# Patient Record
Sex: FEMALE | Race: BLACK OR AFRICAN AMERICAN | NOT HISPANIC OR LATINO | Employment: FULL TIME | URBAN - METROPOLITAN AREA
[De-identification: names, ages, dates, MRNs, and addresses within clinical notes are randomized per-mention and may not be internally consistent; named-entity substitution may affect disease eponyms.]

---

## 2017-01-24 ENCOUNTER — HOSPITAL ENCOUNTER (OUTPATIENT)
Dept: GENERAL RADIOLOGY | Facility: HOSPITAL | Age: 52
Discharge: HOME | End: 2017-01-24
Admitting: ORTHOPAEDIC SURGERY

## 2017-01-24 ENCOUNTER — APPOINTMENT (OUTPATIENT)
Dept: PREADMISSION TESTING | Facility: HOSPITAL | Age: 52
End: 2017-01-24

## 2017-01-24 VITALS
TEMPERATURE: 97.1 F | SYSTOLIC BLOOD PRESSURE: 137 MMHG | OXYGEN SATURATION: 98 % | HEART RATE: 82 BPM | HEIGHT: 62 IN | BODY MASS INDEX: 43.98 KG/M2 | RESPIRATION RATE: 16 BRPM | DIASTOLIC BLOOD PRESSURE: 97 MMHG | WEIGHT: 239 LBS

## 2017-01-24 LAB
APTT PPP: 25.6 SECONDS (ref 22.7–35.4)
BASOPHILS # BLD AUTO: 0.04 10*3/MM3 (ref 0–0.2)
BASOPHILS NFR BLD AUTO: 0.6 % (ref 0–1.5)
BILIRUB UR QL STRIP: NEGATIVE
CLARITY UR: CLEAR
COLOR UR: YELLOW
DEPRECATED RDW RBC AUTO: 49 FL (ref 37–54)
EOSINOPHIL # BLD AUTO: 0.1 10*3/MM3 (ref 0–0.7)
EOSINOPHIL NFR BLD AUTO: 1.6 % (ref 0.3–6.2)
ERYTHROCYTE [DISTWIDTH] IN BLOOD BY AUTOMATED COUNT: 14.5 % (ref 11.7–13)
GLUCOSE UR STRIP-MCNC: NEGATIVE MG/DL
HCT VFR BLD AUTO: 41.4 % (ref 35.6–45.5)
HGB BLD-MCNC: 13.5 G/DL (ref 11.9–15.5)
HGB UR QL STRIP.AUTO: NEGATIVE
IMM GRANULOCYTES # BLD: 0 10*3/MM3 (ref 0–0.03)
IMM GRANULOCYTES NFR BLD: 0 % (ref 0–0.5)
INR PPP: 0.94 (ref 0.9–1.1)
KETONES UR QL STRIP: NEGATIVE
LEUKOCYTE ESTERASE UR QL STRIP.AUTO: NEGATIVE
LYMPHOCYTES # BLD AUTO: 2.66 10*3/MM3 (ref 0.9–4.8)
LYMPHOCYTES NFR BLD AUTO: 42.8 % (ref 19.6–45.3)
MCH RBC QN AUTO: 29.9 PG (ref 26.9–32)
MCHC RBC AUTO-ENTMCNC: 32.6 G/DL (ref 32.4–36.3)
MCV RBC AUTO: 91.8 FL (ref 80.5–98.2)
MONOCYTES # BLD AUTO: 0.33 10*3/MM3 (ref 0.2–1.2)
MONOCYTES NFR BLD AUTO: 5.3 % (ref 5–12)
NEUTROPHILS # BLD AUTO: 3.08 10*3/MM3 (ref 1.9–8.1)
NEUTROPHILS NFR BLD AUTO: 49.7 % (ref 42.7–76)
NITRITE UR QL STRIP: NEGATIVE
PH UR STRIP.AUTO: 7 [PH] (ref 5–8)
PLATELET # BLD AUTO: 303 10*3/MM3 (ref 140–500)
PMV BLD AUTO: 10.5 FL (ref 6–12)
PROT UR QL STRIP: NEGATIVE
PROTHROMBIN TIME: 12.2 SECONDS (ref 11.7–14.2)
RBC # BLD AUTO: 4.51 10*6/MM3 (ref 3.9–5.2)
SP GR UR STRIP: 1.01 (ref 1–1.03)
UROBILINOGEN UR QL STRIP: NORMAL
WBC NRBC COR # BLD: 6.21 10*3/MM3 (ref 4.5–10.7)

## 2017-01-24 PROCEDURE — 85025 COMPLETE CBC W/AUTO DIFF WBC: CPT | Performed by: ORTHOPAEDIC SURGERY

## 2017-01-24 PROCEDURE — 36415 COLL VENOUS BLD VENIPUNCTURE: CPT

## 2017-01-24 PROCEDURE — 85610 PROTHROMBIN TIME: CPT | Performed by: ORTHOPAEDIC SURGERY

## 2017-01-24 PROCEDURE — 85730 THROMBOPLASTIN TIME PARTIAL: CPT | Performed by: ORTHOPAEDIC SURGERY

## 2017-01-24 PROCEDURE — 81003 URINALYSIS AUTO W/O SCOPE: CPT | Performed by: ORTHOPAEDIC SURGERY

## 2017-01-24 PROCEDURE — 71020 HC CHEST PA AND LATERAL: CPT

## 2017-01-24 RX ORDER — AMLODIPINE BESYLATE 10 MG/1
10 TABLET ORAL EVERY MORNING
COMMUNITY
End: 2017-09-18 | Stop reason: SDUPTHER

## 2017-01-24 RX ORDER — CHLORHEXIDINE GLUCONATE 500 MG/1
CLOTH TOPICAL
Status: ON HOLD | COMMUNITY
End: 2017-01-31

## 2017-01-24 RX ORDER — ESTRADIOL 0.07 MG/D
1 FILM, EXTENDED RELEASE TRANSDERMAL 2 TIMES WEEKLY
COMMUNITY
End: 2017-10-10 | Stop reason: SDUPTHER

## 2017-01-24 RX ORDER — FUROSEMIDE 40 MG/1
40 TABLET ORAL EVERY MORNING
COMMUNITY
End: 2017-08-22 | Stop reason: SDUPTHER

## 2017-01-24 NOTE — DISCHARGE INSTRUCTIONS
Take the following medications the morning of surgery with a small sip of water.  AMLODIPINE    ARRIVE AT 5:30    General Instructions:  • Do not eat or drink after midnight: includes water, mints, or gum. You may brush your teeth.  • Do not smoke, chew tobacco, or drink alcohol.  • Bring medications in original bottles, any inhalers and if applicable your C-PAP/ BI-PAP machine.  • Bring any papers given to you in the doctor’s office.  • Wear clean comfortable clothes and socks.  • Do not wear contact lenses or make-up.  Bring a case for your glasses if applicable.   • Bring crutches or walker if applicable.  • Leave all other valuables and jewelry at home.    If you were given a blood bank ID arm band remember to bring it with you the day of surgery.    Preventing a Surgical Site Infection:  Shower on the morning of surgery using a fresh bar of anti-bacterial soap (such as Dial) and clean washcloth.  Dry with a clean towel and dress in clean clothing.  For 2 to 3 days before surgery, avoid shaving with a razor near where you will have surgery because the razor can irritate skin and make it easier to develop an infection  Ask your surgeon if you will be receiving antibiotics prior to surgery  Make sure you, your family, and all healthcare providers clean their hands with soap and water or an alcohol based hand  before caring for you or your wound  If at all possible, quit smoking as many days before surgery as you can.    Day of surgery:  Upon arrival, a Pre-op nurse and Anesthesiologist will review your health history, obtain vital signs, and answer questions you may have.  The only belongings needed at this time will be your home medications and if applicable your C-PAP/BI-PAP machine.  If you are staying overnight your family can leave the rest of your belongings in the car and bring them to your room later.  A Pre-op nurse will start an IV and you may receive medication in preparation for surgery,  including something to help you relax.  Your family will be able to see you in the Pre-op area.  While you are in surgery your family should notify the waiting room  if they leave the waiting room area and provide a contact phone number.    Please be aware that surgery does come with discomfort.  We want to make every effort to control your discomfort so please discuss any uncontrolled symptoms with your nurse.   Your doctor will most likely have prescribed pain medications.      If you are going home after surgery you will receive individualized written care instructions before being discharged.  A responsible adult must drive you to and from the hospital on the day of your surgery and stay with you for 24 hours.    If you are staying overnight following surgery, you will be transported to your hospital room following the recovery period.  [facility] has all private rooms.    If you have any questions please call Pre-Admission Testing at [phone].  Deductibles and co-payments are collected on the day of service. Please be prepared to pay the required co-pay, deductible or deposit on the day of service as defined by your plan.    2% CHLORAHEXIDINE GLUCONATE* CLOTH  Preparing or “prepping” skin before surgery can reduce the risk of infection at the surgical site. To make the process easier, [facility] has chosen disposable cloths moistened with a rinse-free, 2% Chlorhexidine Gluconate (CHG) antiseptic solution. The steps below outline the prepping process and should be carefully followed.        Use the prep cloth on the area that is circled in the diagram             Directions Night before Surgery  1) Shower using a fresh bar of anti-bacterial soap (such as Dial) and clean washcloth.  Use a clean towel to completely dry your skin.  2) Do not use any lotions, oils or creams on your skin.  3) Open the package and remove 1 cloth, wipe your skin for 30 seconds in a circular  motion.  Allow to dry for 3 minutes.  4) Repeat #3 with second cloth.  5) Do not touch your eyes, ears, or mouth with the prep cloth.  6) Allow the wet prep solution to air dry.  7) Discard the prep cloth and wash your hands with soap and water.   8) Dress in clean bed clothes and sleep on fresh clean bed sheets.   9) You may experience some temporary itching after the prep.    Directions Day of Surgery  1) Repeat steps 1,2,3,4,5,6,7, and 9.   2) Dress in clean clothes before coming to the hospital.    BACTROBAN NASAL OINTMENT  There are many germs normally in your nose. Bactroban is an ointment that will help reduce these germs. Please follow these instructions for Bactroban use:    ____Two days before surgery in the evening Date________    ____The day before surgery in the morning  Date________    ____The day before surgery in the evening              Date________    ____The day of surgery in the morning    Date________    **Squirt ½ package of Bactroban Ointment onto a cotton applicator and apply to inside of 1st nostril.  Squirt the remaining Bactroban and apply to the inside of the other nostril.    PERIDEX- ORAL:  Use only if your surgeon has ordered  Use the night before and morning of surgery - Swish, gargle, and spit - do not swallow.

## 2017-01-24 NOTE — MR AVS SNAPSHOT
Joselynromaine Grant   2017 12:00 PM   Appointment    Provider:  FRANKI De Leon   Department:  [facility] PREADMISSION T   Dept Phone:  [phone]                Your Full Care Plan              Your Updated Medication List          This list is accurate as of: 17 12:46 PM.  Always use your most recent med list.                amLODIPine 10 MG tablet   Commonly known as:  NORVASC       Chlorhexidine Gluconate Cloth 2 % pads       diclofenac 50 MG tablet   Commonly known as:  CATAFLAM       estradiol 0.075 MG/24HR patch   Commonly known as:  BRIDGER JOHN-DOT       * furosemide 40 MG tablet   Commonly known as:  LASIX   Take 1 tablet by mouth Daily.       * furosemide 40 MG tablet   Commonly known as:  LASIX       MULTI VITAMIN PO       mupirocin 2 % nasal ointment   Commonly known as:  BACTROBAN       POTASSIUM PO       traMADol 50 MG tablet   Commonly known as:  ULTRAM       ULTIMATE FAT BURNER tablet       * Notice:  This list has 2 medication(s) that are the same as other medications prescribed for you. Read the directions carefully, and ask your doctor or other care provider to review them with you.            [employer] Signup     [facility] [employer] allows you to send messages to your doctor, view your test results, renew your prescriptions, schedule appointments, and more. To sign up, go to [employer] and click on the Sign Up Now link in the New User? box. Enter your [employer] Activation Code exactly as it appears below along with the last four digits of your Social Security Number and your Date of Birth () to complete the sign-up process. If you do not sign up before the expiration date, you must request a new code.    [employer] Activation Code: [email or web address]  Expires: 2017  5:38 AM    If you have questions, you can email "[email or web address] or call [phone] to talk to our [employer] staff. Remember, [employer] is NOT to be used  "for urgent needs. For medical emergencies, dial 911.               Other Info from Your Visit           Allergies     No Known Allergies      Vital Signs     Blood Pressure Pulse Temperature Respirations Height Weight    137/97 (BP Location: Left arm, Patient Position: Sitting) 82 97.1 °F (36.2 °C) (Oral) 16 62\" (157.5 cm) 239 lb (108 kg)    Oxygen Saturation Body Mass Index Smoking Status             98% 43.71 kg/m2 Never Smoker           Discharge Instructions       Take the following medications the morning of surgery with a small sip of water.      ARRIVE AT 5:30    General Instructions:  • Do not eat or drink after midnight: includes water, mints, or gum. You may brush your teeth.  • Do not smoke, chew tobacco, or drink alcohol.  • Bring medications in original bottles, any inhalers and if applicable your C-PAP/ BI-PAP machine.  • Bring any papers given to you in the doctor’s office.  • Wear clean comfortable clothes and socks.  • Do not wear contact lenses or make-up.  Bring a case for your glasses if applicable.   • Bring crutches or walker if applicable.  • Leave all other valuables and jewelry at home.    If you were given a blood bank ID arm band remember to bring it with you the day of surgery.    Preventing a Surgical Site Infection:  Shower on the morning of surgery using a fresh bar of anti-bacterial soap (such as Dial) and clean washcloth.  Dry with a clean towel and dress in clean clothing.  For 2 to 3 days before surgery, avoid shaving with a razor near where you will have surgery because the razor can irritate skin and make it easier to develop an infection  Ask your surgeon if you will be receiving antibiotics prior to surgery  Make sure you, your family, and all healthcare providers clean their hands with soap and water or an alcohol based hand  before caring for you or your wound  If at all possible, quit smoking as many days before surgery as you can.    Day of surgery:  Upon arrival, " a Pre-op nurse and Anesthesiologist will review your health history, obtain vital signs, and answer questions you may have.  The only belongings needed at this time will be your home medications and if applicable your C-PAP/BI-PAP machine.  If you are staying overnight your family can leave the rest of your belongings in the car and bring them to your room later.  A Pre-op nurse will start an IV and you may receive medication in preparation for surgery, including something to help you relax.  Your family will be able to see you in the Pre-op area.  While you are in surgery your family should notify the waiting room  if they leave the waiting room area and provide a contact phone number.    Please be aware that surgery does come with discomfort.  We want to make every effort to control your discomfort so please discuss any uncontrolled symptoms with your nurse.   Your doctor will most likely have prescribed pain medications.      If you are going home after surgery you will receive individualized written care instructions before being discharged.  A responsible adult must drive you to and from the hospital on the day of your surgery and stay with you for 24 hours.    If you are staying overnight following surgery, you will be transported to your hospital room following the recovery period.  [facility] has all private rooms.    If you have any questions please call Pre-Admission Testing at [phone].  Deductibles and co-payments are collected on the day of service. Please be prepared to pay the required co-pay, deductible or deposit on the day of service as defined by your plan.    2% CHLORAHEXIDINE GLUCONATE* CLOTH  Preparing or “prepping” skin before surgery can reduce the risk of infection at the surgical site. To make the process easier, [facility] has chosen disposable cloths moistened with a rinse-free, 2% Chlorhexidine Gluconate (CHG) antiseptic solution. The steps below  outline the prepping process and should be carefully followed.        Use the prep cloth on the area that is circled in the diagram             Directions Night before Surgery  1) Shower using a fresh bar of anti-bacterial soap (such as Dial) and clean washcloth.  Use a clean towel to completely dry your skin.  2) Do not use any lotions, oils or creams on your skin.  3) Open the package and remove 1 cloth, wipe your skin for 30 seconds in a circular motion.  Allow to dry for 3 minutes.  4) Repeat #3 with second cloth.  5) Do not touch your eyes, ears, or mouth with the prep cloth.  6) Allow the wet prep solution to air dry.  7) Discard the prep cloth and wash your hands with soap and water.   8) Dress in clean bed clothes and sleep on fresh clean bed sheets.   9) You may experience some temporary itching after the prep.    Directions Day of Surgery  1) Repeat steps 1,2,3,4,5,6,7, and 9.   2) Dress in clean clothes before coming to the hospital.    BACTROBAN NASAL OINTMENT  There are many germs normally in your nose. Bactroban is an ointment that will help reduce these germs. Please follow these instructions for Bactroban use:    ____Two days before surgery in the evening Date________    ____The day before surgery in the morning  Date________    ____The day before surgery in the evening              Date________    ____The day of surgery in the morning    Date________    **Squirt ½ package of Bactroban Ointment onto a cotton applicator and apply to inside of 1st nostril.  Squirt the remaining Bactroban and apply to the inside of the other nostril.    PERIDEX- ORAL:  Use only if your surgeon has ordered  Use the night before and morning of surgery - Swish, gargle, and spit - do not swallow.       SYMPTOMS OF A STROKE    Call 911 or have someone take you to the Emergency Department if you have any of the following:    · Sudden numbness or weakness of your face, arm or leg especially on one side of the body  · Sudden  confusion, diffiiculty speaking or trouble understanding   · Changes in your vision or loss of sight in one eye  · Sudden severe headache with no known cause  · sudden dizziness, trouble walking, loss of balance or coordination    It is important to seek emergency care right away if you have further stroke symptoms. If you get emergency help quickly, the powerful clot-dissolving medicines can reduce the disabilities caused by a stroke.     For more information:    American Stroke Association  3-709-7-STROKE  www.strokeassociation.org           IF YOU SMOKE OR USE TOBACCO PLEASE READ THE FOLLOWING:    Why is smoking bad for me?  Smoking increases the risk of heart disease, lung disease, vascular disease, stroke, and cancer.     If you smoke, STOP!    If you would like more information on quitting smoking, please visit the [employer] website: [email or web address]   This link will provide additional resources including the QUIT line and the Beat the Pack support groups.     For more information:    American Cancer Society  ([phone] 912-9362    American Heart Association  1-252.453.7044

## 2017-01-30 PROBLEM — T84.023A INSTABILITY OF INTERNAL LEFT KNEE PROSTHESIS (HCC): Chronic | Status: ACTIVE | Noted: 2017-01-30

## 2017-01-31 ENCOUNTER — ANESTHESIA EVENT (OUTPATIENT)
Dept: PERIOP | Facility: HOSPITAL | Age: 52
End: 2017-01-31

## 2017-01-31 ENCOUNTER — APPOINTMENT (OUTPATIENT)
Dept: GENERAL RADIOLOGY | Facility: HOSPITAL | Age: 52
End: 2017-01-31

## 2017-01-31 ENCOUNTER — ANESTHESIA (OUTPATIENT)
Dept: PERIOP | Facility: HOSPITAL | Age: 52
End: 2017-01-31

## 2017-01-31 PROBLEM — E66.01 MORBID OBESITY WITH BMI OF 40.0-44.9, ADULT (HCC): Status: ACTIVE | Noted: 2017-01-31

## 2017-01-31 PROBLEM — M17.9 OSTEOARTHRITIS, KNEE: Status: ACTIVE | Noted: 2017-01-31

## 2017-01-31 PROCEDURE — 25010000002 PROPOFOL 10 MG/ML EMULSION: Performed by: NURSE ANESTHETIST, CERTIFIED REGISTERED

## 2017-01-31 PROCEDURE — 25010000002 KETOROLAC TROMETHAMINE PER 15 MG: Performed by: NURSE ANESTHETIST, CERTIFIED REGISTERED

## 2017-01-31 PROCEDURE — 25010000002 ROPIVACAINE PER 1 MG: Performed by: ANESTHESIOLOGY

## 2017-01-31 PROCEDURE — 73560 X-RAY EXAM OF KNEE 1 OR 2: CPT

## 2017-01-31 PROCEDURE — 25010000002 DEXAMETHASONE PER 1 MG: Performed by: NURSE ANESTHETIST, CERTIFIED REGISTERED

## 2017-01-31 PROCEDURE — 25010000002 NEOSTIGMINE 10 MG/10ML SOLUTION: Performed by: NURSE ANESTHETIST, CERTIFIED REGISTERED

## 2017-01-31 PROCEDURE — 25010000002 FENTANYL CITRATE (PF) 100 MCG/2ML SOLUTION: Performed by: NURSE ANESTHETIST, CERTIFIED REGISTERED

## 2017-01-31 PROCEDURE — 25010000003 CEFAZOLIN IN DEXTROSE 2-4 GM/100ML-% SOLUTION: Performed by: ORTHOPAEDIC SURGERY

## 2017-01-31 PROCEDURE — 25010000002 ONDANSETRON PER 1 MG: Performed by: NURSE ANESTHETIST, CERTIFIED REGISTERED

## 2017-01-31 RX ORDER — ONDANSETRON 2 MG/ML
INJECTION INTRAMUSCULAR; INTRAVENOUS AS NEEDED
Status: DISCONTINUED | OUTPATIENT
Start: 2017-01-31 | End: 2017-01-31 | Stop reason: SURG

## 2017-01-31 RX ORDER — LIDOCAINE HYDROCHLORIDE 20 MG/ML
INJECTION, SOLUTION INFILTRATION; PERINEURAL AS NEEDED
Status: DISCONTINUED | OUTPATIENT
Start: 2017-01-31 | End: 2017-01-31 | Stop reason: SURG

## 2017-01-31 RX ORDER — KETOROLAC TROMETHAMINE 30 MG/ML
INJECTION, SOLUTION INTRAMUSCULAR; INTRAVENOUS AS NEEDED
Status: DISCONTINUED | OUTPATIENT
Start: 2017-01-31 | End: 2017-01-31 | Stop reason: SURG

## 2017-01-31 RX ORDER — ROPIVACAINE HYDROCHLORIDE 5 MG/ML
INJECTION, SOLUTION EPIDURAL; INFILTRATION; PERINEURAL AS NEEDED
Status: DISCONTINUED | OUTPATIENT
Start: 2017-01-31 | End: 2017-01-31 | Stop reason: SURG

## 2017-01-31 RX ORDER — GLYCOPYRROLATE 0.2 MG/ML
INJECTION INTRAMUSCULAR; INTRAVENOUS AS NEEDED
Status: DISCONTINUED | OUTPATIENT
Start: 2017-01-31 | End: 2017-01-31 | Stop reason: SURG

## 2017-01-31 RX ORDER — LIDOCAINE HYDROCHLORIDE AND EPINEPHRINE BITARTRATE 20; .01 MG/ML; MG/ML
INJECTION, SOLUTION SUBCUTANEOUS AS NEEDED
Status: DISCONTINUED | OUTPATIENT
Start: 2017-01-31 | End: 2017-01-31 | Stop reason: SURG

## 2017-01-31 RX ORDER — ROCURONIUM BROMIDE 10 MG/ML
INJECTION, SOLUTION INTRAVENOUS AS NEEDED
Status: DISCONTINUED | OUTPATIENT
Start: 2017-01-31 | End: 2017-01-31 | Stop reason: SURG

## 2017-01-31 RX ORDER — TRANEXAMIC ACID 100 MG/ML
INJECTION, SOLUTION INTRAVENOUS AS NEEDED
Status: DISCONTINUED | OUTPATIENT
Start: 2017-01-31 | End: 2017-01-31 | Stop reason: SURG

## 2017-01-31 RX ORDER — DEXAMETHASONE SODIUM PHOSPHATE 10 MG/ML
INJECTION INTRAMUSCULAR; INTRAVENOUS AS NEEDED
Status: DISCONTINUED | OUTPATIENT
Start: 2017-01-31 | End: 2017-01-31 | Stop reason: SURG

## 2017-01-31 RX ORDER — NEOSTIGMINE METHYLSULFATE 1 MG/ML
INJECTION, SOLUTION INTRAVENOUS AS NEEDED
Status: DISCONTINUED | OUTPATIENT
Start: 2017-01-31 | End: 2017-01-31 | Stop reason: SURG

## 2017-01-31 RX ORDER — FENTANYL CITRATE 50 UG/ML
INJECTION, SOLUTION INTRAMUSCULAR; INTRAVENOUS AS NEEDED
Status: DISCONTINUED | OUTPATIENT
Start: 2017-01-31 | End: 2017-01-31 | Stop reason: SURG

## 2017-01-31 RX ORDER — PROPOFOL 10 MG/ML
VIAL (ML) INTRAVENOUS AS NEEDED
Status: DISCONTINUED | OUTPATIENT
Start: 2017-01-31 | End: 2017-01-31 | Stop reason: SURG

## 2017-01-31 RX ADMIN — LIDOCAINE HYDROCHLORIDE 60 MG: 20 INJECTION, SOLUTION INFILTRATION; PERINEURAL at 10:02

## 2017-01-31 RX ADMIN — CEFAZOLIN SODIUM 2 G: 2 INJECTION, SOLUTION INTRAVENOUS at 09:56

## 2017-01-31 RX ADMIN — TRANEXAMIC ACID 1000 MG: 100 INJECTION, SOLUTION INTRAVENOUS at 11:44

## 2017-01-31 RX ADMIN — GLYCOPYRROLATE 0.6 MG: 0.2 INJECTION INTRAMUSCULAR; INTRAVENOUS at 12:10

## 2017-01-31 RX ADMIN — FENTANYL CITRATE 100 MCG: 50 INJECTION, SOLUTION INTRAMUSCULAR; INTRAVENOUS at 10:02

## 2017-01-31 RX ADMIN — KETOROLAC TROMETHAMINE 30 MG: 30 INJECTION, SOLUTION INTRAMUSCULAR; INTRAVENOUS at 12:11

## 2017-01-31 RX ADMIN — ROPIVACAINE HYDROCHLORIDE 30 ML: 5 INJECTION, SOLUTION EPIDURAL; INFILTRATION; PERINEURAL at 08:40

## 2017-01-31 RX ADMIN — FENTANYL CITRATE 50 MCG: 50 INJECTION, SOLUTION INTRAMUSCULAR; INTRAVENOUS at 12:05

## 2017-01-31 RX ADMIN — NEOSTIGMINE METHYLSULFATE 4 MG: 1 INJECTION INTRAVENOUS at 12:10

## 2017-01-31 RX ADMIN — ROCURONIUM BROMIDE 50 MG: 10 INJECTION INTRAVENOUS at 10:02

## 2017-01-31 RX ADMIN — ONDANSETRON 4 MG: 2 INJECTION INTRAMUSCULAR; INTRAVENOUS at 11:50

## 2017-01-31 RX ADMIN — SODIUM CHLORIDE, POTASSIUM CHLORIDE, SODIUM LACTATE AND CALCIUM CHLORIDE: 600; 310; 30; 20 INJECTION, SOLUTION INTRAVENOUS at 07:45

## 2017-01-31 RX ADMIN — DEXAMETHASONE SODIUM PHOSPHATE 8 MG: 10 INJECTION INTRAMUSCULAR; INTRAVENOUS at 10:05

## 2017-01-31 RX ADMIN — FENTANYL CITRATE 50 MCG: 50 INJECTION, SOLUTION INTRAMUSCULAR; INTRAVENOUS at 10:10

## 2017-01-31 RX ADMIN — PROPOFOL 200 MG: 10 INJECTION, EMULSION INTRAVENOUS at 10:02

## 2017-01-31 RX ADMIN — LIDOCAINE HYDROCHLORIDE AND EPINEPHRINE 30 ML: 20; 10 INJECTION, SOLUTION INFILTRATION; PERINEURAL at 08:40

## 2017-01-31 RX ADMIN — EPHEDRINE SULFATE 5 MG: 50 INJECTION INTRAMUSCULAR; INTRAVENOUS; SUBCUTANEOUS at 11:11

## 2017-01-31 RX ADMIN — SODIUM CHLORIDE, POTASSIUM CHLORIDE, SODIUM LACTATE AND CALCIUM CHLORIDE: 600; 310; 30; 20 INJECTION, SOLUTION INTRAVENOUS at 12:06

## 2017-01-31 RX ADMIN — FENTANYL CITRATE 50 MCG: 50 INJECTION, SOLUTION INTRAMUSCULAR; INTRAVENOUS at 10:40

## 2017-01-31 RX ADMIN — ROCURONIUM BROMIDE 10 MG: 10 INJECTION INTRAVENOUS at 11:18

## 2017-01-31 NOTE — ANESTHESIA PROCEDURE NOTES
Peripheral Block    Patient location during procedure: holding area  Start time: 1/31/2017 8:30 AM  Stop time: 1/31/2017 8:50 AM  Reason for block: at surgeon's request and post-op pain management  Preanesthetic Checklist  Completed: patient identified, site marked, surgical consent, pre-op evaluation, timeout performed, IV checked, risks and benefits discussed and monitors and equipment checked  Peripheral Block Prep:  Sterile barriers:cap and gloves  Prep: ChloraPrep  Patient monitoring: blood pressure monitoring, continuous pulse oximetry and EKG  Peripheral Procedure  Sedation:yes  Guidance:nerve stimulator  Images:still images not obtained  Laterality:leftBlock Type:femoral and sciatic  Injection Technique:single-shotNeedle Type:short-bevel  Needle Gauge:22 G    Medications  Local Injected:ropivacaine 0.5% without epinephrine and lidocaine 2% with epinephrine Local Amount Injected:60mL  Post Assessment  Patient Tolerance:comfortable throughout block  Complications:no

## 2017-01-31 NOTE — ANESTHESIA PROCEDURE NOTES
Airway  Urgency: elective    Airway not difficult    General Information and Staff    Patient location during procedure: OR  Anesthesiologist: MARY CABRAL  CRNA: KRISTI WALTON    Indications and Patient Condition  Indications for airway management: airway protection    Preoxygenated: yes  MILS not maintained throughout  Mask difficulty assessment: 1 - vent by mask    Final Airway Details  Final airway type: endotracheal airway      Successful airway: ETT  Cuffed: yes   Successful intubation technique: direct laryngoscopy  Facilitating devices/methods: intubating stylet  Endotracheal tube insertion site: oral  Blade: Jalen  Blade size: #3  ETT size: 7.0 mm  Cormack-Lehane Classification: grade I - full view of glottis  Placement verified by: chest auscultation   Cuff volume (mL): 8  Measured from: lips  ETT to lips (cm): 21  Number of attempts at approach: 1    Additional Comments  PreO2 100% face mask, IV induction, easy mask, DVL x1, cords noted, tube through, cuff up, EBBSH, +etCO2, = chest movement, tube secured in place, atraumatic, teeth and lips intact as preop.

## 2017-01-31 NOTE — ANESTHESIA POSTPROCEDURE EVALUATION
Patient: Joselyn Grant    Procedure Summary     Date Anesthesia Start Anesthesia Stop Room / Location    01/31/17 0956 1236  [facility] OR 22 /  [facility]       Procedure Diagnosis Surgeon Provider    LT TOTAL KNEE ARTHROPLASTY REVISION (Left Knee) Instability of internal left knee prosthesis, initial encounter MD Belle Sy MD          Anesthesia Type: general  Last vitals  /84 (01/31/17 1315)    Temp 36.4 °C (97.6 °F) (01/31/17 1315)    Pulse 89 (01/31/17 1315)   Resp 16 (01/31/17 1315)    SpO2 95 % (01/31/17 1315)      Post Anesthesia Care and Evaluation    Patient location during evaluation: PACU  Patient participation: complete - patient participated  Level of consciousness: awake and alert  Pain management: adequate  Airway patency: patent  Anesthetic complications: No anesthetic complications    Cardiovascular status: acceptable  Respiratory status: acceptable  Hydration status: acceptable

## 2017-01-31 NOTE — ANESTHESIA PREPROCEDURE EVALUATION
Anesthesia Evaluation     Patient summary reviewed and Nursing notes reviewed    Airway   Mallampati: II  Neck ROM: full  no difficulty expected  Dental - normal exam     Pulmonary     breath sounds clear to auscultation  Cardiovascular   (+) hypertension,     Rhythm: regular    Neuro/Psych  GI/Hepatic/Renal/Endo    (+) obesity, morbid obesity,     Musculoskeletal     Abdominal   (+) obese,    Substance History      OB/GYN          Other                             Anesthesia Plan    ASA 3     general   (FEM/Sciatic)  intravenous induction   Anesthetic plan and risks discussed with patient.

## 2017-02-02 PROBLEM — T84.023A INSTABILITY OF INTERNAL LEFT KNEE PROSTHESIS: Chronic | Status: RESOLVED | Noted: 2017-01-30 | Resolved: 2017-02-02

## 2017-02-02 PROBLEM — M17.9 OSTEOARTHRITIS, KNEE: Status: RESOLVED | Noted: 2017-01-31 | Resolved: 2017-02-02

## 2017-02-21 ENCOUNTER — TRANSCRIBE ORDERS (OUTPATIENT)
Dept: PHYSICAL THERAPY | Facility: CLINIC | Age: 52
End: 2017-02-21

## 2017-02-21 ENCOUNTER — TREATMENT (OUTPATIENT)
Dept: PHYSICAL THERAPY | Facility: CLINIC | Age: 52
End: 2017-02-21

## 2017-02-21 DIAGNOSIS — M25.562 ACUTE PAIN OF LEFT KNEE: Primary | ICD-10-CM

## 2017-02-21 DIAGNOSIS — Z96.652 STATUS POST REVISION OF TOTAL KNEE, LEFT: ICD-10-CM

## 2017-02-21 DIAGNOSIS — M25.662 KNEE STIFFNESS, LEFT: ICD-10-CM

## 2017-02-21 DIAGNOSIS — M25.562 LEFT KNEE PAIN, UNSPECIFIED CHRONICITY: Primary | ICD-10-CM

## 2017-02-21 PROCEDURE — G0283 ELEC STIM OTHER THAN WOUND: HCPCS | Performed by: PHYSICAL THERAPIST

## 2017-02-21 PROCEDURE — 97110 THERAPEUTIC EXERCISES: CPT | Performed by: PHYSICAL THERAPIST

## 2017-02-21 PROCEDURE — 97161 PT EVAL LOW COMPLEX 20 MIN: CPT | Performed by: PHYSICAL THERAPIST

## 2017-02-21 NOTE — PROGRESS NOTES
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  Previous hx of left knee ACL reconstruction, TKA, and TKA revision.  Currently ambulating with straight cane.  Reports left sciatic nerve pain of several months duration as well, is on medication for this and states it is helping.    Quality of life: good    Pain  Location: left knee  Quality: dull ache, sharp, burning and throbbing  Relieving factors: ice and medications  Aggravating factors: movement  Progression: improved    Diagnostic Tests  X-ray: normal (post op)    Patient Goals  Patient goals for therapy: decreased pain, increased motion, independence with ADLs/IADLs, increased strength, return to sport/leisure activities and return to work  Patient goal:              Objective     Observations   Left Knee   Positive for incision.     Additional Observation Details  Incision healing well, no signs of infection    Palpation   Left   Tenderness of the distal semimembranosus, distal semitendinosus and rectus femoris.     Tenderness   Left Knee   Tenderness in the lateral joint line, medial joint line, patellar tendon and quadriceps tendon.     Neurological Testing   Sensation     Knee   Left Knee   Diminished: light touch     Comments   Left light touch: left lateral knee.     Active Range of Motion   Left Knee   Flexion: 97 degrees   Extension: 6 degrees     Right Knee   Normal active range of motion    Passive Range of Motion   Left Knee   Flexion: 100 degrees   Extension: 2 degrees     Right Knee   Normal passive range of motion    Strength/Myotome Testing      Left Hip   Planes of Motion   Flexion: 4-  Extension: 3-  Abduction: 3-  External rotation: 3-  Internal rotation: 3-    Right Hip   Planes of Motion   Flexion: 5  Extension: 5  Abduction: 5  External rotation: 5  Internal rotation: 5    Left Knee   Flexion: 3-  Extension: 3-  Quadriceps contraction: fair    Right Knee   Flexion: 5  Extension: 5  Quadriceps contraction: good    Ambulation   Weight-Bearing Status   Weight-Bearing Status (Left): weight-bearing as tolerated   Assistive device used: single point cane    Observational Gait   Gait: antalgic   Decreased walking speed, stride length and left stance time.   Left foot contact pattern: foot flat         Assessment & Plan     Assessment  Impairments: abnormal gait, abnormal or restricted ROM, activity intolerance, impaired balance, impaired physical strength, lacks appropriate home exercise program, pain with function and weight-bearing intolerance  Assessment details: Pt will benefit from skilled PT services in order to address listed impairments and increase tolerance to normal daily activities including ADL's, work and recreational activities.    Prognosis: good  Prognosis details: Short Term Goals: 4-6 weeks. Patient will:  1.  Patient to be adherent with stretching and HEP.  3.  (L) MMT 4/5 for ability to ascend/descend 5 steps and transfer sit to stand x 5 with knee pain <3/10  4.  Increased hip joint, patellar mobility to allow for decreased stress at tibiofemoral, patellofemoral joint. (knee ROM 0°-110°)  5.  Pt. to exhibit increased LE soft tissue extensibility to allow for increased ease with walking 10 minutes.     Long Term Goals: 6-12 weeks. Patient will:  1.  Pt to score 80% perceived normal ability on LEFS  2.  (L) LE strength to at least 4+/5 to ascend/descend 5 steps without pain >1/10.  3.  Pt to exhibit improved Knee AROM 0 degrees-125 degrees to allow for improved gait, kneeling, bending squatting as is necessary for home management and  work tasks.    4.  Pt to exhibit LE endurance/strength to 4+/5 to allow for returning to unrestricted walking > 20 min.    5.  Negative findings for special testing for dysfunction.   6.  Pt to demonstrate increased stability of the knee to balance on left for 20 seconds as needed to traverse uneven terrain .    Goals        Plan  Therapy options: will be seen for skilled physical therapy services  Planned modality interventions: cryotherapy, electrical stimulation/Russian stimulation and thermotherapy (hydrocollator packs)  Planned therapy interventions: balance/weight-bearing training, body mechanics training, flexibility, functional ROM exercises, gait training, home exercise program, joint mobilization, manual therapy, neuromuscular re-education, postural training, soft tissue mobilization, strengthening, stretching and therapeutic activities  Frequency: 3x week  Duration in weeks: 12  Treatment plan discussed with: patient        Manual Therapy:    0     mins  14467;  Therapeutic Exercise:    23     mins  48446;     Neuromuscular Driss:    0    mins  11571;    Therapeutic Activity:     0     mins  16800;     Gait Trainin     mins  22496;     Ultrasound:     0     mins  63599;    Electrical Stimulation:    15     mins  86629 ( );    Timed Treatment:   23   mins   Total Treatment:     65   mins    PT SIGNATURE: Zena Longo PT, DPT          Physical Therapist                                [place] License #[id number]    DATE TREATMENT INITIATED: 2017    Initial Certification Certification Period: 2017  I certify that the therapy services are furnished while this patient is under my care.  The services outlined above are required by this patient, and will be reviewed every 90 days.     PHYSICIAN: Ha Oh MD      DATE:     Please sign and return via fax to [phone].. Thank you, [facility].

## 2017-02-22 ENCOUNTER — TREATMENT (OUTPATIENT)
Dept: PHYSICAL THERAPY | Facility: CLINIC | Age: 52
End: 2017-02-22

## 2017-02-22 DIAGNOSIS — Z96.652 STATUS POST REVISION OF TOTAL KNEE, LEFT: ICD-10-CM

## 2017-02-22 DIAGNOSIS — M25.662 KNEE STIFFNESS, LEFT: ICD-10-CM

## 2017-02-22 DIAGNOSIS — M25.562 LEFT KNEE PAIN, UNSPECIFIED CHRONICITY: Primary | ICD-10-CM

## 2017-02-22 PROCEDURE — 97110 THERAPEUTIC EXERCISES: CPT | Performed by: PHYSICAL THERAPIST

## 2017-02-22 PROCEDURE — G0283 ELEC STIM OTHER THAN WOUND: HCPCS | Performed by: PHYSICAL THERAPIST

## 2017-02-22 NOTE — PROGRESS NOTES
Physical Therapy Daily Progress Note    Subjective     Joselyn Grant reports: sore after initial evaluation, is having difficulty sleeping      Objective   See Exercise, Manual, and Modality Logs for complete treatment.       Assessment/Plan  Difficulty with exercises today because of knee and sciatic pain.  ROM is progressing well, however.  Progress per Plan of Care           Manual Therapy:    0     mins  44920;  Therapeutic Exercise:    30     mins  97657;     Neuromuscular Driss:    0    mins  86377;    Therapeutic Activity:     0     mins  91170;     Gait Trainin     mins  18971;     Ultrasound:     0     mins  78834;    Electrical Stimulation:    15     mins  95429 ( );    Timed Treatment:   30   mins   Total Treatment:     45   mins    Zena Longo PT, DPT  Physical Therapist  [place] License #[id number]

## 2017-02-24 ENCOUNTER — TREATMENT (OUTPATIENT)
Dept: PHYSICAL THERAPY | Facility: CLINIC | Age: 52
End: 2017-02-24

## 2017-02-24 DIAGNOSIS — M25.662 KNEE STIFFNESS, LEFT: ICD-10-CM

## 2017-02-24 DIAGNOSIS — Z96.652 STATUS POST REVISION OF TOTAL KNEE, LEFT: ICD-10-CM

## 2017-02-24 DIAGNOSIS — M25.562 LEFT KNEE PAIN, UNSPECIFIED CHRONICITY: Primary | ICD-10-CM

## 2017-02-24 PROCEDURE — 97110 THERAPEUTIC EXERCISES: CPT | Performed by: PHYSICAL THERAPIST

## 2017-02-24 PROCEDURE — G0283 ELEC STIM OTHER THAN WOUND: HCPCS | Performed by: PHYSICAL THERAPIST

## 2017-02-24 NOTE — PROGRESS NOTES
Physical Therapy Daily Progress Note    Subjective     Joselyn Grant reports: knee is more painful today, unsure of cause.      Objective   See Exercise, Manual, and Modality Logs for complete treatment.       Assessment/Plan  Unable to progress exercises further today because of pain/nausea.  Progress per Plan of Care           Manual Therapy:    0     mins  89574;  Therapeutic Exercise:    40     mins  21970;     Neuromuscular Driss:    0    mins  89320;    Therapeutic Activity:     0     mins  54441;     Gait Trainin     mins  85470;     Ultrasound:     0     mins  18942;    Electrical Stimulation:    15     mins  21100 ( );    Timed Treatment:   40   mins   Total Treatment:     55   mins    Zena Longo PT, DPT  Physical Therapist  [place] License #[id number]

## 2017-03-02 ENCOUNTER — TREATMENT (OUTPATIENT)
Dept: PHYSICAL THERAPY | Facility: CLINIC | Age: 52
End: 2017-03-02

## 2017-03-02 DIAGNOSIS — M25.562 LEFT KNEE PAIN, UNSPECIFIED CHRONICITY: Primary | ICD-10-CM

## 2017-03-02 DIAGNOSIS — Z96.652 STATUS POST REVISION OF TOTAL KNEE, LEFT: ICD-10-CM

## 2017-03-02 DIAGNOSIS — M25.662 KNEE STIFFNESS, LEFT: ICD-10-CM

## 2017-03-02 PROCEDURE — G0283 ELEC STIM OTHER THAN WOUND: HCPCS | Performed by: PHYSICAL THERAPIST

## 2017-03-02 PROCEDURE — 97110 THERAPEUTIC EXERCISES: CPT | Performed by: PHYSICAL THERAPIST

## 2017-03-02 NOTE — PROGRESS NOTES
Physical Therapy Daily Progress Note    Subjective     Joselyn Grant reports: her movement, quad is improved.  Pain is also a little better.      Objective   See Exercise, Manual, and Modality Logs for complete treatment.       Assessment/Plan  Able to perform 8 reps of SLR independently without extensor lag.  ROM progressing normally.  Will benefit from continued PT addressing ROM and strength.  Progress per Plan of Care           Manual Therapy:    0     mins  44359;  Therapeutic Exercise:    30     mins  95400;     Neuromuscular Driss:    0    mins  67562;    Therapeutic Activity:     0     mins  40904;     Gait Trainin     mins  71807;     Ultrasound:     0     mins  59407;    Electrical Stimulation:    15     mins  98534 ( );    Timed Treatment:   30   mins   Total Treatment:     45   mins    Zena Longo, PT, DPT  Physical Therapist  [place] License #[id number]

## 2017-03-03 ENCOUNTER — TREATMENT (OUTPATIENT)
Dept: PHYSICAL THERAPY | Facility: CLINIC | Age: 52
End: 2017-03-03

## 2017-03-03 DIAGNOSIS — M25.662 KNEE STIFFNESS, LEFT: ICD-10-CM

## 2017-03-03 DIAGNOSIS — M25.562 LEFT KNEE PAIN, UNSPECIFIED CHRONICITY: Primary | ICD-10-CM

## 2017-03-03 DIAGNOSIS — Z96.652 STATUS POST REVISION OF TOTAL KNEE, LEFT: ICD-10-CM

## 2017-03-03 PROCEDURE — 97110 THERAPEUTIC EXERCISES: CPT | Performed by: PHYSICAL THERAPIST

## 2017-03-03 PROCEDURE — G0283 ELEC STIM OTHER THAN WOUND: HCPCS | Performed by: PHYSICAL THERAPIST

## 2017-03-03 NOTE — PROGRESS NOTES
" Physical Therapy Daily Progress Note    Subjective     Joselyn Grant reports: she feels stronger, \"more bendy\", is walking better      Objective   See Exercise, Manual, and Modality Logs for complete treatment.       Assessment/Plan  Ease of movement improved, ROM progressing as expected.  Step length and speed with gait improved.  Progress per Plan of Care           Manual Therapy:    0     mins  41842;  Therapeutic Exercise:    40     mins  10423;     Neuromuscular Driss:    0    mins  93764;    Therapeutic Activity:     0     mins  57695;     Gait Trainin     mins  40091;     Ultrasound:     0     mins  22104;    Electrical Stimulation:    15     mins  77631 ( );    Timed Treatment:   40   mins   Total Treatment:     55   mins    Zena Longo PT, DPT  Physical Therapist  [place] License #[id number]                    "

## 2017-03-06 ENCOUNTER — TREATMENT (OUTPATIENT)
Dept: PHYSICAL THERAPY | Facility: CLINIC | Age: 52
End: 2017-03-06

## 2017-03-06 DIAGNOSIS — M25.662 KNEE STIFFNESS, LEFT: ICD-10-CM

## 2017-03-06 DIAGNOSIS — M25.562 LEFT KNEE PAIN, UNSPECIFIED CHRONICITY: Primary | ICD-10-CM

## 2017-03-06 DIAGNOSIS — Z96.652 STATUS POST REVISION OF TOTAL KNEE, LEFT: ICD-10-CM

## 2017-03-06 PROCEDURE — G0283 ELEC STIM OTHER THAN WOUND: HCPCS | Performed by: PHYSICAL THERAPIST

## 2017-03-06 PROCEDURE — 97110 THERAPEUTIC EXERCISES: CPT | Performed by: PHYSICAL THERAPIST

## 2017-03-06 NOTE — PROGRESS NOTES
Physical Therapy Daily Progress Note    Subjective     Joselyn Grant reports: she was able to increase her activity some over the weekend.  Also worked on extension, feels that she is able to get her knee completely straight.       Objective   See Exercise, Manual, and Modality Logs for complete treatment.       Assessment/Plan  Knee extension near zero.  Did well with neal walks, walking tolerance improving.  Progress per Plan of Care           Manual Therapy:    0     mins  65354;  Therapeutic Exercise:    40     mins  57292;     Neuromuscular Driss:    0    mins  80459;    Therapeutic Activity:     0     mins  68962;     Gait Trainin     mins  95533;     Ultrasound:     0     mins  48463;    Electrical Stimulation:    15     mins  12501 ( );    Timed Treatment:   40   mins   Total Treatment:     55   mins    Zena Longo PT, DPT  Physical Therapist  [place] License #[id number]

## 2017-03-07 ENCOUNTER — TREATMENT (OUTPATIENT)
Dept: PHYSICAL THERAPY | Facility: CLINIC | Age: 52
End: 2017-03-07

## 2017-03-07 DIAGNOSIS — M25.662 KNEE STIFFNESS, LEFT: ICD-10-CM

## 2017-03-07 DIAGNOSIS — M25.562 LEFT KNEE PAIN, UNSPECIFIED CHRONICITY: Primary | ICD-10-CM

## 2017-03-07 DIAGNOSIS — Z96.652 STATUS POST REVISION OF TOTAL KNEE, LEFT: ICD-10-CM

## 2017-03-07 PROCEDURE — 97110 THERAPEUTIC EXERCISES: CPT | Performed by: PHYSICAL THERAPIST

## 2017-03-07 PROCEDURE — G0283 ELEC STIM OTHER THAN WOUND: HCPCS | Performed by: PHYSICAL THERAPIST

## 2017-03-07 NOTE — PROGRESS NOTES
Physical Therapy Daily Progress Note    Subjective     Joselyn Grant reports: walking is getting easier, feels that her quad is getting stronger      Objective   See Exercise, Manual, and Modality Logs for complete treatment.       Assessment/Plan  Tolerated well, making good progress with standing exercises.  Progress per Plan of Care           Manual Therapy:    0     mins  53244;  Therapeutic Exercise:    40     mins  04295;     Neuromuscular Driss:    0    mins  58631;    Therapeutic Activity:     0     mins  28573;     Gait Trainin     mins  94730;     Ultrasound:     0     mins  81134;    Electrical Stimulation:    15     mins  58480 ( );    Timed Treatment:   40   mins   Total Treatment:     55   mins    Zena Longo PT, DPT  Physical Therapist  [place] License #[id number]

## 2017-03-10 ENCOUNTER — TREATMENT (OUTPATIENT)
Dept: PHYSICAL THERAPY | Facility: CLINIC | Age: 52
End: 2017-03-10

## 2017-03-10 DIAGNOSIS — Z96.652 STATUS POST REVISION OF TOTAL KNEE, LEFT: ICD-10-CM

## 2017-03-10 DIAGNOSIS — M25.662 KNEE STIFFNESS, LEFT: ICD-10-CM

## 2017-03-10 DIAGNOSIS — M25.562 LEFT KNEE PAIN, UNSPECIFIED CHRONICITY: Primary | ICD-10-CM

## 2017-03-10 PROCEDURE — 97110 THERAPEUTIC EXERCISES: CPT | Performed by: PHYSICAL THERAPIST

## 2017-03-10 PROCEDURE — G0283 ELEC STIM OTHER THAN WOUND: HCPCS | Performed by: PHYSICAL THERAPIST

## 2017-03-10 NOTE — PROGRESS NOTES
Physical Therapy Daily Progress Note    Subjective     Joselyn Grant reports: continued adherence with HEP.  Pain steadily improving.        Objective   See Exercise, Manual, and Modality Logs for complete treatment.       Assessment/Plan  ROM and gait continue to make steady progress.    Progress per Plan of Care           Manual Therapy:    0     mins  50254;  Therapeutic Exercise:    40     mins  57560;     Neuromuscular Driss:    0    mins  58548;    Therapeutic Activity:     0     mins  93480;     Gait Trainin     mins  37768;     Ultrasound:     0     mins  51323;    Electrical Stimulation:    15     mins  13778 ( );    Timed Treatment:   40   mins   Total Treatment:     55   mins    Zena Longo PT, DPT  Physical Therapist  [place] License #[id number]

## 2017-03-15 ENCOUNTER — TREATMENT (OUTPATIENT)
Dept: PHYSICAL THERAPY | Facility: CLINIC | Age: 52
End: 2017-03-15

## 2017-03-15 DIAGNOSIS — M25.562 LEFT KNEE PAIN, UNSPECIFIED CHRONICITY: Primary | ICD-10-CM

## 2017-03-15 DIAGNOSIS — M25.662 KNEE STIFFNESS, LEFT: ICD-10-CM

## 2017-03-15 DIAGNOSIS — Z96.652 STATUS POST TOTAL LEFT KNEE REPLACEMENT: ICD-10-CM

## 2017-03-15 DIAGNOSIS — Z96.652 STATUS POST REVISION OF TOTAL KNEE, LEFT: ICD-10-CM

## 2017-03-15 PROCEDURE — 97110 THERAPEUTIC EXERCISES: CPT | Performed by: PHYSICAL THERAPIST

## 2017-03-15 PROCEDURE — G0283 ELEC STIM OTHER THAN WOUND: HCPCS | Performed by: PHYSICAL THERAPIST

## 2017-03-15 NOTE — PROGRESS NOTES
Physical Therapy Daily Progress Note    Subjective     Joselyn Grant reports: she felt ill on Monday, rested and now feels much better.      Objective   See Exercise, Manual, and Modality Logs for complete treatment.       Assessment/Plan  Gait speed, pattern improved today.  Active flexion ROM also improved.  Will benefit from continued PT to address ROM, strength and gait.  Progress per Plan of Care           Manual Therapy:    0     mins  52399;  Therapeutic Exercise:    40     mins  04005;     Neuromuscular Driss:    0    mins  25337;    Therapeutic Activity:     0     mins  84504;     Gait Trainin     mins  13031;     Ultrasound:     0     mins  58095;    Electrical Stimulation:    15     mins  10851 ( );    Timed Treatment:   40   mins   Total Treatment:     55   mins    Zena Longo, PT, DPT  Physical Therapist  [place] License #[id number]

## 2017-03-17 ENCOUNTER — TREATMENT (OUTPATIENT)
Dept: PHYSICAL THERAPY | Facility: CLINIC | Age: 52
End: 2017-03-17

## 2017-03-17 DIAGNOSIS — Z96.652 STATUS POST TOTAL LEFT KNEE REPLACEMENT: ICD-10-CM

## 2017-03-17 DIAGNOSIS — M25.662 KNEE STIFFNESS, LEFT: ICD-10-CM

## 2017-03-17 DIAGNOSIS — M25.562 LEFT KNEE PAIN, UNSPECIFIED CHRONICITY: Primary | ICD-10-CM

## 2017-03-17 DIAGNOSIS — Z96.652 STATUS POST REVISION OF TOTAL KNEE, LEFT: ICD-10-CM

## 2017-03-17 PROCEDURE — 97110 THERAPEUTIC EXERCISES: CPT | Performed by: PHYSICAL THERAPIST

## 2017-03-17 PROCEDURE — G0283 ELEC STIM OTHER THAN WOUND: HCPCS | Performed by: PHYSICAL THERAPIST

## 2017-03-17 NOTE — PROGRESS NOTES
Re-Assessment / Re-Certification    Patient: Joselyn Grant   : 1965  Diagnosis/ICD-10 Code:  Left knee pain, unspecified chronicity [M25.562]  Referring practitioner: Ha Oh,*  Date of Initial Visit: 3/17/2017  Today's Date: 3/17/2017  Patient seen for 10 sessions      Subjective:   Joselyn Grant reports: increased ease with ADL's, able to walk 15 minutes during daily activities  Subjective Questionnaire: LEFS: 32/80 (improved from 26/80 at initial eval)  Clinical Progress: improved  Home Program Compliance: Yes  Treatment has included: therapeutic exercise, neuromuscular re-education, electrical stimulation and cryotherapy    Subjective   Objective     Active Range of Motion   Left Knee   Flexion: 106 degrees   Extension: 0 degrees     Strength/Myotome Testing     Left Knee   Flexion: 5  Extension: 4-  Quadriceps contraction: good    Right Knee   Flexion: 5  Extension: 5      Assessment/Plan  Progress toward previous goals: Partially Met    Short Term Goals: 4-6 weeks. Patient will:  1. Patient to be adherent with stretching and HEP. (MET)  3. (L) MMT 4/5 for ability to ascend/descend 5 steps and transfer sit to stand x 5 with knee pain <3/10 (NOT MET)  4. Increased hip joint, patellar mobility to allow for decreased stress at tibiofemoral, patellofemoral joint. (knee ROM 0°-110°)  5. Pt. to exhibit increased LE soft tissue extensibility to allow for increased ease with walking 10 minutes.  (MET)    Long Term Goals: 6-12 weeks. Patient will:  1. Pt to score 80% perceived normal ability on LEFS (NOT MET)  2. (L) LE strength to at least 4+/5 to ascend/descend 5 steps without pain >1/10. (NOT MET)  3. Pt to exhibit improved Knee AROM 0 degrees-125 degrees to allow for improved gait, kneeling, bending squatting as is necessary for home management and work tasks.  (NOT MET)  4. Pt to exhibit LE endurance/strength to 4+/5 to allow for returning to unrestricted walking > 20 min.  (NOT MET)  5.  Negative findings for special testing for dysfunction. (MET)  6. Pt to demonstrate increased stability of the knee to balance on left for 20 seconds as needed to traverse uneven terrain . (NOT MET)      Recommendations: Continue as planned  Timeframe: 3 months  Prognosis to achieve goals: good    PT Signature: Zena Longo, PT, DPT                         Physical Therapist                         [place] License #[id number]    Based upon review of the patient's progress and continued therapy plan, it is my medical opinion that Joselyn Grant should continue physical therapy treatment at [facility]  [address] [place] [id number].    Signature: __________________________________  Ha Oh MD    Manual Therapy:    0     mins  95392;  Therapeutic Exercise:    40     mins  74879;     Neuromuscular Driss:    0    mins  03101;    Therapeutic Activity:     0     mins  88015;     Gait Trainin     mins  08330;     Ultrasound:     0     mins  82740;    Electrical Stimulation:    15     mins  56360 ( );    Timed Treatment:   40   mins   Total Treatment:     55   mins

## 2017-03-20 ENCOUNTER — TREATMENT (OUTPATIENT)
Dept: PHYSICAL THERAPY | Facility: CLINIC | Age: 52
End: 2017-03-20

## 2017-03-20 DIAGNOSIS — M25.662 KNEE STIFFNESS, LEFT: ICD-10-CM

## 2017-03-20 DIAGNOSIS — M25.562 LEFT KNEE PAIN, UNSPECIFIED CHRONICITY: Primary | ICD-10-CM

## 2017-03-20 DIAGNOSIS — Z96.652 STATUS POST REVISION OF TOTAL KNEE, LEFT: ICD-10-CM

## 2017-03-20 PROCEDURE — G0283 ELEC STIM OTHER THAN WOUND: HCPCS | Performed by: PHYSICAL THERAPIST

## 2017-03-20 PROCEDURE — 97110 THERAPEUTIC EXERCISES: CPT | Performed by: PHYSICAL THERAPIST

## 2017-03-20 NOTE — PROGRESS NOTES
" Physical Therapy Daily Progress Note    Subjective     Joselyn Grant reports: \"feeling good today\", ADL's are getting easier.      Objective   See Exercise, Manual, and Modality Logs for complete treatment.       Assessment/Plan  Pt requires cueing to encourage working toward symmetrical weightbearing.  Overall, tolerated well without pain.  Progress per Plan of Care           Manual Therapy:    0     mins  92187;  Therapeutic Exercise:    40     mins  27477;     Neuromuscular Driss:    0    mins  46301;    Therapeutic Activity:     0     mins  06139;     Gait Trainin     mins  54093;     Ultrasound:     0     mins  35351;    Electrical Stimulation:    15     mins  57753 ( );    Timed Treatment:   40   mins   Total Treatment:     55   mins    Zena Longo, PT, DPT  Physical Therapist  [place] License #[id number]                    "

## 2017-03-22 ENCOUNTER — TREATMENT (OUTPATIENT)
Dept: PHYSICAL THERAPY | Facility: CLINIC | Age: 52
End: 2017-03-22

## 2017-03-22 DIAGNOSIS — M25.562 LEFT KNEE PAIN, UNSPECIFIED CHRONICITY: Primary | ICD-10-CM

## 2017-03-22 DIAGNOSIS — Z96.652 STATUS POST REVISION OF TOTAL KNEE, LEFT: ICD-10-CM

## 2017-03-22 DIAGNOSIS — M25.662 KNEE STIFFNESS, LEFT: ICD-10-CM

## 2017-03-22 DIAGNOSIS — Z96.652 STATUS POST TOTAL LEFT KNEE REPLACEMENT: ICD-10-CM

## 2017-03-22 PROCEDURE — 97110 THERAPEUTIC EXERCISES: CPT | Performed by: PHYSICAL THERAPIST

## 2017-03-22 PROCEDURE — G0283 ELEC STIM OTHER THAN WOUND: HCPCS | Performed by: PHYSICAL THERAPIST

## 2017-03-24 ENCOUNTER — TREATMENT (OUTPATIENT)
Dept: PHYSICAL THERAPY | Facility: CLINIC | Age: 52
End: 2017-03-24

## 2017-03-24 DIAGNOSIS — Z96.652 STATUS POST REVISION OF TOTAL KNEE, LEFT: ICD-10-CM

## 2017-03-24 DIAGNOSIS — M25.562 LEFT KNEE PAIN, UNSPECIFIED CHRONICITY: Primary | ICD-10-CM

## 2017-03-24 DIAGNOSIS — M25.662 KNEE STIFFNESS, LEFT: ICD-10-CM

## 2017-03-24 PROCEDURE — G0283 ELEC STIM OTHER THAN WOUND: HCPCS | Performed by: PHYSICAL THERAPIST

## 2017-03-24 PROCEDURE — 97110 THERAPEUTIC EXERCISES: CPT | Performed by: PHYSICAL THERAPIST

## 2017-03-24 NOTE — PROGRESS NOTES
Physical Therapy Daily Progress Note    Subjective     Joselyn Grant reports: increased swelling in thigh, knee and ankle since Wednesday.  States she did some housework and cooked a meal right before the swelling increased.      Objective   See Exercise, Manual, and Modality Logs for complete treatment.   PT noted increased edema LLE, no erythema, point tenderness.  Negative Lisa's.  PT contacted MD office to report change in symptoms and left detailed message with staff member, did not receive call back as of 1:48 in the afternoon.      Assessment/Plan  Held standing exercises today secondary to edema, focused on maintaining ROM and elevating LE.  Advised pt to rest/elevate/ice her knee, and to monitor for signs of DVT, she verbalizes understanding.    Progress per Plan of Care, resume full Rx as able.           Manual Therapy:    0     mins  68708;  Therapeutic Exercise:    25     mins  13108;     Neuromuscular Driss:    0    mins  32764;    Therapeutic Activity:     0     mins  96266;     Gait Trainin     mins  38484;     Ultrasound:     0     mins  88959;    Electrical Stimulation:    15     mins  12438 ( );    Timed Treatment:   25   mins   Total Treatment:     40   mins    Zena Longo PT, DPT  Physical Therapist  [place] License #[id number]

## 2017-03-27 ENCOUNTER — TREATMENT (OUTPATIENT)
Dept: PHYSICAL THERAPY | Facility: CLINIC | Age: 52
End: 2017-03-27

## 2017-03-27 DIAGNOSIS — Z96.652 STATUS POST REVISION OF TOTAL KNEE, LEFT: ICD-10-CM

## 2017-03-27 DIAGNOSIS — M25.662 KNEE STIFFNESS, LEFT: ICD-10-CM

## 2017-03-27 DIAGNOSIS — M25.562 LEFT KNEE PAIN, UNSPECIFIED CHRONICITY: Primary | ICD-10-CM

## 2017-03-27 PROCEDURE — G0283 ELEC STIM OTHER THAN WOUND: HCPCS | Performed by: PHYSICAL THERAPIST

## 2017-03-27 PROCEDURE — 97110 THERAPEUTIC EXERCISES: CPT | Performed by: PHYSICAL THERAPIST

## 2017-03-27 NOTE — PROGRESS NOTES
Physical Therapy Daily Progress Note    Subjective     Joselyn Grant reports: MD contacted her on Friday, she described her swelling, she was told if it worsened to call over the weekend.  Pt reports significant increase in swelling on , spent most of the day resting with LE elevated.  She is scheduled to see MD on Wednesday for follow up.      Objective   See Exercise, Manual, and Modality Logs for complete treatment.   Swelling is minimal at time of treatment today, no erythema, calf is soft and nontender.      Assessment/Plan  Reviewed signs of DVT with patient, advised her to go to ER if these symptoms develop. ROM is still WNL, tolerated exercises in clinic well without adverse reaction.  Progress strengthening /stabilization /functional activity           Manual Therapy:    0     mins  64469;  Therapeutic Exercise:    40     mins  23372;     Neuromuscular Driss:    0    mins  51280;    Therapeutic Activity:     0     mins  64678;     Gait Trainin     mins  46121;     Ultrasound:     0     mins  89887;    Electrical Stimulation:    15     mins  45488 ( );    Timed Treatment:   40   mins   Total Treatment:     55   mins    Zena Longo PT, DPT  Physical Therapist  [place] License #[id number]

## 2017-03-31 ENCOUNTER — TREATMENT (OUTPATIENT)
Dept: PHYSICAL THERAPY | Facility: CLINIC | Age: 52
End: 2017-03-31

## 2017-03-31 DIAGNOSIS — M25.562 LEFT KNEE PAIN, UNSPECIFIED CHRONICITY: Primary | ICD-10-CM

## 2017-03-31 DIAGNOSIS — Z96.652 STATUS POST REVISION OF TOTAL KNEE, LEFT: ICD-10-CM

## 2017-03-31 DIAGNOSIS — M25.662 KNEE STIFFNESS, LEFT: ICD-10-CM

## 2017-03-31 PROCEDURE — G0283 ELEC STIM OTHER THAN WOUND: HCPCS | Performed by: PHYSICAL THERAPIST

## 2017-03-31 PROCEDURE — 97110 THERAPEUTIC EXERCISES: CPT | Performed by: PHYSICAL THERAPIST

## 2017-04-03 ENCOUNTER — TREATMENT (OUTPATIENT)
Dept: PHYSICAL THERAPY | Facility: CLINIC | Age: 52
End: 2017-04-03

## 2017-04-03 DIAGNOSIS — M25.662 KNEE STIFFNESS, LEFT: ICD-10-CM

## 2017-04-03 DIAGNOSIS — Z96.652 STATUS POST REVISION OF TOTAL KNEE, LEFT: ICD-10-CM

## 2017-04-03 DIAGNOSIS — M25.562 LEFT KNEE PAIN, UNSPECIFIED CHRONICITY: Primary | ICD-10-CM

## 2017-04-03 PROCEDURE — 97116 GAIT TRAINING THERAPY: CPT | Performed by: PHYSICAL THERAPIST

## 2017-04-03 PROCEDURE — G0283 ELEC STIM OTHER THAN WOUND: HCPCS | Performed by: PHYSICAL THERAPIST

## 2017-04-03 PROCEDURE — 97110 THERAPEUTIC EXERCISES: CPT | Performed by: PHYSICAL THERAPIST

## 2017-04-03 NOTE — PROGRESS NOTES
Physical Therapy Daily Progress Note    Subjective     Joselyn Grant reports: she was able to walk around the block at home.  Is working hard on exercises.       Objective   See Exercise, Manual, and Modality Logs for complete treatment.       Assessment/Plan  Gait pattern improved after work in parallel bars, less lateral sway.  Able to increase weight on single leg press.  Progressing well toward goals.  Progress per Plan of Care           Manual Therapy:    0     mins  99608;  Therapeutic Exercise:    35     mins  28149;     Neuromuscular Driss:    0    mins  33128;    Therapeutic Activity:     0     mins  24164;     Gait Trainin    mins  04774;     Ultrasound:     0     mins  82930;    Electrical Stimulation:    15     mins  34422 ( );    Timed Treatment:   43   mins   Total Treatment:     57   mins    Zena Longo PT, DPT  Physical Therapist  [place] License #[id number]

## 2017-04-04 ENCOUNTER — TREATMENT (OUTPATIENT)
Dept: PHYSICAL THERAPY | Facility: CLINIC | Age: 52
End: 2017-04-04

## 2017-04-04 DIAGNOSIS — M25.662 KNEE STIFFNESS, LEFT: ICD-10-CM

## 2017-04-04 DIAGNOSIS — Z96.652 STATUS POST REVISION OF TOTAL KNEE, LEFT: ICD-10-CM

## 2017-04-04 DIAGNOSIS — M25.562 LEFT KNEE PAIN, UNSPECIFIED CHRONICITY: Primary | ICD-10-CM

## 2017-04-04 PROCEDURE — G0283 ELEC STIM OTHER THAN WOUND: HCPCS | Performed by: PHYSICAL THERAPIST

## 2017-04-04 PROCEDURE — 97110 THERAPEUTIC EXERCISES: CPT | Performed by: PHYSICAL THERAPIST

## 2017-04-04 NOTE — PROGRESS NOTES
Physical Therapy Daily Progress Note    Subjective     Joselyn Grant reports: she is steadily increasing her activity at home, feels like she is getting stronger.      Objective   See Exercise, Manual, and Modality Logs for complete treatment.       Assessment/Plan  Gait without AD is mor symmetrical, increased tolerance to weightbearing.  Will benefit from continued PT focused on strength, gait and endurance.  Progress per Plan of Care           Manual Therapy:    0     mins  13521;  Therapeutic Exercise:    45     mins  67522;     Neuromuscular Driss:    0    mins  26395;    Therapeutic Activity:     0     mins  99372;     Gait Trainin     mins  14895;     Ultrasound:     0     mins  05737;    Electrical Stimulation:    15     mins  63180 ( );    Timed Treatment:   45   mins   Total Treatment:     60   mins    Zena Longo, PT, DPT  Physical Therapist  [place] License #[id number]

## 2017-04-10 ENCOUNTER — TREATMENT (OUTPATIENT)
Dept: PHYSICAL THERAPY | Facility: CLINIC | Age: 52
End: 2017-04-10

## 2017-04-10 DIAGNOSIS — Z96.652 STATUS POST REVISION OF TOTAL KNEE, LEFT: ICD-10-CM

## 2017-04-10 DIAGNOSIS — M25.562 LEFT KNEE PAIN, UNSPECIFIED CHRONICITY: Primary | ICD-10-CM

## 2017-04-10 DIAGNOSIS — M25.662 KNEE STIFFNESS, LEFT: ICD-10-CM

## 2017-04-10 PROCEDURE — 97110 THERAPEUTIC EXERCISES: CPT | Performed by: PHYSICAL THERAPIST

## 2017-04-10 PROCEDURE — G0283 ELEC STIM OTHER THAN WOUND: HCPCS | Performed by: PHYSICAL THERAPIST

## 2017-04-10 PROCEDURE — 97140 MANUAL THERAPY 1/> REGIONS: CPT | Performed by: PHYSICAL THERAPIST

## 2017-04-12 ENCOUNTER — TREATMENT (OUTPATIENT)
Dept: PHYSICAL THERAPY | Facility: CLINIC | Age: 52
End: 2017-04-12

## 2017-04-12 DIAGNOSIS — M25.662 KNEE STIFFNESS, LEFT: ICD-10-CM

## 2017-04-12 DIAGNOSIS — M25.562 LEFT KNEE PAIN, UNSPECIFIED CHRONICITY: Primary | ICD-10-CM

## 2017-04-12 DIAGNOSIS — Z96.652 STATUS POST REVISION OF TOTAL KNEE, LEFT: ICD-10-CM

## 2017-04-12 PROCEDURE — G0283 ELEC STIM OTHER THAN WOUND: HCPCS | Performed by: PHYSICAL THERAPIST

## 2017-04-12 PROCEDURE — 97110 THERAPEUTIC EXERCISES: CPT | Performed by: PHYSICAL THERAPIST

## 2017-04-12 PROCEDURE — 97140 MANUAL THERAPY 1/> REGIONS: CPT | Performed by: PHYSICAL THERAPIST

## 2017-04-12 NOTE — PROGRESS NOTES
" Physical Therapy Daily Progress Note    Subjective     Joselyn Grant reports: she spoke to MD, who told her to \"take it easy\" for the next week, avoid weightbearing exercises.  If pain persists, he will see her in the office next week.  Addition of manual therapy felt good.      Objective   See Exercise, Manual, and Modality Logs for complete treatment.       Assessment/Plan  Tolerated non weightbearing exercises well without pain.    Progress per Plan of Care           Manual Therapy:    8     mins  30246;  Therapeutic Exercise:    30     mins  03012;     Neuromuscular Driss:    0    mins  80495;    Therapeutic Activity:     0     mins  62603;     Gait Trainin     mins  03865;     Ultrasound:     0     mins  39524;    Electrical Stimulation:    15     mins  15056 ( );    Timed Treatment:   38   mins   Total Treatment:     53   mins    Zena Longo PT, DPT  Physical Therapist  [place] License #[id number]    "

## 2017-04-18 ENCOUNTER — TRANSCRIBE ORDERS (OUTPATIENT)
Dept: ADMINISTRATIVE | Facility: HOSPITAL | Age: 52
End: 2017-04-18

## 2017-04-18 DIAGNOSIS — M25.552 LEFT HIP PAIN: ICD-10-CM

## 2017-04-18 DIAGNOSIS — M25.562 ACUTE PAIN OF LEFT KNEE: Primary | ICD-10-CM

## 2017-04-21 ENCOUNTER — HOSPITAL ENCOUNTER (OUTPATIENT)
Dept: NUCLEAR MEDICINE | Facility: HOSPITAL | Age: 52
Discharge: HOME | End: 2017-04-21
Attending: ORTHOPAEDIC SURGERY

## 2017-04-21 DIAGNOSIS — M25.552 LEFT HIP PAIN: ICD-10-CM

## 2017-04-21 DIAGNOSIS — M25.562 ACUTE PAIN OF LEFT KNEE: ICD-10-CM

## 2017-04-21 PROCEDURE — 78315 BONE IMAGING 3 PHASE: CPT

## 2017-04-21 PROCEDURE — 0 TECHNETIUM MEDRONATE KIT: Performed by: ORTHOPAEDIC SURGERY

## 2017-04-21 PROCEDURE — A9503 TC99M MEDRONATE: HCPCS | Performed by: ORTHOPAEDIC SURGERY

## 2017-04-21 RX ORDER — TC 99M MEDRONATE 20 MG/10ML
21.3 INJECTION, POWDER, LYOPHILIZED, FOR SOLUTION INTRAVENOUS
Status: COMPLETED | OUTPATIENT
Start: 2017-04-21 | End: 2017-04-21

## 2017-04-21 RX ADMIN — Medication 21.3 MILLICURIE: at 12:15

## 2017-05-26 ENCOUNTER — HOSPITAL ENCOUNTER (OUTPATIENT)
Dept: GENERAL RADIOLOGY | Facility: HOSPITAL | Age: 52
Discharge: HOME | End: 2017-05-26
Admitting: ORTHOPAEDIC SURGERY

## 2017-05-26 ENCOUNTER — APPOINTMENT (OUTPATIENT)
Dept: PREADMISSION TESTING | Facility: HOSPITAL | Age: 52
End: 2017-05-26

## 2017-05-26 VITALS
OXYGEN SATURATION: 98 % | HEIGHT: 62 IN | WEIGHT: 228 LBS | SYSTOLIC BLOOD PRESSURE: 136 MMHG | HEART RATE: 88 BPM | RESPIRATION RATE: 20 BRPM | TEMPERATURE: 98.4 F | DIASTOLIC BLOOD PRESSURE: 84 MMHG | BODY MASS INDEX: 41.96 KG/M2

## 2017-05-26 LAB
ABO GROUP BLD: NORMAL
ANION GAP SERPL CALCULATED.3IONS-SCNC: 14.5 MMOL/L
APTT PPP: 26.6 SECONDS (ref 22.7–35.4)
BASOPHILS # BLD AUTO: 0.04 10*3/MM3 (ref 0–0.2)
BASOPHILS NFR BLD AUTO: 0.7 % (ref 0–1.5)
BILIRUB UR QL STRIP: NEGATIVE
BLD GP AB SCN SERPL QL: NEGATIVE
BUN BLD-MCNC: 16 MG/DL (ref 6–20)
BUN/CREAT SERPL: 21.3 (ref 7–25)
CALCIUM SPEC-SCNC: 10.3 MG/DL (ref 8.6–10.5)
CHLORIDE SERPL-SCNC: 98 MMOL/L (ref 98–107)
CLARITY UR: CLEAR
CO2 SERPL-SCNC: 24.5 MMOL/L (ref 22–29)
COLOR UR: YELLOW
CREAT BLD-MCNC: 0.75 MG/DL (ref 0.57–1)
DEPRECATED RDW RBC AUTO: 46.8 FL (ref 37–54)
EOSINOPHIL # BLD AUTO: 0.11 10*3/MM3 (ref 0–0.7)
EOSINOPHIL NFR BLD AUTO: 2 % (ref 0.3–6.2)
ERYTHROCYTE [DISTWIDTH] IN BLOOD BY AUTOMATED COUNT: 14.2 % (ref 11.7–13)
GFR SERPL CREATININE-BSD FRML MDRD: 99 ML/MIN/1.73
GLUCOSE BLD-MCNC: 89 MG/DL (ref 65–99)
GLUCOSE UR STRIP-MCNC: NEGATIVE MG/DL
HCT VFR BLD AUTO: 39.5 % (ref 35.6–45.5)
HGB BLD-MCNC: 13.1 G/DL (ref 11.9–15.5)
HGB UR QL STRIP.AUTO: NEGATIVE
IMM GRANULOCYTES # BLD: 0 10*3/MM3 (ref 0–0.03)
IMM GRANULOCYTES NFR BLD: 0 % (ref 0–0.5)
INR PPP: 1 (ref 0.9–1.1)
KETONES UR QL STRIP: NEGATIVE
LEUKOCYTE ESTERASE UR QL STRIP.AUTO: NEGATIVE
LYMPHOCYTES # BLD AUTO: 2.15 10*3/MM3 (ref 0.9–4.8)
LYMPHOCYTES NFR BLD AUTO: 38.3 % (ref 19.6–45.3)
MCH RBC QN AUTO: 29.9 PG (ref 26.9–32)
MCHC RBC AUTO-ENTMCNC: 33.2 G/DL (ref 32.4–36.3)
MCV RBC AUTO: 90.2 FL (ref 80.5–98.2)
MONOCYTES # BLD AUTO: 0.45 10*3/MM3 (ref 0.2–1.2)
MONOCYTES NFR BLD AUTO: 8 % (ref 5–12)
NEUTROPHILS # BLD AUTO: 2.87 10*3/MM3 (ref 1.9–8.1)
NEUTROPHILS NFR BLD AUTO: 51 % (ref 42.7–76)
NITRITE UR QL STRIP: NEGATIVE
PH UR STRIP.AUTO: 6.5 [PH] (ref 5–8)
PLATELET # BLD AUTO: 271 10*3/MM3 (ref 140–500)
PMV BLD AUTO: 11.1 FL (ref 6–12)
POTASSIUM BLD-SCNC: 4.3 MMOL/L (ref 3.5–5.2)
PROT UR QL STRIP: NEGATIVE
PROTHROMBIN TIME: 12.8 SECONDS (ref 11.7–14.2)
RBC # BLD AUTO: 4.38 10*6/MM3 (ref 3.9–5.2)
RH BLD: POSITIVE
SODIUM BLD-SCNC: 137 MMOL/L (ref 136–145)
SP GR UR STRIP: 1.02 (ref 1–1.03)
UROBILINOGEN UR QL STRIP: NORMAL
WBC NRBC COR # BLD: 5.62 10*3/MM3 (ref 4.5–10.7)

## 2017-05-26 PROCEDURE — 85610 PROTHROMBIN TIME: CPT | Performed by: ORTHOPAEDIC SURGERY

## 2017-05-26 PROCEDURE — 93005 ELECTROCARDIOGRAM TRACING: CPT

## 2017-05-26 PROCEDURE — 36415 COLL VENOUS BLD VENIPUNCTURE: CPT

## 2017-05-26 PROCEDURE — 86900 BLOOD TYPING SEROLOGIC ABO: CPT | Performed by: ORTHOPAEDIC SURGERY

## 2017-05-26 PROCEDURE — 81003 URINALYSIS AUTO W/O SCOPE: CPT | Performed by: ORTHOPAEDIC SURGERY

## 2017-05-26 PROCEDURE — 86901 BLOOD TYPING SEROLOGIC RH(D): CPT | Performed by: ORTHOPAEDIC SURGERY

## 2017-05-26 PROCEDURE — 71020 HC CHEST PA AND LATERAL: CPT

## 2017-05-26 PROCEDURE — 80048 BASIC METABOLIC PNL TOTAL CA: CPT | Performed by: ORTHOPAEDIC SURGERY

## 2017-05-26 PROCEDURE — 85730 THROMBOPLASTIN TIME PARTIAL: CPT | Performed by: ORTHOPAEDIC SURGERY

## 2017-05-26 PROCEDURE — 85025 COMPLETE CBC W/AUTO DIFF WBC: CPT | Performed by: ORTHOPAEDIC SURGERY

## 2017-05-26 PROCEDURE — 86850 RBC ANTIBODY SCREEN: CPT | Performed by: ORTHOPAEDIC SURGERY

## 2017-05-26 PROCEDURE — 93010 ELECTROCARDIOGRAM REPORT: CPT | Performed by: INTERNAL MEDICINE

## 2017-05-26 RX ORDER — CHLORHEXIDINE GLUCONATE 500 MG/1
1 CLOTH TOPICAL
Status: ON HOLD | COMMUNITY
End: 2017-06-02

## 2017-05-26 RX ORDER — HYDROCODONE BITARTRATE AND ACETAMINOPHEN 7.5; 325 MG/1; MG/1
1 TABLET ORAL EVERY 8 HOURS PRN
COMMUNITY
Start: 2017-04-17 | End: 2017-06-04 | Stop reason: HOSPADM

## 2017-05-26 RX ORDER — GABAPENTIN 300 MG/1
300 CAPSULE ORAL EVERY 12 HOURS
COMMUNITY
Start: 2017-03-23 | End: 2018-09-28

## 2017-06-01 PROBLEM — T84.033A MECHANICAL LOOSENING OF INTERNAL LEFT KNEE PROSTHETIC JOINT (HCC): Chronic | Status: ACTIVE | Noted: 2017-01-30

## 2017-06-02 ENCOUNTER — ANESTHESIA (OUTPATIENT)
Dept: PERIOP | Facility: HOSPITAL | Age: 52
End: 2017-06-02

## 2017-06-02 ENCOUNTER — ANESTHESIA EVENT (OUTPATIENT)
Dept: PERIOP | Facility: HOSPITAL | Age: 52
End: 2017-06-02

## 2017-06-02 ENCOUNTER — HOSPITAL ENCOUNTER (INPATIENT)
Facility: HOSPITAL | Age: 52
LOS: 2 days | Discharge: HOME HEALTH | End: 2017-06-04
Attending: ORTHOPAEDIC SURGERY | Admitting: ORTHOPAEDIC SURGERY

## 2017-06-02 ENCOUNTER — APPOINTMENT (OUTPATIENT)
Dept: GENERAL RADIOLOGY | Facility: HOSPITAL | Age: 52
End: 2017-06-02

## 2017-06-02 DIAGNOSIS — R26.89 IMPAIRED GAIT AND MOBILITY: Primary | ICD-10-CM

## 2017-06-02 DIAGNOSIS — T84.093D FAILED TOTAL LEFT KNEE REPLACEMENT, SUBSEQUENT ENCOUNTER: ICD-10-CM

## 2017-06-02 PROBLEM — M17.9 OSTEOARTHRITIS, KNEE: Status: ACTIVE | Noted: 2017-06-02

## 2017-06-02 PROCEDURE — 25010000002 FENTANYL CITRATE (PF) 100 MCG/2ML SOLUTION: Performed by: ANESTHESIOLOGY

## 2017-06-02 PROCEDURE — 25010000002 MIDAZOLAM PER 1 MG: Performed by: ANESTHESIOLOGY

## 2017-06-02 PROCEDURE — 25010000002 PROPOFOL 10 MG/ML EMULSION: Performed by: NURSE ANESTHETIST, CERTIFIED REGISTERED

## 2017-06-02 PROCEDURE — 0SPD09Z REMOVAL OF LINER FROM LEFT KNEE JOINT, OPEN APPROACH: ICD-10-PCS | Performed by: ORTHOPAEDIC SURGERY

## 2017-06-02 PROCEDURE — 0SUW09Z SUPPLEMENT LEFT KNEE JOINT, TIBIAL SURFACE WITH LINER, OPEN APPROACH: ICD-10-PCS | Performed by: ORTHOPAEDIC SURGERY

## 2017-06-02 PROCEDURE — 73560 X-RAY EXAM OF KNEE 1 OR 2: CPT

## 2017-06-02 PROCEDURE — 25010000002 ONDANSETRON PER 1 MG: Performed by: NURSE ANESTHETIST, CERTIFIED REGISTERED

## 2017-06-02 PROCEDURE — C1713 ANCHOR/SCREW BN/BN,TIS/BN: HCPCS | Performed by: ORTHOPAEDIC SURGERY

## 2017-06-02 PROCEDURE — 87176 TISSUE HOMOGENIZATION CULTR: CPT | Performed by: ORTHOPAEDIC SURGERY

## 2017-06-02 PROCEDURE — C1776 JOINT DEVICE (IMPLANTABLE): HCPCS | Performed by: ORTHOPAEDIC SURGERY

## 2017-06-02 PROCEDURE — 87070 CULTURE OTHR SPECIMN AEROBIC: CPT | Performed by: ORTHOPAEDIC SURGERY

## 2017-06-02 PROCEDURE — 0SPW0JZ REMOVAL OF SYNTHETIC SUBSTITUTE FROM LEFT KNEE JOINT, TIBIAL SURFACE, OPEN APPROACH: ICD-10-PCS | Performed by: ORTHOPAEDIC SURGERY

## 2017-06-02 PROCEDURE — 25010000003 CEFAZOLIN IN DEXTROSE 2-4 GM/100ML-% SOLUTION: Performed by: ORTHOPAEDIC SURGERY

## 2017-06-02 PROCEDURE — 25010000002 ONDANSETRON PER 1 MG: Performed by: ORTHOPAEDIC SURGERY

## 2017-06-02 PROCEDURE — 25010000002 NEOSTIGMINE 10 MG/10ML SOLUTION: Performed by: NURSE ANESTHETIST, CERTIFIED REGISTERED

## 2017-06-02 PROCEDURE — L1830 KO IMMOB CANVAS LONG PRE OTS: HCPCS | Performed by: ORTHOPAEDIC SURGERY

## 2017-06-02 PROCEDURE — 25010000002 FENTANYL CITRATE (PF) 100 MCG/2ML SOLUTION: Performed by: NURSE ANESTHETIST, CERTIFIED REGISTERED

## 2017-06-02 PROCEDURE — 25010000002 PROMETHAZINE PER 50 MG: Performed by: NURSE PRACTITIONER

## 2017-06-02 PROCEDURE — 25010000002 ROPIVACAINE PER 1 MG: Performed by: ANESTHESIOLOGY

## 2017-06-02 PROCEDURE — 25010000002 DEXAMETHASONE PER 1 MG: Performed by: NURSE ANESTHETIST, CERTIFIED REGISTERED

## 2017-06-02 PROCEDURE — 97161 PT EVAL LOW COMPLEX 20 MIN: CPT

## 2017-06-02 PROCEDURE — 87075 CULTR BACTERIA EXCEPT BLOOD: CPT | Performed by: ORTHOPAEDIC SURGERY

## 2017-06-02 PROCEDURE — 87205 SMEAR GRAM STAIN: CPT | Performed by: ORTHOPAEDIC SURGERY

## 2017-06-02 PROCEDURE — 0SRW0J9 REPLACEMENT OF LEFT KNEE JOINT, TIBIAL SURFACE WITH SYNTHETIC SUBSTITUTE, CEMENTED, OPEN APPROACH: ICD-10-PCS | Performed by: ORTHOPAEDIC SURGERY

## 2017-06-02 DEVICE — TIBIAL TRAY ROTATING PLATFORM M.B.T. REVISION THICK TRAY SIZE 3 15MM CEMENTED: Type: IMPLANTABLE DEVICE | Site: KNEE | Status: FUNCTIONAL

## 2017-06-02 DEVICE — CMT BONE SIMPLEX/P TMYCIN FDOS SGL: Type: IMPLANTABLE DEVICE | Site: KNEE | Status: FUNCTIONAL

## 2017-06-02 DEVICE — SIGMA TIBIAL INSERT ROTATING PLATFORM STABILIZED AOX SIZE 3 10MM
Type: IMPLANTABLE DEVICE | Site: KNEE | Status: FUNCTIONAL
Brand: SIGMA AOX

## 2017-06-02 RX ORDER — ASPIRIN 325 MG
325 TABLET, DELAYED RELEASE (ENTERIC COATED) ORAL 2 TIMES DAILY
Status: DISCONTINUED | OUTPATIENT
Start: 2017-06-02 | End: 2017-06-04 | Stop reason: HOSPADM

## 2017-06-02 RX ORDER — LIDOCAINE HYDROCHLORIDE 20 MG/ML
INJECTION, SOLUTION INFILTRATION; PERINEURAL AS NEEDED
Status: DISCONTINUED | OUTPATIENT
Start: 2017-06-02 | End: 2017-06-02 | Stop reason: SURG

## 2017-06-02 RX ORDER — FENTANYL CITRATE 50 UG/ML
INJECTION, SOLUTION INTRAMUSCULAR; INTRAVENOUS AS NEEDED
Status: DISCONTINUED | OUTPATIENT
Start: 2017-06-02 | End: 2017-06-02 | Stop reason: SURG

## 2017-06-02 RX ORDER — OXYCODONE HCL 10 MG/1
10 TABLET, FILM COATED, EXTENDED RELEASE ORAL ONCE
Status: COMPLETED | OUTPATIENT
Start: 2017-06-02 | End: 2017-06-02

## 2017-06-02 RX ORDER — SODIUM CHLORIDE 0.9 % (FLUSH) 0.9 %
1-10 SYRINGE (ML) INJECTION AS NEEDED
Status: DISCONTINUED | OUTPATIENT
Start: 2017-06-02 | End: 2017-06-02 | Stop reason: HOSPADM

## 2017-06-02 RX ORDER — DEXAMETHASONE SODIUM PHOSPHATE 10 MG/ML
INJECTION INTRAMUSCULAR; INTRAVENOUS AS NEEDED
Status: DISCONTINUED | OUTPATIENT
Start: 2017-06-02 | End: 2017-06-02 | Stop reason: SURG

## 2017-06-02 RX ORDER — ONDANSETRON 2 MG/ML
4 INJECTION INTRAMUSCULAR; INTRAVENOUS EVERY 6 HOURS PRN
Status: DISCONTINUED | OUTPATIENT
Start: 2017-06-02 | End: 2017-06-04 | Stop reason: HOSPADM

## 2017-06-02 RX ORDER — CEFAZOLIN SODIUM 2 G/100ML
2 INJECTION, SOLUTION INTRAVENOUS ONCE
Status: DISCONTINUED | OUTPATIENT
Start: 2017-06-02 | End: 2017-06-02 | Stop reason: HOSPADM

## 2017-06-02 RX ORDER — FUROSEMIDE 40 MG/1
40 TABLET ORAL EVERY MORNING
Status: DISCONTINUED | OUTPATIENT
Start: 2017-06-03 | End: 2017-06-02

## 2017-06-02 RX ORDER — POTASSIUM CHLORIDE 750 MG/1
20 CAPSULE, EXTENDED RELEASE ORAL DAILY
Status: DISCONTINUED | OUTPATIENT
Start: 2017-06-02 | End: 2017-06-02

## 2017-06-02 RX ORDER — SODIUM CHLORIDE, SODIUM LACTATE, POTASSIUM CHLORIDE, CALCIUM CHLORIDE 600; 310; 30; 20 MG/100ML; MG/100ML; MG/100ML; MG/100ML
9 INJECTION, SOLUTION INTRAVENOUS CONTINUOUS
Status: DISCONTINUED | OUTPATIENT
Start: 2017-06-02 | End: 2017-06-02 | Stop reason: HOSPADM

## 2017-06-02 RX ORDER — ONDANSETRON 2 MG/ML
4 INJECTION INTRAMUSCULAR; INTRAVENOUS ONCE AS NEEDED
Status: DISCONTINUED | OUTPATIENT
Start: 2017-06-02 | End: 2017-06-02 | Stop reason: HOSPADM

## 2017-06-02 RX ORDER — FAMOTIDINE 10 MG/ML
20 INJECTION, SOLUTION INTRAVENOUS ONCE
Status: COMPLETED | OUTPATIENT
Start: 2017-06-02 | End: 2017-06-02

## 2017-06-02 RX ORDER — SODIUM CHLORIDE 9 MG/ML
100 INJECTION, SOLUTION INTRAVENOUS CONTINUOUS
Status: ACTIVE | OUTPATIENT
Start: 2017-06-02 | End: 2017-06-03

## 2017-06-02 RX ORDER — NALOXONE HCL 0.4 MG/ML
0.4 VIAL (ML) INJECTION
Status: DISCONTINUED | OUTPATIENT
Start: 2017-06-02 | End: 2017-06-04 | Stop reason: HOSPADM

## 2017-06-02 RX ORDER — AMLODIPINE BESYLATE 10 MG/1
10 TABLET ORAL EVERY MORNING
Status: DISCONTINUED | OUTPATIENT
Start: 2017-06-03 | End: 2017-06-02

## 2017-06-02 RX ORDER — DOCUSATE SODIUM 100 MG/1
100 CAPSULE, LIQUID FILLED ORAL 2 TIMES DAILY
Status: DISCONTINUED | OUTPATIENT
Start: 2017-06-02 | End: 2017-06-04 | Stop reason: HOSPADM

## 2017-06-02 RX ORDER — PROMETHAZINE HYDROCHLORIDE 25 MG/ML
25 INJECTION, SOLUTION INTRAMUSCULAR; INTRAVENOUS EVERY 6 HOURS PRN
Status: DISCONTINUED | OUTPATIENT
Start: 2017-06-02 | End: 2017-06-04 | Stop reason: HOSPADM

## 2017-06-02 RX ORDER — DIPHENHYDRAMINE HYDROCHLORIDE 50 MG/ML
6.25 INJECTION INTRAMUSCULAR; INTRAVENOUS
Status: DISCONTINUED | OUTPATIENT
Start: 2017-06-02 | End: 2017-06-02 | Stop reason: HOSPADM

## 2017-06-02 RX ORDER — FENTANYL CITRATE 50 UG/ML
100 INJECTION, SOLUTION INTRAMUSCULAR; INTRAVENOUS
Status: DISCONTINUED | OUTPATIENT
Start: 2017-06-02 | End: 2017-06-02 | Stop reason: HOSPADM

## 2017-06-02 RX ORDER — ROPIVACAINE HYDROCHLORIDE 2 MG/ML
INJECTION, SOLUTION EPIDURAL; INFILTRATION; PERINEURAL AS NEEDED
Status: DISCONTINUED | OUTPATIENT
Start: 2017-06-02 | End: 2017-06-02 | Stop reason: SURG

## 2017-06-02 RX ORDER — CEFAZOLIN SODIUM 2 G/100ML
2 INJECTION, SOLUTION INTRAVENOUS ONCE
Status: COMPLETED | OUTPATIENT
Start: 2017-06-02 | End: 2017-06-02

## 2017-06-02 RX ORDER — ROPIVACAINE HYDROCHLORIDE 5 MG/ML
INJECTION, SOLUTION EPIDURAL; INFILTRATION; PERINEURAL AS NEEDED
Status: DISCONTINUED | OUTPATIENT
Start: 2017-06-02 | End: 2017-06-02 | Stop reason: SURG

## 2017-06-02 RX ORDER — ESTRADIOL 0.07 MG/D
1 FILM, EXTENDED RELEASE TRANSDERMAL 2 TIMES WEEKLY
Status: DISCONTINUED | OUTPATIENT
Start: 2017-06-05 | End: 2017-06-04 | Stop reason: HOSPADM

## 2017-06-02 RX ORDER — FLUMAZENIL 0.1 MG/ML
0.2 INJECTION INTRAVENOUS AS NEEDED
Status: DISCONTINUED | OUTPATIENT
Start: 2017-06-02 | End: 2017-06-02 | Stop reason: HOSPADM

## 2017-06-02 RX ORDER — NEOSTIGMINE METHYLSULFATE 1 MG/ML
INJECTION, SOLUTION INTRAVENOUS AS NEEDED
Status: DISCONTINUED | OUTPATIENT
Start: 2017-06-02 | End: 2017-06-02 | Stop reason: SURG

## 2017-06-02 RX ORDER — ACETAMINOPHEN 325 MG/1
325 TABLET ORAL EVERY 4 HOURS PRN
Status: DISCONTINUED | OUTPATIENT
Start: 2017-06-02 | End: 2017-06-04 | Stop reason: HOSPADM

## 2017-06-02 RX ORDER — GABAPENTIN 300 MG/1
300 CAPSULE ORAL EVERY 12 HOURS
Status: DISCONTINUED | OUTPATIENT
Start: 2017-06-02 | End: 2017-06-04 | Stop reason: HOSPADM

## 2017-06-02 RX ORDER — HYDROCODONE BITARTRATE AND ACETAMINOPHEN 7.5; 325 MG/1; MG/1
1 TABLET ORAL ONCE AS NEEDED
Status: DISCONTINUED | OUTPATIENT
Start: 2017-06-02 | End: 2017-06-02 | Stop reason: HOSPADM

## 2017-06-02 RX ORDER — GLYCOPYRROLATE 0.2 MG/ML
INJECTION INTRAMUSCULAR; INTRAVENOUS AS NEEDED
Status: DISCONTINUED | OUTPATIENT
Start: 2017-06-02 | End: 2017-06-02 | Stop reason: SURG

## 2017-06-02 RX ORDER — HYDRALAZINE HYDROCHLORIDE 20 MG/ML
5 INJECTION INTRAMUSCULAR; INTRAVENOUS
Status: DISCONTINUED | OUTPATIENT
Start: 2017-06-02 | End: 2017-06-02 | Stop reason: HOSPADM

## 2017-06-02 RX ORDER — ROCURONIUM BROMIDE 10 MG/ML
INJECTION, SOLUTION INTRAVENOUS AS NEEDED
Status: DISCONTINUED | OUTPATIENT
Start: 2017-06-02 | End: 2017-06-02 | Stop reason: SURG

## 2017-06-02 RX ORDER — ONDANSETRON 2 MG/ML
INJECTION INTRAMUSCULAR; INTRAVENOUS AS NEEDED
Status: DISCONTINUED | OUTPATIENT
Start: 2017-06-02 | End: 2017-06-02 | Stop reason: SURG

## 2017-06-02 RX ORDER — GLYCOPYRROLATE 0.2 MG/ML
0.2 INJECTION INTRAMUSCULAR; INTRAVENOUS
Status: DISCONTINUED | OUTPATIENT
Start: 2017-06-02 | End: 2017-06-02 | Stop reason: HOSPADM

## 2017-06-02 RX ORDER — AMLODIPINE BESYLATE 5 MG/1
5 TABLET ORAL EVERY MORNING
Status: DISCONTINUED | OUTPATIENT
Start: 2017-06-03 | End: 2017-06-04 | Stop reason: HOSPADM

## 2017-06-02 RX ORDER — LABETALOL HYDROCHLORIDE 5 MG/ML
5 INJECTION, SOLUTION INTRAVENOUS
Status: DISCONTINUED | OUTPATIENT
Start: 2017-06-02 | End: 2017-06-02 | Stop reason: HOSPADM

## 2017-06-02 RX ORDER — MIDAZOLAM HYDROCHLORIDE 1 MG/ML
2 INJECTION INTRAMUSCULAR; INTRAVENOUS
Status: DISCONTINUED | OUTPATIENT
Start: 2017-06-02 | End: 2017-06-02 | Stop reason: HOSPADM

## 2017-06-02 RX ORDER — CEFAZOLIN SODIUM 2 G/100ML
2 INJECTION, SOLUTION INTRAVENOUS EVERY 8 HOURS
Status: COMPLETED | OUTPATIENT
Start: 2017-06-02 | End: 2017-06-03

## 2017-06-02 RX ORDER — HYDROCODONE BITARTRATE AND ACETAMINOPHEN 10; 325 MG/1; MG/1
1 TABLET ORAL EVERY 4 HOURS PRN
Status: DISCONTINUED | OUTPATIENT
Start: 2017-06-02 | End: 2017-06-03

## 2017-06-02 RX ORDER — PROPOFOL 10 MG/ML
VIAL (ML) INTRAVENOUS AS NEEDED
Status: DISCONTINUED | OUTPATIENT
Start: 2017-06-02 | End: 2017-06-02 | Stop reason: SURG

## 2017-06-02 RX ORDER — HYDROMORPHONE HYDROCHLORIDE 1 MG/ML
0.5 INJECTION, SOLUTION INTRAMUSCULAR; INTRAVENOUS; SUBCUTANEOUS
Status: DISCONTINUED | OUTPATIENT
Start: 2017-06-02 | End: 2017-06-02 | Stop reason: HOSPADM

## 2017-06-02 RX ORDER — MIDAZOLAM HYDROCHLORIDE 1 MG/ML
1 INJECTION INTRAMUSCULAR; INTRAVENOUS
Status: DISCONTINUED | OUTPATIENT
Start: 2017-06-02 | End: 2017-06-02 | Stop reason: HOSPADM

## 2017-06-02 RX ORDER — FENTANYL CITRATE 50 UG/ML
50 INJECTION, SOLUTION INTRAMUSCULAR; INTRAVENOUS
Status: DISCONTINUED | OUTPATIENT
Start: 2017-06-02 | End: 2017-06-02 | Stop reason: HOSPADM

## 2017-06-02 RX ORDER — TRANEXAMIC ACID 100 MG/ML
INJECTION, SOLUTION INTRAVENOUS AS NEEDED
Status: DISCONTINUED | OUTPATIENT
Start: 2017-06-02 | End: 2017-06-02 | Stop reason: SURG

## 2017-06-02 RX ORDER — OXYCODONE HYDROCHLORIDE AND ACETAMINOPHEN 5; 325 MG/1; MG/1
2 TABLET ORAL ONCE AS NEEDED
Status: DISCONTINUED | OUTPATIENT
Start: 2017-06-02 | End: 2017-06-02 | Stop reason: HOSPADM

## 2017-06-02 RX ORDER — BISACODYL 10 MG
10 SUPPOSITORY, RECTAL RECTAL DAILY PRN
Status: DISCONTINUED | OUTPATIENT
Start: 2017-06-02 | End: 2017-06-04 | Stop reason: HOSPADM

## 2017-06-02 RX ADMIN — FENTANYL CITRATE 50 MCG: 50 INJECTION, SOLUTION INTRAMUSCULAR; INTRAVENOUS at 13:14

## 2017-06-02 RX ADMIN — FENTANYL CITRATE 150 MCG: 50 INJECTION, SOLUTION INTRAMUSCULAR; INTRAVENOUS at 11:21

## 2017-06-02 RX ADMIN — FAMOTIDINE 20 MG: 10 INJECTION, SOLUTION INTRAVENOUS at 09:02

## 2017-06-02 RX ADMIN — ROPIVACAINE HYDROCHLORIDE 10 ML: 2 INJECTION, SOLUTION EPIDURAL; INFILTRATION at 10:06

## 2017-06-02 RX ADMIN — GLYCOPYRROLATE 0.2 MG: 0.2 INJECTION, SOLUTION INTRAMUSCULAR; INTRAVENOUS at 10:00

## 2017-06-02 RX ADMIN — CEFAZOLIN SODIUM 2 G: 2 INJECTION, SOLUTION INTRAVENOUS at 18:46

## 2017-06-02 RX ADMIN — HYDROCODONE BITARTRATE AND ACETAMINOPHEN 1 TABLET: 10; 325 TABLET ORAL at 18:46

## 2017-06-02 RX ADMIN — SODIUM CHLORIDE, POTASSIUM CHLORIDE, SODIUM LACTATE AND CALCIUM CHLORIDE 9 ML/HR: 600; 310; 30; 20 INJECTION, SOLUTION INTRAVENOUS at 08:26

## 2017-06-02 RX ADMIN — CEFAZOLIN SODIUM 2 G: 2 INJECTION, SOLUTION INTRAVENOUS at 11:30

## 2017-06-02 RX ADMIN — ONDANSETRON 4 MG: 2 INJECTION INTRAMUSCULAR; INTRAVENOUS at 15:54

## 2017-06-02 RX ADMIN — ROCURONIUM BROMIDE 50 MG: 10 INJECTION INTRAVENOUS at 11:21

## 2017-06-02 RX ADMIN — FENTANYL CITRATE 100 MCG: 50 INJECTION INTRAMUSCULAR; INTRAVENOUS at 09:58

## 2017-06-02 RX ADMIN — MIDAZOLAM 2 MG: 1 INJECTION INTRAMUSCULAR; INTRAVENOUS at 09:02

## 2017-06-02 RX ADMIN — MIDAZOLAM 2 MG: 1 INJECTION INTRAMUSCULAR; INTRAVENOUS at 09:57

## 2017-06-02 RX ADMIN — SODIUM CHLORIDE, POTASSIUM CHLORIDE, SODIUM LACTATE AND CALCIUM CHLORIDE: 600; 310; 30; 20 INJECTION, SOLUTION INTRAVENOUS at 13:12

## 2017-06-02 RX ADMIN — SODIUM CHLORIDE 100 ML/HR: 9 INJECTION, SOLUTION INTRAVENOUS at 18:46

## 2017-06-02 RX ADMIN — PROPOFOL 200 MG: 10 INJECTION, EMULSION INTRAVENOUS at 11:21

## 2017-06-02 RX ADMIN — PROMETHAZINE HYDROCHLORIDE 25 MG: 25 INJECTION INTRAMUSCULAR; INTRAVENOUS at 21:12

## 2017-06-02 RX ADMIN — LIDOCAINE HYDROCHLORIDE 60 MG: 20 INJECTION, SOLUTION INFILTRATION; PERINEURAL at 11:21

## 2017-06-02 RX ADMIN — ASPIRIN 325 MG: 325 TABLET, DELAYED RELEASE ORAL at 18:46

## 2017-06-02 RX ADMIN — GLYCOPYRROLATE 0.4 MG: 0.2 INJECTION INTRAMUSCULAR; INTRAVENOUS at 13:11

## 2017-06-02 RX ADMIN — ONDANSETRON 4 MG: 2 INJECTION INTRAMUSCULAR; INTRAVENOUS at 12:47

## 2017-06-02 RX ADMIN — TRANEXAMIC ACID 1000 MG: 100 INJECTION, SOLUTION INTRAVENOUS at 12:47

## 2017-06-02 RX ADMIN — ROPIVACAINE HYDROCHLORIDE 20 ML: 5 INJECTION, SOLUTION EPIDURAL; INFILTRATION; PERINEURAL at 10:06

## 2017-06-02 RX ADMIN — GABAPENTIN 300 MG: 300 CAPSULE ORAL at 21:12

## 2017-06-02 RX ADMIN — OXYCODONE HYDROCHLORIDE 10 MG: 10 TABLET, FILM COATED, EXTENDED RELEASE ORAL at 08:26

## 2017-06-02 RX ADMIN — DEXAMETHASONE SODIUM PHOSPHATE 8 MG: 10 INJECTION INTRAMUSCULAR; INTRAVENOUS at 11:47

## 2017-06-02 RX ADMIN — FENTANYL CITRATE 50 MCG: 50 INJECTION, SOLUTION INTRAMUSCULAR; INTRAVENOUS at 13:20

## 2017-06-02 RX ADMIN — NEOSTIGMINE METHYLSULFATE 3 MG: 1 INJECTION INTRAVENOUS at 13:11

## 2017-06-02 NOTE — PLAN OF CARE
Problem: Perioperative Period (Adult)  Goal: Signs and Symptoms of Listed Potential Problems Will be Absent or Manageable (Perioperative Period)  Outcome: Ongoing (interventions implemented as appropriate)    06/02/17 1407   Perioperative Period   Problems Assessed (Perioperative Period) pain   Problems Present (Perioperative Period) none

## 2017-06-02 NOTE — ANESTHESIA PROCEDURE NOTES
Airway  Urgency: elective    Airway not difficult    General Information and Staff    Patient location during procedure: OR  Anesthesiologist: CHONG BOJORQUEZ  CRNA: MARK ANTHONY LIMA    Indications and Patient Condition  Indications for airway management: airway protection    Preoxygenated: yes  Mask difficulty assessment: 2 - vent by mask + OA or adjuvant +/- NMBA    Final Airway Details  Final airway type: endotracheal airway      Successful airway: ETT  Cuffed: yes   Successful intubation technique: direct laryngoscopy  Facilitating devices/methods: Bougie and cricoid pressure  Endotracheal tube insertion site: oral  Blade: Torrez  Blade size: #2  ETT size: 7.0 mm  Cormack-Lehane Classification: grade IIb - view of arytenoids or posterior of glottis only  Placement verified by: chest auscultation and capnometry   Measured from: lips  ETT to lips (cm): 21  Number of attempts at approach: 1    Additional Comments  Atraumatic, MOP to cuff, BSBE, no change to dentition, secured with tape

## 2017-06-02 NOTE — ANESTHESIA PROCEDURE NOTES
Peripheral Block    Patient location during procedure: holding area  Start time: 6/2/2017 9:51 AM  Stop time: 6/2/2017 10:04 AM  Reason for block: at surgeon's request and post-op pain management  Performed by  Anesthesiologist: CHONG BOJORQUEZ  Preanesthetic Checklist  Completed: patient identified, site marked, surgical consent, pre-op evaluation, timeout performed, IV checked, risks and benefits discussed and monitors and equipment checked  Peripheral Block Prep:  Sterile barriers:cap, gloves, mask and sterile barriers  Prep: ChloraPrep  Patient monitoring: blood pressure monitoring, continuous pulse oximetry and EKG  Peripheral Procedure  Sedation:yes  Guidance:ultrasound guided, nerve stimulator and u/s fem      n stim sci  Images:still images obtained    Laterality:left  Block Type:sciatic and femoral  Injection Technique:single-shot  Needle Type:echogenic  Needle Gauge:22 G  ULTRASOUND INTERPRETATION.  Using ultrasound guidance a 22 G gauge needle was placed in close proximity to the femoral and sciatic nerve, at which point, under ultrasound guidance anesthetic was injected in the area of the nerve and spread of the anesthesia was seen on ultrasound in close proximity thereto.  There were no abnormalities seen on ultrasound; a digital image was taken; and the patient tolerated the procedure with no complications.   Medications  Local Injected:ropivacaine 0.5% without epinephrine and ropivacaine 0.2% Local Amount Injected:30mL  Post Assessment  Injection Assessment: negative aspiration for heme, no paresthesia on injection and incremental injection  Patient Tolerance:comfortable throughout block  Complications:no  Additional Notes  Sciatic - 10cc 0.5 %, 5cc 0.2 %  Fem - 10cc 0.5 %, 5cc 0.2 %

## 2017-06-02 NOTE — ANESTHESIA PREPROCEDURE EVALUATION
Anesthesia Evaluation     Patient summary reviewed and Nursing notes reviewed   NPO Solid Status: > 8 hours       Airway   Mallampati: III  TM distance: >3 FB  Neck ROM: full  possible difficult intubation  Dental - normal exam     Pulmonary - negative pulmonary ROS and normal exam   Cardiovascular - normal exam    (+) hypertension,       Neuro/Psych- negative ROS  GI/Hepatic/Renal/Endo    (+) morbid obesity,     Musculoskeletal (-) negative ROS    Abdominal  - normal exam   Substance History - negative use     OB/GYN negative ob/gyn ROS         Other                                        Anesthesia Plan    ASA 3     general   (Fem/ sci For popc)  intravenous induction   Anesthetic plan and risks discussed with patient.    Plan discussed with CRNA.

## 2017-06-02 NOTE — PROGRESS NOTES
Discharge Planning Assessment  [facility]     Patient Name: Joselyn Grant  MRN: [id number]  Today's Date: 6/2/2017    Admit Date: 6/2/2017          Discharge Needs Assessment       06/02/17 1651    Living Environment    Lives With spouse    Living Arrangements house    Transportation Available car;family or friend will provide    Discharge Needs Assessment    Concerns To Be Addressed basic needs concerns    Readmission Within The Last 30 Days no previous admission in last 30 days    Anticipated Changes Related to Illness none    Equipment Currently Used at Home walker, standard    Discharge Facility/Level Of Care Needs home with home health            Discharge Plan       06/02/17 1651    Case Management/Social Work Plan    Plan [facility]    Patient/Family In Agreement With Plan yes    Additional Comments Spoke with pt, verified correct information on facesheet and explained the role of CCP. Pt would like to d/c home with [facility], referral given to Casi with [facility] who states they are able to accept. Plan will be to d/c home with HH and family support.        Discharge Placement     Facility/Agency Request Status Selected? Address Phone Number Fax Number    [facility] Accepted    Yes [address] [address] [phone]                Demographic Summary     None            Functional Status     None            Psychosocial     None            Abuse/Neglect     None            Legal     None            Substance Abuse     None            Patient Forms     None          Nikki Lopez RN

## 2017-06-02 NOTE — PLAN OF CARE
Problem: Patient Care Overview (Adult)  Goal: Plan of Care Review  Outcome: Outcome(s) achieved Date Met:  06/02/17 06/02/17 140   Coping/Psychosocial Response Interventions   Plan Of Care Reviewed With patient   Patient Care Overview   Progress progress toward functional goals as expected   Outcome Evaluation   Outcome Summary/Follow up Plan pt denies discomfort

## 2017-06-02 NOTE — PLAN OF CARE
Problem: Patient Care Overview (Adult)  Goal: Plan of Care Review    06/02/17 1527   Coping/Psychosocial Response Interventions   Plan Of Care Reviewed With patient   Outcome Evaluation   Outcome Summary/Follow up Plan pt presents w .generalized weakness, blocked LLE post op TK revision limiting gait this PM. may benefit from skilled PT acutely for inc strengthening, ROM, gait training for safe return to home.          Problem: Inpatient Physical Therapy  Goal: Bed Mobility Goal LTG- PT    06/02/17 1527   Bed Mobility PT LTG   Bed Mobility PT LTG, Date Established 06/02/17   Bed Mobility PT LTG, Time to Achieve 3 days   Bed Mobility PT LTG, Activity Type all bed mobility   Bed Mobility PT LTG, [place] Level supervision required       Goal: Transfer Training Goal 1 LTG- PT    06/02/17 1527   Transfer Training PT LTG   Transfer Training PT LTG, Date Established 06/02/17   Transfer Training PT LTG, Time to Achieve 3 days   Transfer Training PT LTG, Activity Type all transfers   Transfer Training PT LTG, [place] Level supervision required   Transfer Training PT LTG, Assist Device walker, rolling       Goal: Gait Training Goal LTG- PT    06/02/17 1527   Gait Training PT LTG   Gait Training Goal PT LTG, Date Established 06/02/17   Gait Training Goal PT LTG, Time to Achieve 3 days   Gait Training Goal PT LTG, [place] Level contact guard assist   Gait Training Goal PT LTG, Assist Device walker, rolling   Gait Training Goal PT LTG, Distance to Achieve 100       Goal: Range of Motion Goal LTG- PT    06/02/17 1527   Range of Motion PT LTG   Range of Motion Goal PT LTG, Date Established 06/02/17   Range of Motion Goal PT LTG, Time to Achieve 3 days   Range fo Motion Goal PT LTG, Joint L knee   Range of Motion Goal PT LTG, AROM Measure -5-85

## 2017-06-02 NOTE — THERAPY EVALUATION
Acute Care - Physical Therapy Initial Evaluation  [facility]     Patient Name: Joselyn Grnat  : 1965  MRN: [id number]  Today's Date: 2017   Onset of Illness/Injury or Date of Surgery Date: 17  Date of Referral to PT: 17  Referring Physician: Althea      Admit Date: 2017     Visit Dx:    ICD-10-CM ICD-9-CM   1. Impaired gait and mobility R26.89 781.2   2. Failed total left knee replacement, subsequent encounter T84.093D V58.89     996.47     V43.65     Patient Active Problem List   Diagnosis   • Hypertension   • Mechanical loosening of internal left knee prosthetic joint   • Morbid obesity with BMI of 40.0-44.9, adult   • Osteoarthritis, knee     Past Medical History:   Diagnosis Date   • Arthritis    • History of kidney stones    • Hypertension    • Knee pain    • Migraine    • Urinary incontinence     wears pads     Past Surgical History:   Procedure Laterality Date   • EYE SURGERY      lasix   • HYSTERECTOMY      10 years ago for heavy menses and fibroids   • LAPAROSCOPIC CHOLECYSTECTOMY     • IN REVISE KNEE JOINT REPLACE,1 PART Left 2017    Procedure: LT TOTAL KNEE ARTHROPLASTY REVISION;  Surgeon: Ha Oh MD;  Location: [facility] OR;  Service: Orthopedics   • REPLACEMENT TOTAL KNEE      left   • STOMACH SURGERY      lapband          PT ASSESSMENT (last 72 hours)      PT Evaluation       17 1522 17 0850    Rehab Evaluation    Document Type evaluation  -     Subjective Information agree to therapy;complains of;nausea/vomiting  -LH     Patient Effort, Rehab Treatment good  -LH     Symptoms Noted During/After Treatment none  -LH     Symptoms Noted Comment some c/o nausea with mobility   -     General Information    Patient Profile Review yes  -LH     Onset of Illness/Injury or Date of Surgery Date 17  -     Referring Physician Althea  -     General Observations supine in bed no acute distress  -     Pertinent History Of Current  Problem POD 0 TKR revision  -     Precautions/Limitations fall precautions;brace on when up   KI until SLR  -     Prior Level of Function independent:  -     Equipment Currently Used at Home  walker, standard  -    Plans/Goals Discussed With patient  -     Benefits Reviewed patient:  -     Clinical Impression    Date of Referral to PT 06/02/17  -     Criteria for Skilled Therapeutic Interventions Met yes  -     Pathology/Pathophysiology Noted (Describe Specifically for Each System) musculoskeletal  -     Impairments Found (describe specific impairments) ROM;gait, locomotion, and balance;joint integrity and mobility  -     Functional Limitations in Following Categories (Describe Specific Limitations) self-care;home management  -     Rehab Potential good, to achieve stated therapy goals  -     Pain Assessment    Pain Assessment No/denies pain  -     Vision Assessment/Intervention    Visual Impairment WNL  -     Cognitive Assessment/Intervention    Current Cognitive/Communication Assessment functional  -     Orientation Status oriented x 4  -     Follows Commands/Answers Questions 100% of the time  -     Personal Safety WNL/WFL  -     ROM (Range of Motion)    General ROM no range of motion deficits identified  -     General ROM Detail except L knee  -     MMT (Manual Muscle Testing)    General MMT Assessment no strength deficits identified  -     Bed Mobility, Assessment/Treatment    Bed Mob, Supine to Sit, [place] minimum assist (75% patient effort);verbal cues required  -     Transfer Assessment/Treatment    Transfers, Sit-Stand [place] contact guard assist  -     Transfers, Stand-Sit [place] contact guard assist  -     Transfers, Sit-Stand-Sit, Assist Device rolling walker  -     Transfer, Impairments ROM decreased  -     Gait Assessment/Treatment    Gait, [place] Level contact guard assist  -     Gait, Assistive Device rolling walker  -      Gait, Distance (Feet) 3  -     Gait, Gait Deviations decreased heel strike;kimberly decreased;left:  -     Gait, Safety Issues step length decreased;sequencing ability decreased;weight-shifting ability decreased  -     Gait, Impairments ROM decreased  -     Gait, Comment blocked, limited gait  -     Therapy Exercises    Exercise Protocols total knee  -     Total Knee Exercises left:;10 reps;completed protocol  -     Positioning and Restraints    Pre-Treatment Position in bed  -     Post Treatment Position chair  -     In Chair reclined;call light within reach;encouraged to call for assist;notified nsg;with family/caregiver  -       06/02/17 0814       General Information    Equipment Currently Used at Home walker, standard;raised toilet;bath bench;cane, straight  -     Living Environment    Lives With spouse  -DK     Living Arrangements house  -DK     Home Accessibility stairs within home  -DK     Stair Railings at Home inside, present on right side  -     Type of Financial/Environmental Concern none  -DK     Transportation Available car;family or friend will provide  -       User Key  (r) = Recorded By, (t) = Taken By, (c) = Cosigned By    Initials Name Provider Type    DK Cristina Syed, RN Registered Nurse     Casi Gleason, AMY Physical Therapist          Physical Therapy Education     Title: PT OT SLP Therapies (Active)     Topic: Physical Therapy (Active)     Point: Mobility training (Active)    Learning Progress Summary    Learner Readiness Method Response Comment Documented by Status   Patient Acceptance E NR   06/02/17 1526 Active               Point: Home exercise program (Active)    Learning Progress Summary    Learner Readiness Method Response Comment Documented by Status   Patient Acceptance E NR   06/02/17 1526 Active                      User Key     Initials Effective Dates Name Provider Type Discipline     02/07/17 -  Casi Gleason PT Physical Therapist PT                 PT Recommendation and Plan  Anticipated Discharge Disposition: home with home health  Planned Therapy Interventions: balance training, bed mobility training, gait training, home exercise program, ROM (Range of Motion), stair training, strengthening, stretching, transfer training  PT Frequency: 2 times/day  Plan of Care Review  Plan Of Care Reviewed With: patient  Outcome Summary/Follow up Plan: pt presents w .generalized weakness, blocked LLE post op TK revision limiting gait this PM. may benefit from skilled PT acutely for inc strengthening, ROM, gait training for safe return to home.           IP PT Goals       06/02/17 1527          Bed Mobility PT LTG    Bed Mobility PT LTG, Date Established 06/02/17  -LH      Bed Mobility PT LTG, Time to Achieve 3 days  -LH      Bed Mobility PT LTG, Activity Type all bed mobility  -LH      Bed Mobility PT LTG, [place] Level supervision required  -      Transfer Training PT LTG    Transfer Training PT LTG, Date Established 06/02/17  -      Transfer Training PT LTG, Time to Achieve 3 days  -LH      Transfer Training PT LTG, Activity Type all transfers  -LH      Transfer Training PT LTG, [place] Level supervision required  -      Transfer Training PT LTG, Assist Device walker, rolling  -LH      Gait Training PT LTG    Gait Training Goal PT LTG, Date Established 06/02/17  -      Gait Training Goal PT LTG, Time to Achieve 3 days  -LH      Gait Training Goal PT LTG, [place] Level contact guard assist  -LH      Gait Training Goal PT LTG, Assist Device walker, rolling  -LH      Gait Training Goal PT LTG, Distance to Achieve 100  -LH      Range of Motion PT LTG    Range of Motion Goal PT LTG, Date Established 06/02/17  -LH      Range of Motion Goal PT LTG, Time to Achieve 3 days  -LH      Range fo Motion Goal PT LTG, Joint L knee  -LH      Range of Motion Goal PT LTG, AROM Measure -5-85  -LH        User Key  (r) = Recorded By, (t) = Taken By, (c) = Cosigned  By    Initials Name Provider Type     Casi Gleason, PT Physical Therapist                Outcome Measures       06/02/17 1500          How much help from another person do you currently need...    Turning from your back to your side while in flat bed without using bedrails? 3  -LH      Moving from lying on back to sitting on the side of a flat bed without bedrails? 3  -LH      Moving to and from a bed to a chair (including a wheelchair)? 3  -LH      Standing up from a chair using your arms (e.g., wheelchair, bedside chair)? 3  -LH      Climbing 3-5 steps with a railing? 2  -LH      To walk in hospital room? 3  -      AM-PAC 6 Clicks Score 17  -      Functional Assessment    Outcome Measure Options AM-PAC 6 Clicks Basic Mobility (PT)  -        User Key  (r) = Recorded By, (t) = Taken By, (c) = Cosigned By    Initials Name Provider Type     Casi Gleason PT Physical Therapist           Time Calculation:         PT Charges       06/02/17 1529          Time Calculation    Start Time 1511  -      Stop Time 1522  -      Time Calculation (min) 11 min  -      PT Received On 06/02/17  -      PT - Next Appointment 06/03/17  -      PT Goal Re-Cert Due Date 06/05/17  -        User Key  (r) = Recorded By, (t) = Taken By, (c) = Cosigned By    Initials Name Provider Type     Casi Gleason PT Physical Therapist          Therapy Charges for Today     Code Description Service Date Service Provider Modifiers Qty    [id number] HC PT EVAL LOW COMPLEXITY 2 6/2/2017 Casi Gleason, PT GP 1          PT G-Codes  Outcome Measure Options: AM-PAC 6 Clicks Basic Mobility (PT)      Casi Gleason PT  6/2/2017

## 2017-06-02 NOTE — BRIEF OP NOTE
TOTAL KNEE ARTHROPLASTY REVISION  Procedure Note    Joselyn Grant  6/2/2017    Pre-op Diagnosis:   Other mechanical complication of internal left knee prosthesis, initial encounter [T84.093A]    Post-op Diagnosis:     Post-Op Diagnosis Codes:     * Other mechanical complication of internal left knee prosthesis, initial encounter [T84.093A]    Procedure/CPT® Codes:      Procedure(s):  LT TOTAL KNEE ARTHROPLASTY REVISION    Surgeon(s):  Ha Oh MD    Anesthesia: General    Staff:   Circulator: Rebeca Rendon RN; Soila Lynch RN  Scrub Person: Mariann Tubbs  Assistant: DEBBIE Ferrer  Other: Kelsie Grubbs    Estimated Blood Loss: *No blood loss documented*  Urine Voided: 100 ml  Specimens:                  ID Type Source Tests Collected by Time Destination   1 : LEFT KNEE TISSUE AEROBIC ANAEROBIC CULT Tissue Knee, Left ANAEROBIC CULTURE, TISSUE/BONE CULTURE Ha Oh MD 6/2/2017 1158          Drains:           Findings: see dict     Complications: nil      Ha Oh MD     Date: 6/2/2017  Time: 12:58 PM

## 2017-06-02 NOTE — ANESTHESIA POSTPROCEDURE EVALUATION
Patient: Joselyn Grant    Procedure Summary     Date Anesthesia Start Anesthesia Stop Room / Location    06/02/17 1117 1326  [facility] OR 24 /  [facility]       Procedure Diagnosis Surgeon Provider    LT TOTAL KNEE ARTHROPLASTY REVISION (Left Knee) Other mechanical complication of internal left knee prosthesis, initial encounter MD Bernard Sy MD          Anesthesia Type: general  Last vitals  /92 (06/02/17 1322)    Temp 36.8 °C (98.3 °F) (06/02/17 1322)    Pulse 77 (06/02/17 1322)   Resp 16 (06/02/17 1322)    SpO2 99 % (06/02/17 1322)      Post Anesthesia Care and Evaluation    Patient location during evaluation: bedside  Patient participation: complete - patient participated  Level of consciousness: awake  Pain management: adequate  Airway patency: patent  Anesthetic complications: No anesthetic complications    Cardiovascular status: acceptable  Respiratory status: acceptable  Hydration status: acceptable

## 2017-06-02 NOTE — CONSULTS
Name: Joselyn Grant ADMIT: 2017   : 1965  PCP: Leodan Nam MD    MRN: [id number] LOS: 0 days   AGE/SEX: 51 y.o. female  ROOM: P887/1     Inpatient Consult to Hospitalist  Consult performed by: ENRIQUE COLE  Consult ordered by: HA OH  Reason for consult: HTN      Date of Admit: 2017  Date of Consult: 17    Subjective   History of Present Illness  Ms. Grant is a 51 y.o. female that presents to [facility] following elective MN REVISE KNEE JOINT REPLACE,1 PART [92452] (LT TOTAL KNEE ARTHROPLASTY REVISION).  She has been admitted to a orthopedic floor following surgery and we were asked to see and assist with her medical problems, specifically relating to her blood pressure.  At the time of my visit she denies any chest pain, SOA, vomiting or diarrhea.  She does complain of expected postoperative discomfort and nausea.  She has tolerated a diet.  She has been in a normal state of health leading up to surgery.      Past Medical History:   Diagnosis Date   • Arthritis    • History of kidney stones    • Hypertension    • Knee pain    • Migraine    • Urinary incontinence     wears pads     Past Surgical History:   Procedure Laterality Date   • EYE SURGERY      lasix   • HYSTERECTOMY      10 years ago for heavy menses and fibroids   • LAPAROSCOPIC CHOLECYSTECTOMY     • MN REVISE KNEE JOINT REPLACE,1 PART Left 2017    Procedure: LT TOTAL KNEE ARTHROPLASTY REVISION;  Surgeon: Ha Oh MD;  Location: [facility];  Service: Orthopedics   • REPLACEMENT TOTAL KNEE      left   • STOMACH SURGERY      lapband     Family History   Problem Relation Age of Onset   • Pancreatic cancer Mother    • Hypertension Mother    • No Known Problems Father    • Fibroids Sister    • Heart murmur Sister    • Lymphoma Maternal Uncle    • Lung cancer Maternal Grandmother    • Bell's palsy Brother    • Hypertension Brother    • No Known Problems Maternal  Grandfather    • No Known Problems Brother    • Malig Hyperthermia Neg Hx      Social History   Substance Use Topics   • Smoking status: Never Smoker   • Smokeless tobacco: Never Used   • Alcohol use 1.2 oz/week     1 Glasses of wine, 1 Shots of liquor per week     Prescriptions Prior to Admission   Medication Sig Dispense Refill Last Dose   • acetaminophen (TYLENOL) 500 MG tablet Take 500 mg by mouth Every 6 (Six) Hours As Needed for mild pain (1-3).   Past Month at Unknown time   • amLODIPine (NORVASC) 10 MG tablet Take 10 mg by mouth Every Morning.   6/1/2017 at Unknown time   • furosemide (LASIX) 40 MG tablet Take 40 mg by mouth Every Morning.   6/1/2017 at 0800   • gabapentin (NEURONTIN) 300 MG capsule Take 300 mg by mouth Every 12 (Twelve) Hours.   6/1/2017 at 2350   • HYDROcodone-acetaminophen (NORCO) 7.5-325 MG per tablet Take 1 tablet by mouth Every 8 (Eight) Hours As Needed.   6/1/2017 at 2300   • KP CALCIUM-MAGNESIUM-ZINC PO Take 1 tablet by mouth Daily. Also has vitamin d   6/1/2017 at 0800   • POTASSIUM PO Take 99 mg by mouth Daily. 99 mg   6/1/2017 at 0800   • estradiol (MINIVELLE, VIVELLE-DOT) 0.075 MG/24HR patch Place 1 patch on the skin 2 (Two) Times a Week. Wednesdays  AND SUNDAYS   5/31/2017 at 0800     Allergies:  Review of patient's allergies indicates no known allergies.    Review of Systems   Constitutional: Negative.    HENT: Negative.    Eyes: Negative.    Respiratory: Negative.    Cardiovascular: Negative.    Gastrointestinal: Positive for nausea. Negative for vomiting.   Endocrine: Negative.    Genitourinary: Negative.    Musculoskeletal: Negative.    Skin: Negative.    Neurological: Negative.    Hematological: Negative.    Psychiatric/Behavioral: Negative.        Objective      Vital Signs  Temp:  [96.9 °F (36.1 °C)-98.3 °F (36.8 °C)] 96.9 °F (36.1 °C)  Heart Rate:  [64-88] 75  Resp:  [12-22] 16  BP: (120-143)/() 125/84  Body mass index is 42.4 kg/(m^2).    Physical Exam      Constitutional: She is oriented to person, place, and time. She appears well-developed and well-nourished. No distress.   HENT:   Head: Normocephalic and atraumatic.   Eyes: Conjunctivae and EOM are normal. No scleral icterus.   Neck: Normal range of motion. Neck supple. No tracheal deviation present.   Cardiovascular: Normal rate and regular rhythm.    No murmur heard.  Pulmonary/Chest: Effort normal and breath sounds normal. No respiratory distress. She has no wheezes. She has no rales.   Abdominal: Soft. Bowel sounds are normal. She exhibits no distension and no mass. There is no tenderness.   Musculoskeletal: Normal range of motion. She exhibits no edema or deformity.   Neurological: She is alert and oriented to person, place, and time. No cranial nerve deficit.   Skin: Skin is warm and dry. No rash noted. No erythema.   Psychiatric: She has a normal mood and affect. Her behavior is normal. Judgment and thought content normal.   Nursing note and vitals reviewed.      Results Review:    I reviewed the patient's new clinical results.      Assessment/Plan     Principal Problem:    Osteoarthritis, knee  Active Problems:    Hypertension    Morbid obesity with BMI of 40.0-44.9, adult      Assessment & Plan  - Holding parameters have been written for NORVASC.   - Will hold LASIX and KCl.  Anticipate fluctuations in BP due to blood loss, hypovolemia, anesthesia, narcotics etc..   - Continue IVFs as ordered.    - ASA has been ordered for DVT prophylaxis per ortho.  - She was encouraged to use incentive spirometer as instructed.      Thank you very much for asking [facility] to be involved in this patient's care.  We will follow along with you.      Kalin Calderon MD  [facility]  06/02/17  5:46 PM

## 2017-06-02 NOTE — OP NOTE
ORTHOPAEDIC OPERATIVE NOTE    Patient: Joselyn Grant       YOB: 1965    Medical Record Number: [id number]    Attending Physician: Ha Oh, *    Primary Care Physician: Leodan Nam MD    Date of Service: 6/2/2017    Surgeon: Ha Oh MD        DATE OF PROCEDURE: 6/2/2017    PREOPERATIVE DIAGNOSIS: Other mechanical complication of internal left knee prosthesis, initial encounter [T84.093A]    POSTOPERATIVE DIAGNOSIS: Other mechanical complication of internal left knee prosthesis, initial encounter [T84.093A]    PROCEDURE PERFORMED: FL REVISE KNEE JOINT REPLACE,1 PART [97377] (LT TOTAL KNEE ARTHROPLASTY REVISION) Revision  OF LEFT TOTAL KNEE by utilizing a  medial parapatellar approach with Depuy tibial tray rotating platform M.B.T.revision size 3 - 15 mm thick, a tibial insert rotating platform stabilized size 3, 10 mm thick. The tibia was  also augmented with cement  utilizing 1 a batch of tobramycin Simplex cement .    SURGEON: Ha Oh MD     ASSISTANT:     Ranjeet Meadows MD, Fellow  [facility]    The services of a skilled  first assist were necessary for performing the procedure safely and expeditiously.  The first assist was present for the entire duration of the case and helped with positioning, retraction and closure of the incision.      ANESTHESIA: General anaesthesia with a regional block femoral-sciatic and intraoperative periarticular  Exparel injection.    ESTIMATED BLOOD LOSS: 100 ml    SPECIMENS: 1.Tissue from right knee for culture sensitivity aerobic and anaerobic.        COMPLICATIONS: Nil.     DRAINS: A 10 [place] Hemovac drain.     INDICATIONS: The patient is a 51 y.o. female  who is known to me from revision of her left  total knee arthroplasty about four months back. She did well initially but started having pain in her left shin. The patient's serial x-rays showed that there was some  shift in the tibial stem with pressure over the anterior tibial cortex.  The stress fracture was suspected and 3 phase bone scan was obtained.  The bone scan confirmed the presence of increased uptake at the tip of the stem Treatment options and alternatives were discussed in detail with the patient who is indicated for a revision total knee arthroplasty.   The plan was to remove the Stem and the leave the sleeve with the baseplate. Likely risks and benefits of the procedure, including but not limited to infection, DVT, pulmonary embolism, future loosening of the implants, possibility of injury to tendons, ligaments, nerves or vessels and periprosthetic fractures, loss of extensor mechanism, stiffness, future above-the-knee amputation have been discussed in detail. Despite the risks involved, the patient elected to proceed and informed consent was obtained and the patient was scheduled for surgery. The patient was seen in the preoperative holding area and the operative site was marked.     DESCRIPTION OF PROCEDURE:   The patient was transferred to [facility] operating room. Preoperative antibiotics in the form of  Kefzol  intravenously was infused prior to the incision and prior to the tourniquet placement according to the SCIP protocol. A surgical time out was done with the team and the correct patient, surgical side and site were identified. After achieving adequate general anesthesia, a well-padded tourniquet was placed over the proximal aspect of the operative thigh. The operative leg was prepped and draped in the usual sterile fashion. Tourniquet was elevated to a pressure of 250 mmHg.     A skin incision was made vertically oriented centering over the patella anteriorly incorporating previous surgical scar. Skin and subcutaneous tissue were incised, full thickness flaps were raised and a medial parapatellar approach was developed. There was  A small effusion. The patella was subluxed and the  knee was inspected. Both the femur and the tibia were found to be well fixed. The patella component was also found to be well fixed.  I removed the polyethylene liner.  This was a 17.5 mm thick rotating platform posterior stabilized liner.  The tibia was subluxed.  I utilized a reciprocating saw under the tibial baseplate within the baseplate and the bone through the cement medially and laterally.  A V - punch was utilized 2.  Remove the well fixed tibial baseplate.  The baseplate came out disengaged from the metaphyseal sleeve along with the stem fixed to the baseplate.  This was a 5 mm thick baseplate. The metaphyseal sleeve for the tibia was inspected.  This was found to be ingrown and well fixed.  Tissue was sent for culture and sensitivity.The knee joint was thoroughly irrigated with saline.  All traces of previous cement was removed.  Debridement was completed.    As per the preoperative plan, I elected to proceed with the baseplate without stem.  Proximal tibia was sized and a trial reduction was performed. Reduction was found to be satisfactory with range of motion from 0° to 120° with the patella tracking well.     Having been satisfied with the trials, the bony surfaces irrigated with saline. Exparel was infiltrated into the posterior capsule medially and laterally and into periarticular tissue and subcutaneously. I elected to proceed with a 15 mm thick tibial baseplate MBT tray.  This was seated into the metaphyseal sleeve maintaining the correct rotational alignment and impacted into place.  I utilized Simplex cement with tobramycin  for obtaining some support of the component to the bone secondary to bone loss.  The cement was allowed to set excess cement was removed.  The trial liner was replaced with a 10 mm rotating platform posteriorly stabilized liner.     Again, range of motion was checked and the range was satisfactory from 0° to 120° with excellent stability throughout the range of motion. Good  medial and lateral tissue tension, and soft tissue balancing. The patella was tracking well. Having been satisfied with this, the joint was thoroughly irrigated with saline. Soft tissue hemostasis was secured. A 10-Y[place]uba Hemovac drain was placed.      Trannexamic acid was given intravenously prior to release of the tourniquet. Arthrotomy was closed with Ethibond sutures followed by closure of the incision in layers with Vicryl sutures and staples. Sterile dressings were placed and the patient was transferred to the recovery room in stable condition. The patient was given a knee immobilizer. The patient tolerated the procedure well and is being admitted for postoperative antibiotics according to the SCIP protocol for 2 more doses in the first 48 hours, until I see culture result. Also, Aspirin 325 mg 1 p.o. Q.12 will be started daily on postoperative day #1. The patient will be mobilized in am with physical therapy.      Ha Oh M.D.    6/2/2017    CC: Leodan Nam MD; MD Ha Nowak, *

## 2017-06-02 NOTE — PLAN OF CARE
Problem: Patient Care Overview (Adult)  Goal: Plan of Care Review  Outcome: Ongoing (interventions implemented as appropriate)    06/02/17 0850   Coping/Psychosocial Response Interventions   Plan Of Care Reviewed With patient   Patient Care Overview   Progress no change       Goal: Adult Individualization and Mutuality  Outcome: Ongoing (interventions implemented as appropriate)    06/02/17 0850   Individualization   Patient Specific Preferences call Joselyn   Patient Specific Goals no pain upon walking   Patient Specific Interventions teach S&S of infection   Mutuality/Individual Preferences   What Anxieties, Fears or Concerns Do You Have About Your Health or Care? none   What Questions Do You Have About Your Health or Care? none   What Information Would Help Us Give You More Personalized Care? none       Goal: Discharge Needs Assessment  Outcome: Ongoing (interventions implemented as appropriate)    06/02/17 0850   Discharge Needs Assessment   Concerns To Be Addressed denies needs/concerns at this time   Readmission Within The Last 30 Days no previous admission in last 30 days   Current Health   Anticipated Changes Related to Illness none   Self-Care   Equipment Currently Used at Home walker, standard         Problem: Perioperative Period (Adult)  Goal: Signs and Symptoms of Listed Potential Problems Will be Absent or Manageable (Perioperative Period)  Outcome: Ongoing (interventions implemented as appropriate)    06/02/17 0850   Perioperative Period   Problems Assessed (Perioperative Period) pain;hypoxia/hypoxemia;situational response   Problems Present (Perioperative Period) pain

## 2017-06-03 PROBLEM — M17.9 OSTEOARTHRITIS, KNEE: Status: RESOLVED | Noted: 2017-06-02 | Resolved: 2017-06-03

## 2017-06-03 LAB
ANION GAP SERPL CALCULATED.3IONS-SCNC: 13.5 MMOL/L
BASOPHILS # BLD AUTO: 0.01 10*3/MM3 (ref 0–0.2)
BASOPHILS NFR BLD AUTO: 0.1 % (ref 0–1.5)
BUN BLD-MCNC: 12 MG/DL (ref 6–20)
BUN/CREAT SERPL: 18.2 (ref 7–25)
CALCIUM SPEC-SCNC: 9.3 MG/DL (ref 8.6–10.5)
CHLORIDE SERPL-SCNC: 104 MMOL/L (ref 98–107)
CO2 SERPL-SCNC: 23.5 MMOL/L (ref 22–29)
CREAT BLD-MCNC: 0.66 MG/DL (ref 0.57–1)
DEPRECATED RDW RBC AUTO: 45.9 FL (ref 37–54)
EOSINOPHIL # BLD AUTO: 0 10*3/MM3 (ref 0–0.7)
EOSINOPHIL NFR BLD AUTO: 0 % (ref 0.3–6.2)
ERYTHROCYTE [DISTWIDTH] IN BLOOD BY AUTOMATED COUNT: 14.3 % (ref 11.7–13)
GFR SERPL CREATININE-BSD FRML MDRD: 115 ML/MIN/1.73
GLUCOSE BLD-MCNC: 122 MG/DL (ref 65–99)
HCT VFR BLD AUTO: 34.4 % (ref 35.6–45.5)
HGB BLD-MCNC: 11.6 G/DL (ref 11.9–15.5)
IMM GRANULOCYTES # BLD: 0.03 10*3/MM3 (ref 0–0.03)
IMM GRANULOCYTES NFR BLD: 0.3 % (ref 0–0.5)
LYMPHOCYTES # BLD AUTO: 1.04 10*3/MM3 (ref 0.9–4.8)
LYMPHOCYTES NFR BLD AUTO: 9.6 % (ref 19.6–45.3)
MCH RBC QN AUTO: 29.7 PG (ref 26.9–32)
MCHC RBC AUTO-ENTMCNC: 33.7 G/DL (ref 32.4–36.3)
MCV RBC AUTO: 88.2 FL (ref 80.5–98.2)
MONOCYTES # BLD AUTO: 0.75 10*3/MM3 (ref 0.2–1.2)
MONOCYTES NFR BLD AUTO: 6.9 % (ref 5–12)
NEUTROPHILS # BLD AUTO: 9.05 10*3/MM3 (ref 1.9–8.1)
NEUTROPHILS NFR BLD AUTO: 83.1 % (ref 42.7–76)
PLATELET # BLD AUTO: 236 10*3/MM3 (ref 140–500)
PMV BLD AUTO: 11 FL (ref 6–12)
POTASSIUM BLD-SCNC: 4.4 MMOL/L (ref 3.5–5.2)
RBC # BLD AUTO: 3.9 10*6/MM3 (ref 3.9–5.2)
SODIUM BLD-SCNC: 141 MMOL/L (ref 136–145)
WBC NRBC COR # BLD: 10.88 10*3/MM3 (ref 4.5–10.7)

## 2017-06-03 PROCEDURE — 80048 BASIC METABOLIC PNL TOTAL CA: CPT | Performed by: ORTHOPAEDIC SURGERY

## 2017-06-03 PROCEDURE — 97150 GROUP THERAPEUTIC PROCEDURES: CPT

## 2017-06-03 PROCEDURE — 25010000003 CEFAZOLIN IN DEXTROSE 2-4 GM/100ML-% SOLUTION: Performed by: ORTHOPAEDIC SURGERY

## 2017-06-03 PROCEDURE — 25010000002 HYDROMORPHONE PER 4 MG: Performed by: ORTHOPAEDIC SURGERY

## 2017-06-03 PROCEDURE — 97110 THERAPEUTIC EXERCISES: CPT

## 2017-06-03 PROCEDURE — 85025 COMPLETE CBC W/AUTO DIFF WBC: CPT | Performed by: ORTHOPAEDIC SURGERY

## 2017-06-03 RX ORDER — OXYCODONE HCL 10 MG/1
20 TABLET, FILM COATED, EXTENDED RELEASE ORAL EVERY 12 HOURS SCHEDULED
Status: DISCONTINUED | OUTPATIENT
Start: 2017-06-03 | End: 2017-06-03

## 2017-06-03 RX ORDER — HYDROMORPHONE HYDROCHLORIDE 1 MG/ML
0.5 INJECTION, SOLUTION INTRAMUSCULAR; INTRAVENOUS; SUBCUTANEOUS
Status: DISCONTINUED | OUTPATIENT
Start: 2017-06-03 | End: 2017-06-03

## 2017-06-03 RX ORDER — HYDROCODONE BITARTRATE AND ACETAMINOPHEN 10; 325 MG/1; MG/1
1 TABLET ORAL EVERY 4 HOURS PRN
Qty: 72 TABLET | Refills: 0 | Status: SHIPPED | OUTPATIENT
Start: 2017-06-03 | End: 2017-06-15

## 2017-06-03 RX ORDER — OXYCODONE HCL 10 MG/1
20 TABLET, FILM COATED, EXTENDED RELEASE ORAL EVERY 12 HOURS PRN
Status: DISCONTINUED | OUTPATIENT
Start: 2017-06-03 | End: 2017-06-04 | Stop reason: HOSPADM

## 2017-06-03 RX ORDER — HYDROCODONE BITARTRATE AND ACETAMINOPHEN 10; 325 MG/1; MG/1
1 TABLET ORAL EVERY 4 HOURS PRN
Status: DISCONTINUED | OUTPATIENT
Start: 2017-06-03 | End: 2017-06-04 | Stop reason: HOSPADM

## 2017-06-03 RX ORDER — KETOROLAC TROMETHAMINE 30 MG/ML
30 INJECTION, SOLUTION INTRAMUSCULAR; INTRAVENOUS ONCE AS NEEDED
Status: DISCONTINUED | OUTPATIENT
Start: 2017-06-03 | End: 2017-06-04 | Stop reason: HOSPADM

## 2017-06-03 RX ORDER — HYDROCODONE BITARTRATE AND ACETAMINOPHEN 10; 325 MG/1; MG/1
2 TABLET ORAL EVERY 4 HOURS PRN
Status: DISCONTINUED | OUTPATIENT
Start: 2017-06-03 | End: 2017-06-04 | Stop reason: HOSPADM

## 2017-06-03 RX ADMIN — GABAPENTIN 300 MG: 300 CAPSULE ORAL at 20:44

## 2017-06-03 RX ADMIN — HYDROCODONE BITARTRATE AND ACETAMINOPHEN 1 TABLET: 10; 325 TABLET ORAL at 20:44

## 2017-06-03 RX ADMIN — HYDROCODONE BITARTRATE AND ACETAMINOPHEN 1 TABLET: 10; 325 TABLET ORAL at 12:43

## 2017-06-03 RX ADMIN — ASPIRIN 325 MG: 325 TABLET, DELAYED RELEASE ORAL at 17:11

## 2017-06-03 RX ADMIN — HYDROMORPHONE HYDROCHLORIDE 1 MG: 1 INJECTION, SOLUTION INTRAMUSCULAR; INTRAVENOUS; SUBCUTANEOUS at 23:36

## 2017-06-03 RX ADMIN — HYDROMORPHONE HYDROCHLORIDE 0.5 MG: 1 INJECTION, SOLUTION INTRAMUSCULAR; INTRAVENOUS; SUBCUTANEOUS at 17:32

## 2017-06-03 RX ADMIN — AMLODIPINE BESYLATE 5 MG: 5 TABLET ORAL at 06:52

## 2017-06-03 RX ADMIN — CEFAZOLIN SODIUM 2 G: 2 INJECTION, SOLUTION INTRAVENOUS at 03:35

## 2017-06-03 RX ADMIN — GABAPENTIN 300 MG: 300 CAPSULE ORAL at 08:29

## 2017-06-03 RX ADMIN — HYDROCODONE BITARTRATE AND ACETAMINOPHEN 1 TABLET: 10; 325 TABLET ORAL at 08:29

## 2017-06-03 RX ADMIN — DOCUSATE SODIUM 100 MG: 100 CAPSULE, LIQUID FILLED ORAL at 17:11

## 2017-06-03 RX ADMIN — HYDROCODONE BITARTRATE AND ACETAMINOPHEN 1 TABLET: 10; 325 TABLET ORAL at 03:23

## 2017-06-03 RX ADMIN — ASPIRIN 325 MG: 325 TABLET, DELAYED RELEASE ORAL at 08:29

## 2017-06-03 RX ADMIN — HYDROCODONE BITARTRATE AND ACETAMINOPHEN 1 TABLET: 10; 325 TABLET ORAL at 16:39

## 2017-06-03 RX ADMIN — HYDROMORPHONE HYDROCHLORIDE 0.5 MG: 1 INJECTION, SOLUTION INTRAMUSCULAR; INTRAVENOUS; SUBCUTANEOUS at 19:39

## 2017-06-03 RX ADMIN — HYDROMORPHONE HYDROCHLORIDE 0.5 MG: 1 INJECTION, SOLUTION INTRAMUSCULAR; INTRAVENOUS; SUBCUTANEOUS at 21:37

## 2017-06-03 RX ADMIN — DOCUSATE SODIUM 100 MG: 100 CAPSULE, LIQUID FILLED ORAL at 08:31

## 2017-06-03 NOTE — PLAN OF CARE
Problem: Patient Care Overview (Adult)  Goal: Plan of Care Review  Outcome: Ongoing (interventions implemented as appropriate)    06/02/17 2045   Coping/Psychosocial Response Interventions   Plan Of Care Reviewed With patient;spouse;friend   Patient Care Overview   Progress progress toward functional goals as expected   Outcome Evaluation   Outcome Summary/Follow up Plan pt has been transfering from bed to bedside commode well with little to no pain. feeling in the left leg is slowly returning after the surgical block. pt encouraged to use IS often, tolerates use well. continue to offere tolieting frequently. no headache or migrane present through out shift but hx of migrane, continue to monitor.        Goal: Adult Individualization and Mutuality  Outcome: Ongoing (interventions implemented as appropriate)    06/02/17 2045   Individualization   Patient Specific Preferences pt goes by Joselyn   Patient Specific Goals better mobilty and control of nausea   Patient Specific Interventions pt will continue to monitor signs and symptoms o finfection    Mutuality/Individual Preferences   What Anxieties, Fears or Concerns Do You Have About Your Health or Care? none   What Questions Do You Have About Your Health or Care? none   What Information Would Help Us Give You More Personalized Care? none       Goal: Discharge Needs Assessment  Outcome: Ongoing (interventions implemented as appropriate)    06/02/17 2045   Discharge Needs Assessment   Concerns To Be Addressed denies needs/concerns at this time   Readmission Within The Last 30 Days no previous admission in last 30 days   Discharge Facility/Level Of Care Needs home with home health   Self-Care   Equipment Currently Used at Home walker, standard   Living Environment   Transportation Available car;family or friend will provide         Problem: Fall Risk (Adult)  Intervention: Monitor/Assist with Self Care    06/02/17 2045   Activity   Activity Type activity adjusted per  tolerance   Activity Level of Assistance assistance, 1 person   Monitor/Assist with Self Care   Ambulation 3-->assistive equipment and person   Transferring 1-->assistive equipment   Toileting 1-->assistive equipment   Bathing 2-->assistive person   Dressing 2-->assistive person   Eating 0-->independent   Communication 0-->understands/communicates without difficulty   Swallowing (if score 2 or more for any item, consult Rehab Services) 0-->swallows foods/liquids without difficulty       Intervention: Reduce Risk/Promote Restraint Free Environment    06/02/17 2045   Safety Interventions   Safety/Security Measures bed alarm set   Environmental Safety Modification assistive device/personal items within reach;clutter free environment maintained;lighting adjusted;room organization consistent;room near unit station;mobility aid in reach   Restraint Interventions   Safety Promotion/Fall Prevention safety round/check completed;fall prevention program maintained;nonskid shoes/slippers when out of bed;gait belt       Intervention: Review Medications/Identify Contributors to Fall Risk    06/02/17 2045   Safety Interventions   Medication Review/Management medications reviewed         Goal: Identify Related Risk Factors and Signs and Symptoms  Outcome: Outcome(s) achieved Date Met:  06/02/17 06/02/17 2045   Fall Risk   Fall Risk: Related Risk Factors polypharmacy;fatigue/slow reaction;slipper/uneven surfaces;gait/mobility problems   Fall Risk: Signs and Symptoms presence of risk factors       Goal: Absence of Falls  Outcome: Ongoing (interventions implemented as appropriate)    06/02/17 2045   Fall Risk (Adult)   Absence of Falls achieves out       06/02/17 2045   Fall Risk (Adult)   Absence of Falls making progress toward outcome   come

## 2017-06-03 NOTE — PLAN OF CARE
Problem: Inpatient Physical Therapy  Goal: Bed Mobility Goal LTG- PT  Outcome: Outcome(s) achieved Date Met:  06/03/17 06/03/17 1449   Bed Mobility PT LTG   Bed Mobility PT LTG, Outcome goal met       Goal: Transfer Training Goal 1 LTG- PT  Outcome: Outcome(s) achieved Date Met:  06/03/17 06/03/17 1449   Transfer Training PT LTG   Transfer Training PT LTG, Outcome goal met       Goal: Gait Training Goal LTG- PT  Outcome: Outcome(s) achieved Date Met:  06/03/17 06/03/17 1449   Gait Training PT LTG   Gait Training Goal PT LTG, Outcome goal met       Goal: Range of Motion Goal LTG- PT  Outcome: Unable to achieve outcome(s) by discharge Date Met:  06/03/17 06/03/17 1449   Range of Motion PT LTG   Range of Motion Goal PT LTG, Outcome goal partially met   Reason Goal Not Met (ROM) PT LTG discharged from facility

## 2017-06-03 NOTE — PLAN OF CARE
Problem: Pain, Acute (Adult)  Intervention: Monitor/Manage Analgesia    06/02/17 2042   Manage Acute Burn Pain   Bowel Intervention adequate fluid intake promoted;commode/bedpan at bedside   Pain Management Interventions cold applied;pillow support       Intervention: Mutually Develop/Implement Acute Pain Management Plan    06/02/17 2042   Cognitive Interventions   Sensory Stimulation Regulation music/television provided for stimulation   Manage Acute Burn Pain   Pain Management Interventions cold applied;pillow support       Intervention: Support/Optimize Psychosocial Response to Acute Pain    06/02/17 2042   Coping Strategies   Trust Relationship/Rapport care explained;questions answered   Supportive Measures active listening utilized         Goal: Identify Related Risk Factors and Signs and Symptoms  Outcome: Ongoing (interventions implemented as appropriate)    06/02/17 2042   Pain, Acute   Related Risk Factors (Acute Pain) surgery   Signs and Symptoms (Acute Pain) nausea/vomiting/anorexia       Goal: Acceptable Pain Control/Comfort Level  Outcome: Ongoing (interventions implemented as appropriate)    06/02/17 2042   Pain, Acute (Adult)   Acceptable Pain Control/Comfort Level making progress toward outcome

## 2017-06-03 NOTE — SIGNIFICANT NOTE
06/03/17 0819   Rehab Treatment   Discipline occupational therapist   Rehab Evaluation   Evaluation Not Performed patient/family declined evaluation  (Pt denies need for OT at this time. States has assist as needed and has equipment from previous knee surgery. will not follow for OT at this time)

## 2017-06-03 NOTE — THERAPY DISCHARGE NOTE
Acute Care - Physical Therapy Treatment Note/Discharge  [facility]     Patient Name: Joselyn Grant  : 1965  MRN: [id number]  Today's Date: 6/3/2017  Onset of Illness/Injury or Date of Surgery Date: 17  Date of Referral to PT: 17  Referring Physician: Althea    Admit Date: 2017    Visit Dx:    ICD-10-CM ICD-9-CM   1. Impaired gait and mobility R26.89 781.2   2. Failed total left knee replacement, subsequent encounter T84.093D V58.89     996.47     V43.65     Patient Active Problem List   Diagnosis   • Hypertension   • Mechanical loosening of internal left knee prosthetic joint   • Morbid obesity with BMI of 40.0-44.9, adult       Physical Therapy Education     Title: PT OT SLP Therapies (Resolved)     Topic: Physical Therapy (Resolved)     Point: Mobility training (Resolved)    Learning Progress Summary    Learner Readiness Method Response Comment Documented by Status   Patient Acceptance E VU,NR   17 1121 Done    Acceptance E NR   17 1526 Active               Point: Home exercise program (Resolved)    Learning Progress Summary    Learner Readiness Method Response Comment Documented by Status   Patient Acceptance E VU,NR   17 1121 Done    Acceptance E NR   17 1526 Active                      User Key     Initials Effective Dates Name Provider Type Discipline     10/06/15 -  Kristal Kimble, PT Physical Therapist PT     17 -  Casi Gleason, PT Physical Therapist PT                    IP PT Goals       17 1449 17 1527       Bed Mobility PT LTG    Bed Mobility PT LTG, Date Established  17  -     Bed Mobility PT LTG, Time to Achieve  3 days  -     Bed Mobility PT LTG, Activity Type  all bed mobility  -     Bed Mobility PT LTG, [place] Level  supervision required  -     Bed Mobility PT LTG, Outcome goal met  -      Transfer Training PT LTG    Transfer Training PT LTG, Date Established  17  -     Transfer  Training PT LTG, Time to Achieve  3 days  -LH     Transfer Training PT LTG, Activity Type  all transfers  -LH     Transfer Training PT LTG, [place] Level  supervision required  -LH     Transfer Training PT LTG, Assist Device  walker, rolling  -LH     Transfer Training PT LTG, Outcome goal met  -SILVIA      Gait Training PT LTG    Gait Training Goal PT LTG, Date Established  06/02/17  -LH     Gait Training Goal PT LTG, Time to Achieve  3 days  -LH     Gait Training Goal PT LTG, [place] Level  contact guard assist  -LH     Gait Training Goal PT LTG, Assist Device  walker, rolling  -LH     Gait Training Goal PT LTG, Distance to Achieve  100  -LH     Gait Training Goal PT LTG, Outcome goal met  -SILVIA      Range of Motion PT LTG    Range of Motion Goal PT LTG, Date Established  06/02/17  -LH     Range of Motion Goal PT LTG, Time to Achieve  3 days  -LH     Range fo Motion Goal PT LTG, Joint  L knee  -LH     Range of Motion Goal PT LTG, AROM Measure  -5-85  -LH     Range of Motion Goal PT LTG, Outcome goal partially met  -SILVIA      Reason Goal Not Met (ROM) PT LTG discharged from facility  -SILVIA        User Key  (r) = Recorded By, (t) = Taken By, (c) = Cosigned By    Initials Name Provider Type    SILVIA Kristal Kimble, PT Physical Therapist     Casi Gleason, PT Physical Therapist              Adult Rehabilitation Note       06/03/17 1300 06/03/17 0930       Rehab Assessment/Intervention    Discipline physical therapist  -SILVIA physical therapist  -SILVIA     Document Type therapy note (daily note);discharge summary  -SILVIA therapy note (daily note)  -SILVIA     Subjective Information agree to therapy  -SILVIA agree to therapy  -SILVIA     Patient Effort, Rehab Treatment good  -SILVIA good  -SILVIA     Recorded by [SILVIA] Kristal Kimble, PT [SILVIA] Kristal Kimble, PT     Pain Assessment    Pain Assessment 0-10  -SILVIA 0-10  -SILVIA     Pain Score 3  -SILVIA 3  -SILVIA     Pain Location Knee  -SILVIA Knee  -SILVIA     Pain Orientation Left  -SILVIA Left  -SILVIA     Recorded by [SILVIA]  Kristal Kimble, PT [SILVIA] Kristal Kimble, PT     Cognitive Assessment/Intervention    Current Cognitive/Communication Assessment functional  -SILVIA functional  -SILVIA     Orientation Status oriented x 4  -SILVIA oriented x 4  -SILVIA     Personal Safety WNL/WFL  -SILVIA WNL/WFL  -SILVIA     Personal Safety Interventions fall prevention program maintained;gait belt;muscle strengthening facilitated;nonskid shoes/slippers when out of bed  -SILVIA fall prevention program maintained;gait belt;muscle strengthening facilitated;nonskid shoes/slippers when out of bed  -SILVIA     Recorded by [SILVIA] Kristal Kimble, PT [SILVIA] Kristal Kimble, PT     ROM (Range of Motion)    General ROM Detail  8-88  -SILVIA     Recorded by  [SILVIA] Kristal Kimble PT     Bed Mobility, Assessment/Treatment    Bed Mobility, Assistive Device bed rails;head of bed elevated  -SILVIA bed rails;head of bed elevated  -SILVIA     Bed Mob, Supine to Sit, [place] conditional independence  -SILVIA conditional independence  -SILVIA     Bed Mob, Sit to Supine, [place] conditional independence  -SILVIA conditional independence  -SILVIA     Recorded by [SILVIA] Kristal Kimble, PT [SILVIA] Kristal Kimble, PT     Transfer Assessment/Treatment    Transfers, Sit-Stand [place] conditional independence  -SILVIA conditional independence  -SILVIA     Transfers, Stand-Sit [place] conditional independence  -SILVIA conditional independence  -SILVIA     Transfers, Sit-Stand-Sit, Assist Device rolling walker  -SILVIA rolling walker  -SILVIA     Recorded by [SILVIA] Kristal Kimble, PT [SILVIA] Kristal Kimble, PT     Gait Assessment/Treatment    Gait, [place] Level conditional independence  -SILVIA conditional independence  -SILVIA     Gait, Assistive Device rolling walker  -SILVIA rolling walker  -SILVIA     Gait, Distance (Feet) 100  -SILVIA 100  -SILVIA     Gait, Gait Deviations antalgic;kimberly decreased  -SILVIA antalgic;kimberly decreased  -SILVIA     Recorded by [SILVIA] Kristal Kimble, PT [SILVIA] Kristal Kimble, PT     Stairs Assessment/Treatment    Number of  Stairs  4  -SILVIA     Stairs, Handrail Location  both sides  -SILVIA     Stairs, [place] Level  contact guard assist;supervision required;verbal cues required  -SILVIA     Stairs, Technique Used  step to step (ascending);step to step (descending)  -SILVIA     Recorded by  [SILVIA] Kristal Kimble PT     Therapy Exercises    Exercise Protocols total knee  -SILVIA total knee  -SILVIA     Total Knee Exercises left:;completed protocol;20 reps  -SILVIA left:;15 reps;completed protocol  -SILVIA     Recorded by [SILVIA] Kristal Kimble, PT [SILVIA] Kristal Kimble PT     Positioning and Restraints    Pre-Treatment Position sitting in chair/recliner  -SILVIA sitting in chair/recliner  -SILVIA     Post Treatment Position chair  -SILVIA chair  -SILVIA     In Chair reclined;call light within reach  -SILVIA reclined;call light within reach  -SILVIA     Recorded by [SILVIA] Kristal Kimble, PT [SILVIA] Kristal Kimble PT       User Key  (r) = Recorded By, (t) = Taken By, (c) = Cosigned By    Initials Name Effective Dates    SILVIA Kimble, PT 10/06/15 -           PT Recommendation and Plan  Anticipated Discharge Disposition: home with home health  Planned Therapy Interventions: balance training, bed mobility training, gait training, home exercise program, ROM (Range of Motion), stair training, strengthening, stretching, transfer training  PT Frequency: 2 times/day  Plan of Care Review  Plan Of Care Reviewed With: patient  Progress: progress toward functional goals as expected          Outcome Measures       06/03/17 1100 06/02/17 1500       How much help from another person do you currently need...    Turning from your back to your side while in flat bed without using bedrails? 4  -SILVIA 3  -LH     Moving from lying on back to sitting on the side of a flat bed without bedrails? 4  -SILVIA 3  -LH     Moving to and from a bed to a chair (including a wheelchair)? 4  -SILVIA 3  -LH     Standing up from a chair using your arms (e.g., wheelchair, bedside chair)? 4  -SILVIA 3  -LH     Climbing 3-5 steps  with a railing? 3  -SILVIA 2  -     To walk in hospital room? 4  -SILVIA 3  -LH     AM-PAC 6 Clicks Score 23  -SILVIA 17  -     Functional Assessment    Outcome Measure Options AM-PAC 6 Clicks Basic Mobility (PT)  -SILVIA AM-PAC 6 Clicks Basic Mobility (PT)  -       User Key  (r) = Recorded By, (t) = Taken By, (c) = Cosigned By    Initials Name Provider Type    SILVIA Kimble, PT Physical Therapist     Casi Gleason, PT Physical Therapist           Time Calculation:         PT Charges       06/03/17 1452 06/03/17 1130       Time Calculation    Start Time 1300  -SILVIA 0930  -SILVIA     Stop Time 1400  -SILVIA 1030  -SILVIA     Time Calculation (min) 60 min  -SILVIA 60 min  -SILVIA     PT Received On 06/03/17  -SILVIA 06/03/17  -SILVIA       User Key  (r) = Recorded By, (t) = Taken By, (c) = Cosigned By    Initials Name Provider Type    SILVIA Kimble, PT Physical Therapist          Therapy Charges for Today     Code Description Service Date Service Provider Modifiers Qty    01607661945 HC PT THER PROC GROUP 6/3/2017 Kristal Kimble, PT GP 1    07036098324 HC PT THER PROC EA 15 MIN 6/3/2017 Kristal Kibmle, PT GP 1    52752899603 HC PT THER PROC GROUP 6/3/2017 Kristal Kimble, PT GP 1    27882052992 HC PT THER PROC EA 15 MIN 6/3/2017 Kristal Kimble, PT GP 1          PT G-Codes  Outcome Measure Options: AM-PAC 6 Clicks Basic Mobility (PT)    PT Discharge Summary  Reason for Discharge: Discharge from facility    Kristal Kimble PT  6/3/2017

## 2017-06-03 NOTE — DISCHARGE SUMMARY
Orthopedic Discharge Summary      Patient: Joselyn Grant   YOB: 1965    Medical Record Number: [id number]    Attending Physician: Ha Oh, *  Consulting Physician(s):   Date of Admission: 6/2/2017  7:45 AM  Date of Discharge:        [place] REVISE KNEE JOINT REPLACE,1 PART [13416] (LT TOTAL KNEE ARTHROPLASTY REVISION)    Patient Active Problem List   Diagnosis   • Hypertension   • Mechanical loosening of internal left knee prosthetic joint   • Morbid obesity with BMI of 40.0-44.9, adult        No Known Allergies     Joselyn Grant   Home Medication Instructions INDRA:[id number]    Printed on:06/03/17 1683   Medication Information                      acetaminophen (TYLENOL) 500 MG tablet  Take 500 mg by mouth Every 6 (Six) Hours As Needed for mild pain (1-3).             amLODIPine (NORVASC) 10 MG tablet  Take 10 mg by mouth Every Morning.             aspirin  MG EC tablet  Take 1 tablet by mouth 2 (Two) Times a Day for 20 days.             estradiol (MINIVELLE, VIVELLE-DOT) 0.075 MG/24HR patch  Place 1 patch on the skin 2 (Two) Times a Week. Wednesdays  AND SUNDAYS             furosemide (LASIX) 40 MG tablet  Take 40 mg by mouth Every Morning.             gabapentin (NEURONTIN) 300 MG capsule  Take 300 mg by mouth Every 12 (Twelve) Hours.             HYDROcodone-acetaminophen (NORCO)  MG per tablet  Take 1 tablet by mouth Every 4 (Four) Hours As Needed for Moderate Pain (4-6) for up to 12 days.             KP CALCIUM-MAGNESIUM-ZINC PO  Take 1 tablet by mouth Daily. Also has vitamin d             POTASSIUM PO  Take 99 mg by mouth Daily. 99 mg                    Past Medical History:   Diagnosis Date   • Arthritis    • History of kidney stones    • Hypertension    • Knee pain    • Migraine    • Urinary incontinence     wears pads        Past Surgical History:   Procedure Laterality Date   • EYE SURGERY      lasix   • HYSTERECTOMY      10 years ago for heavy menses and fibroids    • LAPAROSCOPIC CHOLECYSTECTOMY     • [place] REVISE KNEE JOINT REPLACE,1 PART Left 1/31/2017    Procedure: LT TOTAL KNEE ARTHROPLASTY REVISION;  Surgeon: Ha Oh MD;  Location: [facility] OR;  Service: Orthopedics   • REPLACEMENT TOTAL KNEE      left   • STOMACH SURGERY      lapband        Social History     Occupational History   • Not on file.     Social History Main Topics   • Smoking status: Never Smoker   • Smokeless tobacco: Never Used   • Alcohol use 1.2 oz/week     1 Glasses of wine, 1 Shots of liquor per week   • Drug use: Yes     Special: Hydrocodone      Comment: prescribed by MD   • Sexual activity: Not on file      Social History     Social History Narrative        Family History   Problem Relation Age of Onset   • Pancreatic cancer Mother    • Hypertension Mother    • No Known Problems Father    • Fibroids Sister    • Heart murmur Sister    • Lymphoma Maternal Uncle    • Lung cancer Maternal Grandmother    • Bell's palsy Brother    • Hypertension Brother    • No Known Problems Maternal Grandfather    • No Known Problems Brother    • Malig Hyperthermia Neg Hx        Physical Exam: 51 y.o. female  General Appearance:    Alert, cooperative, in no acute distress                      Vitals:    06/03/17 0300 06/03/17 0702 06/03/17 1116 06/03/17 1547   BP: 140/89 117/76 117/76 125/72   BP Location: Left arm Right arm Left arm Right arm   Patient Position: Lying Lying Sitting Sitting   Pulse: 67 66 83 84   Resp: 16 16 16 16   Temp: 96.8 °F (36 °C) 97.4 °F (36.3 °C)  98.2 °F (36.8 °C)   TempSrc: Oral Oral  Oral   SpO2: 96% 97% 98% 97%   Weight:       Height:            Hospital Course:  51 y.o. female admitted to [facility] to services of Ha Oh, * with persistent pain in her shin status post revision TKA with stem implant  on 6/2/2017 and underwent revision of her tibial component, Per Ha Oh MD. Antibiotic and VTE prophylaxis were per SCIP protocols and  included  Kefzol 2 gm every 8 hours and Aspirin 325 mg twice daily . Post-operatively the patient transferred to the post-operative floor where the patient underwent mobilization therapy that included active as well as passive ROM exercises. Opioids were titrated to achieve appropriate pain management to allow for participation in mobilization exercises. Vital signs are now stable. The incision is intact without signs or symptoms of infection. Operative extremity neurovascular status remains intact. She reports relief of the shin pain.   Appropriate education re: incision care, activity levels, medications, and follow up visits was completed and all questions were answered. The patient is now deemed stable for discharge.      DIAGNOSTIC TESTS:     Admission on 06/02/2017   Component Date Value Ref Range Status   • Culture 06/02/2017 No growth at 24 hours   Preliminary   • Gram Stain Result 06/02/2017 Occasional WBCs seen   Preliminary   • Gram Stain Result 06/02/2017 No organisms seen   Preliminary   • Glucose 06/03/2017 122* 65 - 99 mg/dL Final   • BUN 06/03/2017 12  6 - 20 mg/dL Final   • Creatinine 06/03/2017 0.66  0.57 - 1.00 mg/dL Final   • Sodium 06/03/2017 141  136 - 145 mmol/L Final   • Potassium 06/03/2017 4.4  3.5 - 5.2 mmol/L Final   • Chloride 06/03/2017 104  98 - 107 mmol/L Final   • CO2 06/03/2017 23.5  22.0 - 29.0 mmol/L Final   • Calcium 06/03/2017 9.3  8.6 - 10.5 mg/dL Final   • eGFR   Amer 06/03/2017 115  >60 mL/min/1.73 Final   • BUN/Creatinine Ratio 06/03/2017 18.2  7.0 - 25.0 Final   • Anion Gap 06/03/2017 13.5  mmol/L Final   • WBC 06/03/2017 10.88* 4.50 - 10.70 10*3/mm3 Final   • RBC 06/03/2017 3.90  3.90 - 5.20 10*6/mm3 Final   • Hemoglobin 06/03/2017 11.6* 11.9 - 15.5 g/dL Final   • Hematocrit 06/03/2017 34.4* 35.6 - 45.5 % Final   • MCV 06/03/2017 88.2  80.5 - 98.2 fL Final   • MCH 06/03/2017 29.7  26.9 - 32.0 pg Final   • MCHC 06/03/2017 33.7  32.4 - 36.3 g/dL Final   • RDW  06/03/2017 14.3* 11.7 - 13.0 % Final   • RDW-SD 06/03/2017 45.9  37.0 - 54.0 fl Final   • MPV 06/03/2017 11.0  6.0 - 12.0 fL Final   • Platelets 06/03/2017 236  140 - 500 10*3/mm3 Final   • Neutrophil % 06/03/2017 83.1* 42.7 - 76.0 % Final   • Lymphocyte % 06/03/2017 9.6* 19.6 - 45.3 % Final   • Monocyte % 06/03/2017 6.9  5.0 - 12.0 % Final   • Eosinophil % 06/03/2017 0.0* 0.3 - 6.2 % Final   • Basophil % 06/03/2017 0.1  0.0 - 1.5 % Final   • Immature Grans % 06/03/2017 0.3  0.0 - 0.5 % Final   • Neutrophils, Absolute 06/03/2017 9.05* 1.90 - 8.10 10*3/mm3 Final   • Lymphocytes, Absolute 06/03/2017 1.04  0.90 - 4.80 10*3/mm3 Final   • Monocytes, Absolute 06/03/2017 0.75  0.20 - 1.20 10*3/mm3 Final   • Eosinophils, Absolute 06/03/2017 0.00  0.00 - 0.70 10*3/mm3 Final   • Basophils, Absolute 06/03/2017 0.01  0.00 - 0.20 10*3/mm3 Final   • Immature Grans, Absolute 06/03/2017 0.03  0.00 - 0.03 10*3/mm3 Final       Xr Knee 1 Or 2 View Left    Result Date: 6/2/2017  Narrative: PORTABLE VIEWS OF THE  LEFT KNEE  CLINICAL HISTORY: Postop knee arthroplasty  AP and crosstable lateral views demonstrate a total knee arthroplasty that appears in satisfactory position.  This report was finalized on 6/2/2017 2:00 PM by Dr. Edmund Lucio MD.      Xr Chest Pa & Lateral    Result Date: 5/26/2017  Narrative: TWO-VIEW CHEST  HISTORY: Preop for knee surgery. Hypertension.  FINDINGS: The heart, lungs and mediastinal structures are normal and unchanged from 01/24/2017.  This report was finalized on 5/26/2017 10:53 PM by Dr. Jacky Nuno MD.        Discharge and Follow up Instructions:     Total Knee Joint Replacement Discharge Instructions:    I. ACTIVITIES:  1. Exercises:  ? Complete exercise program as taught by the hospital physical therapist 2 times per day  ? Exercise program will be advanced by your home health physical therapist  ? During the day be up ambulating every 2 hours (while awake) for short distances  ? Complete the  ankle pump exercises at least 10 times per hour (while awake)  ? Elevate legs most of the day the first week post operatively and thereafter elevate legs when in bed and for at least 30 minutes during the day.   ? Caution must be taken to avoid pillow placement under the bend of the knee as this can led to flexion contractures of the knee. Pillow placement under the heel is encouraged.  ? Use cold packs 20-30 minutes approximately 5 times per day. This should be done before and after completing your exercises and at any time you are experiencing pain/ stiffness in your operative extremity.      2. Activities of Daily Living:  ? No tub baths, hot tubs, or swimming pools for 4 weeks  ? May shower and let water run over the incision on post-operative day #5 if no drainage. Do not scrub or rub the incision. Simply let the water run over the incision and pat dry.    II. Restrictions  ? Do not cross legs or kneel  ? Your surgeon will discuss with you when you will be able to drive again. Usual guidelines are you are to be off pain medications prior to driving.  ? Weight bearing is as tolerated  ? First week stay inside on even terrain. May go up and down stairs one stair at a time utilizing the hand rail.  ? After one week, you may venture outside.    III. Precautions:  ? Everyone that comes near you should wash their hands  ? No elective dental, genital-urinary, or colon procedures or surgical procedures for 12 weeks after surgery unless absolutely necessary.  ?  If dental work or surgical procedure is deemed absolutely necessary, you will need to contact your surgeon as you will need to take antibiotics 1 hour prior to any dental work (including teeth cleanings).  ? Please discuss with your surgeon prophylactic antibiotics as the length of time this intervention will be necessary for you varies with each patient’s health history and situation.  ? Avoid sick people. If you must be around someone who is ill, they should  wear a mask.  ? Avoid visits to the Emergency Room or Urgent Care. If you feel you need to go to the emergency room, please notify your surgeon.    ? Stockings are to be worn for one week after surgery and are to be placed on in the morning and removed at night. Observe your skin when stocking is removed for any problems. Monitor the stockings to ensure that any swelling is not causing the stockings to become too tight. In this case, remove stockings immediately.    IV. INCISION CARE:  ? Wash your hands prior to dressing changes  ? Change the dressing as needed to keep incision clean and dry. Utilize dry gauze and paper tape. Avoid touching the side of the gauze that goes against the incision with your hands.  ? No creams or ointments to the incision  ? May remove dressing once the incision is free of drainage  ? Do not touch or pick at the incision  ? Check incision every day and notify surgeon immediately if any of the following signs or symptoms are noted:  o Increase in redness  o Increase in swelling around the incision and of the entire extremity  o Increase in pain  o Drainage oozing from the incision  o Pulling apart of the edges of the incision  o Increase in overall body temperature (greater than 100.5 degrees)  ? Your  Staples will be removed between 10-14 days postoperation.  This may be done by either the home health nurse, rehabilitation nurse or during your return visit to Dr. Oh's office.  You will then be instructed on how to care for the incision.  (Please call the office if your staples have not been removed within 14 days after surgery).    V. Medications:   1. Anticoagulants: You will be discharged on an anticoagulant. This is a prophylactic medication that helps prevent blood clots during your post-operative period.  You will be on Aspirin 325 mg twice daily for 21 days.   ? While taking the anticoagulant, you should avoid taking any additional aspirin, ibuprofen (Advil or Motrin), Aleve  (Naprosyn) or other non-steroidal anti-inflammatory medications.   ? Notify surgeon immediately if any kevin bleeding is noted in the urine, stool, emesis, or from the nose or the incision. Blood in the stool will often appear as black rather than red. Blood in urine may appear as pink. Blood in emesis may appear as brown/black like coffee grounds.  ? You will need to apply pressure for longer periods of time to any cuts or abrasions to stop bleeding  ? Avoid alcohol while taking anticoagulants    2. Stool Softeners: You will be at greater risk of constipation after surgery due to being less mobile and the pain medications.   ? Take stool softeners as instructed by your surgeon while on pain medications. Over the counter Colace 100 mg 1-2 capsules twice daily.   ? If stools become too loose or too frequent, please decreases the dosage or stop the stool softener.  ? If constipation occurs despite use of stool softeners, you are to continue the stool softeners and add a laxative (Milk of Magnesia 1 ounce daily as needed).  ? Dulcolax oral tabs or suppository, or a fleets enema can also be utilized for constipation and can be obtained over the counter.   ? If above interventions are unsuccessful in inducing bowel movements, please contact your surgeon's office / family physician's office.  ? Drink plenty of fluids, and eat fruits and vegetables during your recovery time    3. Pain Medications utilized after surgery are narcotics and the law requires that the following information be given to all patients that are prescribed narcotics:  ? CLASSIFICATION: Pain medications are called Opioids and are narcotics  ? LEGALITIES: It is illegal to share narcotics with others and to drive within 24 hours of taking narcotics  ? POTENTIAL SIDE EFFECTS: Potential side effects of opioids include: nausea, vomiting, itching, dizziness, drowsiness, dry mouth, constipation, and difficulty urinating.  ? POTENTIAL ADVERSE EFFECTS:    o Opioid tolerance can develop with use of pain medications and this simply means that it requires more and more of the medication to control pain; however, this is seen more in patients that use opioids for longer periods of time.  o Opioid dependence can develop with use of Opioids and this simply means that to stop the medication can cause withdrawal symptoms; however, this is seen with patients that use Opioids for longer periods of time.  o Opioid addiction can develop with use of Opioids and the incidence of this is very unlikely in patients who take the medications as ordered and stop the medications as instructed.  o Opioid overdose can be dangerous, but is unlikely when the medication is taken as ordered and stopped when ordered. It is important not to mix opioids with alcohol or with and type of sedative such as Benadryl as this can lead to over sedation and respiratory difficulty.  ? DOSAGE:   o Pain medications will need to be taken consistently for the first week to decrease pain and promote adequate pain relief and participation in physical therapy.  o After the initial surgical pain begins to resolve, you may begin to decrease the pain medication. By the end of 6 weeks, you should be off of pain medications.  o Refills will not be given by the office during evening hours, on weekends, or after 6 weeks post-op.  o To seek refills on pain medications during the initial 6 week post-operative period, you must call the office 48 hours in advance to request the refill. The office will then notify you when to  the prescription. DO NOT wait until you are out of the medication to request a refill.    V. FOLLOW-UP VISITS:  ? You will need to follow up in the office with your surgeon in 2 weeks June 14, 2017. Please call this number [phone] to schedule this appointment.  ? If you have any concerns or suspected complications prior to your follow up visit, please call your surgeons office. Do not  wait until your appointment time if you suspect complications. These will need to be addressed in the office promptly.      Date:     Ha Oh MD    CC: Leodan Nam MD; MD IAM [place]/Transcription disclaimer:   Much of this encounter note is an electronic transcription/translation of spoken   language to printed text. The electronic translation of spoken language may permit erroneous, or at times, nonsensical words or phrases to be inadvertently transcribed; Although I have reviewed the note for such errors, some may still exist.

## 2017-06-03 NOTE — PROGRESS NOTES
Orthopedic Progress Note      Patient: Joselyn Grant  YOB: 1965     Date of Admission: 6/2/2017  7:45 AM Medical Record Number: [id number]     Attending Physician: Ha Oh, *    Status Post:  [place] REVISE KNEE JOINT REPLACE,1 PART [27511] (LT TOTAL KNEE ARTHROPLASTY REVISION) Post Operative Day Number: 1    Subjective : No new orthopaedic complaints     Pain Relief: some relief with present medication. No pain in leg     Systemic Complaints: No Complaints  Vitals:    06/03/17 0300 06/03/17 0702 06/03/17 1116 06/03/17 1547   BP: 140/89 117/76 117/76 125/72   BP Location: Left arm Right arm Left arm Right arm   Patient Position: Lying Lying Sitting Sitting   Pulse: 67 66 83 84   Resp: 16 16 16 16   Temp: 96.8 °F (36 °C) 97.4 °F (36.3 °C)  98.2 °F (36.8 °C)   TempSrc: Oral Oral  Oral   SpO2: 96% 97% 98% 97%   Weight:       Height:           Physical Exam: 51 y.o. female    General Appearance:       Alert, cooperative, in no acute distress                  Extremities:    Dressing Clean, Dry and Intact         Incision healthy without signs or symptoms of infections         No clinical sign of DVT        Able to do good movements of digits    Pulses:   Pulses palpable and equal bilaterally           Diagnostic Tests:      Results from last 7 days  Lab Units 06/03/17  0326   WBC 10*3/mm3 10.88*   HEMOGLOBIN g/dL 11.6*   HEMATOCRIT % 34.4*   PLATELETS 10*3/mm3 236       Results from last 7 days  Lab Units 06/03/17  0326   SODIUM mmol/L 141   POTASSIUM mmol/L 4.4   CHLORIDE mmol/L 104   TOTAL CO2 mmol/L 23.5   BUN mg/dL 12   CREATININE mg/dL 0.66   GLUCOSE mg/dL 122*   CALCIUM mg/dL 9.3           Xr Knee 1 Or 2 View Left    Result Date: 6/2/2017  Narrative: PORTABLE VIEWS OF THE  LEFT KNEE  CLINICAL HISTORY: Postop knee arthroplasty  AP and crosstable lateral views demonstrate a total knee arthroplasty that appears in satisfactory position.  This report was finalized on 6/2/2017 2:00 PM by   Edmund Lucio MD.      Xr Chest Pa & Lateral    Result Date: 5/26/2017  Narrative: TWO-VIEW CHEST  HISTORY: Preop for knee surgery. Hypertension.  FINDINGS: The heart, lungs and mediastinal structures are normal and unchanged from 01/24/2017.  This report was finalized on 5/26/2017 10:53 PM by Dr. Jacky Nuno MD.          Current Medications:  Scheduled Meds:  amLODIPine 5 mg Oral QAM   aspirin 325 mg Oral BID   docusate sodium 100 mg Oral BID   [START ON 6/5/2017] estradiol 1 patch Transdermal Once per day on Mon Thu   gabapentin 300 mg Oral Q12H     Continuous Infusions:  sodium chloride 100 mL/hr Last Rate: 100 mL/hr (06/02/17 4051)     PRN Meds:.•  acetaminophen  •  bisacodyl  •  HYDROcodone-acetaminophen  •  Morphine **AND** naloxone  •  ondansetron  •  promethazine    Assessment: Status post  [place] REVISE KNEE JOINT REPLACE,1 PART [43854] (LT TOTAL KNEE ARTHROPLASTY REVISION)    Patient Active Problem List   Diagnosis   • Hypertension   • Mechanical loosening of internal left knee prosthetic joint   • Morbid obesity with BMI of 40.0-44.9, adult       PLAN:   Continues current post-op course  Anticoagulation: Aspirin started   Dressing Change in am  Mobilize with PT as tolerated per protocol    Weight Bearing: WBAT  Discharge Plan: OK to plan for discharge in  today to home and home health  from orthopadic perspective.    Ha Oh MD    Date: 6/3/2017    Time: 4:44 PM    EMR Dragon/Transcription disclaimer:   Much of this encounter note is an electronic transcription/translation of spoken language to printed text. The electronic translation of spoken language may permit erroneous, or at times, nonsensical words or phrases to be inadvertently transcribed; Although I have reviewed the note for such errors, some may still exist.

## 2017-06-03 NOTE — DISCHARGE INSTR - DIET
[facility] Total Knee Replacement Discharge Instructions     I. ACTIVITIES:  1. Exercises:  ? Complete exercise program as taught by the hospital physical therapist 2 times per day  ? Exercise program will be advanced by your home health physical therapist  ? During the day be up ambulating every 2 hours (while awake) for short distances  ? Complete the ankle pump exercises at least 10 times per hour (while awake)  ? Elevate legs most of the day the first week post operatively and thereafter elevate legs when in bed and for at least 30 minutes during the day.   ? Caution must be taken to avoid pillow placement under the bend of the knee as this can led to flexion contractures of the knee. Pillow placement under the heel is encouraged.  ? Use cold packs 20-30 minutes approximately 5 times per day. This should be done before and after completing your exercises and at any time you are experiencing pain/ stiffness in your operative extremity.      2. Activities of Daily Living:  ? No tub baths, hot tubs, or swimming pools for 4 weeks  ? May shower and let water run over the incision on post-operative day #5 if no drainage. Do not scrub or rub the incision. Simply let the water run over the incision and pat dry.    II. Restrictions  ? Do not cross legs or kneel  ? Your surgeon will discuss with you when you will be able to drive again. Usual guidelines are you are to be off pain medications prior to driving.  ? Weight bearing is as tolerated  ? First week stay inside on even terrain. May go up and down stairs one stair at a time utilizing the hand rail.  ? After one week, you may venture outside.    III. Precautions:  ? Everyone that comes near you should wash their hands  ? No elective dental, genital-urinary, or colon procedures or surgical procedures for 12 weeks after surgery unless absolutely necessary.  ?  If dental work or surgical procedure is deemed absolutely necessary, you will need to contact your surgeon as  you will need to take antibiotics 1 hour prior to any dental work (including teeth cleanings).  ? Please discuss with your surgeon prophylactic antibiotics as the length of time this intervention will be necessary for you varies with each patient’s health history and situation.  ? Avoid sick people. If you must be around someone who is ill, they should wear a mask.  ? Avoid visits to the Emergency Room or Urgent Care. If you feel you need to go to the emergency room, please notify your surgeon.    ? Stockings are to be worn for one week after surgery and are to be placed on in the morning and removed at night. Observe your skin when stocking is removed for any problems. Monitor the stockings to ensure that any swelling is not causing the stockings to become too tight. In this case, remove stockings immediately.    IV. INCISION CARE:  ? Wash your hands prior to dressing changes  ? Change the dressing as needed to keep incision clean and dry. Utilize dry gauze and paper tape. Avoid touching the side of the gauze that goes against the incision with your hands.  ? No creams or ointments to the incision  ? May remove dressing once the incision is free of drainage  ? Do not touch or pick at the incision  ? Check incision every day and notify surgeon immediately if any of the following signs or symptoms are noted:  o Increase in redness  o Increase in swelling around the incision and of the entire extremity  o Increase in pain  o Drainage oozing from the incision  o Pulling apart of the edges of the incision  o Increase in overall body temperature (greater than 100.5 degrees)  ? Your  Staples will be removed between 10-14 days postoperation.  This may be done by either the home health nurse, rehabilitation nurse or during your return visit to Dr. Oh's office.  You will then be instructed on how to care for the incision.  (Please call the office if your staples have not been removed within 14 days after  surgery).    V. Medications:   1. Anticoagulants: You will be discharged on an anticoagulant. This is a prophylactic medication that helps prevent blood clots during your post-operative period. *** You will be on *** Aspirin 325 mg Enteric Coated every 12 hours orally for *** 21 days.   ? While taking the anticoagulant, you should avoid taking any additional aspirin, ibuprofen (Advil or Motrin), Aleve (Naprosyn) or other non-steroidal anti-inflammatory medications.   ? Notify surgeon immediately if any kevin bleeding is noted in the urine, stool, emesis, or from the nose or the incision. Blood in the stool will often appear as black rather than red. Blood in urine may appear as pink. Blood in emesis may appear as brown/black like coffee grounds.  ? You will need to apply pressure for longer periods of time to any cuts or abrasions to stop bleeding  ? Avoid alcohol while taking anticoagulants    2. Stool Softeners: You will be at greater risk of constipation after surgery due to being less mobile and the pain medications.   ? Take stool softeners as instructed by your surgeon while on pain medications. Over the counter Colace 100 mg 1-2 capsules twice daily.   ? If stools become too loose or too frequent, please decreases the dosage or stop the stool softener.  ? If constipation occurs despite use of stool softeners, you are to continue the stool softeners and add a laxative (Milk of Magnesia 1 ounce daily as needed).  ? Dulcolax oral tabs or suppository, or a fleets enema can also be utilized for constipation and can be obtained over the counter.   ? If above interventions are unsuccessful in inducing bowel movements, please contact your surgeon's office / family physician's office.  ? Drink plenty of fluids, and eat fruits and vegetables during your recovery time    3. Pain Medications utilized after surgery are narcotics and the law requires that the following information be given to all patients that are  prescribed narcotics:  ? CLASSIFICATION: Pain medications are called Opioids and are narcotics  ? LEGALITIES: It is illegal to share narcotics with others and to drive within 24 hours of taking narcotics  ? POTENTIAL SIDE EFFECTS: Potential side effects of opioids include: nausea, vomiting, itching, dizziness, drowsiness, dry mouth, constipation, and difficulty urinating.  ? POTENTIAL ADVERSE EFFECTS:   o Opioid tolerance can develop with use of pain medications and this simply means that it requires more and more of the medication to control pain; however, this is seen more in patients that use opioids for longer periods of time.  o Opioid dependence can develop with use of Opioids and this simply means that to stop the medication can cause withdrawal symptoms; however, this is seen with patients that use Opioids for longer periods of time.  o Opioid addiction can develop with use of Opioids and the incidence of this is very unlikely in patients who take the medications as ordered and stop the medications as instructed.  o Opioid overdose can be dangerous, but is unlikely when the medication is taken as ordered and stopped when ordered. It is important not to mix opioids with alcohol or with and type of sedative such as Benadryl as this can lead to over sedation and respiratory difficulty.  ? DOSAGE:   o Pain medications will need to be taken consistently for the first week to decrease pain and promote adequate pain relief and participation in physical therapy.  o After the initial surgical pain begins to resolve, you may begin to decrease the pain medication. By the end of 6 weeks, you should be off of pain medications.  o Refills will not be given by the office during evening hours, on weekends, or after 6 weeks post-op.  o To seek refills on pain medications during the initial 6 week post-operative period, you must call the office 48 hours in advance to request the refill. The office will then notify you when to   the prescription. DO NOT wait until you are out of the medication to request a refill.    V. FOLLOW-UP VISITS:  ? You will need to follow up in the office with your surgeon in 2 weeks ***. Please call this number [phone] to schedule this appointment.  ? If you have any concerns or suspected complications prior to your follow up visit, please call your surgeons office. Do not wait until your appointment time if you suspect complications. These will need to be addressed in the office promptly.    EMR Dragon/Transcription disclaimer:   Much of this encounter note is an electronic transcription/translation of spoken language to printed text. The electronic translation of spoken language may permit erroneous, or at times, nonsensical words or phrases to be inadvertently transcribed; Although I have reviewed the note for such errors, some may still exist.

## 2017-06-03 NOTE — PLAN OF CARE
Problem: Patient Care Overview (Adult)  Goal: Plan of Care Review  Outcome: Ongoing (interventions implemented as appropriate)    06/03/17 1127   Coping/Psychosocial Response Interventions   Plan Of Care Reviewed With patient   Patient Care Overview   Progress progress toward functional goals as expected

## 2017-06-03 NOTE — PLAN OF CARE
Problem: Patient Care Overview (Adult)  Goal: Plan of Care Review  Outcome: Outcome(s) achieved Date Met:  06/03/17 06/03/17 1648   Coping/Psychosocial Response Interventions   Plan Of Care Reviewed With patient   Patient Care Overview   Progress progress toward functional goals as expected   Outcome Evaluation   Outcome Summary/Follow up Plan pt has been transfering from be to bathroom and down hallway well. pain is well managed with mediation. feeling in the left leg has returned after the surgical block. pt uses IS often with encouragement, tolerates use well. continue to encourage fluids and offer tolieting often.        Goal: Adult Individualization and Mutuality  Outcome: Outcome(s) achieved Date Met:  06/03/17 06/03/17 1648   Individualization   Patient Specific Preferences pt goes by Joselyn   Patient Specific Goals better mobility and control of nausea    Patient Specific Interventions pt will continue to monitor s&s of infection   Mutuality/Individual Preferences   What Anxieties, Fears or Concerns Do You Have About Your Health or Care? none   What Questions Do You Have About Your Health or Care? none   What Information Would Help Us Give You More Personalized Care? none       Goal: Discharge Needs Assessment  Outcome: Outcome(s) achieved Date Met:  06/03/17 06/03/17 1648   Discharge Needs Assessment   Concerns To Be Addressed denies needs/concerns at this time   Readmission Within The Last 30 Days no previous admission in last 30 days   Discharge Facility/Level Of Care Needs home with home health   Current Health   Anticipated Changes Related to Illness none   Self-Care   Equipment Currently Used at Home walker, standard   Living Environment   Transportation Available car;family or friend will provide         Problem: Pain, Acute (Adult)  Goal: Identify Related Risk Factors and Signs and Symptoms  Outcome: Outcome(s) achieved Date Met:  06/03/17 06/03/17 1648   Pain, Acute   Related Risk  Factors (Acute Pain) surgery   Signs and Symptoms (Acute Pain) fatigue/weakness;nausea/vomiting/anorexia       Goal: Acceptable Pain Control/Comfort Level  Outcome: Ongoing (interventions implemented as appropriate)    06/03/17 1648   Pain, Acute (Adult)   Acceptable Pain Control/Comfort Level making progress toward outcome         Problem: Knee Replacement, Total (Adult)  Goal: Signs and Symptoms of Listed Potential Problems Will be Absent or Manageable (Knee Replacement, Total)  Outcome: Ongoing (interventions implemented as appropriate)    06/03/17 1648   Knee Replacement, Total   Problems Assessed (Total Knee Replacement) all   Problems Present (Total Knee Replacement) pain

## 2017-06-03 NOTE — THERAPY TREATMENT NOTE
Acute Care - Physical Therapy Treatment Note  [facility]     Patient Name: Joselyn Grant  : 1965  MRN: [id number]  Today's Date: 6/3/2017  Onset of Illness/Injury or Date of Surgery Date: 17  Date of Referral to PT: 17  Referring Physician: Althea    Admit Date: 2017    Visit Dx:    ICD-10-CM ICD-9-CM   1. Impaired gait and mobility R26.89 781.2   2. Failed total left knee replacement, subsequent encounter T84.093D V58.89     996.47     V43.65     Patient Active Problem List   Diagnosis   • Hypertension   • Mechanical loosening of internal left knee prosthetic joint   • Morbid obesity with BMI of 40.0-44.9, adult               Adult Rehabilitation Note       17 0930          Rehab Assessment/Intervention    Discipline physical therapist  -SILVIA      Document Type therapy note (daily note)  -SILVIA      Subjective Information agree to therapy  -SILVIA      Patient Effort, Rehab Treatment good  -SILVIA      Recorded by [SILVIA] Kristal Kimble, PT      Pain Assessment    Pain Assessment 0-10  -SILVIA      Pain Score 3  -SILVIA      Pain Location Knee  -SILVIA      Pain Orientation Left  -SILVIA      Recorded by [SILVIA] Kristal Kimble, PT      Cognitive Assessment/Intervention    Current Cognitive/Communication Assessment functional  -SILVIA      Orientation Status oriented x 4  -SILVIA      Personal Safety WNL/WFL  -SILVIA      Personal Safety Interventions fall prevention program maintained;gait belt;muscle strengthening facilitated;nonskid shoes/slippers when out of bed  -SILVIA      Recorded by [SILVIA] Kristal Kimble, PT      ROM (Range of Motion)    General ROM Detail 8-88  -SILVIA      Recorded by [SILVIA] Kristal Kimble, PT      Bed Mobility, Assessment/Treatment    Bed Mobility, Assistive Device bed rails;head of bed elevated  -SILVIA      Bed Mob, Supine to Sit, [place] conditional independence  -SILVIA      Bed Mob, Sit to Supine, [place] conditional independence  -SILVIA      Recorded by [SILVIA] Kristal Kimble PT      Transfer  Assessment/Treatment    Transfers, Sit-Stand [place] conditional independence  -SILVIA      Transfers, Stand-Sit [place] conditional independence  -SILVIA      Transfers, Sit-Stand-Sit, Assist Device rolling walker  -SILVIA      Recorded by [SILVIA] Kristal Kimble, PT      Gait Assessment/Treatment    Gait, [place] Level conditional independence  -SILVIA      Gait, Assistive Device rolling walker  -SILVIA      Gait, Distance (Feet) 100  -SILVIA      Gait, Gait Deviations antalgic;kimberly decreased  -SILVIA      Recorded by [SILVIA] Kristal Kimble, PT      Stairs Assessment/Treatment    Number of Stairs 4  -SILVIA      Stairs, Handrail Location both sides  -SILVIA      Stairs, [place] Level contact guard assist;supervision required;verbal cues required  -SILVIA      Stairs, Technique Used step to step (ascending);step to step (descending)  -SILVIA      Recorded by [ISLVIA] Kristal Kimble, PT      Therapy Exercises    Exercise Protocols total knee  -SILVIA      Total Knee Exercises left:;15 reps;completed protocol  -SILVIA      Recorded by [SILVIA] Kristal Kimble, PT      Positioning and Restraints    Pre-Treatment Position sitting in chair/recliner  -SILVIA      Post Treatment Position chair  -SILVIA      In Chair reclined;call light within reach  -SILVIA      Recorded by [SILVIA] Kristal Kimble, PT        User Key  (r) = Recorded By, (t) = Taken By, (c) = Cosigned By    Initials Name Effective Dates    SILVIA Kimble, PT 10/06/15 -                 IP PT Goals       06/02/17 1527          Bed Mobility PT LTG    Bed Mobility PT LTG, Date Established 06/02/17  -      Bed Mobility PT LTG, Time to Achieve 3 days  -      Bed Mobility PT LTG, Activity Type all bed mobility  -      Bed Mobility PT LTG, [place] Level supervision required  -      Transfer Training PT LTG    Transfer Training PT LTG, Date Established 06/02/17  -      Transfer Training PT LTG, Time to Achieve 3 days  -      Transfer Training PT LTG, Activity Type all transfers  -       Transfer Training PT LTG, [place] Level supervision required  -      Transfer Training PT LTG, Assist Device walker, rolling  -      Gait Training PT LTG    Gait Training Goal PT LTG, Date Established 06/02/17  -      Gait Training Goal PT LTG, Time to Achieve 3 days  -LH      Gait Training Goal PT LTG, [place] Level contact guard assist  -      Gait Training Goal PT LTG, Assist Device walker, rolling  -      Gait Training Goal PT LTG, Distance to Achieve 100  -LH      Range of Motion PT LTG    Range of Motion Goal PT LTG, Date Established 06/02/17  -      Range of Motion Goal PT LTG, Time to Achieve 3 days  -LH      Range fo Motion Goal PT LTG, Joint L knee  -LH      Range of Motion Goal PT LTG, AROM Measure -5-85  -LH        User Key  (r) = Recorded By, (t) = Taken By, (c) = Cosigned By    Initials Name Provider Type     Casi Gleason, PT Physical Therapist          Physical Therapy Education     Title: PT OT SLP Therapies (Active)     Topic: Physical Therapy (Active)     Point: Mobility training (Done)    Learning Progress Summary    Learner Readiness Method Response Comment Documented by Status   Patient Acceptance E CIARA,NR   06/03/17 1121 Done    Acceptance E [facility] 06/02/17 1526 Active               Point: Home exercise program (Done)    Learning Progress Summary    Learner Readiness Method Response Comment Documented by Status   Patient Acceptance E CIARA,NR  SILVIA 06/03/17 1121 Done    Acceptance E [facility] 06/02/17 1526 Active                      User Key     Initials Effective Dates Name Provider Type [facility] 10/06/15 -  Kristal Kimble, PT Physical Therapist PT     02/07/17 -  Casi Gleason, PT Physical Therapist PT                    PT Recommendation and Plan  Anticipated Discharge Disposition: home with home health  Planned Therapy Interventions: balance training, bed mobility training, gait training, home exercise program, ROM (Range of Motion), stair training,  strengthening, stretching, transfer training  PT Frequency: 2 times/day  Plan of Care Review  Plan Of Care Reviewed With: patient  Progress: progress toward functional goals as expected          Outcome Measures       06/03/17 1100 06/02/17 1500       How much help from another person do you currently need...    Turning from your back to your side while in flat bed without using bedrails? 4  -SILVIA 3  -LH     Moving from lying on back to sitting on the side of a flat bed without bedrails? 4  -SILVIA 3  -LH     Moving to and from a bed to a chair (including a wheelchair)? 4  -SILVIA 3  -LH     Standing up from a chair using your arms (e.g., wheelchair, bedside chair)? 4  -SILVIA 3  -LH     Climbing 3-5 steps with a railing? 3  -SILVIA 2  -LH     To walk in hospital room? 4  -SILVIA 3  -LH     AM-PAC 6 Clicks Score 23  -SILVIA 17  -     Functional Assessment    Outcome Measure Options AM-PAC 6 Clicks Basic Mobility (PT)  -SILVIA AM-PAC 6 Clicks Basic Mobility (PT)  -       User Key  (r) = Recorded By, (t) = Taken By, (c) = Cosigned By    Initials Name Provider Type    SILVIA Kimble, PT Physical Therapist     Casi Gleason, PT Physical Therapist           Time Calculation:         PT Charges       06/03/17 1130          Time Calculation    Start Time 0930  -      Stop Time 1030  -SILVIA      Time Calculation (min) 60 min  -SILVIA      PT Received On 06/03/17  -        User Key  (r) = Recorded By, (t) = Taken By, (c) = Cosigned By    Initials Name Provider Type    SILVIA Kimble, PT Physical Therapist          Therapy Charges for Today     Code Description Service Date Service Provider Modifiers Qty    11350985479 HC PT THER PROC GROUP 6/3/2017 Kristal Kimble, PT GP 1    [id number] HC PT THER PROC EA 15 MIN 6/3/2017 Kristal Kimble, PT GP 1          PT G-Codes  Outcome Measure Options: AM-PAC 6 Clicks Basic Mobility (PT)    Kristal Kimble PT  6/3/2017

## 2017-06-04 VITALS
DIASTOLIC BLOOD PRESSURE: 79 MMHG | HEART RATE: 89 BPM | WEIGHT: 231.8 LBS | OXYGEN SATURATION: 98 % | TEMPERATURE: 97.9 F | BODY MASS INDEX: 42.65 KG/M2 | HEIGHT: 62 IN | SYSTOLIC BLOOD PRESSURE: 126 MMHG | RESPIRATION RATE: 16 BRPM

## 2017-06-04 LAB
ANION GAP SERPL CALCULATED.3IONS-SCNC: 9.9 MMOL/L
BASOPHILS # BLD AUTO: 0.04 10*3/MM3 (ref 0–0.2)
BASOPHILS NFR BLD AUTO: 0.4 % (ref 0–1.5)
BUN BLD-MCNC: 12 MG/DL (ref 6–20)
BUN/CREAT SERPL: 15.6 (ref 7–25)
CALCIUM SPEC-SCNC: 9.1 MG/DL (ref 8.6–10.5)
CHLORIDE SERPL-SCNC: 103 MMOL/L (ref 98–107)
CO2 SERPL-SCNC: 29.1 MMOL/L (ref 22–29)
CREAT BLD-MCNC: 0.77 MG/DL (ref 0.57–1)
DEPRECATED RDW RBC AUTO: 47.7 FL (ref 37–54)
EOSINOPHIL # BLD AUTO: 0.1 10*3/MM3 (ref 0–0.7)
EOSINOPHIL NFR BLD AUTO: 1.1 % (ref 0.3–6.2)
ERYTHROCYTE [DISTWIDTH] IN BLOOD BY AUTOMATED COUNT: 14.5 % (ref 11.7–13)
GFR SERPL CREATININE-BSD FRML MDRD: 96 ML/MIN/1.73
GLUCOSE BLD-MCNC: 102 MG/DL (ref 65–99)
HCT VFR BLD AUTO: 31.5 % (ref 35.6–45.5)
HGB BLD-MCNC: 10.3 G/DL (ref 11.9–15.5)
IMM GRANULOCYTES # BLD: 0.02 10*3/MM3 (ref 0–0.03)
IMM GRANULOCYTES NFR BLD: 0.2 % (ref 0–0.5)
LYMPHOCYTES # BLD AUTO: 2.58 10*3/MM3 (ref 0.9–4.8)
LYMPHOCYTES NFR BLD AUTO: 27.1 % (ref 19.6–45.3)
MCH RBC QN AUTO: 29.5 PG (ref 26.9–32)
MCHC RBC AUTO-ENTMCNC: 32.7 G/DL (ref 32.4–36.3)
MCV RBC AUTO: 90.3 FL (ref 80.5–98.2)
MONOCYTES # BLD AUTO: 1.01 10*3/MM3 (ref 0.2–1.2)
MONOCYTES NFR BLD AUTO: 10.6 % (ref 5–12)
NEUTROPHILS # BLD AUTO: 5.76 10*3/MM3 (ref 1.9–8.1)
NEUTROPHILS NFR BLD AUTO: 60.6 % (ref 42.7–76)
PLATELET # BLD AUTO: 211 10*3/MM3 (ref 140–500)
PMV BLD AUTO: 10.7 FL (ref 6–12)
POTASSIUM BLD-SCNC: 4.2 MMOL/L (ref 3.5–5.2)
RBC # BLD AUTO: 3.49 10*6/MM3 (ref 3.9–5.2)
SODIUM BLD-SCNC: 142 MMOL/L (ref 136–145)
WBC NRBC COR # BLD: 9.51 10*3/MM3 (ref 4.5–10.7)

## 2017-06-04 PROCEDURE — 97110 THERAPEUTIC EXERCISES: CPT

## 2017-06-04 PROCEDURE — 97150 GROUP THERAPEUTIC PROCEDURES: CPT

## 2017-06-04 PROCEDURE — 25010000002 HYDROMORPHONE PER 4 MG: Performed by: ORTHOPAEDIC SURGERY

## 2017-06-04 PROCEDURE — 85025 COMPLETE CBC W/AUTO DIFF WBC: CPT | Performed by: ORTHOPAEDIC SURGERY

## 2017-06-04 PROCEDURE — 80048 BASIC METABOLIC PNL TOTAL CA: CPT | Performed by: ORTHOPAEDIC SURGERY

## 2017-06-04 RX ADMIN — ASPIRIN 325 MG: 325 TABLET, DELAYED RELEASE ORAL at 08:00

## 2017-06-04 RX ADMIN — HYDROCODONE BITARTRATE AND ACETAMINOPHEN 2 TABLET: 10; 325 TABLET ORAL at 10:41

## 2017-06-04 RX ADMIN — HYDROMORPHONE HYDROCHLORIDE 1 MG: 1 INJECTION, SOLUTION INTRAMUSCULAR; INTRAVENOUS; SUBCUTANEOUS at 08:00

## 2017-06-04 RX ADMIN — AMLODIPINE BESYLATE 5 MG: 5 TABLET ORAL at 08:00

## 2017-06-04 RX ADMIN — HYDROMORPHONE HYDROCHLORIDE 1 MG: 1 INJECTION, SOLUTION INTRAMUSCULAR; INTRAVENOUS; SUBCUTANEOUS at 04:02

## 2017-06-04 RX ADMIN — HYDROCODONE BITARTRATE AND ACETAMINOPHEN 2 TABLET: 10; 325 TABLET ORAL at 05:00

## 2017-06-04 RX ADMIN — HYDROCODONE BITARTRATE AND ACETAMINOPHEN 2 TABLET: 10; 325 TABLET ORAL at 01:04

## 2017-06-04 RX ADMIN — GABAPENTIN 300 MG: 300 CAPSULE ORAL at 08:00

## 2017-06-04 RX ADMIN — DOCUSATE SODIUM 100 MG: 100 CAPSULE, LIQUID FILLED ORAL at 08:00

## 2017-06-04 NOTE — THERAPY DISCHARGE NOTE
Acute Care - Physical Therapy Treatment Note/Discharge  [facility]     Patient Name: Joselyn Grant  : 1965  MRN: [id number]  Today's Date: 2017  Onset of Illness/Injury or Date of Surgery Date: 17  Date of Referral to PT: 17  Referring Physician: Althea    Admit Date: 2017    Visit Dx:    ICD-10-CM ICD-9-CM   1. Impaired gait and mobility R26.89 781.2   2. Failed total left knee replacement, subsequent encounter T84.093D V58.89     996.47     V43.65     Patient Active Problem List   Diagnosis   • Hypertension   • Mechanical loosening of internal left knee prosthetic joint   • Morbid obesity with BMI of 40.0-44.9, adult       Physical Therapy Education     Title: PT OT SLP Therapies (Resolved)     Topic: Physical Therapy (Resolved)     Point: Mobility training (Resolved)    Learning Progress Summary    Learner Readiness Method Response Comment Documented by Status   Patient Acceptance E VU,NR   17 1121 Done    Acceptance E NR   17 1526 Active               Point: Home exercise program (Resolved)    Learning Progress Summary    Learner Readiness Method Response Comment Documented by Status   Patient Acceptance E VU,NR   17 1121 Done    Acceptance E NR   17 1526 Active                      User Key     Initials Effective Dates Name Provider Type Discipline     10/06/15 -  Kristal Kimble, PT Physical Therapist PT     17 -  Casi Gleason, PT Physical Therapist PT                    IP PT Goals       17 1449 17 1527       Bed Mobility PT LTG    Bed Mobility PT LTG, Date Established  17  -     Bed Mobility PT LTG, Time to Achieve  3 days  -     Bed Mobility PT LTG, Activity Type  all bed mobility  -     Bed Mobility PT LTG, [place] Level  supervision required  -     Bed Mobility PT LTG, Outcome goal met  -      Transfer Training PT LTG    Transfer Training PT LTG, Date Established  17  -     Transfer  Training PT LTG, Time to Achieve  3 days  -LH     Transfer Training PT LTG, Activity Type  all transfers  -LH     Transfer Training PT LTG, [place] Level  supervision required  -LH     Transfer Training PT LTG, Assist Device  walker, rolling  -LH     Transfer Training PT LTG, Outcome goal met  -SILVIA      Gait Training PT LTG    Gait Training Goal PT LTG, Date Established  06/02/17  -     Gait Training Goal PT LTG, Time to Achieve  3 days  -LH     Gait Training Goal PT LTG, [place] Level  contact guard assist  -LH     Gait Training Goal PT LTG, Assist Device  walker, rolling  -LH     Gait Training Goal PT LTG, Distance to Achieve  100  -LH     Gait Training Goal PT LTG, Outcome goal met  -SILVIA      Range of Motion PT LTG    Range of Motion Goal PT LTG, Date Established  06/02/17  -LH     Range of Motion Goal PT LTG, Time to Achieve  3 days  -LH     Range fo Motion Goal PT LTG, Joint  L knee  -LH     Range of Motion Goal PT LTG, AROM Measure  -5-85  -LH     Range of Motion Goal PT LTG, Outcome goal partially met  -SILVIA      Reason Goal Not Met (ROM) PT LTG discharged from facility  -SILVIA        User Key  (r) = Recorded By, (t) = Taken By, (c) = Cosigned By    Initials Name Provider Type    SILVIA Kimble, PT Physical Therapist     Casi Gleason, PT Physical Therapist              Adult Rehabilitation Note       06/04/17 0830 06/03/17 1300 06/03/17 0930    Rehab Assessment/Intervention    Discipline physical therapist  -SILVIA physical therapist  -SILVIA physical therapist  -SILVIA    Document Type therapy note (daily note);discharge summary  -SILVIA therapy note (daily note);discharge summary  -SILVIA therapy note (daily note)  -SILVIA    Subjective Information agree to therapy  -SILVIA agree to therapy  -SILVIA agree to therapy  -SILVIA    Patient Effort, Rehab Treatment good  -SILVIA good  -SILVIA good  -SILVIA    Recorded by [SILVIA] Kristal Kimble, PT [SILVIA] Kristal Kimble, PT [SILVIA] Kristal Kimble, PT    Pain Assessment    Pain Assessment 0-10  -SILVIA  0-10  -SILVIA 0-10  -SILVIA    Pain Score 5  -SILVIA 3  -SILVIA 3  -SILVIA    Pain Location Knee  -SILVIA Knee  -SILVIA Knee  -SILVIA    Pain Orientation Left  -SILVIA Left  -SILVIA Left  -SILVIA    Pain Intervention(s) Medication (See MAR);Repositioned  -SILVIA      Recorded by [SILVIA] Kristal Kimble, PT [SILVIA] Kristal Kimble, PT [SILVIA] Kristal Kimble, PT    Cognitive Assessment/Intervention    Current Cognitive/Communication Assessment functional  -SILVIA functional  -SILVIA functional  -SILVIA    Orientation Status oriented x 4  -SILVIA oriented x 4  -SILVIA oriented x 4  -SILVIA    Personal Safety WNL/WFL  -SILVIA WNL/WFL  -SILVIA WNL/WFL  -SILVIA    Personal Safety Interventions fall prevention program maintained;gait belt;muscle strengthening facilitated;nonskid shoes/slippers when out of bed  -SILVIA fall prevention program maintained;gait belt;muscle strengthening facilitated;nonskid shoes/slippers when out of bed  -SILVIA fall prevention program maintained;gait belt;muscle strengthening facilitated;nonskid shoes/slippers when out of bed  -SILVIA    Recorded by [SILVIA] Kristal Kimble, PT [SILVIA] Kristal Kimble, PT [SILVIA] Kristal Kimble, PT    ROM (Range of Motion)    General ROM Detail   8-88  -SILVIA    Recorded by   [SILVIA] Kristal Kimble PT    Bed Mobility, Assessment/Treatment    Bed Mobility, Assistive Device  bed rails;head of bed elevated  -SILVIA bed rails;head of bed elevated  -SILVIA    Bed Mob, Supine to Sit, [place] independent  -SILVIA conditional independence  -SILVIA conditional independence  -SILVIA    Bed Mob, Sit to Supine, [place] independent  -SILVIA conditional independence  -SILVIA conditional independence  -SILVIA    Recorded by [SILVIA] Kristal Kimble, PT [SILVIA] Kristal Kimble, PT [SILVIA] Kristal Kimble, PT    Transfer Assessment/Treatment    Transfers, Sit-Stand [place] conditional independence  -SILVIA conditional independence  -SILVIA conditional independence  -SILVIA    Transfers, Stand-Sit [place] conditional independence  -SILVIA conditional independence  -SILVIA conditional independence  -SILVIA    Transfers,  Sit-Stand-Sit, Assist Device rolling walker  -SILVIA rolling walker  -SILVIA rolling walker  -SILVIA    Recorded by [SILVIA] Kristal Kimble, PT [SILVIA] Kristal Kimble, PT [SILVIA] Kristal Kimble, PT    Gait Assessment/Treatment    Gait, [place] Level conditional independence  -SILVIA conditional independence  -SILVIA conditional independence  -SILVIA    Gait, Assistive Device rolling walker  -SILVIA rolling walker  -SILVIA rolling walker  -SILVIA    Gait, Distance (Feet) 125  -SILVIA 100  -SILVIA 100  -SILVIA    Gait, Gait Deviations antalgic;kimberly decreased  -SILVIA antalgic;kimberly decreased  -SILVIA antalgic;kimberly decreased  -SILVIA    Recorded by [SILVIA] Kristal Kimble, PT [SILVIA] Kristal Kimble, PT [SILVIA] Kristal Kimble, PT    Stairs Assessment/Treatment    Number of Stairs   4  -SILVIA    Stairs, Handrail Location   both sides  -SILVIA    Stairs, [place] Level   contact guard assist;supervision required;verbal cues required  -SILVIA    Stairs, Technique Used   step to step (ascending);step to step (descending)  -SILVIA    Recorded by   [SILVIA] Kristal Kimble PT    Therapy Exercises    Exercise Protocols total knee  -SILIVA total knee  -SILVIA total knee  -SILVIA    Total Knee Exercises left:;25 reps;completed protocol  -SILVIA left:;completed protocol;20 reps  -SILVIA left:;15 reps;completed protocol  -SILVIA    Recorded by [SILVIA] Kristal Kimble PT [SILVIA] Kristal Kimble, PT [SILVIA] Kristal Kibmle, PT    Positioning and Restraints    Pre-Treatment Position sitting in chair/recliner  -SILVIA sitting in chair/recliner  -SILVIA sitting in chair/recliner  -SILVIA    Post Treatment Position chair  -SILVIA chair  -SILVIA chair  -SILVIA    In Chair reclined;call light within reach  -SILVIA reclined;call light within reach  -SILVIA reclined;call light within reach  -SILVIA    Recorded by [SILVIA] Kristal Kimble, PT [SILVIA] Kristal Kimble, PT [SILVIA] Kristal Kimble, PT      User Key  (r) = Recorded By, (t) = Taken By, (c) = Cosigned By    Initials Name Effective Dates    SILVIA Kimble, PT 10/06/15 -           PT Recommendation and  Plan  Anticipated Discharge Disposition: home with home health  Planned Therapy Interventions: balance training, bed mobility training, gait training, home exercise program, ROM (Range of Motion), stair training, strengthening, stretching, transfer training  PT Frequency: 2 times/day  Plan of Care Review  Plan Of Care Reviewed With: patient  Progress: progress toward functional goals as expected          Outcome Measures       06/04/17 0800 06/03/17 1100 06/02/17 1500    How much help from another person do you currently need...    Turning from your back to your side while in flat bed without using bedrails? 4  -SILVIA 4  -SILVIA 3  -LH    Moving from lying on back to sitting on the side of a flat bed without bedrails? 4  -SILVIA 4  -SILVIA 3  -LH    Moving to and from a bed to a chair (including a wheelchair)? 4  -SILVIA 4  -SILVIA 3  -LH    Standing up from a chair using your arms (e.g., wheelchair, bedside chair)? 4  -SILVIA 4  -SILVIA 3  -LH    Climbing 3-5 steps with a railing? 3  -SILVIA 3  -SILVIA 2  -LH    To walk in hospital room? 4  -SILVIA 4  -SILVIA 3  -LH    AM-PAC 6 Clicks Score 23  -SILVIA 23  -SILVIA 17  -LH    Functional Assessment    Outcome Measure Options AM-PAC 6 Clicks Basic Mobility (PT)  -SILVIA AM-PAC 6 Clicks Basic Mobility (PT)  -SILVIA AM-PAC 6 Clicks Basic Mobility (PT)  -      User Key  (r) = Recorded By, (t) = Taken By, (c) = Cosigned By    Initials Name Provider Type    SILVIA Kimble, PT Physical Therapist     Casi Glesaon, PT Physical Therapist           Time Calculation:         PT Charges       06/04/17 0840          Time Calculation    Start Time 0830  -SILVIA      Stop Time 0930  -SILVIA      Time Calculation (min) 60 min  -SILVIA      PT Received On 06/04/17  -        User Key  (r) = Recorded By, (t) = Taken By, (c) = Cosigned By    Initials Name Provider Type    SILVIA Kimble, AMY Physical Therapist          Therapy Charges for Today     Code Description Service Date Service Provider Modifiers Qty    85956956337  PT THER PROC GROUP  6/3/2017 Kristal LOZADA Lamin, PT GP 1    61376693949 HC PT THER PROC EA 15 MIN 6/3/2017 Kristal LOZADA Lamin, PT GP 1    89193810991 HC PT THER PROC GROUP 6/3/2017 Kristal NEVILLE Kimble, PT GP 1    56058696218 HC PT THER PROC EA 15 MIN 6/3/2017 Kristalbob Kimble, PT GP 1    62390667871 HC PT THER PROC GROUP 6/4/2017 Kristalbob Kimble, PT GP 1    55377122809 HC PT THER PROC EA 15 MIN 6/4/2017 Kristalbob Kimble, PT GP 1          PT G-Codes  Outcome Measure Options: AM-PAC 6 Clicks Basic Mobility (PT)    PT Discharge Summary  Reason for Discharge: Discharge from facility    Kristal NEVILLE Kimble, PT  6/4/2017

## 2017-06-04 NOTE — PLAN OF CARE
Problem: Patient Care Overview (Adult)  Goal: Plan of Care Review  Outcome: Ongoing (interventions implemented as appropriate)    06/04/17 0839   Coping/Psychosocial Response Interventions   Plan Of Care Reviewed With patient   Patient Care Overview   Progress progress toward functional goals as expected

## 2017-06-04 NOTE — PLAN OF CARE
Problem: Patient Care Overview (Adult)  Goal: Plan of Care Review  Outcome: Ongoing (interventions implemented as appropriate)    06/04/17 0643   Coping/Psychosocial Response Interventions   Plan Of Care Reviewed With patient   Patient Care Overview   Progress no change   Outcome Evaluation   Outcome Summary/Follow up Plan VSS. No numbness or tingling. Pt ambulating and needing only supervision by staff. Pt receiving po and IM pain meds for pain control. Pt to be discharged home today 6/4/17.       Goal: Adult Individualization and Mutuality  Outcome: Ongoing (interventions implemented as appropriate)    Problem: Fall Risk (Adult)  Goal: Absence of Falls  Outcome: Outcome(s) achieved Date Met:  06/04/17    Problem: Pain, Acute (Adult)  Goal: Acceptable Pain Control/Comfort Level  Outcome: Ongoing (interventions implemented as appropriate)    Problem: Knee Replacement, Total (Adult)  Goal: Signs and Symptoms of Listed Potential Problems Will be Absent or Manageable (Knee Replacement, Total)  Outcome: Ongoing (interventions implemented as appropriate)

## 2017-06-04 NOTE — PLAN OF CARE
Problem: Patient Care Overview (Adult)  Goal: Plan of Care Review  Outcome: Ongoing (interventions implemented as appropriate)    06/04/17 1139   Coping/Psychosocial Response Interventions   Plan Of Care Reviewed With patient   Patient Care Overview   Progress improving   Outcome Evaluation   Outcome Summary/Follow up Plan VSS. BP stable with home BP medications. voiding per BRP without difficulty. c/o increased pain this AM. Im Dilaudid given x 1 and PO [place] given x 1. PT gym session x 1. increased ambulation this shift. medically stable and ready for discharge home with [facility] and family support.          Problem: Fall Risk (Adult)  Goal: Absence of Falls  Outcome: Ongoing (interventions implemented as appropriate)    Problem: Knee Replacement, Total (Adult)  Goal: Signs and Symptoms of Listed Potential Problems Will be Absent or Manageable (Knee Replacement, Total)  Outcome: Ongoing (interventions implemented as appropriate)

## 2017-06-04 NOTE — PROGRESS NOTES
Orthopedic Progress Note      Patient: Joselyn Grant  YOB: 1965     Date of Admission: 6/2/2017  7:45 AM Medical Record Number: [id number]     Attending Physician: Ha Oh, *    Status Post:  ND REVISE KNEE JOINT REPLACE,1 PART [10769] (LT TOTAL KNEE ARTHROPLASTY REVISION) Post Operative Day Number: 2    Subjective : No new orthopaedic complaints     Pain Relief: some relief with present medication. Stayed overnight due to pain control issues, feels better now, will dc home on hydrocodone, DC oxycontin    Systemic Complaints: No Complaints  Vitals:    06/03/17 1944 06/03/17 2345 06/04/17 0419 06/04/17 0719   BP: 133/82 135/87 132/94 129/88   BP Location: Left arm Left arm Left arm Left arm   Patient Position: Lying Lying Lying Lying   Pulse: 87 98 92 71   Resp: 16 16 16 16   Temp: 97 °F (36.1 °C) 97.2 °F (36.2 °C) 97.2 °F (36.2 °C) 97.9 °F (36.6 °C)   TempSrc: Oral Oral Oral Oral   SpO2: 100% 98% 94% 97%   Weight:       Height:           Physical Exam: 51 y.o. female    General Appearance:       Alert, cooperative, in no acute distress                  Extremities:    Dressing Clean, Dry and Intact         Incision healthy without signs or symptoms of infections         No clinical sign of DVT        Able to do good movements of digits    Pulses:   Pulses palpable and equal bilaterally           Diagnostic Tests:      Results from last 7 days  Lab Units 06/04/17  0349 06/03/17  0326   WBC 10*3/mm3 9.51 10.88*   HEMOGLOBIN g/dL 10.3* 11.6*   HEMATOCRIT % 31.5* 34.4*   PLATELETS 10*3/mm3 211 236       Results from last 7 days  Lab Units 06/04/17  0349 06/03/17  0326   SODIUM mmol/L 142 141   POTASSIUM mmol/L 4.2 4.4   CHLORIDE mmol/L 103 104   TOTAL CO2 mmol/L 29.1* 23.5   BUN mg/dL 12 12   CREATININE mg/dL 0.77 0.66   GLUCOSE mg/dL 102* 122*   CALCIUM mg/dL 9.1 9.3           Xr Knee 1 Or 2 View Left    Result Date: 6/2/2017  Narrative: PORTABLE VIEWS OF THE  LEFT KNEE  CLINICAL  HISTORY: Postop knee arthroplasty  AP and crosstable lateral views demonstrate a total knee arthroplasty that appears in satisfactory position.  This report was finalized on 6/2/2017 2:00 PM by Dr. Edmund Lucio MD.      Xr Chest Pa & Lateral    Result Date: 5/26/2017  Narrative: TWO-VIEW CHEST  HISTORY: Preop for knee surgery. Hypertension.  FINDINGS: The heart, lungs and mediastinal structures are normal and unchanged from 01/24/2017.  This report was finalized on 5/26/2017 10:53 PM by Dr. Jacky Nuno MD.          Current Medications:  Scheduled Meds:  amLODIPine 5 mg Oral QAM   aspirin 325 mg Oral BID   docusate sodium 100 mg Oral BID   [START ON 6/5/2017] estradiol 1 patch Transdermal Once per day on Mon Thu   gabapentin 300 mg Oral Q12H     Continuous Infusions:   PRN Meds:.•  acetaminophen  •  bisacodyl  •  HYDROcodone-acetaminophen **OR** HYDROcodone-acetaminophen  •  HYDROmorphone  •  ketorolac  •  Morphine **AND** naloxone  •  ondansetron  •  oxyCODONE  •  promethazine    Assessment: Status post  SD REVISE KNEE JOINT REPLACE,1 PART [87697] (LT TOTAL KNEE ARTHROPLASTY REVISION)    Patient Active Problem List   Diagnosis   • Hypertension   • Mechanical loosening of internal left knee prosthetic joint   • Morbid obesity with BMI of 40.0-44.9, adult       PLAN:   Continues current post-op course  Mobilize with PT as tolerated per protocol    Weight Bearing: WBAT  Discharge Plan: OK to plan for discharge in  today to home and home health  from orthopadic perspective.    Ha Oh MD    Date: 6/4/2017    Time: 10:51 AM    EMR Dragon/Transcription disclaimer:   Much of this encounter note is an electronic transcription/translation of spoken language to printed text. The electronic translation of spoken language may permit erroneous, or at times, nonsensical words or phrases to be inadvertently transcribed; Although I have reviewed the note for such errors, some may still exist.

## 2017-06-05 LAB
BACTERIA SPEC AEROBE CULT: NORMAL
GRAM STN SPEC: NORMAL
GRAM STN SPEC: NORMAL

## 2017-06-07 LAB — BACTERIA SPEC ANAEROBE CULT: NORMAL

## 2017-06-21 ENCOUNTER — TRANSCRIBE ORDERS (OUTPATIENT)
Dept: PHYSICAL THERAPY | Facility: CLINIC | Age: 52
End: 2017-06-21

## 2017-06-21 ENCOUNTER — TREATMENT (OUTPATIENT)
Dept: PHYSICAL THERAPY | Facility: CLINIC | Age: 52
End: 2017-06-21

## 2017-06-21 ENCOUNTER — DOCUMENTATION (OUTPATIENT)
Dept: PHYSICAL THERAPY | Facility: CLINIC | Age: 52
End: 2017-06-21

## 2017-06-21 DIAGNOSIS — M25.561 CHRONIC PAIN OF RIGHT KNEE: Primary | ICD-10-CM

## 2017-06-21 DIAGNOSIS — M25.662 KNEE STIFFNESS, LEFT: ICD-10-CM

## 2017-06-21 DIAGNOSIS — Z96.652 STATUS POST REVISION OF TOTAL KNEE, LEFT: ICD-10-CM

## 2017-06-21 DIAGNOSIS — M25.562 LEFT KNEE PAIN, UNSPECIFIED CHRONICITY: Primary | ICD-10-CM

## 2017-06-21 DIAGNOSIS — G89.29 CHRONIC PAIN OF RIGHT KNEE: Primary | ICD-10-CM

## 2017-06-21 PROCEDURE — 97110 THERAPEUTIC EXERCISES: CPT | Performed by: PHYSICAL THERAPIST

## 2017-06-21 PROCEDURE — G0283 ELEC STIM OTHER THAN WOUND: HCPCS | Performed by: PHYSICAL THERAPIST

## 2017-06-21 PROCEDURE — 97161 PT EVAL LOW COMPLEX 20 MIN: CPT | Performed by: PHYSICAL THERAPIST

## 2017-06-21 NOTE — PROGRESS NOTES
"Physical Therapy Initial Evaluation and Plan of Care    Patient: Joselyn Grant   : 1965  Diagnosis/ICD-10 Code:  Left knee pain, unspecified chronicity [M25.562]  Referring practitioner: Ha Oh,*  Date of Initial Visit: 2017  Today's Date: 2017    Subjective Evaluation    History of Present Illness  Date of surgery: 2017  Mechanism of injury: Pt with revision of left TKA on 2017.  PT currently walking with rolling walker, was told to \"take it slow\" and wean from walker over the next few weeks.  Pt states the lower leg pain that had been present before this revision is resolved, still with post op pain but she states it is significantly \"better than last time\".  Pt also has some intermittent sciatica on the left side.    Quality of life: good    Pain  Current pain ratin  At best pain ratin  At worst pain ratin  Location: left knee  Quality: dull ache  Relieving factors: ice, change in position, rest and medications  Aggravating factors: movement  Progression: improved    Social Support  Lives in: multiple-level home    Diagnostic Tests  Abnormal x-ray: see chart.    Treatments  Previous treatment: physical therapy and medication  Current treatment: medication  Discharged from (in last 30 days): inpatient hospitalization and home health care  Patient Goals  Patient goals for therapy: decreased pain, improved balance, increased strength, independence with ADLs/IADLs, return to sport/leisure activities, return to work and increased motion             Objective     Observations   Left Knee   Positive for edema and incision. Negative for atrophy, deformity, drainage and trophic changes.     Palpation   Left   No palpable tenderness to the lateral gastrocnemius, medial gastrocnemius, rectus femoris, vastus lateralis and vastus medialis.   Tenderness of the distal biceps femoris, distal semimembranosus and distal semitendinosus.     Tenderness   Left Knee   No " tenderness in the lateral joint line, medial joint line, patellar tendon and quadriceps tendon.     Neurological Testing   Sensation     Knee   Left Knee   Diminished: light touch     Right Knee   Intact: light touch     Comments   Left light touch: around distal scar.     Active Range of Motion   Left Hip   Normal active range of motion    Right Hip   Normal active range of motion  Left Knee   Flexion: 105 degrees   Extension: 4 degrees   Left Ankle/Foot   Normal active range of motion    Right Ankle/Foot   Normal active range of motion    Patellar Mobility   Left Knee Hypermobile in the left medial, left lateral, left superior and left inferior patellar tendon(s).     Patellar Static Positioning   Left Knee: WFL  Right Knee: WFL    Strength/Myotome Testing     Left Hip   Planes of Motion   Flexion: 4-  Extension: 4-  Abduction: 4-    Right Hip   Planes of Motion   Flexion: 4+  Extension: 4+  Abduction: 4+    Left Knee   Flexion: 3-  Extension: 3-  Quadriceps contraction: fair    Right Knee   Flexion: 5  Extension: 5  Quadriceps contraction: good    Left Ankle/Foot   Dorsiflexion: 4-  Plantar flexion: 4-    Right Ankle/Foot   Dorsiflexion: 5  Plantar flexion: 5    Tests     Additional Tests Details  Deferred special tests secondary to post op status    Ambulation   Weight-Bearing Status   Weight-Bearing Status (Left): weight-bearing as tolerated   Assistive device used: front-wheeled walker    Observational Gait   Gait: antalgic   Increased right stance time and left swing time. Decreased walking speed, stride length, left stance time, right swing time, left step length and right step length.   Left foot contact pattern: foot flat  Right foot contact pattern: heel to toe  Left arm swing: decreased  Right arm swing: decreased  Base of support: normal         Assessment & Plan     Assessment  Impairments: abnormal gait, abnormal or restricted ROM, activity intolerance, impaired balance, impaired physical strength,  lacks appropriate home exercise program, pain with function and weight-bearing intolerance  Assessment details: Pt will benefit from skilled PT services in order to address listed impairments and increase tolerance to normal daily activities including ADL's, work and recreational activities.    Prognosis: good  Prognosis details: Short Term Goals: 4-6 weeks. Patient will:  1.  Patient to be adherent with stretching and HEP.  2.  (L) MMT 4/5 for ability to ascend/descend 5 steps and transfer sit to stand x 5 with knee pain <2/10  3.  Increased hip joint, patellar mobility to allow for decreased stress at tibiofemoral, patellofemoral joint. (knee ROM 0°-115°)  4.  Pt. to exhibit increased LE soft tissue extensibility to allow for increased ease with walking 10 minutes.     Long Term Goals: 6-12 weeks. Patient will:  1.  Pt to score 80% perceived normal ability on LEFS  2.  (L) LE strength to at least 5/5 to ascend/descend 5 steps without pain >2/10.  3.  Pt to exhibit improved Knee AROM 0 degrees-120 degrees to allow for improved gait, kneeling, bending squatting as is necessary for household tasks.    4.  Pt to exhibit LE endurance/strength to 5/5 to allow for returning to unrestricted walking > 20 min.    5.  Negative findings for special testing for dysfunction.   6.  Pt to demonstrate increased stability of the knee to balance on left for 20 seconds as needed to traverse uneven terrain .      Plan  Therapy options: will be seen for skilled physical therapy services  Planned modality interventions: cryotherapy, electrical stimulation/Russian stimulation and thermotherapy (hydrocollator packs)  Planned therapy interventions: balance/weight-bearing training, body mechanics training, flexibility, functional ROM exercises, gait training, home exercise program, joint mobilization, manual therapy, neuromuscular re-education, postural training, soft tissue mobilization, strengthening, stretching and therapeutic  activities  Frequency: 3x week  Duration in weeks: 12  Treatment plan discussed with: patient        Manual Therapy:    0     mins  78221;  Therapeutic Exercise:    20     mins  49157;     Neuromuscular Driss:    0    mins  85296;    Therapeutic Activity:     0     mins  97695;     Gait Trainin     mins  73635;     Ultrasound:     0     mins  23545;    Electrical Stimulation:    15     mins  98247 ( );    Timed Treatment:   35   mins   Total Treatment:     60   mins    PT SIGNATURE: Zena Longo PT, DPT          Physical Therapist                               [place] License #[id number]    DATE TREATMENT INITIATED: 2017    Initial Certification Certification Period: 2017  I certify that the therapy services are furnished while this patient is under my care.  The services outlined above are required by this patient, and will be reviewed every 90 days.     PHYSICIAN: Ha Oh MD      DATE:     Please sign and return via fax to [phone].. Thank you, [facility].

## 2017-06-21 NOTE — PROGRESS NOTES
Discharge Report    Patient Name: Joselyn Grant       Patient MRN: [id number]  : 1965  Physician:  Date: 2017    Encounter Diagnoses   Name Primary?   • Left knee pain, unspecified chronicity Yes   • Knee stiffness, left    • Status post revision of total knee, left        Treatment has included therapeutic exercise, manual therapy, electrical stimulation and cryotherapy    Assessment:   Progress towards goals: Not Met    Discharge Reason: pt placed on bed rest by MD pending new surgery    Plan of Care: Patient to return to referring/providing physician    Prognosis: fair    Understanding at Discharge: good

## 2017-06-23 ENCOUNTER — TREATMENT (OUTPATIENT)
Dept: PHYSICAL THERAPY | Facility: CLINIC | Age: 52
End: 2017-06-23

## 2017-06-23 DIAGNOSIS — M25.662 KNEE STIFFNESS, LEFT: ICD-10-CM

## 2017-06-23 DIAGNOSIS — M25.562 LEFT KNEE PAIN, UNSPECIFIED CHRONICITY: Primary | ICD-10-CM

## 2017-06-23 DIAGNOSIS — Z96.652 STATUS POST REVISION OF TOTAL KNEE, LEFT: ICD-10-CM

## 2017-06-23 PROCEDURE — 97110 THERAPEUTIC EXERCISES: CPT | Performed by: PHYSICAL THERAPIST

## 2017-06-23 PROCEDURE — G0283 ELEC STIM OTHER THAN WOUND: HCPCS | Performed by: PHYSICAL THERAPIST

## 2017-06-23 NOTE — PROGRESS NOTES
" Physical Therapy Daily Progress Note    Visit #2    Subjective     Joselyn Grant reports: she was sore after first visit, but \"it was a good soreness\"      Objective   See Exercise, Manual, and Modality Logs for complete treatment.       Assessment/Plan  ROM is improving.  Tolerated well overall.  Progress per Plan of Care           Manual Therapy:    0     mins  62326;  Therapeutic Exercise:    35     mins  67085;     Neuromuscular Driss:    0    mins  38278;    Therapeutic Activity:     0     mins  63533;     Gait Trainin     mins  03732;     Ultrasound:     0     mins  94986;    Electrical Stimulation:    15     mins  29556 ( );    Timed Treatment:   35   mins   Total Treatment:     50   mins    Zena Longo PT, DPT  Physical Therapist  [place] License #[id number]                    "

## 2017-06-26 ENCOUNTER — TREATMENT (OUTPATIENT)
Dept: PHYSICAL THERAPY | Facility: CLINIC | Age: 52
End: 2017-06-26

## 2017-06-26 DIAGNOSIS — Z96.652 STATUS POST REVISION OF TOTAL KNEE, LEFT: ICD-10-CM

## 2017-06-26 DIAGNOSIS — M25.562 LEFT KNEE PAIN, UNSPECIFIED CHRONICITY: Primary | ICD-10-CM

## 2017-06-26 DIAGNOSIS — M25.662 KNEE STIFFNESS, LEFT: ICD-10-CM

## 2017-06-26 PROCEDURE — 97110 THERAPEUTIC EXERCISES: CPT | Performed by: PHYSICAL THERAPIST

## 2017-06-26 PROCEDURE — G0283 ELEC STIM OTHER THAN WOUND: HCPCS | Performed by: PHYSICAL THERAPIST

## 2017-06-26 NOTE — PROGRESS NOTES
Physical Therapy Daily Progress Note    Visit #3    Subjective     Joselyn Grant reports: she is feeling good this morning.  Minimal soreness after last session.        Objective   See Exercise, Manual, and Modality Logs for complete treatment.       Assessment/Plan  Pt fatigues easily, but quad strength and ROM are progressing well.  Progress per Plan of Care           Manual Therapy:    0     mins  56816;  Therapeutic Exercise:    35     mins  43853;     Neuromuscular Driss:    0    mins  48223;    Therapeutic Activity:     0     mins  10941;     Gait Trainin     mins  26640;     Ultrasound:     0     mins  78601;    Electrical Stimulation:    15     mins  50089 ( );    Timed Treatment:   35   mins   Total Treatment:     50   mins    Zena Longo, PT, DPT  Physical Therapist  [place] License #[id number]

## 2017-06-28 ENCOUNTER — TREATMENT (OUTPATIENT)
Dept: PHYSICAL THERAPY | Facility: CLINIC | Age: 52
End: 2017-06-28

## 2017-06-28 DIAGNOSIS — Z96.652 STATUS POST REVISION OF TOTAL KNEE, LEFT: ICD-10-CM

## 2017-06-28 DIAGNOSIS — M25.662 KNEE STIFFNESS, LEFT: ICD-10-CM

## 2017-06-28 DIAGNOSIS — M25.562 LEFT KNEE PAIN, UNSPECIFIED CHRONICITY: Primary | ICD-10-CM

## 2017-06-28 PROCEDURE — G0283 ELEC STIM OTHER THAN WOUND: HCPCS | Performed by: PHYSICAL THERAPIST

## 2017-06-28 PROCEDURE — 97110 THERAPEUTIC EXERCISES: CPT | Performed by: PHYSICAL THERAPIST

## 2017-06-28 NOTE — PROGRESS NOTES
Physical Therapy Daily Progress Note    Visit #4    Subjective     Joselyn Grant reports: she feels that she is moving better.      Objective   See Exercise, Manual, and Modality Logs for complete treatment.       Assessment/Plan  Able to complete full revolution on bike, [facility]  Progress per Plan of Care           Manual Therapy:    0     mins  84047;  Therapeutic Exercise:    45     mins  70156;     Neuromuscular Driss:    0    mins  84203;    Therapeutic Activity:     0     mins  96762;     Gait Trainin     mins  60060;     Ultrasound:     0     mins  88467;    Electrical Stimulation:    15     mins  16017 ( );    Timed Treatment:   45   mins   Total Treatment:     60   mins    Zena Longo, PT, DPT  Physical Therapist  [place] License #[id number]

## 2017-07-03 ENCOUNTER — TREATMENT (OUTPATIENT)
Dept: PHYSICAL THERAPY | Facility: CLINIC | Age: 52
End: 2017-07-03

## 2017-07-03 DIAGNOSIS — Z96.652 STATUS POST REVISION OF TOTAL KNEE, LEFT: ICD-10-CM

## 2017-07-03 DIAGNOSIS — M25.562 LEFT KNEE PAIN, UNSPECIFIED CHRONICITY: Primary | ICD-10-CM

## 2017-07-03 DIAGNOSIS — M25.662 KNEE STIFFNESS, LEFT: ICD-10-CM

## 2017-07-03 PROCEDURE — 97110 THERAPEUTIC EXERCISES: CPT | Performed by: PHYSICAL THERAPIST

## 2017-07-03 PROCEDURE — G0283 ELEC STIM OTHER THAN WOUND: HCPCS | Performed by: PHYSICAL THERAPIST

## 2017-07-03 NOTE — PROGRESS NOTES
Physical Therapy Daily Progress Note    Visit #5     Subjective     Joselyn Grant reports: no new complaints.  Was busy over the weekend so [place] [facility] [place] [facility].  She wants to get rid of the walker soon.      Objective   See Exercise, Manual, and Modality Logs for complete treatment.       Assessment/Plan  Activity tolerance continues to improve.  Good symmetrical gait with walker.  Progress per Plan of Care           Manual Therapy:    0     mins  75422;  Therapeutic Exercise:    40     mins  98307;     Neuromuscular Driss:    0    mins  95478;    Therapeutic Activity:     0     mins  43611;     Gait Trainin     mins  96158;     Ultrasound:     0     mins  21053;    Electrical Stimulation:    15     mins  32820 ( );    Timed Treatment:   40   mins   Total Treatment:     55   mins    Zena Longo PT, DPT  Physical Therapist  [place] License #[id number]

## 2017-07-07 ENCOUNTER — TREATMENT (OUTPATIENT)
Dept: PHYSICAL THERAPY | Facility: CLINIC | Age: 52
End: 2017-07-07

## 2017-07-07 DIAGNOSIS — M25.562 LEFT KNEE PAIN, UNSPECIFIED CHRONICITY: Primary | ICD-10-CM

## 2017-07-07 DIAGNOSIS — M25.662 KNEE STIFFNESS, LEFT: ICD-10-CM

## 2017-07-07 DIAGNOSIS — Z96.652 STATUS POST REVISION OF TOTAL KNEE, LEFT: ICD-10-CM

## 2017-07-07 PROCEDURE — G0283 ELEC STIM OTHER THAN WOUND: HCPCS | Performed by: PHYSICAL THERAPIST

## 2017-07-07 PROCEDURE — 97110 THERAPEUTIC EXERCISES: CPT | Performed by: PHYSICAL THERAPIST

## 2017-07-07 NOTE — PROGRESS NOTES
Physical Therapy Daily Progress Note    Visit #6    Subjective     Joselyn Grant reports: she feels ready to graduate from her walker.      Objective   See Exercise, Manual, and Modality Logs for complete treatment.       Assessment/Plan  Functionally, pt is ready to try gait with a cane.  PT left message with MD office to see if this is OK or if pt should wait until her next follow up with MD.  Progress per Plan of Care           Manual Therapy:    0     mins  52365;  Therapeutic Exercise:    40     mins  02017;     Neuromuscular Driss:    0    mins  09622;    Therapeutic Activity:     0     mins  37302;     Gait Trainin     mins  49215;     Ultrasound:     0     mins  58586;    Electrical Stimulation:    15     mins  53850 ( );    Timed Treatment:   40   mins   Total Treatment:     55   mins    Zena Longo PT, DPT  Physical Therapist  [place] License #[id number]

## 2017-07-10 ENCOUNTER — TREATMENT (OUTPATIENT)
Dept: PHYSICAL THERAPY | Facility: CLINIC | Age: 52
End: 2017-07-10

## 2017-07-10 DIAGNOSIS — M25.662 KNEE STIFFNESS, LEFT: ICD-10-CM

## 2017-07-10 DIAGNOSIS — Z96.652 STATUS POST REVISION OF TOTAL KNEE, LEFT: ICD-10-CM

## 2017-07-10 DIAGNOSIS — M25.562 LEFT KNEE PAIN, UNSPECIFIED CHRONICITY: Primary | ICD-10-CM

## 2017-07-10 PROCEDURE — 97110 THERAPEUTIC EXERCISES: CPT | Performed by: PHYSICAL THERAPIST

## 2017-07-10 PROCEDURE — G0283 ELEC STIM OTHER THAN WOUND: HCPCS | Performed by: PHYSICAL THERAPIST

## 2017-07-10 NOTE — PROGRESS NOTES
Physical Therapy Daily Progress Note    Visit #7    Subjective     Joselyn Grant reports: she was able to be more active over the weekend, she walked ~ a block.  Sciatica pain is bothering her more than the knee.        Objective   See Exercise, Manual, and Modality Logs for complete treatment.       Assessment/Plan  Pt with some increased edema today, likely from increased activity.    Progress per Plan of Care           Manual Therapy:    0     mins  10251;  Therapeutic Exercise:    45     mins  86847;     Neuromuscular Driss:    0    mins  96601;    Therapeutic Activity:     0     mins  76524;     Gait Trainin     mins  86500;     Ultrasound:     0     mins  74625;    Electrical Stimulation:    15     mins  29709 ( );    Timed Treatment:   45   mins   Total Treatment:     60   mins    Zena Longo PT, DPT  Physical Therapist  [place] License #[id number]

## 2017-07-13 ENCOUNTER — TREATMENT (OUTPATIENT)
Dept: PHYSICAL THERAPY | Facility: CLINIC | Age: 52
End: 2017-07-13

## 2017-07-13 DIAGNOSIS — Z96.652 STATUS POST REVISION OF TOTAL KNEE, LEFT: ICD-10-CM

## 2017-07-13 DIAGNOSIS — M25.662 KNEE STIFFNESS, LEFT: ICD-10-CM

## 2017-07-13 DIAGNOSIS — M25.562 LEFT KNEE PAIN, UNSPECIFIED CHRONICITY: Primary | ICD-10-CM

## 2017-07-13 PROCEDURE — 97110 THERAPEUTIC EXERCISES: CPT | Performed by: PHYSICAL THERAPIST

## 2017-07-13 PROCEDURE — G0283 ELEC STIM OTHER THAN WOUND: HCPCS | Performed by: PHYSICAL THERAPIST

## 2017-07-14 NOTE — PROGRESS NOTES
Physical Therapy Daily Progress Note    Visit #8    Subjective     Joselyn Grant reports: MD called her and instructed her to continue with walker until her next follow up with him.  Knee pain is minimal, sciatica is primary pain.      Objective   See Exercise, Manual, and Modality Logs for complete treatment.       Assessment/Plan  Pt is making good progress within restrictions, ROM is increasing and knee pain is minimal.  Due to insurance coverage, pt to D/C to HEP at this time.  Reviewed [facility]             Manual Therapy:    0     mins  53575;  Therapeutic Exercise:    45     mins  28054;     Neuromuscular Driss:    0    mins  90431;    Therapeutic Activity:     0     mins  04231;     Gait Trainin     mins  21689;     Ultrasound:     0     mins  95906;    Electrical Stimulation:    15     mins  91503 ( );    Timed Treatment:   45   mins   Total Treatment:     60   mins    Zena Longo PT, DPT  Physical Therapist  [place] License #[id number]

## 2017-08-01 ENCOUNTER — TREATMENT (OUTPATIENT)
Dept: PHYSICAL THERAPY | Facility: CLINIC | Age: 52
End: 2017-08-01

## 2017-08-01 DIAGNOSIS — M25.562 LEFT KNEE PAIN, UNSPECIFIED CHRONICITY: Primary | ICD-10-CM

## 2017-08-01 DIAGNOSIS — M25.662 KNEE STIFFNESS, LEFT: ICD-10-CM

## 2017-08-01 DIAGNOSIS — Z96.652 STATUS POST REVISION OF TOTAL KNEE, LEFT: ICD-10-CM

## 2017-08-01 PROCEDURE — 97110 THERAPEUTIC EXERCISES: CPT | Performed by: PHYSICAL THERAPIST

## 2017-08-01 PROCEDURE — G0283 ELEC STIM OTHER THAN WOUND: HCPCS | Performed by: PHYSICAL THERAPIST

## 2017-08-01 NOTE — PROGRESS NOTES
Re-Assessment / Re-Certification    Patient: Joselyn Grnat   : 1965  Diagnosis/ICD-10 Code:  Left knee pain, unspecified chronicity [M25.562]  Referring practitioner: Ha Oh,*  Date of Initial Visit: 2017  Today's Date: 2017  Patient seen for 9 sessions      Subjective:   Joselyn Grant reports: [facility].    Subjective Questionnaire: LEFS: 35/80 (improved from 19/80 on initial eval)  Clinical Progress: improved  Home Program Compliance: Yes  Treatment has included: therapeutic exercise, electrical stimulation and cryotherapy    Subjective   Objective     Active Range of Motion   Left Knee   Flexion: 100 degrees   Extension: 2 degrees     Strength/Myotome Testing     Left Knee   Flexion: 3-  Extension: 3-    Right Knee   Flexion: 5  Extension: 5     Assessment/Plan  Progress toward previous goals: Partially Met    Short Term Goals: 4-6 weeks. Patient will:  1.  Patient to be adherent with stretching and HEP. (MET)  2.  (L) MMT 4/5 for ability to ascend/descend 5 steps and transfer sit to stand x 5 with knee pain <2/10 (PROGRESSING)  3.  Increased hip joint, patellar mobility to allow for decreased stress at tibiofemoral, patellofemoral joint. (knee ROM 0°-115°) (PROGRESSING)  4.  Pt. to exhibit increased LE soft tissue extensibility to allow for increased ease with walking 10 minutes.  (MET)    Long Term Goals: 6-12 weeks. Patient will:  1.  Pt to score 80% perceived normal ability on LEFS (PROGRESSING)  2.  (L) LE strength to at least 5/5 to ascend/descend 5 steps without pain >2/10. (PROGRESSING)  3.  Pt to exhibit improved Knee AROM 0 degrees-120 degrees to allow for improved gait, kneeling, bending squatting as is necessary for household tasks.  (PROGRESSING)  4.  Pt to exhibit LE endurance/strength to 5/5 to allow for returning to unrestricted walking > 20 min.  (PROGRESSING)  5.  Negative findings for special testing  for dysfunction. (MET)  6.  Pt to demonstrate increased stability of the knee to balance on left for 20 seconds as needed to traverse uneven terrain .  (NOT MET)      Recommendations: Continue as planned  Timeframe: 1 month  Prognosis to achieve goals: good    PT Signature: Zena Longo, PT, DPT                         Physical Therapist                         [place] License #[id number]    Based upon review of the patient's progress and continued therapy plan, it is my medical opinion that Joselyn Grant should continue physical therapy treatment at [facility]  [address] [place] [id number] [phone].    Signature: __________________________________  Ha Oh MD    Manual Therapy:    0     mins  60787;  Therapeutic Exercise:    40     mins  39715;     Neuromuscular Driss:    0    mins  78163;    Therapeutic Activity:     0     mins  51960;     Gait Trainin     mins  59650;     Ultrasound:     0     mins  99827;    Electrical Stimulation:    15     mins  69774 ( );    Timed Treatment:   40   mins   Total Treatment:     55   mins

## 2017-08-03 ENCOUNTER — TREATMENT (OUTPATIENT)
Dept: PHYSICAL THERAPY | Facility: CLINIC | Age: 52
End: 2017-08-03

## 2017-08-03 DIAGNOSIS — Z96.652 STATUS POST REVISION OF TOTAL KNEE, LEFT: ICD-10-CM

## 2017-08-03 DIAGNOSIS — M25.662 KNEE STIFFNESS, LEFT: ICD-10-CM

## 2017-08-03 DIAGNOSIS — M25.562 LEFT KNEE PAIN, UNSPECIFIED CHRONICITY: Primary | ICD-10-CM

## 2017-08-03 PROCEDURE — G0283 ELEC STIM OTHER THAN WOUND: HCPCS | Performed by: PHYSICAL THERAPIST

## 2017-08-03 PROCEDURE — 97110 THERAPEUTIC EXERCISES: CPT | Performed by: PHYSICAL THERAPIST

## 2017-08-03 NOTE — PROGRESS NOTES
" Physical Therapy Daily Progress Note    Visit #10    Subjective     Joselyn Grant reports: she was \"worn out\" after last session.  Was able to go shopping at the [employer] mall yesterday for ~45 minutes.        Objective   See Exercise, Manual, and Modality Logs for complete treatment.       Assessment/Plan  Tolerated well, pt with improving activity tolerance.  Still requires cueing for gait pattern.  Progress per Plan of Care           Manual Therapy:    0     mins  98460;  Therapeutic Exercise:    40     mins  92890;     Neuromuscular Driss:    0    mins  81621;    Therapeutic Activity:     0     mins  53039;     Gait Trainin     mins  86068;     Ultrasound:     0     mins  20360;    Electrical Stimulation:    15     mins  74710 ( );    Timed Treatment:   40   mins   Total Treatment:     55   mins    Zena Longo PT, DPT  Physical Therapist  [place] License #[id number]                    "

## 2017-08-08 ENCOUNTER — TREATMENT (OUTPATIENT)
Dept: PHYSICAL THERAPY | Facility: CLINIC | Age: 52
End: 2017-08-08

## 2017-08-08 DIAGNOSIS — Z96.652 STATUS POST REVISION OF TOTAL KNEE, LEFT: ICD-10-CM

## 2017-08-08 DIAGNOSIS — M25.662 KNEE STIFFNESS, LEFT: ICD-10-CM

## 2017-08-08 DIAGNOSIS — M25.562 LEFT KNEE PAIN, UNSPECIFIED CHRONICITY: Primary | ICD-10-CM

## 2017-08-08 PROCEDURE — 97140 MANUAL THERAPY 1/> REGIONS: CPT | Performed by: PHYSICAL THERAPIST

## 2017-08-08 PROCEDURE — G0283 ELEC STIM OTHER THAN WOUND: HCPCS | Performed by: PHYSICAL THERAPIST

## 2017-08-08 PROCEDURE — 97110 THERAPEUTIC EXERCISES: CPT | Performed by: PHYSICAL THERAPIST

## 2017-08-08 NOTE — PROGRESS NOTES
Physical Therapy Daily Progress Note    Visit #11    Subjective     Joselyn Grant reports: she walked a lot for graduation over the weekend, and went back to work yesterday.  Her hamstrings and calf are in spasm from all the walking.      Objective   See Exercise, Manual, and Modality Logs for complete treatment.       Assessment/Plan  Pt tolerated manual therapy fairly well.  Deferred some exercises secondary to pain level and recent increased activity.  Will resume full exercise program at next visit.  Progress per Plan of Care           Manual Therapy:    10     mins  80057;  Therapeutic Exercise:    20     mins  34329;     Neuromuscular Driss:    0    mins  88121;    Therapeutic Activity:     0     mins  06336;     Gait Trainin     mins  99622;     Ultrasound:     0     mins  87972;    Electrical Stimulation:    15     mins  18125 ( );    Timed Treatment:   30   mins   Total Treatment:     45   mins    Zena Longo PT, DPT  Physical Therapist  [place] License #[id number]

## 2017-08-10 ENCOUNTER — TREATMENT (OUTPATIENT)
Dept: PHYSICAL THERAPY | Facility: CLINIC | Age: 52
End: 2017-08-10

## 2017-08-10 DIAGNOSIS — Z96.652 STATUS POST REVISION OF TOTAL KNEE, LEFT: ICD-10-CM

## 2017-08-10 DIAGNOSIS — M25.562 LEFT KNEE PAIN, UNSPECIFIED CHRONICITY: Primary | ICD-10-CM

## 2017-08-10 DIAGNOSIS — M25.662 KNEE STIFFNESS, LEFT: ICD-10-CM

## 2017-08-10 PROCEDURE — 97140 MANUAL THERAPY 1/> REGIONS: CPT | Performed by: PHYSICAL THERAPIST

## 2017-08-10 PROCEDURE — G0283 ELEC STIM OTHER THAN WOUND: HCPCS | Performed by: PHYSICAL THERAPIST

## 2017-08-10 PROCEDURE — 97110 THERAPEUTIC EXERCISES: CPT | Performed by: PHYSICAL THERAPIST

## 2017-08-11 NOTE — PROGRESS NOTES
" Physical Therapy Daily Progress Note    Visit #12    Subjective     Joselyn Grant reports: \"I am starting to see a light at the end of the tunnel\", feels that her activity tolerance is increasing.  Manual therapy helped some with muscle spasms last session.        Objective   See Exercise, Manual, and Modality Logs for complete treatment.       Assessment/Plan  Increased exercise tolerance today.  Still with increased edema in LLE, but muscle spasms are improved.  Progress per Plan of Care           Manual Therapy:    10     mins  51611;  Therapeutic Exercise:    30     mins  16034;     Neuromuscular Driss:    0    mins  61834;    Therapeutic Activity:     0     mins  00488;     Gait Trainin     mins  19542;     Ultrasound:     0     mins  25613;    Electrical Stimulation:    15     mins  79896 ( );    Timed Treatment:   40   mins   Total Treatment:     55   mins    Zena Longo PT, DPT  Physical Therapist  [place] License #[id number]                    "

## 2017-08-18 ENCOUNTER — TREATMENT (OUTPATIENT)
Dept: PHYSICAL THERAPY | Facility: CLINIC | Age: 52
End: 2017-08-18

## 2017-08-18 DIAGNOSIS — M25.662 KNEE STIFFNESS, LEFT: ICD-10-CM

## 2017-08-18 DIAGNOSIS — Z96.652 STATUS POST REVISION OF TOTAL KNEE, LEFT: ICD-10-CM

## 2017-08-18 DIAGNOSIS — M25.562 LEFT KNEE PAIN, UNSPECIFIED CHRONICITY: Primary | ICD-10-CM

## 2017-08-18 PROCEDURE — 97110 THERAPEUTIC EXERCISES: CPT | Performed by: PHYSICAL THERAPIST

## 2017-08-18 PROCEDURE — G0283 ELEC STIM OTHER THAN WOUND: HCPCS | Performed by: PHYSICAL THERAPIST

## 2017-08-18 PROCEDURE — 97140 MANUAL THERAPY 1/> REGIONS: CPT | Performed by: PHYSICAL THERAPIST

## 2017-08-18 NOTE — PROGRESS NOTES
Physical Therapy Daily Progress Note    Visit #13    Subjective     Joselyn Grant reports: she is continuing to increase her walking distance.  Knee swells and gets sore, but this improves with ice/elevation.  Still with muscle spasms in her calf and hamstring.      Objective   See Exercise, Manual, and Modality Logs for complete treatment.       Assessment/Plan  Pt is able to walk several feet with symmetrical weight bearing without AD.  Progressing well overall.  Progress per Plan of Care           Manual Therapy:    10     mins  64602;  Therapeutic Exercise:    35     mins  90787;     Neuromuscular Driss:    0    mins  43099;    Therapeutic Activity:     0     mins  40035;     Gait Trainin     mins  96626;     Ultrasound:     0     mins  34007;    Electrical Stimulation:    15     mins  71587 ( );    Timed Treatment:   45   mins   Total Treatment:     60   mins    Zena Longo PT, DPT  Physical Therapist  [place] License #[id number]

## 2017-08-22 ENCOUNTER — TELEPHONE (OUTPATIENT)
Dept: FAMILY MEDICINE CLINIC | Facility: CLINIC | Age: 52
End: 2017-08-22

## 2017-08-22 ENCOUNTER — TREATMENT (OUTPATIENT)
Dept: PHYSICAL THERAPY | Facility: CLINIC | Age: 52
End: 2017-08-22

## 2017-08-22 DIAGNOSIS — Z96.652 STATUS POST REVISION OF TOTAL KNEE, LEFT: ICD-10-CM

## 2017-08-22 DIAGNOSIS — M25.562 LEFT KNEE PAIN, UNSPECIFIED CHRONICITY: Primary | ICD-10-CM

## 2017-08-22 DIAGNOSIS — M25.662 KNEE STIFFNESS, LEFT: ICD-10-CM

## 2017-08-22 PROCEDURE — 97140 MANUAL THERAPY 1/> REGIONS: CPT | Performed by: PHYSICAL THERAPIST

## 2017-08-22 PROCEDURE — 97110 THERAPEUTIC EXERCISES: CPT | Performed by: PHYSICAL THERAPIST

## 2017-08-22 PROCEDURE — G0283 ELEC STIM OTHER THAN WOUND: HCPCS | Performed by: PHYSICAL THERAPIST

## 2017-08-22 RX ORDER — FUROSEMIDE 40 MG/1
40 TABLET ORAL EVERY MORNING
Qty: 30 TABLET | Refills: 0 | Status: SHIPPED | OUTPATIENT
Start: 2017-08-22 | End: 2017-09-07 | Stop reason: SDUPTHER

## 2017-08-22 NOTE — PROGRESS NOTES
Physical Therapy Daily Progress Note    Visit #14    Subjective     Joselyn Grant reports: she is working from home this week.  Manual therapy is uncomfortable at the time, but overall pain and ease of movement is improved after Rx.      Objective   See Exercise, Manual, and Modality Logs for complete treatment.       Assessment/Plan  Improved gastroc tone, hamstrings still very tight.  Gait continues to steadily improve.  Progress per Plan of Care           Manual Therapy:    10     mins  30887;  Therapeutic Exercise:    30     mins  20950;     Neuromuscular Driss:    0    mins  63508;    Therapeutic Activity:     0     mins  24094;     Gait Trainin     mins  43650;     Ultrasound:     0     mins  27731;    Electrical Stimulation:    10     mins  90190 ( );    Timed Treatment:   40   mins   Total Treatment:     50   mins    Zena Longo PT, DPT  Physical Therapist  [place] License #[id number]

## 2017-08-28 ENCOUNTER — TREATMENT (OUTPATIENT)
Dept: PHYSICAL THERAPY | Facility: CLINIC | Age: 52
End: 2017-08-28

## 2017-08-28 DIAGNOSIS — M25.662 KNEE STIFFNESS, LEFT: ICD-10-CM

## 2017-08-28 DIAGNOSIS — M25.562 LEFT KNEE PAIN, UNSPECIFIED CHRONICITY: Primary | ICD-10-CM

## 2017-08-28 DIAGNOSIS — Z96.652 STATUS POST REVISION OF TOTAL KNEE, LEFT: ICD-10-CM

## 2017-08-28 PROCEDURE — 97110 THERAPEUTIC EXERCISES: CPT | Performed by: PHYSICAL THERAPIST

## 2017-08-28 PROCEDURE — G0283 ELEC STIM OTHER THAN WOUND: HCPCS | Performed by: PHYSICAL THERAPIST

## 2017-08-28 PROCEDURE — 97140 MANUAL THERAPY 1/> REGIONS: CPT | Performed by: PHYSICAL THERAPIST

## 2017-08-28 NOTE — PROGRESS NOTES
Physical Therapy Daily Progress Note    Visit #15    Subjective     Joselyn Grant reports: LE is staying swollen, especially as she increases activity.  She has a compression stocking but it stops below her knee and cuts off circulation.      Objective   See Exercise, Manual, and Modality Logs for complete treatment.       Assessment/Plan  Advised pt to try ace bandage wrap to see effect on edema.  May benefit from compression stocking that includes thigh.   Progress per Plan of Care           Manual Therapy:    10     mins  79112;  Therapeutic Exercise:    35     mins  20155;     Neuromuscular Driss:    0    mins  24333;    Therapeutic Activity:     0     mins  97514;     Gait Trainin     mins  51540;     Ultrasound:     0     mins  61765;    Electrical Stimulation:    15     mins  39891 ( );    Timed Treatment:   45   mins   Total Treatment:     60   mins    Zena Longo PT, DPT  Physical Therapist  [place] License #[id number]

## 2017-09-01 ENCOUNTER — TREATMENT (OUTPATIENT)
Dept: PHYSICAL THERAPY | Facility: CLINIC | Age: 52
End: 2017-09-01

## 2017-09-01 DIAGNOSIS — M25.562 LEFT KNEE PAIN, UNSPECIFIED CHRONICITY: Primary | ICD-10-CM

## 2017-09-01 DIAGNOSIS — Z96.652 STATUS POST REVISION OF TOTAL KNEE, LEFT: ICD-10-CM

## 2017-09-01 DIAGNOSIS — M25.662 KNEE STIFFNESS, LEFT: ICD-10-CM

## 2017-09-01 PROCEDURE — 97140 MANUAL THERAPY 1/> REGIONS: CPT | Performed by: PHYSICAL THERAPIST

## 2017-09-01 PROCEDURE — 97110 THERAPEUTIC EXERCISES: CPT | Performed by: PHYSICAL THERAPIST

## 2017-09-01 PROCEDURE — G0283 ELEC STIM OTHER THAN WOUND: HCPCS | Performed by: PHYSICAL THERAPIST

## 2017-09-01 NOTE — PROGRESS NOTES
Physical Therapy Daily Progress Note    Visit #16    Subjective     Joselyn Grant reports: she tried wrapping her knee with an ace bandage, felt like it helped.  Manual therapy is uncomfortable, but she feels like her pain is decreased for several hours after and it is overall decreasing.      Objective   See Exercise, Manual, and Modality Logs for complete treatment.       Assessment/Plan  Improved tone/trigger points in gastroc/hamstring.    Progress per Plan of Care           Manual Therapy:    10     mins  24908;  Therapeutic Exercise:    35     mins  40511;     Neuromuscular Driss:    0    mins  23523;    Therapeutic Activity:     0     mins  79293;     Gait Trainin     mins  42937;     Ultrasound:     0     mins  67606;    Electrical Stimulation:    15     mins  82712 ( );    Timed Treatment:   45   mins   Total Treatment:     60   mins    Zena Longo PT, DPT  Physical Therapist  [place] License #[id number]

## 2017-09-07 RX ORDER — FUROSEMIDE 40 MG/1
40 TABLET ORAL EVERY MORNING
Qty: 90 TABLET | Refills: 0 | Status: SHIPPED | OUTPATIENT
Start: 2017-09-07 | End: 2017-09-18 | Stop reason: SDUPTHER

## 2017-09-18 ENCOUNTER — OFFICE VISIT (OUTPATIENT)
Dept: FAMILY MEDICINE CLINIC | Facility: CLINIC | Age: 52
End: 2017-09-18

## 2017-09-18 VITALS
DIASTOLIC BLOOD PRESSURE: 80 MMHG | HEIGHT: 62 IN | TEMPERATURE: 98.1 F | BODY MASS INDEX: 42.33 KG/M2 | HEART RATE: 96 BPM | WEIGHT: 230 LBS | SYSTOLIC BLOOD PRESSURE: 124 MMHG | OXYGEN SATURATION: 97 %

## 2017-09-18 DIAGNOSIS — Z23 NEED FOR VACCINATION: ICD-10-CM

## 2017-09-18 DIAGNOSIS — Z12.11 COLON CANCER SCREENING: ICD-10-CM

## 2017-09-18 DIAGNOSIS — E66.01 MORBID OBESITY WITH BMI OF 40.0-44.9, ADULT (HCC): ICD-10-CM

## 2017-09-18 DIAGNOSIS — Z23 NEED FOR VACCINATION WITH 13-POLYVALENT PNEUMOCOCCAL CONJUGATE VACCINE: ICD-10-CM

## 2017-09-18 DIAGNOSIS — I10 ESSENTIAL HYPERTENSION: Primary | ICD-10-CM

## 2017-09-18 LAB
ALBUMIN SERPL-MCNC: 4.4 G/DL (ref 3.5–5.2)
ALBUMIN/GLOB SERPL: 1.4 G/DL
ALP SERPL-CCNC: 86 U/L (ref 39–117)
ALT SERPL W P-5'-P-CCNC: 10 U/L (ref 1–33)
ANION GAP SERPL CALCULATED.3IONS-SCNC: 12.1 MMOL/L
AST SERPL-CCNC: 18 U/L (ref 1–32)
BACTERIA UR QL AUTO: ABNORMAL /HPF
BILIRUB SERPL-MCNC: 0.2 MG/DL (ref 0.1–1.2)
BILIRUB UR QL STRIP: NEGATIVE
BUN BLD-MCNC: 18 MG/DL (ref 6–20)
BUN/CREAT SERPL: 18.6 (ref 7–25)
CALCIUM SPEC-SCNC: 10.2 MG/DL (ref 8.6–10.5)
CHLORIDE SERPL-SCNC: 100 MMOL/L (ref 98–107)
CLARITY UR: CLEAR
CO2 SERPL-SCNC: 27.9 MMOL/L (ref 22–29)
COLOR UR: YELLOW
CREAT BLD-MCNC: 0.97 MG/DL (ref 0.57–1)
ERYTHROCYTE [DISTWIDTH] IN BLOOD BY AUTOMATED COUNT: 14.2 % (ref 4.5–15)
GFR SERPL CREATININE-BSD FRML MDRD: 74 ML/MIN/1.73
GLOBULIN UR ELPH-MCNC: 3.1 GM/DL
GLUCOSE BLD-MCNC: 88 MG/DL (ref 65–99)
GLUCOSE UR STRIP-MCNC: NEGATIVE MG/DL
HCT VFR BLD AUTO: 38.4 % (ref 31–42)
HGB BLD-MCNC: 12.7 G/DL (ref 12–18)
HGB UR QL STRIP.AUTO: NEGATIVE
KETONES UR QL STRIP: ABNORMAL
LEUKOCYTE ESTERASE UR QL STRIP.AUTO: NEGATIVE
LYMPHOCYTES # BLD AUTO: 2.3 10*3/MM3 (ref 1.2–3.4)
LYMPHOCYTES NFR BLD AUTO: 39 % (ref 21–51)
MCH RBC QN AUTO: 29.8 PG (ref 26.1–33.1)
MCHC RBC AUTO-ENTMCNC: 33.1 G/DL (ref 33–37)
MCV RBC AUTO: 90.2 FL (ref 80–99)
MONOCYTES # BLD AUTO: 0.5 10*3/MM3 (ref 0.1–0.6)
MONOCYTES NFR BLD AUTO: 8.6 % (ref 2–9)
NEUTROPHILS # BLD AUTO: 3 10*3/MM3 (ref 1.4–6.5)
NEUTROPHILS NFR BLD AUTO: 52.4 % (ref 42–75)
NITRITE UR QL STRIP: NEGATIVE
PH UR STRIP.AUTO: <=5 [PH] (ref 4.6–8)
PLATELET # BLD AUTO: 256 10*3/MM3 (ref 150–450)
PMV BLD AUTO: 7.7 FL (ref 7.1–10.5)
POTASSIUM BLD-SCNC: 3.8 MMOL/L (ref 3.5–5.2)
PROT SERPL-MCNC: 7.5 G/DL (ref 6–8.5)
PROT UR QL STRIP: NEGATIVE
RBC # BLD AUTO: 4.26 10*6/MM3 (ref 4–6)
RBC # UR: ABNORMAL /HPF
REF LAB TEST METHOD: ABNORMAL
SODIUM BLD-SCNC: 140 MMOL/L (ref 136–145)
SP GR UR STRIP: 1.02 (ref 1–1.03)
SQUAMOUS #/AREA URNS HPF: ABNORMAL /HPF
UROBILINOGEN UR QL STRIP: ABNORMAL
WBC NRBC COR # BLD: 5.8 10*3/MM3 (ref 4.5–10)
WBC UR QL AUTO: ABNORMAL /HPF

## 2017-09-18 PROCEDURE — 90686 IIV4 VACC NO PRSV 0.5 ML IM: CPT | Performed by: NURSE PRACTITIONER

## 2017-09-18 PROCEDURE — 99213 OFFICE O/P EST LOW 20 MIN: CPT | Performed by: NURSE PRACTITIONER

## 2017-09-18 PROCEDURE — 85025 COMPLETE CBC W/AUTO DIFF WBC: CPT | Performed by: NURSE PRACTITIONER

## 2017-09-18 PROCEDURE — 81001 URINALYSIS AUTO W/SCOPE: CPT | Performed by: NURSE PRACTITIONER

## 2017-09-18 PROCEDURE — 36415 COLL VENOUS BLD VENIPUNCTURE: CPT | Performed by: NURSE PRACTITIONER

## 2017-09-18 PROCEDURE — 80053 COMPREHEN METABOLIC PANEL: CPT | Performed by: NURSE PRACTITIONER

## 2017-09-18 PROCEDURE — 90471 IMMUNIZATION ADMIN: CPT | Performed by: NURSE PRACTITIONER

## 2017-09-18 PROCEDURE — 90670 PCV13 VACCINE IM: CPT | Performed by: NURSE PRACTITIONER

## 2017-09-18 RX ORDER — FUROSEMIDE 40 MG/1
40 TABLET ORAL EVERY MORNING
Qty: 90 TABLET | Refills: 3 | Status: SHIPPED | OUTPATIENT
Start: 2017-09-18 | End: 2019-01-22 | Stop reason: SDUPTHER

## 2017-09-18 RX ORDER — AMLODIPINE BESYLATE 10 MG/1
10 TABLET ORAL EVERY MORNING
Qty: 90 TABLET | Refills: 3 | Status: SHIPPED | OUTPATIENT
Start: 2017-09-18 | End: 2019-05-16 | Stop reason: SDUPTHER

## 2017-09-18 RX ORDER — HYDROCODONE BITARTRATE AND ACETAMINOPHEN 7.5; 325 MG/1; MG/1
TABLET ORAL
Status: ON HOLD | COMMUNITY
Start: 2017-09-07 | End: 2017-12-20

## 2017-09-18 RX ORDER — NAPROXEN 500 MG/1
TABLET ORAL
Status: ON HOLD | COMMUNITY
Start: 2017-09-07 | End: 2017-12-20

## 2017-09-18 NOTE — PROGRESS NOTES
Subjective   Joselyn Grant is a 51 y.o. female.     History of Present Illness   Here to FU on HTN on amlodipine 10 mg daily and lasix 40 mg AM with potassium OTC with intermittent LE edema, recently had L knee sgy Dr Russo 01/17 and revision 06/17 with poor healing, completed PT and resumes travel with work next week, no CP dizziness HA with BP similar at home, UTD pap and mammo sees Dr Winston, never had colonoscopy, request flu shot and prevnar today as exposure to sick ppl with travel and work training    The following portions of the patient's history were reviewed and updated as appropriate: allergies, current medications, past family history, past medical history, past social history, past surgical history and problem list.    Review of Systems   Constitutional: Negative for fever.   Respiratory: Negative for cough, shortness of breath and wheezing.    Cardiovascular: Positive for leg swelling. Negative for chest pain and palpitations.   Gastrointestinal: Negative for abdominal distention, abdominal pain, anal bleeding and blood in stool.   Musculoskeletal: Positive for arthralgias (L knee), gait problem (using cane) and joint swelling. Negative for back pain.   Neurological: Negative for dizziness and headaches.   All other systems reviewed and are negative.      Objective   Physical Exam   Constitutional: She is oriented to person, place, and time. She appears well-developed and well-nourished.   HENT:   Head: Normocephalic and atraumatic.   Eyes: Conjunctivae and EOM are normal. Pupils are equal, round, and reactive to light.   Cardiovascular: Normal rate, regular rhythm and normal heart sounds.    Pulmonary/Chest: Effort normal and breath sounds normal.   Musculoskeletal: Normal range of motion. She exhibits edema (B LE nonpitting).   Neurological: She is alert and oriented to person, place, and time.   Skin: Skin is warm and dry.   Psychiatric: She has a normal mood and affect. Her behavior is  normal. Judgment and thought content normal.   Vitals reviewed.      Assessment/Plan   Joselyn was seen today for follow-up.    Diagnoses and all orders for this visit:    Essential hypertension  -     CBC & Differential  -     Comprehensive Metabolic Panel  -     Urinalysis With Microscopic  -     CBC Auto Differential  -     Urinalysis  -     Urinalysis, Microscopic Only    Morbid obesity with BMI of 40.0-44.9, adult    Need for vaccination  -     Flu Vaccine Quad PF 3YR+    Need for vaccination with 13-polyvalent pneumococcal conjugate vaccine  -     Pneumococcal Conjugate Vaccine 13-Valent All    Colon cancer screening  -     Ambulatory Referral to General Surgery    Other orders  -     amLODIPine (NORVASC) 10 MG tablet; Take 1 tablet by mouth Every Morning.  -     furosemide (LASIX) 40 MG tablet; Take 1 tablet by mouth Every Morning.    check labs and call with results, refill amlodipine and lasix, Enc healthy diet and regular exercise for wt loss, flu shot and prevnar today, refer overdue colonoscopy, will get pap and mammo results to update chart

## 2017-09-18 NOTE — PATIENT INSTRUCTIONS
check labs and call with results, refill amlodipine and lasix, Enc healthy diet and regular exercise for wt loss, flu shot and prevnar today, refer overdue colonoscopy, will get pap and mammo results to update chart

## 2017-09-19 ENCOUNTER — TREATMENT (OUTPATIENT)
Dept: PHYSICAL THERAPY | Facility: CLINIC | Age: 52
End: 2017-09-19

## 2017-09-19 ENCOUNTER — TELEPHONE (OUTPATIENT)
Dept: FAMILY MEDICINE CLINIC | Facility: CLINIC | Age: 52
End: 2017-09-19

## 2017-09-19 DIAGNOSIS — M25.662 KNEE STIFFNESS, LEFT: ICD-10-CM

## 2017-09-19 DIAGNOSIS — Z96.652 STATUS POST REVISION OF TOTAL KNEE, LEFT: ICD-10-CM

## 2017-09-19 DIAGNOSIS — M25.562 LEFT KNEE PAIN, UNSPECIFIED CHRONICITY: Primary | ICD-10-CM

## 2017-09-19 PROCEDURE — 97140 MANUAL THERAPY 1/> REGIONS: CPT | Performed by: PHYSICAL THERAPIST

## 2017-09-19 PROCEDURE — 97110 THERAPEUTIC EXERCISES: CPT | Performed by: PHYSICAL THERAPIST

## 2017-09-19 PROCEDURE — G0283 ELEC STIM OTHER THAN WOUND: HCPCS | Performed by: PHYSICAL THERAPIST

## 2017-09-19 NOTE — TELEPHONE ENCOUNTER
Pt informed    ----- Message from ARNAV Stephens sent at 9/18/2017  4:59 PM EDT -----  BS, kidney, liver normal, no anemias, UA normal, we will check fasting chol NCV

## 2017-09-19 NOTE — PROGRESS NOTES
Re-Assessment / Re-Certification    Patient: Joselyn Grant   : 1965  Diagnosis/ICD-10 Code:  Left knee pain, unspecified chronicity [M25.562]  Referring practitioner: Ha Oh,*  Date of Initial Visit: 2017  Today's Date: 2017  Patient seen for 17 sessions      Subjective:   Joselyn Grant reports: she is starting traveling for work next week.  Has been gradually increasing her activity, including walking distance.   Subjective Questionnaire: LEFS: 33/80  Clinical Progress: improved  Home Program Compliance: Yes  Treatment has included: therapeutic exercise, neuromuscular re-education, manual therapy, electrical stimulation and cryotherapy    Subjective   Objective     Active Range of Motion   Left Knee   Flexion: 115 degrees   Extension: 0 degrees     Strength/Myotome Testing     Left Hip   Planes of Motion   Flexion: 4+  Extension: 4+  Abduction: 4+    Left Knee   Flexion: 5  Extension: 5     Assessment/Plan  Progress toward previous goals: Partially Met    Short Term Goals: 4-6 weeks. Patient will:  1.  Patient to be adherent with stretching and HEP. (MET)  2.  (L) MMT 4/5 for ability to ascend/descend 5 steps and transfer sit to stand x 5 with knee pain <2/10 (MET)  3.  Increased hip joint, patellar mobility to allow for decreased stress at tibiofemoral, patellofemoral joint. (knee ROM 0°-115°) (MET)  4.  Pt. to exhibit increased LE soft tissue extensibility to allow for increased ease with walking 10 minutes.  (MET)    Long Term Goals: 6-12 weeks. Patient will:  1.  Pt to score 80% perceived normal ability on LEFS (PROGRESSING)  2.  (L) LE strength to at least 5/5 to ascend/descend 5 steps without pain >2/10. (PROGRESSING)  3.  Pt to exhibit improved Knee AROM 0 degrees-120 degrees to allow for improved gait, kneeling, bending squatting as is necessary for household tasks.  (PROGRESSING)  4.  Pt to exhibit LE endurance/strength to 5/5 to allow for returning to unrestricted  walking > 20 min.  (PROGRESSING)  5.  Negative findings for special testing for dysfunction. (MET)  6.  Pt to demonstrate increased stability of the knee to balance on left for 20 seconds as needed to traverse uneven terrain .  (PROGRESSING)     Pt is now able to ambulate short distances without her cane, with a near symmetrical gait pattern.   Recommendations: Continue as planned as pt is able with her work schedule  Timeframe: 1 month  Prognosis to achieve goals: good    PT Signature: Zena Longo, PT, DPT                         Physical Therapist                         [place] License #[id number]    Based upon review of the patient's progress and continued therapy plan, it is my medical opinion that Joselyn Grant should continue physical therapy treatment at [facility]  [address] [place] [id number] [phone].    Signature: __________________________________  Ha Oh MD    Manual Therapy:    10     mins  56044;  Therapeutic Exercise:    35     mins  31503;     Neuromuscular Driss:    0    mins  51692;    Therapeutic Activity:     0     mins  67276;     Gait Trainin     mins  09959;     Ultrasound:     0     mins  18772;    Electrical Stimulation:    15     mins  10928 ( );    Timed Treatment:   45   mins   Total Treatment:     60   mins

## 2017-09-19 NOTE — TELEPHONE ENCOUNTER
[facility]    ----- Message from ARNAV Stephens sent at 9/18/2017  4:59 PM EDT -----  BS, kidney, liver normal, no anemias, UA normal, we will check fasting chol NCV

## 2017-09-22 ENCOUNTER — TREATMENT (OUTPATIENT)
Dept: PHYSICAL THERAPY | Facility: CLINIC | Age: 52
End: 2017-09-22

## 2017-09-22 DIAGNOSIS — M25.662 KNEE STIFFNESS, LEFT: ICD-10-CM

## 2017-09-22 DIAGNOSIS — Z96.652 STATUS POST REVISION OF TOTAL KNEE, LEFT: ICD-10-CM

## 2017-09-22 DIAGNOSIS — M25.562 LEFT KNEE PAIN, UNSPECIFIED CHRONICITY: Primary | ICD-10-CM

## 2017-09-22 PROCEDURE — 97110 THERAPEUTIC EXERCISES: CPT | Performed by: PHYSICAL THERAPIST

## 2017-09-22 PROCEDURE — 97140 MANUAL THERAPY 1/> REGIONS: CPT | Performed by: PHYSICAL THERAPIST

## 2017-09-22 PROCEDURE — G0283 ELEC STIM OTHER THAN WOUND: HCPCS | Performed by: PHYSICAL THERAPIST

## 2017-09-22 NOTE — PROGRESS NOTES
" Physical Therapy Daily Progress Note    Visit #18    Subjective     Joselyn Grant reports: she is seeing \"more and more glimpses or normalcy\", is traveling for work for most of the next month.      Objective   See Exercise, Manual, and Modality Logs for complete treatment.       Assessment/Plan  Pt is independent with HEP, unable to schedule appointments due to work schedule.    Hold chart open 30 days in case of acute exacerbation.            Manual Therapy:    10     mins  50513;  Therapeutic Exercise:    45     mins  81799;     Neuromuscular Driss:    0    mins  97074;    Therapeutic Activity:     0     mins  82536;     Gait Trainin     mins  40523;     Ultrasound:     0     mins  80063;    Electrical Stimulation:    15     mins  46312 ( );    Timed Treatment:   55   mins   Total Treatment:     70   mins    Zena Longo PT, DPT  Physical Therapist  [place] License #[id number]                    "

## 2017-10-10 RX ORDER — ESTRADIOL 0.07 MG/D
FILM, EXTENDED RELEASE TRANSDERMAL
Qty: 8 PATCH | Refills: 0 | Status: SHIPPED | OUTPATIENT
Start: 2017-10-10 | End: 2017-11-26 | Stop reason: SDUPTHER

## 2017-11-27 RX ORDER — ESTRADIOL 0.07 MG/D
FILM, EXTENDED RELEASE TRANSDERMAL
Qty: 8 PATCH | Refills: 0 | Status: SHIPPED | OUTPATIENT
Start: 2017-11-27 | End: 2017-12-27 | Stop reason: SDUPTHER

## 2017-11-27 RX ORDER — FUROSEMIDE 40 MG/1
TABLET ORAL
Qty: 90 TABLET | Refills: 0 | Status: ON HOLD | OUTPATIENT
Start: 2017-11-27 | End: 2017-12-20 | Stop reason: SDUPTHER

## 2017-12-20 ENCOUNTER — ANESTHESIA EVENT (OUTPATIENT)
Dept: GASTROENTEROLOGY | Facility: HOSPITAL | Age: 52
End: 2017-12-20

## 2017-12-20 ENCOUNTER — HOSPITAL ENCOUNTER (OUTPATIENT)
Facility: HOSPITAL | Age: 52
Setting detail: HOSPITAL OUTPATIENT SURGERY
Discharge: HOME | End: 2017-12-20
Attending: SURGERY | Admitting: SURGERY

## 2017-12-20 ENCOUNTER — ANESTHESIA (OUTPATIENT)
Dept: GASTROENTEROLOGY | Facility: HOSPITAL | Age: 52
End: 2017-12-20

## 2017-12-20 VITALS
SYSTOLIC BLOOD PRESSURE: 124 MMHG | OXYGEN SATURATION: 100 % | RESPIRATION RATE: 16 BRPM | DIASTOLIC BLOOD PRESSURE: 84 MMHG | HEIGHT: 62 IN | TEMPERATURE: 98.6 F | WEIGHT: 230.13 LBS | HEART RATE: 75 BPM | BODY MASS INDEX: 42.35 KG/M2

## 2017-12-20 PROCEDURE — 25010000002 PROPOFOL 10 MG/ML EMULSION: Performed by: ANESTHESIOLOGY

## 2017-12-20 RX ORDER — SODIUM CHLORIDE, SODIUM LACTATE, POTASSIUM CHLORIDE, CALCIUM CHLORIDE 600; 310; 30; 20 MG/100ML; MG/100ML; MG/100ML; MG/100ML
1000 INJECTION, SOLUTION INTRAVENOUS CONTINUOUS PRN
Status: DISCONTINUED | OUTPATIENT
Start: 2017-12-20 | End: 2017-12-20 | Stop reason: HOSPADM

## 2017-12-20 RX ORDER — PROPOFOL 10 MG/ML
VIAL (ML) INTRAVENOUS AS NEEDED
Status: DISCONTINUED | OUTPATIENT
Start: 2017-12-20 | End: 2017-12-20 | Stop reason: SURG

## 2017-12-20 RX ADMIN — SODIUM CHLORIDE, POTASSIUM CHLORIDE, SODIUM LACTATE AND CALCIUM CHLORIDE: 600; 310; 30; 20 INJECTION, SOLUTION INTRAVENOUS at 15:45

## 2017-12-20 RX ADMIN — SODIUM CHLORIDE, POTASSIUM CHLORIDE, SODIUM LACTATE AND CALCIUM CHLORIDE 1000 ML: 600; 310; 30; 20 INJECTION, SOLUTION INTRAVENOUS at 15:41

## 2017-12-20 RX ADMIN — PROPOFOL 450 MG: 10 INJECTION, EMULSION INTRAVENOUS at 15:45

## 2017-12-20 NOTE — ANESTHESIA PREPROCEDURE EVALUATION
Anesthesia Evaluation     Patient summary reviewed and Nursing notes reviewed   NPO Solid Status: > 8 hours  NPO Liquid Status: > 8 hours     Airway   Mallampati: I  TM distance: <3 FB  Neck ROM: full  no difficulty expected  Dental - normal exam     Pulmonary - negative pulmonary ROS and normal exam   Cardiovascular - normal exam  Exercise tolerance: good (4-7 METS)    (+) hypertension,       Neuro/Psych  (+) headaches,    GI/Hepatic/Renal/Endo    (+) obesity,    (-) morbid obesity    Musculoskeletal     Abdominal  - normal exam    Bowel sounds: normal.   Substance History - negative use     OB/GYN negative ob/gyn ROS         Other   (+) arthritis                                   Anesthesia Plan    ASA 3     MAC     Anesthetic plan and risks discussed with patient.

## 2017-12-20 NOTE — H&P
Subjective   Joselyn Grant is a 52 y.o. female who presents today for a screening colonoscopy.  On questioning, there are no GI complaints or problems.      Past Medical History:   Diagnosis Date   • Arthritis    • History of kidney stones    • Hypertension    • Knee pain    • Migraine    • Urinary incontinence     wears pads       PHYSICAL EXAM  Pt is in no distress.  Heart regular.  Chest clear.  Abdomen soft.  Rectal deferred to endoscopy.      Assessment/Plan      Plan a screening colonoscopy today.  Risks and benefits were discussed.  Patient is agreeable.  Final recommendations will follow depending on the results.

## 2017-12-20 NOTE — PLAN OF CARE
Problem: Patient Care Overview (Adult)  Goal: Plan of Care Review  Outcome: Ongoing (interventions implemented as appropriate)   12/20/17 1515   Coping/Psychosocial Response Interventions   Plan Of Care Reviewed With patient   Patient Care Overview   Progress no change     Goal: Adult Individualization and Mutuality  Outcome: Ongoing (interventions implemented as appropriate)    Goal: Discharge Needs Assessment  Outcome: Ongoing (interventions implemented as appropriate)      Problem: GI Endoscopy (Adult)  Goal: Signs and Symptoms of Listed Potential Problems Will be Absent or Manageable (GI Endoscopy)  Outcome: Ongoing (interventions implemented as appropriate)

## 2017-12-20 NOTE — ANESTHESIA POSTPROCEDURE EVALUATION
"Patient: Joselyn Grant    Procedure Summary     Date Anesthesia Start Anesthesia Stop Room / Location    12/20/17 1540 0950  [facility]       Procedure Diagnosis Surgeon Provider    COLONOSCOPY to cecum (N/A ) No diagnosis on file. MD Bernard Barrett MD          Anesthesia Type: MAC  Last vitals  BP   125/84 (12/20/17 1539)   Temp   37 °C (98.6 °F) (12/20/17 1539)   Pulse   87 (12/20/17 1539)   Resp   14 (12/20/17 1539)     SpO2   99 % (12/20/17 1539)     Post Anesthesia Care and Evaluation    Patient location during evaluation: PACU  Patient participation: complete - patient participated  Level of consciousness: awake and alert  Pain management: adequate  Airway patency: patent  Anesthetic complications: No anesthetic complications    Cardiovascular status: acceptable  Respiratory status: acceptable  Hydration status: acceptable    Comments: /84 (BP Location: Left arm, Patient Position: Lying)  Pulse 87  Temp 37 °C (98.6 °F) (Oral)   Resp 14  Ht 157.5 cm (62\")  Wt 104 kg (230 lb 2 oz)  SpO2 99%  BMI 42.09 kg/m2      "

## 2017-12-20 NOTE — NURSING NOTE
PATIENT REQUESTS TO HAVE PRE-OPERATIVE ANTIBIOTIC PRIOR TO COLONOSCOPY TODAY. SPOKE WITH DR. LUGO REGARDING PATIENT REQUEST. DR LUGO STATES PATIENT DOES NOT NEED ANTIBIOTIC. DR. LUGO ALSO SPOKE WITH PATIENT AND TOLD PATIENT SHE DID NOT NEED ANTIBIOTIC FOR COLONOSCOPY PROCEDURE TODAY. PATIENT IS OK TO PROCEDE WITH PROCEDURE.

## 2017-12-27 ENCOUNTER — OFFICE VISIT (OUTPATIENT)
Dept: OBSTETRICS AND GYNECOLOGY | Facility: CLINIC | Age: 52
End: 2017-12-27

## 2017-12-27 VITALS
BODY MASS INDEX: 43.06 KG/M2 | DIASTOLIC BLOOD PRESSURE: 96 MMHG | WEIGHT: 234 LBS | HEIGHT: 62 IN | SYSTOLIC BLOOD PRESSURE: 146 MMHG

## 2017-12-27 DIAGNOSIS — Z01.419 ENCOUNTER FOR WELL WOMAN EXAM WITH ROUTINE GYNECOLOGICAL EXAM: ICD-10-CM

## 2017-12-27 DIAGNOSIS — Z78.0 POSTMENOPAUSAL: Primary | ICD-10-CM

## 2017-12-27 PROCEDURE — 99213 OFFICE O/P EST LOW 20 MIN: CPT | Performed by: OBSTETRICS & GYNECOLOGY

## 2017-12-27 RX ORDER — ESTRADIOL 0.07 MG/D
1 FILM, EXTENDED RELEASE TRANSDERMAL 2 TIMES WEEKLY
Qty: 24 PATCH | Refills: 3 | Status: SHIPPED | OUTPATIENT
Start: 2017-12-28 | End: 2018-03-29 | Stop reason: SDUPTHER

## 2017-12-27 NOTE — PROGRESS NOTES
Subjective:    Patient Joselyn Grant is a 52 y.o. female.   Chief Complaint   Patient presents with   • Gynecologic Exam     ae no problems     Here for annual exam patient's hemoglobin and hematocrit is slightly low    HPI      The following portions of the patient's history were reviewed and updated as appropriate: allergies, current medications, past family history, past medical history, past social history, past surgical history and problem list.      Review of Systems   Constitutional: Negative.    HENT: Negative.    Eyes: Negative.    Respiratory: Negative.    Cardiovascular: Negative.    Gastrointestinal: Negative for abdominal distention, abdominal pain, anal bleeding, blood in stool, constipation, diarrhea, nausea, rectal pain and vomiting.   Endocrine: Negative for cold intolerance, heat intolerance, polydipsia, polyphagia and polyuria.   Genitourinary: Negative.  Negative for decreased urine volume, dyspareunia, dysuria, enuresis, flank pain, frequency, genital sores, hematuria, menstrual problem, pelvic pain, urgency, vaginal bleeding, vaginal discharge and vaginal pain.   Musculoskeletal: Negative.    Skin: Negative.    Allergic/Immunologic: Negative.    Neurological: Negative.    Hematological: Negative for adenopathy. Does not bruise/bleed easily.   Psychiatric/Behavioral: Negative for agitation, confusion and sleep disturbance. The patient is not nervous/anxious.          Objective:      Physical Exam   Constitutional: She appears well-developed and well-nourished. She is not intubated.   HENT:   Head: Hair is normal.   Nose: Nose normal.   Mouth/Throat: Oropharynx is clear and moist.   Eyes: Conjunctivae are normal.   Neck: Normal carotid pulses and no JVD present. No tracheal tenderness, no spinous process tenderness and no muscular tenderness present. Carotid bruit is not present. No rigidity. No edema, no erythema and normal range of motion present. No thyroid mass and no thyromegaly present.    Cardiovascular: Normal rate, regular rhythm, S1 normal and normal heart sounds.  Exam reveals no gallop.    No murmur heard.  Pulmonary/Chest: Effort normal. No accessory muscle usage or stridor. No apnea, no tachypnea and no bradypnea. She is not intubated. No respiratory distress. She has no wheezes. She has no rales. She exhibits no tenderness. Right breast exhibits no inverted nipple, no mass, no nipple discharge, no skin change and no tenderness. Left breast exhibits no inverted nipple, no mass, no nipple discharge, no skin change and no tenderness.   Abdominal: Soft. Bowel sounds are normal. She exhibits no distension and no mass. There is no tenderness. There is no rebound and no guarding. No hernia.   Genitourinary: Vagina normal. Rectal exam shows no external hemorrhoid, no internal hemorrhoid, no fissure, no mass, no tenderness and anal tone normal. There is no rash, tenderness, lesion or injury on the right labia. There is no rash, tenderness, lesion or injury on the left labia. No erythema, tenderness or bleeding in the vagina. No foreign body in the vagina. No signs of injury around the vagina. No vaginal discharge found.   Genitourinary Comments: Uterus has been removed   Musculoskeletal: She exhibits no edema or tenderness.        Right shoulder: She exhibits no tenderness, no swelling, no pain and no spasm.   Lymphadenopathy:        Head (right side): No submental, no submandibular, no tonsillar, no preauricular, no posterior auricular and no occipital adenopathy present.        Head (left side): No submental, no submandibular, no tonsillar, no preauricular, no posterior auricular and no occipital adenopathy present.     She has no cervical adenopathy.        Right cervical: No superficial cervical, no deep cervical and no posterior cervical adenopathy present.       Left cervical: No superficial cervical, no deep cervical and no posterior cervical adenopathy present.        Right axillary: No  pectoral and no lateral adenopathy present.        Left axillary: No pectoral and no lateral adenopathy present.       Right: No inguinal, no supraclavicular and no epitrochlear adenopathy present.        Left: No inguinal, no supraclavicular and no epitrochlear adenopathy present.   Neurological: No cranial nerve deficit. Coordination normal.   Skin: Skin is warm and dry. No abrasion, no bruising, no burn, no lesion, no petechiae, no purpura and no rash noted. Rash is not macular, not maculopapular, not nodular and not urticarial. No cyanosis or erythema. No pallor. Nails show no clubbing.   Psychiatric: She has a normal mood and affect. Her behavior is normal.         Assessment and Plan:    Patient has been instructed to perform a self breast exam on a weekly basis, a yearly mammogram, pap smear yearly unless instructed otherwise and bone density every 2 years.  I recommended that the patient not smoke, and discussed smoking cessation when appropriate.     There are no diagnoses linked to this encounter.

## 2017-12-28 ENCOUNTER — TRANSCRIBE ORDERS (OUTPATIENT)
Dept: ADMINISTRATIVE | Facility: HOSPITAL | Age: 52
End: 2017-12-28

## 2017-12-28 DIAGNOSIS — Z78.0 POST-MENOPAUSAL: Primary | ICD-10-CM

## 2017-12-29 LAB
CONV .: NORMAL
CYTOLOGIST CVX/VAG CYTO: NORMAL
CYTOLOGY CVX/VAG DOC THIN PREP: NORMAL
DX ICD CODE: NORMAL
HIV 1 & 2 AB SER-IMP: NORMAL
OTHER STN SPEC: NORMAL
PATH REPORT.FINAL DX SPEC: NORMAL
STAT OF ADQ CVX/VAG CYTO-IMP: NORMAL

## 2018-01-16 ENCOUNTER — HOSPITAL ENCOUNTER (OUTPATIENT)
Dept: BONE DENSITY | Facility: HOSPITAL | Age: 53
Discharge: HOME | End: 2018-01-16
Attending: ORTHOPAEDIC SURGERY

## 2018-02-28 RX ORDER — FUROSEMIDE 40 MG/1
TABLET ORAL
Qty: 90 TABLET | Refills: 0 | Status: SHIPPED | OUTPATIENT
Start: 2018-02-28 | End: 2018-08-03 | Stop reason: SDUPTHER

## 2018-02-28 RX ORDER — ESTRADIOL 0.07 MG/D
FILM, EXTENDED RELEASE TRANSDERMAL
Refills: 0 | OUTPATIENT
Start: 2018-02-28

## 2018-03-29 RX ORDER — ESTRADIOL 0.07 MG/D
FILM, EXTENDED RELEASE TRANSDERMAL
Qty: 8 PATCH | Refills: 6 | Status: SHIPPED | OUTPATIENT
Start: 2018-03-29 | End: 2018-09-19 | Stop reason: SDUPTHER

## 2018-05-24 RX ORDER — FUROSEMIDE 40 MG/1
TABLET ORAL
Qty: 90 TABLET | Refills: 0 | Status: SHIPPED | OUTPATIENT
Start: 2018-05-24 | End: 2018-08-03 | Stop reason: SDUPTHER

## 2018-07-30 ENCOUNTER — TELEPHONE (OUTPATIENT)
Dept: FAMILY MEDICINE CLINIC | Facility: CLINIC | Age: 53
End: 2018-07-30

## 2018-07-30 NOTE — TELEPHONE ENCOUNTER
PATIENT IS IN THE HOSPITAL IN [place] WITH KNEE INFECTION AND HOSPITAL WANTS TO SENT HER HOME WITH IV ANTIBIOTICS. WANTS TO KNOW IF PRIMARY DR WITH FOLLOW UP WITH HER. PATIENT WILL ALSO NEED HOME HEALTH     HOME HEALTH HAS THE PAPERWORK FROM HOSPITAL IN [place]. IF SERINA WOULD LIKE TO HAVE IT JUST CALL SHANE AND SHE WILL FAX OVER

## 2018-07-30 NOTE — TELEPHONE ENCOUNTER
We don't dose IV abx, she will need to get referral to infectious dz or ortho, please see if we can get hospital records and see what they would recommend, we can order home health-who does she prefer? when does she anticipate coming home?

## 2018-07-31 ENCOUNTER — TELEPHONE (OUTPATIENT)
Dept: FAMILY MEDICINE CLINIC | Facility: CLINIC | Age: 53
End: 2018-07-31

## 2018-08-02 ENCOUNTER — TELEPHONE (OUTPATIENT)
Dept: INFECTIOUS DISEASES | Facility: CLINIC | Age: 53
End: 2018-08-02

## 2018-08-02 NOTE — TELEPHONE ENCOUNTER
According to nurse, patient arrived here from [place] and Dr. Oh is her surgeon and they have you listed as her ID physician

## 2018-08-02 NOTE — TELEPHONE ENCOUNTER
"Patients HH Nurse, Lilly phoned the office and stated that the patient is on cefazolin 2 q 8h.  Patient is complaining of itching on her right arm.  Lilly states that there are what appears to be 4 \"bites\" on her R arm, which is where pts picc is located; one around the picc, one on her shoulder and 2 by her elbow.  Lilly does not believe that the patient is having a reaction to abx but wanted to report the spots in case they do get worse.  I informed Lilly that I would make Dr. Braswell aware, but that I would also need to confirm with him that we are monitoring patients care.  "

## 2018-08-02 NOTE — TELEPHONE ENCOUNTER
No, I have no record of seeing her. Am I missing something?  She should be referred to clinic if they want us to follow her.

## 2018-08-02 NOTE — TELEPHONE ENCOUNTER
I spoke w/Carolyn @ Dr. Oh's office and informed her of the situation.  She is going to have Dr. Oh phone our office.

## 2018-08-02 NOTE — TELEPHONE ENCOUNTER
I have spoke w/Dr. Narvaez and Dr. Braswell regarding this patient.  They are unaware of patient and the patient does not have a referral or appointment scheduled with any of our physicians.  Dr. Narvaez has informed me to call [facility] [place] back and make them aware that until we have a referral/appt for the patient or otherwise notified, that our physician will not be monitoring patients IV abx.    I spoke w/Bebe @ [facility] and informed her that until we have received a referral for the patient, that our physicians will not be following the patient.  Bebe voiced understanding.

## 2018-08-03 ENCOUNTER — TELEPHONE (OUTPATIENT)
Dept: FAMILY MEDICINE CLINIC | Facility: CLINIC | Age: 53
End: 2018-08-03

## 2018-08-03 ENCOUNTER — OFFICE VISIT (OUTPATIENT)
Dept: FAMILY MEDICINE CLINIC | Facility: CLINIC | Age: 53
End: 2018-08-03

## 2018-08-03 VITALS
TEMPERATURE: 98 F | SYSTOLIC BLOOD PRESSURE: 142 MMHG | DIASTOLIC BLOOD PRESSURE: 100 MMHG | OXYGEN SATURATION: 97 % | HEART RATE: 87 BPM

## 2018-08-03 DIAGNOSIS — Z96.659 INFECTION OF TOTAL KNEE REPLACEMENT, SUBSEQUENT ENCOUNTER: ICD-10-CM

## 2018-08-03 DIAGNOSIS — T84.59XD INFECTION OF TOTAL KNEE REPLACEMENT, SUBSEQUENT ENCOUNTER: ICD-10-CM

## 2018-08-03 DIAGNOSIS — I10 ESSENTIAL HYPERTENSION: ICD-10-CM

## 2018-08-03 DIAGNOSIS — Z96.652 S/P TOTAL KNEE ARTHROPLASTY, LEFT: Primary | ICD-10-CM

## 2018-08-03 DIAGNOSIS — A41.01 SEPSIS DUE TO METHICILLIN SUSCEPTIBLE STAPHYLOCOCCUS AUREUS (HCC): ICD-10-CM

## 2018-08-03 DIAGNOSIS — E66.01 MORBID OBESITY WITH BMI OF 40.0-44.9, ADULT (HCC): ICD-10-CM

## 2018-08-03 PROCEDURE — 99214 OFFICE O/P EST MOD 30 MIN: CPT | Performed by: NURSE PRACTITIONER

## 2018-08-03 RX ORDER — SENNA AND DOCUSATE SODIUM 50; 8.6 MG/1; MG/1
2 TABLET, FILM COATED ORAL 2 TIMES DAILY
COMMUNITY
End: 2018-09-28

## 2018-08-03 RX ORDER — RIFAMPIN 300 MG/1
300 CAPSULE ORAL
COMMUNITY
Start: 2018-07-31 | End: 2018-09-12

## 2018-08-03 RX ORDER — EPINEPHRINE 0.3 MG/.3ML
INJECTION SUBCUTANEOUS AS NEEDED
COMMUNITY
Start: 2018-07-31 | End: 2020-06-19

## 2018-08-03 RX ORDER — HYDROCODONE BITARTRATE AND ACETAMINOPHEN 10; 325 MG/1; MG/1
1-2 TABLET ORAL
COMMUNITY
Start: 2018-07-31 | End: 2018-08-22

## 2018-08-03 RX ORDER — CELECOXIB 200 MG/1
200 CAPSULE ORAL
COMMUNITY
Start: 2018-07-31 | End: 2018-08-06

## 2018-08-03 RX ORDER — MORPHINE SULFATE 15 MG/1
15 TABLET, FILM COATED, EXTENDED RELEASE ORAL
COMMUNITY
Start: 2018-07-31 | End: 2018-08-08

## 2018-08-03 RX ORDER — TRAMADOL HYDROCHLORIDE 50 MG/1
50 TABLET ORAL
COMMUNITY
Start: 2018-07-31 | End: 2018-08-31

## 2018-08-03 NOTE — PROGRESS NOTES
Subjective   Joselyn Grant is a 52 y.o. female.     History of Present Illness   Here to FU on recent infection and sepsis in metal of L knee replacement  07/10/18-07/24/18  s/p sgy Dr Russo 1 year ago approx, with hx of fatigue and swelling was working in [place] and went to [facility] d/t pain and unable to bear weight, chest pain and fever, with irrigation and extensive excisional debridement of left knee including skin, subcutaneous tissue, muscle, fascia and bone, removal of left total knee arthroplasty and placement of antibiotic spacer, placement of incisional wound VAC measuring 20 cm, now with home health here with  today in wheelchair with cont pain 3/10 but slowly improving, pending apt next week with Dr Russo and awaiting infectious disease apt with PICC line in place and getting cefazolin and rifamipin, taking lovenox injections daily without bruising, was dc on celebrex, norco 10/325 mg, morphine MS contin 15 mg BID and tramadol 50 mg, states plans to get CBC and CMP today or tomorrow from home health  With HTN chronic on amlodipine 10 mg and lasix 40 mg daily but notes not taking daily d/t uanble to go to bathroom freq,q no CP dizziness HA but LE edema, no abd pain SOA wheezing coughing, not checking BP regularly but states has been consistently high c/t pain    The following portions of the patient's history were reviewed and updated as appropriate: allergies, current medications, past family history, past medical history, past social history, past surgical history and problem list.    Review of Systems   Constitutional: Negative for fever.   Respiratory: Negative for cough, chest tightness, shortness of breath and wheezing.    Cardiovascular: Positive for leg swelling. Negative for chest pain and palpitations.   Gastrointestinal: Negative for abdominal distention, abdominal pain, anal bleeding, blood in stool, constipation, diarrhea, nausea, rectal pain and vomiting.    Musculoskeletal: Positive for arthralgias, gait problem, joint swelling and myalgias. Negative for back pain, neck pain and neck stiffness.   Skin: Negative for color change.   Neurological: Negative for dizziness and headaches.   Hematological: Negative for adenopathy. Does not bruise/bleed easily.   All other systems reviewed and are negative.      Objective   Physical Exam   Constitutional: She is oriented to person, place, and time. She appears well-developed and well-nourished.   HENT:   Head: Normocephalic and atraumatic.   Eyes: Pupils are equal, round, and reactive to light. Conjunctivae and EOM are normal.   Cardiovascular: Normal rate, regular rhythm and normal heart sounds.    Pulmonary/Chest: Effort normal and breath sounds normal.   Abdominal: Soft. She exhibits no distension and no mass. There is no tenderness. There is no rebound and no guarding. No hernia.   Musculoskeletal: Normal range of motion. She exhibits edema (B LE) and tenderness (L knee wrapped compression bandage in wheelchair with stiff ROM).   Neurological: She is alert and oriented to person, place, and time.   Skin: Skin is warm and dry.   PICC line R UE   Psychiatric: She has a normal mood and affect. Her behavior is normal. Judgment and thought content normal.   Vitals reviewed.      Assessment/Plan   Joselyn was seen today for follow-up.    Diagnoses and all orders for this visit:    S/P total knee arthroplasty, left  -     Ambulatory Referral to Infectious Disease    Infection of total knee replacement, subsequent encounter  -     Ambulatory Referral to Infectious Disease    Sepsis due to methicillin susceptible Staphylococcus aureus (CMS/[facility])  -     Ambulatory Referral to Infectious Disease    Essential hypertension    Morbid obesity with BMI of 40.0-44.9, adult (CMS/[facility])    reviewed hospital records, imaging and labs, called [facility] and altered needs urgent consult and placed referral in chart, will cont FU with   Rafaela next week and home healthy today, cont amlodipine 10 mg and restart lasix 20 mg daily and check BP at home and monitor swelling, call if remains uncontrolled

## 2018-08-03 NOTE — TELEPHONE ENCOUNTER
Please call pt and let her know I spoke with [facility] infectious disease clinic and put urgent referral in and they should be contacting her today or early next week to set apt. Please sent lab results to Dr Russo to review and if you send here we can scan in chart for infectious disease to look at when you see them. Please monitor BP at home and call with log in 1 wk

## 2018-08-06 ENCOUNTER — LAB REQUISITION (OUTPATIENT)
Dept: LAB | Facility: HOSPITAL | Age: 53
End: 2018-08-06

## 2018-08-06 DIAGNOSIS — Z00.00 ENCOUNTER FOR GENERAL ADULT MEDICAL EXAMINATION WITHOUT ABNORMAL FINDINGS: ICD-10-CM

## 2018-08-06 LAB
ALBUMIN SERPL-MCNC: 3.5 G/DL (ref 3.5–5.2)
ALBUMIN/GLOB SERPL: 0.9 G/DL
ALP SERPL-CCNC: 171 U/L (ref 39–117)
ALT SERPL W P-5'-P-CCNC: 12 U/L (ref 1–33)
ANION GAP SERPL CALCULATED.3IONS-SCNC: 13.8 MMOL/L
AST SERPL-CCNC: 28 U/L (ref 1–32)
BASOPHILS # BLD AUTO: 0.05 10*3/MM3 (ref 0–0.2)
BASOPHILS NFR BLD AUTO: 0.7 % (ref 0–1.5)
BILIRUB SERPL-MCNC: 0.2 MG/DL (ref 0.1–1.2)
BUN BLD-MCNC: 7 MG/DL (ref 6–20)
BUN/CREAT SERPL: 10.1 (ref 7–25)
CALCIUM SPEC-SCNC: 10 MG/DL (ref 8.6–10.5)
CHLORIDE SERPL-SCNC: 98 MMOL/L (ref 98–107)
CO2 SERPL-SCNC: 27.2 MMOL/L (ref 22–29)
CREAT BLD-MCNC: 0.69 MG/DL (ref 0.57–1)
DEPRECATED RDW RBC AUTO: 51.7 FL (ref 37–54)
EOSINOPHIL # BLD AUTO: 0.22 10*3/MM3 (ref 0–0.7)
EOSINOPHIL NFR BLD AUTO: 3 % (ref 0.3–6.2)
ERYTHROCYTE [DISTWIDTH] IN BLOOD BY AUTOMATED COUNT: 15.2 % (ref 11.7–13)
GFR SERPL CREATININE-BSD FRML MDRD: 109 ML/MIN/1.73
GLOBULIN UR ELPH-MCNC: 4.1 GM/DL
GLUCOSE BLD-MCNC: 67 MG/DL (ref 65–99)
HCT VFR BLD AUTO: 31.2 % (ref 35.6–45.5)
HGB BLD-MCNC: 9.8 G/DL (ref 11.9–15.5)
IMM GRANULOCYTES # BLD: 0.07 10*3/MM3 (ref 0–0.03)
IMM GRANULOCYTES NFR BLD: 0.9 % (ref 0–0.5)
LYMPHOCYTES # BLD AUTO: 1.43 10*3/MM3 (ref 0.9–4.8)
LYMPHOCYTES NFR BLD AUTO: 19.4 % (ref 19.6–45.3)
MCH RBC QN AUTO: 29.2 PG (ref 26.9–32)
MCHC RBC AUTO-ENTMCNC: 31.4 G/DL (ref 32.4–36.3)
MCV RBC AUTO: 92.9 FL (ref 80.5–98.2)
MONOCYTES # BLD AUTO: 0.61 10*3/MM3 (ref 0.2–1.2)
MONOCYTES NFR BLD AUTO: 8.3 % (ref 5–12)
NEUTROPHILS # BLD AUTO: 5.08 10*3/MM3 (ref 1.9–8.1)
NEUTROPHILS NFR BLD AUTO: 68.6 % (ref 42.7–76)
PLATELET # BLD AUTO: 790 10*3/MM3 (ref 140–500)
PMV BLD AUTO: 9.2 FL (ref 6–12)
POTASSIUM BLD-SCNC: 4.3 MMOL/L (ref 3.5–5.2)
PROT SERPL-MCNC: 7.6 G/DL (ref 6–8.5)
RBC # BLD AUTO: 3.36 10*6/MM3 (ref 3.9–5.2)
SODIUM BLD-SCNC: 139 MMOL/L (ref 136–145)
WBC NRBC COR # BLD: 7.39 10*3/MM3 (ref 4.5–10.7)

## 2018-08-06 PROCEDURE — 80053 COMPREHEN METABOLIC PANEL: CPT | Performed by: INTERNAL MEDICINE

## 2018-08-06 PROCEDURE — 85025 COMPLETE CBC W/AUTO DIFF WBC: CPT | Performed by: INTERNAL MEDICINE

## 2018-08-07 ENCOUNTER — TELEPHONE (OUTPATIENT)
Dept: FAMILY MEDICINE CLINIC | Facility: CLINIC | Age: 53
End: 2018-08-07

## 2018-08-07 ENCOUNTER — TELEPHONE (OUTPATIENT)
Dept: INFECTIOUS DISEASES | Facility: CLINIC | Age: 53
End: 2018-08-07

## 2018-08-07 NOTE — TELEPHONE ENCOUNTER
Will try to get urgent apt with Dr Ravi tomorrow, called today and left voicemail office is closed, discussed case with  about [facility] accessibility

## 2018-08-07 NOTE — TELEPHONE ENCOUNTER
Called patient at home 08/08/18 12:50 reviewed labs with her hemoglobin improving when compared 07/31/18 but platelets now doubled from 305 to 790, explained that I want her to be seen earlier than 08/29/18, patient also concerned about late appointment date and wants to be seen earlier. Sees Dr Russo tomorrow. Called Dr Ravi's office closed today and left voicemail will call again tomorrow to get urgent appointment. Addressed issue with  about Jew patient accessiblity.    Called Dr Ravi's office ID out of [place] until 08/20/18. Recommended call Dr Andrew Harvey, [phone] and will see her today 1 PM, will fax over records for him to review

## 2018-08-07 NOTE — TELEPHONE ENCOUNTER
After speaking to Dr Johns and Radha Nguyen, NP regarding pt's picc line and home health care I called to [facility] in [place] and spoke to Ez. Informed him that this patient was treated there and then came home and fell into our laps so to speak and just needed to know who/how this patient was being followed by their group there. He said he would get with Dr Florinda Dong and her nurse and would call us back. Nkechi

## 2018-08-07 NOTE — TELEPHONE ENCOUNTER
Radha,   After speaking with Dr Johns he stated that in the patient's medical documentation from [facility], the ID group said they would follow her until she established care with an ID group here at home. This is a complete lack of planning on their part.With the schedule the next couple of weeks, Dr Johns's first available is the 29th. We can add her to the wait list in the event of a cancellation. If you want to speak to [facility] their number is [phone]. If we need to cancel her appt b/c you have found another ID group please let us know. Sorry I couldn't be of more help. Thanks. Nkechi

## 2018-08-07 NOTE — TELEPHONE ENCOUNTER
Thanks for calling ID in [place]. It is not common for us to dose IV abx and she needs to be seen urgently by ID, which is why I called last week on Friday. I have reviewed labs and they are abnormal. I will have to see if someone else can see her if your office can't see her urgently. I understand your office is very busy but I don't want the patient to fall through the cracks. Is there an  who I could speak with regarding this case?

## 2018-08-13 ENCOUNTER — LAB REQUISITION (OUTPATIENT)
Dept: LAB | Facility: HOSPITAL | Age: 53
End: 2018-08-13

## 2018-08-13 DIAGNOSIS — I10 ESSENTIAL (PRIMARY) HYPERTENSION: ICD-10-CM

## 2018-08-13 DIAGNOSIS — Z79.2 LONG TERM CURRENT USE OF ANTIBIOTICS: ICD-10-CM

## 2018-08-13 DIAGNOSIS — Z45.2 ENCOUNTER FOR ADJUSTMENT OR MANAGEMENT OF VASCULAR ACCESS DEVICE: ICD-10-CM

## 2018-08-13 LAB
ALBUMIN SERPL-MCNC: 3.5 G/DL (ref 3.5–5.2)
ALBUMIN/GLOB SERPL: 0.8 G/DL
ALP SERPL-CCNC: 167 U/L (ref 39–117)
ALT SERPL W P-5'-P-CCNC: 6 U/L (ref 1–33)
ANION GAP SERPL CALCULATED.3IONS-SCNC: 14.4 MMOL/L
AST SERPL-CCNC: 19 U/L (ref 1–32)
BASOPHILS # BLD AUTO: 0.04 10*3/MM3 (ref 0–0.2)
BASOPHILS NFR BLD AUTO: 1 % (ref 0–1.5)
BILIRUB SERPL-MCNC: 0.2 MG/DL (ref 0.1–1.2)
BUN BLD-MCNC: 15 MG/DL (ref 6–20)
BUN/CREAT SERPL: 19.2 (ref 7–25)
CALCIUM SPEC-SCNC: 9.6 MG/DL (ref 8.6–10.5)
CHLORIDE SERPL-SCNC: 100 MMOL/L (ref 98–107)
CO2 SERPL-SCNC: 24.6 MMOL/L (ref 22–29)
CREAT BLD-MCNC: 0.78 MG/DL (ref 0.57–1)
CRP SERPL-MCNC: 0.68 MG/DL (ref 0–0.5)
DEPRECATED RDW RBC AUTO: 49.9 FL (ref 37–54)
EOSINOPHIL # BLD AUTO: 0.19 10*3/MM3 (ref 0–0.7)
EOSINOPHIL NFR BLD AUTO: 4.5 % (ref 0.3–6.2)
ERYTHROCYTE [DISTWIDTH] IN BLOOD BY AUTOMATED COUNT: 14.7 % (ref 11.7–13)
GFR SERPL CREATININE-BSD FRML MDRD: 94 ML/MIN/1.73
GLOBULIN UR ELPH-MCNC: 4.2 GM/DL
GLUCOSE BLD-MCNC: 132 MG/DL (ref 65–99)
HCT VFR BLD AUTO: 32.1 % (ref 35.6–45.5)
HGB BLD-MCNC: 9.9 G/DL (ref 11.9–15.5)
IMM GRANULOCYTES # BLD: 0.01 10*3/MM3 (ref 0–0.03)
IMM GRANULOCYTES NFR BLD: 0.2 % (ref 0–0.5)
LYMPHOCYTES # BLD AUTO: 1.17 10*3/MM3 (ref 0.9–4.8)
LYMPHOCYTES NFR BLD AUTO: 27.8 % (ref 19.6–45.3)
MCH RBC QN AUTO: 28.8 PG (ref 26.9–32)
MCHC RBC AUTO-ENTMCNC: 30.8 G/DL (ref 32.4–36.3)
MCV RBC AUTO: 93.3 FL (ref 80.5–98.2)
MONOCYTES # BLD AUTO: 0.41 10*3/MM3 (ref 0.2–1.2)
MONOCYTES NFR BLD AUTO: 9.7 % (ref 5–12)
NEUTROPHILS # BLD AUTO: 2.4 10*3/MM3 (ref 1.9–8.1)
NEUTROPHILS NFR BLD AUTO: 57 % (ref 42.7–76)
PLATELET # BLD AUTO: 617 10*3/MM3 (ref 140–500)
PMV BLD AUTO: 9.5 FL (ref 6–12)
POTASSIUM BLD-SCNC: 3.6 MMOL/L (ref 3.5–5.2)
PROT SERPL-MCNC: 7.7 G/DL (ref 6–8.5)
RBC # BLD AUTO: 3.44 10*6/MM3 (ref 3.9–5.2)
SODIUM BLD-SCNC: 139 MMOL/L (ref 136–145)
WBC NRBC COR # BLD: 4.21 10*3/MM3 (ref 4.5–10.7)

## 2018-08-13 PROCEDURE — 85025 COMPLETE CBC W/AUTO DIFF WBC: CPT | Performed by: ORTHOPAEDIC SURGERY

## 2018-08-13 PROCEDURE — 86140 C-REACTIVE PROTEIN: CPT | Performed by: ORTHOPAEDIC SURGERY

## 2018-08-13 PROCEDURE — 80053 COMPREHEN METABOLIC PANEL: CPT | Performed by: ORTHOPAEDIC SURGERY

## 2018-08-15 ENCOUNTER — TELEPHONE (OUTPATIENT)
Dept: FAMILY MEDICINE CLINIC | Facility: CLINIC | Age: 53
End: 2018-08-15

## 2018-08-15 RX ORDER — METOPROLOL SUCCINATE 25 MG/1
25 TABLET, EXTENDED RELEASE ORAL DAILY
Qty: 30 TABLET | Refills: 1 | Status: SHIPPED | OUTPATIENT
Start: 2018-08-15 | End: 2018-08-29 | Stop reason: SDUPTHER

## 2018-08-15 NOTE — TELEPHONE ENCOUNTER
Called patient 11 AM, reviewed Dr Harvey's note and recent labs this week, states she called Dr Harvey for BP elevation and states not common SE. Pain 4/10 and more sedentary  States BP running elevated with HA, on amlodipine 10 mg and lasix 40 mg, denies CP swelling and dizziness. /104, HR 84, will add metoprolol XL 25 mg daily and call with BP log in few days

## 2018-08-15 NOTE — TELEPHONE ENCOUNTER
PT IS CALLING TO LET SERINA KNOW WHAT HER B/P HAS BEEN RUNNING;    8-12.....154/98    8-13.....135/85    8-14.....164/113 (STATES IT RAN LIKE THIS ALL DAY)    8-15.....150/105    OTHER B/P READINGS SHE GAVE ME, BUT WITH NO SPECIFIC DATE OR TIME;    160/90  170/117  150/110

## 2018-08-20 ENCOUNTER — LAB REQUISITION (OUTPATIENT)
Dept: LAB | Facility: HOSPITAL | Age: 53
End: 2018-08-20

## 2018-08-20 ENCOUNTER — TELEPHONE (OUTPATIENT)
Dept: FAMILY MEDICINE CLINIC | Facility: CLINIC | Age: 53
End: 2018-08-20

## 2018-08-20 DIAGNOSIS — Z00.00 ENCOUNTER FOR GENERAL ADULT MEDICAL EXAMINATION WITHOUT ABNORMAL FINDINGS: ICD-10-CM

## 2018-08-20 LAB
ALBUMIN SERPL-MCNC: 3.7 G/DL (ref 3.5–5.2)
ALBUMIN/GLOB SERPL: 0.9 G/DL
ALP SERPL-CCNC: 145 U/L (ref 39–117)
ALT SERPL W P-5'-P-CCNC: 9 U/L (ref 1–33)
ANION GAP SERPL CALCULATED.3IONS-SCNC: 10.5 MMOL/L
AST SERPL-CCNC: 18 U/L (ref 1–32)
BASOPHILS # BLD AUTO: 0.04 10*3/MM3 (ref 0–0.2)
BASOPHILS NFR BLD AUTO: 1 % (ref 0–1.5)
BILIRUB SERPL-MCNC: 0.2 MG/DL (ref 0.1–1.2)
BUN BLD-MCNC: 13 MG/DL (ref 6–20)
BUN/CREAT SERPL: 19.1 (ref 7–25)
CALCIUM SPEC-SCNC: 9.8 MG/DL (ref 8.6–10.5)
CHLORIDE SERPL-SCNC: 100 MMOL/L (ref 98–107)
CO2 SERPL-SCNC: 27.5 MMOL/L (ref 22–29)
CREAT BLD-MCNC: 0.68 MG/DL (ref 0.57–1)
CRP SERPL-MCNC: 0.71 MG/DL (ref 0–0.5)
DEPRECATED RDW RBC AUTO: 47.9 FL (ref 37–54)
EOSINOPHIL # BLD AUTO: 0.27 10*3/MM3 (ref 0–0.7)
EOSINOPHIL NFR BLD AUTO: 7 % (ref 0.3–6.2)
ERYTHROCYTE [DISTWIDTH] IN BLOOD BY AUTOMATED COUNT: 14.2 % (ref 11.7–13)
GFR SERPL CREATININE-BSD FRML MDRD: 111 ML/MIN/1.73
GLOBULIN UR ELPH-MCNC: 4.1 GM/DL
GLUCOSE BLD-MCNC: 86 MG/DL (ref 65–99)
HCT VFR BLD AUTO: 33.4 % (ref 35.6–45.5)
HGB BLD-MCNC: 10.7 G/DL (ref 11.9–15.5)
IMM GRANULOCYTES # BLD: 0 10*3/MM3 (ref 0–0.03)
IMM GRANULOCYTES NFR BLD: 0 % (ref 0–0.5)
LYMPHOCYTES # BLD AUTO: 1.49 10*3/MM3 (ref 0.9–4.8)
LYMPHOCYTES NFR BLD AUTO: 38.9 % (ref 19.6–45.3)
MCH RBC QN AUTO: 29.4 PG (ref 26.9–32)
MCHC RBC AUTO-ENTMCNC: 32 G/DL (ref 32.4–36.3)
MCV RBC AUTO: 91.8 FL (ref 80.5–98.2)
MONOCYTES # BLD AUTO: 0.45 10*3/MM3 (ref 0.2–1.2)
MONOCYTES NFR BLD AUTO: 11.7 % (ref 5–12)
NEUTROPHILS # BLD AUTO: 1.58 10*3/MM3 (ref 1.9–8.1)
NEUTROPHILS NFR BLD AUTO: 41.4 % (ref 42.7–76)
PLATELET # BLD AUTO: 296 10*3/MM3 (ref 140–500)
PMV BLD AUTO: 10 FL (ref 6–12)
POTASSIUM BLD-SCNC: 4 MMOL/L (ref 3.5–5.2)
PROT SERPL-MCNC: 7.8 G/DL (ref 6–8.5)
RBC # BLD AUTO: 3.64 10*6/MM3 (ref 3.9–5.2)
SODIUM BLD-SCNC: 138 MMOL/L (ref 136–145)
WBC NRBC COR # BLD: 3.83 10*3/MM3 (ref 4.5–10.7)

## 2018-08-20 PROCEDURE — 86140 C-REACTIVE PROTEIN: CPT | Performed by: INTERNAL MEDICINE

## 2018-08-20 PROCEDURE — 85025 COMPLETE CBC W/AUTO DIFF WBC: CPT | Performed by: INTERNAL MEDICINE

## 2018-08-20 PROCEDURE — 80053 COMPREHEN METABOLIC PANEL: CPT | Performed by: INTERNAL MEDICINE

## 2018-08-20 NOTE — TELEPHONE ENCOUNTER
BP IS STILL RUNNING HIGH 140/100 TODAY. PLEASE ADVISE, SERINA WAS THE ONE THAT CHANGED THE MEDICINE ON 8-15-18

## 2018-08-27 ENCOUNTER — LAB REQUISITION (OUTPATIENT)
Dept: LAB | Facility: HOSPITAL | Age: 53
End: 2018-08-27

## 2018-08-27 DIAGNOSIS — Z00.00 ENCOUNTER FOR GENERAL ADULT MEDICAL EXAMINATION WITHOUT ABNORMAL FINDINGS: ICD-10-CM

## 2018-08-27 PROCEDURE — 86140 C-REACTIVE PROTEIN: CPT | Performed by: INTERNAL MEDICINE

## 2018-08-27 PROCEDURE — 85025 COMPLETE CBC W/AUTO DIFF WBC: CPT | Performed by: INTERNAL MEDICINE

## 2018-08-27 PROCEDURE — 80053 COMPREHEN METABOLIC PANEL: CPT | Performed by: INTERNAL MEDICINE

## 2018-08-28 ENCOUNTER — TELEPHONE (OUTPATIENT)
Dept: FAMILY MEDICINE CLINIC | Facility: CLINIC | Age: 53
End: 2018-08-28

## 2018-08-28 DIAGNOSIS — R74.8 ELEVATED ALKALINE PHOSPHATASE LEVEL: Primary | ICD-10-CM

## 2018-08-28 DIAGNOSIS — D50.9 IRON DEFICIENCY ANEMIA, UNSPECIFIED IRON DEFICIENCY ANEMIA TYPE: Primary | ICD-10-CM

## 2018-08-28 NOTE — TELEPHONE ENCOUNTER
PT STATES SHE IS ON IV ANTIBIOTICS FOR 1 MONTH, AND THEN OFF FOR 1 MONTH. BUT SHE SAYS THAT SHE IS WORRIED HER INFECTIOUS DISEASE DOCTOR DOES NOT FULLY UNDERSTAND HER CASE, SO SHE IS ASKING IF SERINA COULD GIVE HER A CALL AND EXPLAIN SOME THINGS TO HER FROM HER CHART

## 2018-08-28 NOTE — TELEPHONE ENCOUNTER
Called patient 08/28/18 3 PM regarding IV abx, concern when she needs to stop antibiotic, when left [place] was supposed to be on IV abx until 08/27/18 and now out of antibiotic IV and PO, has requested refill for antibiotics. Dr Harvey recommended continue medication until second knee surgery. Dr Russo tentatively have knee sgy once cleared by ID for infection cleared, pending FU with Dr Russo 09/05/18. /90s thinks attributes to stress.  Called Dr Harvey 08/29/18 12 extended antibiotic until after 2 days after surgery, plans to see or speak with patient after she sees Dr Russo. Called patient again 12:05 08/29/18 verbalized understanding and will FU with Dr Harvey after consult Dr Russo. Enc check BP at home and cont BP meds

## 2018-08-28 NOTE — TELEPHONE ENCOUNTER
Please get updated records from Dr Harvey, she has to see ID because we can't dose IV meds, looks like her labs are improving when last checked, did ID review or do we need to send them? Please see if there are specific questions and I can try to call her after clinic but I am not ID specialist so recommend she call them before I call her today to see if they can answer her questions

## 2018-08-28 NOTE — TELEPHONE ENCOUNTER
The last time patient seen Dr Harvey was 8/8/18. She spoke with Dr Harvey this am. She was told she was suppose to take a break after being on antibiotics for a month to see if all the infection is gone. He told her to stay on antibiotics until 2 days after surgery. He didn't say that at first. She doesn't like the fact he didn't remember who she was and the plan changed from the last time she talked to him. She wants someone to take control because this is her health she is talking about. Patient would REALLY appreciate it if you gave her a call today

## 2018-08-29 RX ORDER — METOPROLOL SUCCINATE 25 MG/1
25 TABLET, EXTENDED RELEASE ORAL 2 TIMES DAILY
Qty: 60 TABLET | Refills: 1 | Status: SHIPPED | OUTPATIENT
Start: 2018-08-29 | End: 2018-09-19 | Stop reason: SDUPTHER

## 2018-09-03 ENCOUNTER — LAB REQUISITION (OUTPATIENT)
Dept: LAB | Facility: HOSPITAL | Age: 53
End: 2018-09-03

## 2018-09-03 DIAGNOSIS — Z00.00 ENCOUNTER FOR GENERAL ADULT MEDICAL EXAMINATION WITHOUT ABNORMAL FINDINGS: ICD-10-CM

## 2018-09-03 LAB
ALBUMIN SERPL-MCNC: 3.8 G/DL (ref 3.5–5.2)
ALBUMIN/GLOB SERPL: 1 G/DL
ALP SERPL-CCNC: 131 U/L (ref 39–117)
ALT SERPL W P-5'-P-CCNC: 10 U/L (ref 1–33)
ANION GAP SERPL CALCULATED.3IONS-SCNC: 11.4 MMOL/L
AST SERPL-CCNC: 16 U/L (ref 1–32)
BASOPHILS # BLD AUTO: 0.07 10*3/MM3 (ref 0–0.2)
BASOPHILS NFR BLD AUTO: 1.8 % (ref 0–1.5)
BILIRUB SERPL-MCNC: <0.2 MG/DL (ref 0.1–1.2)
BUN BLD-MCNC: 13 MG/DL (ref 6–20)
BUN/CREAT SERPL: 20.3 (ref 7–25)
CALCIUM SPEC-SCNC: 9.6 MG/DL (ref 8.6–10.5)
CHLORIDE SERPL-SCNC: 102 MMOL/L (ref 98–107)
CO2 SERPL-SCNC: 25.6 MMOL/L (ref 22–29)
CREAT BLD-MCNC: 0.64 MG/DL (ref 0.57–1)
CRP SERPL-MCNC: 0.42 MG/DL (ref 0–0.5)
DEPRECATED RDW RBC AUTO: 47.1 FL (ref 37–54)
EOSINOPHIL # BLD AUTO: 0.22 10*3/MM3 (ref 0–0.7)
EOSINOPHIL NFR BLD AUTO: 5.7 % (ref 0.3–6.2)
ERYTHROCYTE [DISTWIDTH] IN BLOOD BY AUTOMATED COUNT: 14.1 % (ref 11.7–13)
ERYTHROCYTE [SEDIMENTATION RATE] IN BLOOD: 42 MM/HR (ref 0–30)
GFR SERPL CREATININE-BSD FRML MDRD: 119 ML/MIN/1.73
GLOBULIN UR ELPH-MCNC: 3.8 GM/DL
GLUCOSE BLD-MCNC: 95 MG/DL (ref 65–99)
HCT VFR BLD AUTO: 34 % (ref 35.6–45.5)
HGB BLD-MCNC: 11 G/DL (ref 11.9–15.5)
IMM GRANULOCYTES # BLD: 0 10*3/MM3 (ref 0–0.03)
IMM GRANULOCYTES NFR BLD: 0 % (ref 0–0.5)
LYMPHOCYTES # BLD AUTO: 1.34 10*3/MM3 (ref 0.9–4.8)
LYMPHOCYTES NFR BLD AUTO: 34.6 % (ref 19.6–45.3)
MCH RBC QN AUTO: 29.2 PG (ref 26.9–32)
MCHC RBC AUTO-ENTMCNC: 32.4 G/DL (ref 32.4–36.3)
MCV RBC AUTO: 90.2 FL (ref 80.5–98.2)
MONOCYTES # BLD AUTO: 0.43 10*3/MM3 (ref 0.2–1.2)
MONOCYTES NFR BLD AUTO: 11.1 % (ref 5–12)
NEUTROPHILS # BLD AUTO: 1.81 10*3/MM3 (ref 1.9–8.1)
NEUTROPHILS NFR BLD AUTO: 46.8 % (ref 42.7–76)
PLATELET # BLD AUTO: 254 10*3/MM3 (ref 140–500)
PMV BLD AUTO: 10.4 FL (ref 6–12)
POTASSIUM BLD-SCNC: 3.8 MMOL/L (ref 3.5–5.2)
PROT SERPL-MCNC: 7.6 G/DL (ref 6–8.5)
RBC # BLD AUTO: 3.77 10*6/MM3 (ref 3.9–5.2)
SODIUM BLD-SCNC: 139 MMOL/L (ref 136–145)
WBC NRBC COR # BLD: 3.87 10*3/MM3 (ref 4.5–10.7)

## 2018-09-03 PROCEDURE — 86140 C-REACTIVE PROTEIN: CPT | Performed by: INTERNAL MEDICINE

## 2018-09-03 PROCEDURE — 85025 COMPLETE CBC W/AUTO DIFF WBC: CPT | Performed by: INTERNAL MEDICINE

## 2018-09-03 PROCEDURE — 85652 RBC SED RATE AUTOMATED: CPT | Performed by: INTERNAL MEDICINE

## 2018-09-03 PROCEDURE — 80053 COMPREHEN METABOLIC PANEL: CPT | Performed by: INTERNAL MEDICINE

## 2018-09-10 ENCOUNTER — LAB REQUISITION (OUTPATIENT)
Dept: LAB | Facility: HOSPITAL | Age: 53
End: 2018-09-10

## 2018-09-10 DIAGNOSIS — Z00.00 ENCOUNTER FOR GENERAL ADULT MEDICAL EXAMINATION WITHOUT ABNORMAL FINDINGS: ICD-10-CM

## 2018-09-10 LAB
CRP SERPL-MCNC: 0.68 MG/DL (ref 0–0.5)
DEPRECATED RDW RBC AUTO: 47.4 FL (ref 37–54)
ERYTHROCYTE [DISTWIDTH] IN BLOOD BY AUTOMATED COUNT: 14.1 % (ref 11.7–13)
HCT VFR BLD AUTO: 37.3 % (ref 35.6–45.5)
HGB BLD-MCNC: 11.4 G/DL (ref 11.9–15.5)
MCH RBC QN AUTO: 28.1 PG (ref 26.9–32)
MCHC RBC AUTO-ENTMCNC: 30.6 G/DL (ref 32.4–36.3)
MCV RBC AUTO: 91.9 FL (ref 80.5–98.2)
PLATELET # BLD AUTO: 325 10*3/MM3 (ref 140–500)
PMV BLD AUTO: 10.3 FL (ref 6–12)
RBC # BLD AUTO: 4.06 10*6/MM3 (ref 3.9–5.2)
WBC NRBC COR # BLD: 4.39 10*3/MM3 (ref 4.5–10.7)

## 2018-09-10 PROCEDURE — 86140 C-REACTIVE PROTEIN: CPT | Performed by: INTERNAL MEDICINE

## 2018-09-10 PROCEDURE — 85027 COMPLETE CBC AUTOMATED: CPT | Performed by: INTERNAL MEDICINE

## 2018-09-19 RX ORDER — ESTRADIOL 0.07 MG/D
1 FILM, EXTENDED RELEASE TRANSDERMAL 2 TIMES WEEKLY
Qty: 8 PATCH | Refills: 9 | Status: SHIPPED | OUTPATIENT
Start: 2018-09-20 | End: 2018-09-28

## 2018-09-19 RX ORDER — METOPROLOL SUCCINATE 25 MG/1
25 TABLET, EXTENDED RELEASE ORAL 2 TIMES DAILY
Qty: 60 TABLET | Refills: 9 | Status: SHIPPED | OUTPATIENT
Start: 2018-09-19 | End: 2018-09-28

## 2018-09-26 ENCOUNTER — OFFICE VISIT (OUTPATIENT)
Dept: FAMILY MEDICINE CLINIC | Facility: CLINIC | Age: 53
End: 2018-09-26

## 2018-09-26 VITALS
HEART RATE: 77 BPM | DIASTOLIC BLOOD PRESSURE: 78 MMHG | HEIGHT: 62 IN | SYSTOLIC BLOOD PRESSURE: 130 MMHG | BODY MASS INDEX: 42.51 KG/M2 | TEMPERATURE: 98.5 F | OXYGEN SATURATION: 97 % | WEIGHT: 231 LBS

## 2018-09-26 DIAGNOSIS — I10 ESSENTIAL HYPERTENSION: ICD-10-CM

## 2018-09-26 DIAGNOSIS — Z96.659 INFECTION OF TOTAL KNEE REPLACEMENT, SUBSEQUENT ENCOUNTER: ICD-10-CM

## 2018-09-26 DIAGNOSIS — Z96.652 S/P TOTAL KNEE ARTHROPLASTY, LEFT: Primary | ICD-10-CM

## 2018-09-26 DIAGNOSIS — E66.01 MORBID OBESITY WITH BMI OF 40.0-44.9, ADULT (HCC): ICD-10-CM

## 2018-09-26 DIAGNOSIS — T84.59XD INFECTION OF TOTAL KNEE REPLACEMENT, SUBSEQUENT ENCOUNTER: ICD-10-CM

## 2018-09-26 DIAGNOSIS — A41.02 SEPSIS DUE TO METHICILLIN RESISTANT STAPHYLOCOCCUS AUREUS (HCC): ICD-10-CM

## 2018-09-26 PROCEDURE — 99214 OFFICE O/P EST MOD 30 MIN: CPT | Performed by: NURSE PRACTITIONER

## 2018-09-26 RX ORDER — HYDROCODONE BITARTRATE AND ACETAMINOPHEN 10; 325 MG/1; MG/1
TABLET ORAL
Refills: 0 | COMMUNITY
Start: 2018-09-05 | End: 2018-09-28

## 2018-09-26 RX ORDER — RIFAMPIN 300 MG/1
300 CAPSULE ORAL EVERY 12 HOURS
COMMUNITY
End: 2018-10-06 | Stop reason: HOSPADM

## 2018-09-26 RX ORDER — CELECOXIB 200 MG/1
200 CAPSULE ORAL DAILY
COMMUNITY
End: 2020-06-19

## 2018-09-26 RX ORDER — IBUPROFEN 200 MG
200 TABLET ORAL EVERY 6 HOURS PRN
COMMUNITY
End: 2018-10-06 | Stop reason: HOSPADM

## 2018-09-26 NOTE — PATIENT INSTRUCTIONS
awaiting pre-op testing later this week to review and will write clearance letter prn, cont check BP at home and cont all chronic dz meds

## 2018-09-26 NOTE — PROGRESS NOTES
Subjective   Joselyn Grant is a 53 y.o. female.     History of Present Illness   Here for pre-op clearance with labs, EKG and CXR scheduled Friday and pending sgy 10/04/18 Dr Russo to remove spacer R knee, s/p infection and sepsis in metal of L knee replacement, with hospitalization [facility] 07/10/18-07/24/18 with irrigation and extensive excisional debridement of left knee including skin, subcutaneous tissue, muscle, fascia and bone, removal of left total knee arthroplasty and placement of antibiotic spacer, placement of incisional wound VAC measuring 20 cm, still taking celebrex 200 mg pain 0/10 today, norco 10/325 mg prn, Still seeing Dr Harvey infectious disease stopped IV ancef approx 2 wks ago and now on rifampin 300 mg BID with pending updated labs this week, taking MVI and iron supplement as well, walking with walker here with   With chronic HTN on metoprolol XL 25 mg 2 PO daily, amlodipine 10 mg, on lasix 40 mg every other day d/t availablity of bathrooms, on potassium supplement and melatonin as well, no CP dizziness HA palpitations and checking BP at home, weight loss 3 lbs since 12/17    The following portions of the patient's history were reviewed and updated as appropriate: allergies, current medications, past family history, past medical history, past social history, past surgical history and problem list.    Review of Systems   Constitutional: Negative for fever.   Respiratory: Negative for cough, shortness of breath and wheezing.    Cardiovascular: Positive for leg swelling. Negative for chest pain and palpitations.   Musculoskeletal: Positive for arthralgias, gait problem and joint swelling. Negative for back pain, myalgias, neck pain and neck stiffness.   Neurological: Negative for dizziness and headaches.   All other systems reviewed and are negative.      Objective   Physical Exam   Constitutional: She is oriented to person, place, and time. She appears well-developed and  well-nourished.   HENT:   Head: Normocephalic and atraumatic.   Eyes: Pupils are equal, round, and reactive to light. Conjunctivae and EOM are normal.   Cardiovascular: Normal rate, regular rhythm and normal heart sounds.    Pulmonary/Chest: Effort normal and breath sounds normal.   Musculoskeletal: Normal range of motion. She exhibits tenderness (L knee stiff ROM wearing brace using walker with L sided edema, well approximated central incision no dc or bleeding).   Neurological: She is alert and oriented to person, place, and time.   Skin: Skin is warm and dry.   Psychiatric: She has a normal mood and affect. Her behavior is normal. Judgment and thought content normal.   Vitals reviewed.      Assessment/Plan   Joselyn was seen today for post-op problem.    Diagnoses and all orders for this visit:    S/P total knee arthroplasty, left    Infection of total knee replacement, subsequent encounter    Sepsis due to methicillin resistant Staphylococcus aureus (CMS/HCC)    Essential hypertension    Morbid obesity with BMI of 40.0-44.9, adult (CMS/[facility])    awaiting pre-op testing later this week to review and will write clearance letter prn, cont check BP at home and cont all chronic dz meds

## 2018-09-28 ENCOUNTER — HOSPITAL ENCOUNTER (OUTPATIENT)
Dept: GENERAL RADIOLOGY | Facility: HOSPITAL | Age: 53
Discharge: HOME | End: 2018-09-28

## 2018-09-28 ENCOUNTER — HOSPITAL ENCOUNTER (OUTPATIENT)
Dept: GENERAL RADIOLOGY | Facility: HOSPITAL | Age: 53
Discharge: HOME | End: 2018-09-28
Admitting: ORTHOPAEDIC SURGERY

## 2018-09-28 ENCOUNTER — APPOINTMENT (OUTPATIENT)
Dept: PREADMISSION TESTING | Facility: HOSPITAL | Age: 53
End: 2018-09-28

## 2018-09-28 VITALS
OXYGEN SATURATION: 99 % | RESPIRATION RATE: 18 BRPM | BODY MASS INDEX: 41.77 KG/M2 | HEIGHT: 62 IN | SYSTOLIC BLOOD PRESSURE: 134 MMHG | TEMPERATURE: 98.2 F | DIASTOLIC BLOOD PRESSURE: 90 MMHG | HEART RATE: 65 BPM | WEIGHT: 227 LBS

## 2018-09-28 LAB
ALBUMIN SERPL-MCNC: 4.2 G/DL (ref 3.5–5.2)
ALBUMIN/GLOB SERPL: 1.1 G/DL
ALP SERPL-CCNC: 113 U/L (ref 39–117)
ALT SERPL W P-5'-P-CCNC: 12 U/L (ref 1–33)
ANION GAP SERPL CALCULATED.3IONS-SCNC: 11.4 MMOL/L
APTT PPP: 26.7 SECONDS (ref 22.7–35.4)
AST SERPL-CCNC: 17 U/L (ref 1–32)
BACTERIA UR QL AUTO: NORMAL /HPF
BASOPHILS # BLD AUTO: 0.02 10*3/MM3 (ref 0–0.2)
BASOPHILS NFR BLD AUTO: 0.5 % (ref 0–1.5)
BILIRUB SERPL-MCNC: 0.2 MG/DL (ref 0.1–1.2)
BILIRUB UR QL STRIP: NEGATIVE
BUN BLD-MCNC: 18 MG/DL (ref 6–20)
BUN/CREAT SERPL: 21.7 (ref 7–25)
CALCIUM SPEC-SCNC: 9.9 MG/DL (ref 8.6–10.5)
CHLORIDE SERPL-SCNC: 99 MMOL/L (ref 98–107)
CLARITY UR: CLEAR
CO2 SERPL-SCNC: 26.6 MMOL/L (ref 22–29)
COLOR UR: YELLOW
CREAT BLD-MCNC: 0.83 MG/DL (ref 0.57–1)
CRP SERPL-MCNC: 0.36 MG/DL (ref 0–0.5)
DEPRECATED RDW RBC AUTO: 48.4 FL (ref 37–54)
EOSINOPHIL # BLD AUTO: 0.15 10*3/MM3 (ref 0–0.7)
EOSINOPHIL NFR BLD AUTO: 4 % (ref 0.3–6.2)
ERYTHROCYTE [DISTWIDTH] IN BLOOD BY AUTOMATED COUNT: 14.5 % (ref 11.7–13)
ERYTHROCYTE [SEDIMENTATION RATE] IN BLOOD: 31 MM/HR (ref 0–30)
GFR SERPL CREATININE-BSD FRML MDRD: 87 ML/MIN/1.73
GLOBULIN UR ELPH-MCNC: 4 GM/DL
GLUCOSE BLD-MCNC: 94 MG/DL (ref 65–99)
GLUCOSE UR STRIP-MCNC: NEGATIVE MG/DL
HCT VFR BLD AUTO: 39.9 % (ref 35.6–45.5)
HGB BLD-MCNC: 12.6 G/DL (ref 11.9–15.5)
HGB UR QL STRIP.AUTO: NEGATIVE
HYALINE CASTS UR QL AUTO: NORMAL /LPF
IMM GRANULOCYTES # BLD: 0 10*3/MM3 (ref 0–0.03)
IMM GRANULOCYTES NFR BLD: 0 % (ref 0–0.5)
INR PPP: 0.97 (ref 0.9–1.1)
KETONES UR QL STRIP: NEGATIVE
LEUKOCYTE ESTERASE UR QL STRIP.AUTO: ABNORMAL
LYMPHOCYTES # BLD AUTO: 1.48 10*3/MM3 (ref 0.9–4.8)
LYMPHOCYTES NFR BLD AUTO: 39.9 % (ref 19.6–45.3)
MCH RBC QN AUTO: 28.7 PG (ref 26.9–32)
MCHC RBC AUTO-ENTMCNC: 31.6 G/DL (ref 32.4–36.3)
MCV RBC AUTO: 90.9 FL (ref 80.5–98.2)
MONOCYTES # BLD AUTO: 0.27 10*3/MM3 (ref 0.2–1.2)
MONOCYTES NFR BLD AUTO: 7.3 % (ref 5–12)
NEUTROPHILS # BLD AUTO: 1.79 10*3/MM3 (ref 1.9–8.1)
NEUTROPHILS NFR BLD AUTO: 48.3 % (ref 42.7–76)
NITRITE UR QL STRIP: NEGATIVE
PH UR STRIP.AUTO: 5.5 [PH] (ref 5–8)
PLATELET # BLD AUTO: 241 10*3/MM3 (ref 140–500)
PMV BLD AUTO: 10.2 FL (ref 6–12)
POTASSIUM BLD-SCNC: 4.4 MMOL/L (ref 3.5–5.2)
PROT SERPL-MCNC: 8.2 G/DL (ref 6–8.5)
PROT UR QL STRIP: NEGATIVE
PROTHROMBIN TIME: 12.7 SECONDS (ref 11.7–14.2)
RBC # BLD AUTO: 4.39 10*6/MM3 (ref 3.9–5.2)
RBC # UR: NORMAL /HPF
REF LAB TEST METHOD: NORMAL
SODIUM BLD-SCNC: 137 MMOL/L (ref 136–145)
SP GR UR STRIP: 1.02 (ref 1–1.03)
SQUAMOUS #/AREA URNS HPF: NORMAL /HPF
UROBILINOGEN UR QL STRIP: ABNORMAL
WBC NRBC COR # BLD: 3.71 10*3/MM3 (ref 4.5–10.7)
WBC UR QL AUTO: NORMAL /HPF

## 2018-09-28 PROCEDURE — 85610 PROTHROMBIN TIME: CPT | Performed by: ORTHOPAEDIC SURGERY

## 2018-09-28 PROCEDURE — 36415 COLL VENOUS BLD VENIPUNCTURE: CPT

## 2018-09-28 PROCEDURE — 93005 ELECTROCARDIOGRAM TRACING: CPT

## 2018-09-28 PROCEDURE — 85652 RBC SED RATE AUTOMATED: CPT | Performed by: ORTHOPAEDIC SURGERY

## 2018-09-28 PROCEDURE — 80053 COMPREHEN METABOLIC PANEL: CPT | Performed by: ORTHOPAEDIC SURGERY

## 2018-09-28 PROCEDURE — 86140 C-REACTIVE PROTEIN: CPT | Performed by: ORTHOPAEDIC SURGERY

## 2018-09-28 PROCEDURE — 85025 COMPLETE CBC W/AUTO DIFF WBC: CPT | Performed by: ORTHOPAEDIC SURGERY

## 2018-09-28 PROCEDURE — 73560 X-RAY EXAM OF KNEE 1 OR 2: CPT

## 2018-09-28 PROCEDURE — 85730 THROMBOPLASTIN TIME PARTIAL: CPT | Performed by: ORTHOPAEDIC SURGERY

## 2018-09-28 PROCEDURE — 71046 X-RAY EXAM CHEST 2 VIEWS: CPT

## 2018-09-28 PROCEDURE — 81001 URINALYSIS AUTO W/SCOPE: CPT | Performed by: ORTHOPAEDIC SURGERY

## 2018-09-28 RX ORDER — HYDROCODONE BITARTRATE AND ACETAMINOPHEN 10; 325 MG/1; MG/1
1 TABLET ORAL EVERY 6 HOURS PRN
Status: ON HOLD | COMMUNITY
End: 2018-10-06

## 2018-09-28 RX ORDER — CHOLECALCIFEROL (VITAMIN D3) 125 MCG
5 CAPSULE ORAL NIGHTLY PRN
COMMUNITY
End: 2022-03-23

## 2018-09-28 RX ORDER — ESTRADIOL 0.07 MG/D
1 FILM, EXTENDED RELEASE TRANSDERMAL 2 TIMES WEEKLY
COMMUNITY
End: 2018-10-03 | Stop reason: SDUPTHER

## 2018-09-28 RX ORDER — METOPROLOL SUCCINATE 25 MG/1
50 TABLET, EXTENDED RELEASE ORAL DAILY
COMMUNITY
End: 2018-10-02 | Stop reason: SDUPTHER

## 2018-09-28 RX ORDER — FERROUS SULFATE 325(65) MG
325 TABLET ORAL
COMMUNITY
End: 2019-08-08

## 2018-09-28 ASSESSMENT — KOOS JR
KOOS JR SCORE: 15.939
KOOS JR SCORE: 26

## 2018-10-01 RX ORDER — METOPROLOL SUCCINATE 25 MG/1
TABLET, EXTENDED RELEASE ORAL
Qty: 21 TABLET | Refills: 0 | Status: SHIPPED | OUTPATIENT
Start: 2018-10-01 | End: 2019-12-30

## 2018-10-02 ENCOUNTER — TELEPHONE (OUTPATIENT)
Dept: FAMILY MEDICINE CLINIC | Facility: CLINIC | Age: 53
End: 2018-10-02

## 2018-10-02 RX ORDER — METOPROLOL SUCCINATE 25 MG/1
50 TABLET, EXTENDED RELEASE ORAL DAILY
Qty: 180 TABLET | Refills: 2 | Status: SHIPPED | OUTPATIENT
Start: 2018-10-02 | End: 2019-08-08

## 2018-10-02 NOTE — TELEPHONE ENCOUNTER
Please check labs and pre op testing and write clearence letter please so she can have surgery on Thursday.

## 2018-10-03 RX ORDER — ESTRADIOL 0.07 MG/D
1 FILM, EXTENDED RELEASE TRANSDERMAL 2 TIMES WEEKLY
Qty: 24 PATCH | Refills: 6 | Status: SHIPPED | OUTPATIENT
Start: 2018-10-04 | End: 2019-11-25 | Stop reason: SDUPTHER

## 2018-10-04 ENCOUNTER — ANESTHESIA EVENT (OUTPATIENT)
Dept: PERIOP | Facility: HOSPITAL | Age: 53
End: 2018-10-04

## 2018-10-04 ENCOUNTER — HOSPITAL ENCOUNTER (INPATIENT)
Facility: HOSPITAL | Age: 53
LOS: 2 days | Discharge: HOME HEALTH | End: 2018-10-06
Attending: ORTHOPAEDIC SURGERY | Admitting: ORTHOPAEDIC SURGERY

## 2018-10-04 ENCOUNTER — APPOINTMENT (OUTPATIENT)
Dept: GENERAL RADIOLOGY | Facility: HOSPITAL | Age: 53
End: 2018-10-04

## 2018-10-04 ENCOUNTER — ANESTHESIA (OUTPATIENT)
Dept: PERIOP | Facility: HOSPITAL | Age: 53
End: 2018-10-04

## 2018-10-04 DIAGNOSIS — R26.2 DIFFICULTY WALKING: Primary | ICD-10-CM

## 2018-10-04 DIAGNOSIS — M00.9 INFECTION OF LEFT KNEE (HCC): ICD-10-CM

## 2018-10-04 PROBLEM — M17.9 OSTEOARTHRITIS OF KNEE: Status: ACTIVE | Noted: 2018-10-04

## 2018-10-04 PROCEDURE — 87075 CULTR BACTERIA EXCEPT BLOOD: CPT | Performed by: ORTHOPAEDIC SURGERY

## 2018-10-04 PROCEDURE — 88305 TISSUE EXAM BY PATHOLOGIST: CPT | Performed by: ORTHOPAEDIC SURGERY

## 2018-10-04 PROCEDURE — 25010000002 ONDANSETRON PER 1 MG: Performed by: ORTHOPAEDIC SURGERY

## 2018-10-04 PROCEDURE — 25010000002 HYDROMORPHONE PER 4 MG: Performed by: ORTHOPAEDIC SURGERY

## 2018-10-04 PROCEDURE — 25010000002 PROPOFOL 10 MG/ML EMULSION: Performed by: NURSE ANESTHETIST, CERTIFIED REGISTERED

## 2018-10-04 PROCEDURE — 73560 X-RAY EXAM OF KNEE 1 OR 2: CPT

## 2018-10-04 PROCEDURE — 63710000001 PROMETHAZINE PER 12.5 MG: Performed by: ORTHOPAEDIC SURGERY

## 2018-10-04 PROCEDURE — 0SPD08Z REMOVAL OF SPACER FROM LEFT KNEE JOINT, OPEN APPROACH: ICD-10-PCS | Performed by: ORTHOPAEDIC SURGERY

## 2018-10-04 PROCEDURE — 25010000002 MIDAZOLAM PER 1 MG: Performed by: ANESTHESIOLOGY

## 2018-10-04 PROCEDURE — C1776 JOINT DEVICE (IMPLANTABLE): HCPCS | Performed by: ORTHOPAEDIC SURGERY

## 2018-10-04 PROCEDURE — 88332 PATH CONSLTJ SURG EA ADD BLK: CPT | Performed by: ORTHOPAEDIC SURGERY

## 2018-10-04 PROCEDURE — 25010000002 VANCOMYCIN 10 G RECONSTITUTED SOLUTION: Performed by: ORTHOPAEDIC SURGERY

## 2018-10-04 PROCEDURE — C1713 ANCHOR/SCREW BN/BN,TIS/BN: HCPCS | Performed by: ORTHOPAEDIC SURGERY

## 2018-10-04 PROCEDURE — 87205 SMEAR GRAM STAIN: CPT | Performed by: ORTHOPAEDIC SURGERY

## 2018-10-04 PROCEDURE — 25010000002 HYDROMORPHONE PER 4 MG: Performed by: NURSE ANESTHETIST, CERTIFIED REGISTERED

## 2018-10-04 PROCEDURE — 97162 PT EVAL MOD COMPLEX 30 MIN: CPT

## 2018-10-04 PROCEDURE — 87070 CULTURE OTHR SPECIMN AEROBIC: CPT | Performed by: ORTHOPAEDIC SURGERY

## 2018-10-04 PROCEDURE — 97110 THERAPEUTIC EXERCISES: CPT

## 2018-10-04 PROCEDURE — 25010000002 ONDANSETRON PER 1 MG: Performed by: NURSE ANESTHETIST, CERTIFIED REGISTERED

## 2018-10-04 PROCEDURE — 25010000002 PROMETHAZINE PER 50 MG: Performed by: HOSPITALIST

## 2018-10-04 PROCEDURE — 25010000002 DEXAMETHASONE PER 1 MG: Performed by: NURSE ANESTHETIST, CERTIFIED REGISTERED

## 2018-10-04 PROCEDURE — 25010000002 FENTANYL CITRATE (PF) 100 MCG/2ML SOLUTION: Performed by: NURSE ANESTHETIST, CERTIFIED REGISTERED

## 2018-10-04 PROCEDURE — 25010000002 FENTANYL CITRATE (PF) 100 MCG/2ML SOLUTION: Performed by: ANESTHESIOLOGY

## 2018-10-04 PROCEDURE — L1830 KO IMMOB CANVAS LONG PRE OTS: HCPCS | Performed by: ORTHOPAEDIC SURGERY

## 2018-10-04 PROCEDURE — 25010000002 VANCOMYCIN PER 500 MG: Performed by: ORTHOPAEDIC SURGERY

## 2018-10-04 PROCEDURE — 25010000003 CEFAZOLIN IN DEXTROSE 2-4 GM/100ML-% SOLUTION: Performed by: ORTHOPAEDIC SURGERY

## 2018-10-04 PROCEDURE — 25010000002 ROPIVACAINE PER 1 MG: Performed by: ANESTHESIOLOGY

## 2018-10-04 PROCEDURE — 88331 PATH CONSLTJ SURG 1 BLK 1SPC: CPT | Performed by: ORTHOPAEDIC SURGERY

## 2018-10-04 PROCEDURE — 0SRD0J9 REPLACEMENT OF LEFT KNEE JOINT WITH SYNTHETIC SUBSTITUTE, CEMENTED, OPEN APPROACH: ICD-10-PCS | Performed by: ORTHOPAEDIC SURGERY

## 2018-10-04 PROCEDURE — 87176 TISSUE HOMOGENIZATION CULTR: CPT | Performed by: ORTHOPAEDIC SURGERY

## 2018-10-04 DEVICE — BASEPLT TIB TRIATH TS NO4: Type: IMPLANTABLE DEVICE | Site: KNEE | Status: FUNCTIONAL

## 2018-10-04 DEVICE — STEM FEM TRIATH CMT 15X50MM: Type: IMPLANTABLE DEVICE | Site: KNEE | Status: FUNCTIONAL

## 2018-10-04 DEVICE — IMPLANTABLE DEVICE: Type: IMPLANTABLE DEVICE | Site: KNEE | Status: FUNCTIONAL

## 2018-10-04 DEVICE — INSRT TIB/KN TRIATHLON PS X3 NMBR4 9MM: Type: IMPLANTABLE DEVICE | Site: KNEE | Status: FUNCTIONAL

## 2018-10-04 DEVICE — CMT BONE SIMPLEX RO FUL DOSE: Type: IMPLANTABLE DEVICE | Site: KNEE | Status: FUNCTIONAL

## 2018-10-04 DEVICE — EXT STEM TRIATH KN TOTL COCR FLUT 25X80MM: Type: IMPLANTABLE DEVICE | Site: KNEE | Status: FUNCTIONAL

## 2018-10-04 DEVICE — COMP FEM TRIATH TS SZ3 LT: Type: IMPLANTABLE DEVICE | Site: KNEE | Status: FUNCTIONAL

## 2018-10-04 RX ORDER — BISACODYL 5 MG/1
10 TABLET, DELAYED RELEASE ORAL DAILY PRN
Status: DISCONTINUED | OUTPATIENT
Start: 2018-10-04 | End: 2018-10-06 | Stop reason: HOSPADM

## 2018-10-04 RX ORDER — ONDANSETRON 2 MG/ML
4 INJECTION INTRAMUSCULAR; INTRAVENOUS ONCE AS NEEDED
Status: DISCONTINUED | OUTPATIENT
Start: 2018-10-04 | End: 2018-10-04 | Stop reason: HOSPADM

## 2018-10-04 RX ORDER — EPHEDRINE SULFATE 50 MG/ML
5 INJECTION, SOLUTION INTRAVENOUS ONCE AS NEEDED
Status: DISCONTINUED | OUTPATIENT
Start: 2018-10-04 | End: 2018-10-04 | Stop reason: HOSPADM

## 2018-10-04 RX ORDER — PROMETHAZINE HYDROCHLORIDE 12.5 MG/1
12.5 TABLET ORAL EVERY 6 HOURS PRN
Status: DISCONTINUED | OUTPATIENT
Start: 2018-10-04 | End: 2018-10-06 | Stop reason: HOSPADM

## 2018-10-04 RX ORDER — ASPIRIN 325 MG
325 TABLET, DELAYED RELEASE (ENTERIC COATED) ORAL EVERY 12 HOURS SCHEDULED
Status: DISCONTINUED | OUTPATIENT
Start: 2018-10-05 | End: 2018-10-06 | Stop reason: HOSPADM

## 2018-10-04 RX ORDER — NALOXONE HCL 0.4 MG/ML
0.2 VIAL (ML) INJECTION AS NEEDED
Status: DISCONTINUED | OUTPATIENT
Start: 2018-10-04 | End: 2018-10-04 | Stop reason: HOSPADM

## 2018-10-04 RX ORDER — ONDANSETRON 4 MG/1
4 TABLET, ORALLY DISINTEGRATING ORAL EVERY 6 HOURS PRN
Status: DISCONTINUED | OUTPATIENT
Start: 2018-10-04 | End: 2018-10-06 | Stop reason: HOSPADM

## 2018-10-04 RX ORDER — BISACODYL 10 MG
10 SUPPOSITORY, RECTAL RECTAL DAILY PRN
Status: DISCONTINUED | OUTPATIENT
Start: 2018-10-04 | End: 2018-10-06 | Stop reason: HOSPADM

## 2018-10-04 RX ORDER — AMLODIPINE BESYLATE 10 MG/1
10 TABLET ORAL EVERY MORNING
Status: DISCONTINUED | OUTPATIENT
Start: 2018-10-05 | End: 2018-10-06 | Stop reason: HOSPADM

## 2018-10-04 RX ORDER — FLUMAZENIL 0.1 MG/ML
0.2 INJECTION INTRAVENOUS AS NEEDED
Status: DISCONTINUED | OUTPATIENT
Start: 2018-10-04 | End: 2018-10-04 | Stop reason: HOSPADM

## 2018-10-04 RX ORDER — SODIUM CHLORIDE 9 MG/ML
100 INJECTION, SOLUTION INTRAVENOUS CONTINUOUS
Status: ACTIVE | OUTPATIENT
Start: 2018-10-04 | End: 2018-10-05

## 2018-10-04 RX ORDER — HYDROMORPHONE HYDROCHLORIDE 1 MG/ML
0.5 INJECTION, SOLUTION INTRAMUSCULAR; INTRAVENOUS; SUBCUTANEOUS
Status: DISCONTINUED | OUTPATIENT
Start: 2018-10-04 | End: 2018-10-04 | Stop reason: HOSPADM

## 2018-10-04 RX ORDER — CEFAZOLIN SODIUM 2 G/100ML
2 INJECTION, SOLUTION INTRAVENOUS EVERY 8 HOURS
Status: COMPLETED | OUTPATIENT
Start: 2018-10-04 | End: 2018-10-05

## 2018-10-04 RX ORDER — SODIUM CHLORIDE, SODIUM LACTATE, POTASSIUM CHLORIDE, CALCIUM CHLORIDE 600; 310; 30; 20 MG/100ML; MG/100ML; MG/100ML; MG/100ML
9 INJECTION, SOLUTION INTRAVENOUS CONTINUOUS PRN
Status: DISCONTINUED | OUTPATIENT
Start: 2018-10-04 | End: 2018-10-04 | Stop reason: HOSPADM

## 2018-10-04 RX ORDER — CHOLECALCIFEROL (VITAMIN D3) 125 MCG
5 CAPSULE ORAL NIGHTLY PRN
Status: DISCONTINUED | OUTPATIENT
Start: 2018-10-04 | End: 2018-10-06 | Stop reason: HOSPADM

## 2018-10-04 RX ORDER — FENTANYL CITRATE 50 UG/ML
50 INJECTION, SOLUTION INTRAMUSCULAR; INTRAVENOUS
Status: COMPLETED | OUTPATIENT
Start: 2018-10-04 | End: 2018-10-04

## 2018-10-04 RX ORDER — FUROSEMIDE 40 MG/1
40 TABLET ORAL EVERY MORNING
Status: DISCONTINUED | OUTPATIENT
Start: 2018-10-05 | End: 2018-10-06 | Stop reason: HOSPADM

## 2018-10-04 RX ORDER — RIFAMPIN 300 MG/1
300 CAPSULE ORAL EVERY 12 HOURS SCHEDULED
Status: DISCONTINUED | OUTPATIENT
Start: 2018-10-04 | End: 2018-10-06 | Stop reason: HOSPADM

## 2018-10-04 RX ORDER — OXYCODONE HCL 10 MG/1
20 TABLET, FILM COATED, EXTENDED RELEASE ORAL ONCE
Status: COMPLETED | OUTPATIENT
Start: 2018-10-04 | End: 2018-10-04

## 2018-10-04 RX ORDER — ONDANSETRON 2 MG/ML
INJECTION INTRAMUSCULAR; INTRAVENOUS AS NEEDED
Status: DISCONTINUED | OUTPATIENT
Start: 2018-10-04 | End: 2018-10-04 | Stop reason: SURG

## 2018-10-04 RX ORDER — PROMETHAZINE HYDROCHLORIDE 25 MG/ML
12.5 INJECTION, SOLUTION INTRAMUSCULAR; INTRAVENOUS ONCE AS NEEDED
Status: DISCONTINUED | OUTPATIENT
Start: 2018-10-04 | End: 2018-10-04 | Stop reason: HOSPADM

## 2018-10-04 RX ORDER — DEXAMETHASONE SODIUM PHOSPHATE 10 MG/ML
INJECTION INTRAMUSCULAR; INTRAVENOUS AS NEEDED
Status: DISCONTINUED | OUTPATIENT
Start: 2018-10-04 | End: 2018-10-04 | Stop reason: SURG

## 2018-10-04 RX ORDER — ACETAMINOPHEN 500 MG
1000 TABLET ORAL ONCE
Status: COMPLETED | OUTPATIENT
Start: 2018-10-04 | End: 2018-10-04

## 2018-10-04 RX ORDER — PROMETHAZINE HYDROCHLORIDE 25 MG/ML
12.5 INJECTION, SOLUTION INTRAMUSCULAR; INTRAVENOUS EVERY 6 HOURS PRN
Status: DISCONTINUED | OUTPATIENT
Start: 2018-10-04 | End: 2018-10-06 | Stop reason: HOSPADM

## 2018-10-04 RX ORDER — CEFAZOLIN SODIUM 2 G/100ML
2 INJECTION, SOLUTION INTRAVENOUS ONCE
Status: COMPLETED | OUTPATIENT
Start: 2018-10-04 | End: 2018-10-04

## 2018-10-04 RX ORDER — SODIUM CHLORIDE 0.9 % (FLUSH) 0.9 %
3 SYRINGE (ML) INJECTION EVERY 12 HOURS SCHEDULED
Status: DISCONTINUED | OUTPATIENT
Start: 2018-10-04 | End: 2018-10-04 | Stop reason: HOSPADM

## 2018-10-04 RX ORDER — PROMETHAZINE HYDROCHLORIDE 25 MG/1
25 TABLET ORAL ONCE AS NEEDED
Status: DISCONTINUED | OUTPATIENT
Start: 2018-10-04 | End: 2018-10-04 | Stop reason: HOSPADM

## 2018-10-04 RX ORDER — MAGNESIUM HYDROXIDE 1200 MG/15ML
LIQUID ORAL AS NEEDED
Status: DISCONTINUED | OUTPATIENT
Start: 2018-10-04 | End: 2018-10-04 | Stop reason: HOSPADM

## 2018-10-04 RX ORDER — NALOXONE HCL 0.4 MG/ML
0.1 VIAL (ML) INJECTION
Status: DISCONTINUED | OUTPATIENT
Start: 2018-10-04 | End: 2018-10-06 | Stop reason: HOSPADM

## 2018-10-04 RX ORDER — ROPIVACAINE HYDROCHLORIDE 5 MG/ML
INJECTION, SOLUTION EPIDURAL; INFILTRATION; PERINEURAL AS NEEDED
Status: DISCONTINUED | OUTPATIENT
Start: 2018-10-04 | End: 2018-10-04 | Stop reason: SURG

## 2018-10-04 RX ORDER — ONDANSETRON 4 MG/1
4 TABLET, FILM COATED ORAL EVERY 6 HOURS PRN
Status: DISCONTINUED | OUTPATIENT
Start: 2018-10-04 | End: 2018-10-06 | Stop reason: HOSPADM

## 2018-10-04 RX ORDER — PROMETHAZINE HYDROCHLORIDE 25 MG/ML
12.5 INJECTION, SOLUTION INTRAMUSCULAR; INTRAVENOUS ONCE
Status: COMPLETED | OUTPATIENT
Start: 2018-10-04 | End: 2018-10-04

## 2018-10-04 RX ORDER — DOCUSATE SODIUM 100 MG/1
100 CAPSULE, LIQUID FILLED ORAL 2 TIMES DAILY
Status: DISCONTINUED | OUTPATIENT
Start: 2018-10-04 | End: 2018-10-06 | Stop reason: HOSPADM

## 2018-10-04 RX ORDER — PROMETHAZINE HYDROCHLORIDE 25 MG/1
12.5 TABLET ORAL ONCE AS NEEDED
Status: DISCONTINUED | OUTPATIENT
Start: 2018-10-04 | End: 2018-10-04 | Stop reason: HOSPADM

## 2018-10-04 RX ORDER — HYDROCODONE BITARTRATE AND ACETAMINOPHEN 7.5; 325 MG/1; MG/1
1 TABLET ORAL EVERY 4 HOURS PRN
Status: DISCONTINUED | OUTPATIENT
Start: 2018-10-04 | End: 2018-10-06 | Stop reason: HOSPADM

## 2018-10-04 RX ORDER — HYDROCODONE BITARTRATE AND ACETAMINOPHEN 7.5; 325 MG/1; MG/1
1 TABLET ORAL ONCE AS NEEDED
Status: DISCONTINUED | OUTPATIENT
Start: 2018-10-04 | End: 2018-10-04 | Stop reason: HOSPADM

## 2018-10-04 RX ORDER — OXYCODONE AND ACETAMINOPHEN 7.5; 325 MG/1; MG/1
1 TABLET ORAL ONCE AS NEEDED
Status: DISCONTINUED | OUTPATIENT
Start: 2018-10-04 | End: 2018-10-04 | Stop reason: HOSPADM

## 2018-10-04 RX ORDER — FENTANYL CITRATE 50 UG/ML
50 INJECTION, SOLUTION INTRAMUSCULAR; INTRAVENOUS
Status: DISCONTINUED | OUTPATIENT
Start: 2018-10-04 | End: 2018-10-04 | Stop reason: HOSPADM

## 2018-10-04 RX ORDER — ONDANSETRON 2 MG/ML
4 INJECTION INTRAMUSCULAR; INTRAVENOUS EVERY 6 HOURS PRN
Status: DISCONTINUED | OUTPATIENT
Start: 2018-10-04 | End: 2018-10-06 | Stop reason: HOSPADM

## 2018-10-04 RX ORDER — POTASSIUM CHLORIDE 750 MG/1
10 CAPSULE, EXTENDED RELEASE ORAL DAILY
Status: DISCONTINUED | OUTPATIENT
Start: 2018-10-04 | End: 2018-10-06 | Stop reason: HOSPADM

## 2018-10-04 RX ORDER — HYDROMORPHONE HYDROCHLORIDE 1 MG/ML
0.5 INJECTION, SOLUTION INTRAMUSCULAR; INTRAVENOUS; SUBCUTANEOUS
Status: DISCONTINUED | OUTPATIENT
Start: 2018-10-04 | End: 2018-10-06 | Stop reason: HOSPADM

## 2018-10-04 RX ORDER — MEPERIDINE HYDROCHLORIDE 25 MG/ML
12.5 INJECTION INTRAMUSCULAR; INTRAVENOUS; SUBCUTANEOUS
Status: DISCONTINUED | OUTPATIENT
Start: 2018-10-04 | End: 2018-10-04 | Stop reason: HOSPADM

## 2018-10-04 RX ORDER — CELECOXIB 200 MG/1
200 CAPSULE ORAL DAILY
Status: DISCONTINUED | OUTPATIENT
Start: 2018-10-04 | End: 2018-10-06 | Stop reason: HOSPADM

## 2018-10-04 RX ORDER — LABETALOL HYDROCHLORIDE 5 MG/ML
5 INJECTION, SOLUTION INTRAVENOUS
Status: DISCONTINUED | OUTPATIENT
Start: 2018-10-04 | End: 2018-10-04 | Stop reason: HOSPADM

## 2018-10-04 RX ORDER — MIDAZOLAM HYDROCHLORIDE 1 MG/ML
1 INJECTION INTRAMUSCULAR; INTRAVENOUS
Status: DISCONTINUED | OUTPATIENT
Start: 2018-10-04 | End: 2018-10-04 | Stop reason: HOSPADM

## 2018-10-04 RX ORDER — METOPROLOL SUCCINATE 50 MG/1
50 TABLET, EXTENDED RELEASE ORAL DAILY
Status: DISCONTINUED | OUTPATIENT
Start: 2018-10-04 | End: 2018-10-06 | Stop reason: HOSPADM

## 2018-10-04 RX ORDER — MIDAZOLAM HYDROCHLORIDE 1 MG/ML
2 INJECTION INTRAMUSCULAR; INTRAVENOUS
Status: DISCONTINUED | OUTPATIENT
Start: 2018-10-04 | End: 2018-10-04 | Stop reason: HOSPADM

## 2018-10-04 RX ORDER — FAMOTIDINE 10 MG/ML
20 INJECTION, SOLUTION INTRAVENOUS ONCE
Status: COMPLETED | OUTPATIENT
Start: 2018-10-04 | End: 2018-10-04

## 2018-10-04 RX ORDER — SODIUM CHLORIDE 0.9 % (FLUSH) 0.9 %
3-10 SYRINGE (ML) INJECTION AS NEEDED
Status: DISCONTINUED | OUTPATIENT
Start: 2018-10-04 | End: 2018-10-04 | Stop reason: HOSPADM

## 2018-10-04 RX ORDER — TRANEXAMIC ACID 100 MG/ML
INJECTION, SOLUTION INTRAVENOUS AS NEEDED
Status: DISCONTINUED | OUTPATIENT
Start: 2018-10-04 | End: 2018-10-04 | Stop reason: SURG

## 2018-10-04 RX ORDER — PROPOFOL 10 MG/ML
VIAL (ML) INTRAVENOUS AS NEEDED
Status: DISCONTINUED | OUTPATIENT
Start: 2018-10-04 | End: 2018-10-04 | Stop reason: SURG

## 2018-10-04 RX ORDER — ACETAMINOPHEN 325 MG/1
325 TABLET ORAL EVERY 4 HOURS PRN
Status: DISCONTINUED | OUTPATIENT
Start: 2018-10-04 | End: 2018-10-06 | Stop reason: HOSPADM

## 2018-10-04 RX ORDER — ROCURONIUM BROMIDE 10 MG/ML
INJECTION, SOLUTION INTRAVENOUS AS NEEDED
Status: DISCONTINUED | OUTPATIENT
Start: 2018-10-04 | End: 2018-10-04 | Stop reason: SURG

## 2018-10-04 RX ORDER — PROMETHAZINE HYDROCHLORIDE 25 MG/1
25 SUPPOSITORY RECTAL ONCE AS NEEDED
Status: DISCONTINUED | OUTPATIENT
Start: 2018-10-04 | End: 2018-10-04 | Stop reason: HOSPADM

## 2018-10-04 RX ORDER — HYDROMORPHONE HCL 110MG/55ML
PATIENT CONTROLLED ANALGESIA SYRINGE INTRAVENOUS AS NEEDED
Status: DISCONTINUED | OUTPATIENT
Start: 2018-10-04 | End: 2018-10-04 | Stop reason: SURG

## 2018-10-04 RX ORDER — HYDROCODONE BITARTRATE AND ACETAMINOPHEN 7.5; 325 MG/1; MG/1
2 TABLET ORAL EVERY 4 HOURS PRN
Status: DISCONTINUED | OUTPATIENT
Start: 2018-10-04 | End: 2018-10-06 | Stop reason: HOSPADM

## 2018-10-04 RX ADMIN — FENTANYL CITRATE 50 MCG: 50 INJECTION INTRAMUSCULAR; INTRAVENOUS at 09:36

## 2018-10-04 RX ADMIN — TRANEXAMIC ACID 1000 MG: 100 INJECTION, SOLUTION INTRAVENOUS at 10:37

## 2018-10-04 RX ADMIN — HYDROMORPHONE HYDROCHLORIDE 0.5 MG: 2 INJECTION INTRAMUSCULAR; INTRAVENOUS; SUBCUTANEOUS at 10:34

## 2018-10-04 RX ADMIN — HYDROCODONE BITARTRATE AND ACETAMINOPHEN 2 TABLET: 7.5; 325 TABLET ORAL at 17:52

## 2018-10-04 RX ADMIN — HYDROMORPHONE HYDROCHLORIDE 0.5 MG: 1 INJECTION, SOLUTION INTRAMUSCULAR; INTRAVENOUS; SUBCUTANEOUS at 16:08

## 2018-10-04 RX ADMIN — CEFAZOLIN SODIUM 2 G: 2 INJECTION, SOLUTION INTRAVENOUS at 08:20

## 2018-10-04 RX ADMIN — PROPOFOL 150 MG: 10 INJECTION, EMULSION INTRAVENOUS at 08:09

## 2018-10-04 RX ADMIN — FENTANYL CITRATE 100 MCG: 50 INJECTION INTRAMUSCULAR; INTRAVENOUS at 08:08

## 2018-10-04 RX ADMIN — CELECOXIB 200 MG: 200 CAPSULE ORAL at 13:58

## 2018-10-04 RX ADMIN — PROMETHAZINE HYDROCHLORIDE 12.5 MG: 12.5 TABLET ORAL at 23:21

## 2018-10-04 RX ADMIN — ONDANSETRON 4 MG: 2 INJECTION INTRAMUSCULAR; INTRAVENOUS at 10:50

## 2018-10-04 RX ADMIN — SODIUM CHLORIDE, POTASSIUM CHLORIDE, SODIUM LACTATE AND CALCIUM CHLORIDE: 600; 310; 30; 20 INJECTION, SOLUTION INTRAVENOUS at 10:50

## 2018-10-04 RX ADMIN — ONDANSETRON 4 MG: 2 INJECTION INTRAMUSCULAR; INTRAVENOUS at 15:20

## 2018-10-04 RX ADMIN — FENTANYL CITRATE 50 MCG: 50 INJECTION INTRAMUSCULAR; INTRAVENOUS at 07:12

## 2018-10-04 RX ADMIN — HYDROCODONE BITARTRATE AND ACETAMINOPHEN 2 TABLET: 7.5; 325 TABLET ORAL at 13:57

## 2018-10-04 RX ADMIN — FAMOTIDINE 20 MG: 10 INJECTION, SOLUTION INTRAVENOUS at 07:10

## 2018-10-04 RX ADMIN — HYDROMORPHONE HYDROCHLORIDE 0.5 MG: 2 INJECTION INTRAMUSCULAR; INTRAVENOUS; SUBCUTANEOUS at 10:50

## 2018-10-04 RX ADMIN — FENTANYL CITRATE 50 MCG: 50 INJECTION INTRAMUSCULAR; INTRAVENOUS at 07:10

## 2018-10-04 RX ADMIN — ROPIVACAINE HYDROCHLORIDE 30 ML: 5 INJECTION, SOLUTION EPIDURAL; INFILTRATION; PERINEURAL at 07:12

## 2018-10-04 RX ADMIN — ACETAMINOPHEN 1000 MG: 500 TABLET, FILM COATED ORAL at 07:06

## 2018-10-04 RX ADMIN — MIDAZOLAM HYDROCHLORIDE 2 MG: 2 INJECTION, SOLUTION INTRAMUSCULAR; INTRAVENOUS at 07:10

## 2018-10-04 RX ADMIN — SODIUM CHLORIDE 100 ML/HR: 9 INJECTION, SOLUTION INTRAVENOUS at 14:44

## 2018-10-04 RX ADMIN — FENTANYL CITRATE 100 MCG: 50 INJECTION INTRAMUSCULAR; INTRAVENOUS at 08:57

## 2018-10-04 RX ADMIN — HYDROMORPHONE HYDROCHLORIDE 0.5 MG: 2 INJECTION INTRAMUSCULAR; INTRAVENOUS; SUBCUTANEOUS at 11:20

## 2018-10-04 RX ADMIN — RIFAMPIN 300 MG: 300 CAPSULE ORAL at 21:53

## 2018-10-04 RX ADMIN — HYDROMORPHONE HYDROCHLORIDE 0.5 MG: 2 INJECTION INTRAMUSCULAR; INTRAVENOUS; SUBCUTANEOUS at 11:11

## 2018-10-04 RX ADMIN — FENTANYL CITRATE 50 MCG: 50 INJECTION, SOLUTION INTRAMUSCULAR; INTRAVENOUS at 11:33

## 2018-10-04 RX ADMIN — VANCOMYCIN HYDROCHLORIDE 1500 MG: 10 INJECTION, POWDER, LYOPHILIZED, FOR SOLUTION INTRAVENOUS at 07:06

## 2018-10-04 RX ADMIN — HYDROCODONE BITARTRATE AND ACETAMINOPHEN 2 TABLET: 7.5; 325 TABLET ORAL at 23:21

## 2018-10-04 RX ADMIN — PROMETHAZINE HYDROCHLORIDE 12.5 MG: 25 INJECTION INTRAMUSCULAR; INTRAVENOUS at 18:27

## 2018-10-04 RX ADMIN — CEFAZOLIN SODIUM 2 G: 2 INJECTION, SOLUTION INTRAVENOUS at 16:18

## 2018-10-04 RX ADMIN — POTASSIUM CHLORIDE 10 MEQ: 750 CAPSULE, EXTENDED RELEASE ORAL at 13:58

## 2018-10-04 RX ADMIN — DEXAMETHASONE SODIUM PHOSPHATE 6 MG: 10 INJECTION INTRAMUSCULAR; INTRAVENOUS at 08:26

## 2018-10-04 RX ADMIN — SODIUM CHLORIDE, POTASSIUM CHLORIDE, SODIUM LACTATE AND CALCIUM CHLORIDE 9 ML/HR: 600; 310; 30; 20 INJECTION, SOLUTION INTRAVENOUS at 07:05

## 2018-10-04 RX ADMIN — HYDROMORPHONE HYDROCHLORIDE 0.5 MG: 1 INJECTION, SOLUTION INTRAMUSCULAR; INTRAVENOUS; SUBCUTANEOUS at 12:07

## 2018-10-04 RX ADMIN — OXYCODONE HYDROCHLORIDE 20 MG: 10 TABLET, FILM COATED, EXTENDED RELEASE ORAL at 07:06

## 2018-10-04 RX ADMIN — ROCURONIUM BROMIDE 50 MG: 10 INJECTION INTRAVENOUS at 08:09

## 2018-10-04 NOTE — OP NOTE
ORTHOPAEDIC OPERATIVE NOTE    Patient: Joselyn Grant       YOB: 1965    Medical Record Number: [id number]    Attending Physician: Ha Oh,*    Primary Care Physician: Leodan Nam MD    Date of Service: 10/4/2018    Surgeon: Ha Oh MD        DATE OF PROCEDURE: 10/4/2018    PREOPERATIVE DIAGNOSIS: 1. Infected LEFT total knee arthroplasty status post removal of  implant and placement of antibiotic spacer- Planned second stage for reimplantation.    POSTOPERATIVE DIAGNOSIS: 1.  Infected LEFT total knee arthroplasty status post removal of  implant and placement of antibiotic spacer- Planned second stage for reimplantation.    PROCEDURE PERFORMED:   1. Removal of antibiotic spacer  2. Revision LEFT total knee arthroplasty by utilizing the Anna Lozabain revision system with a     Tibia  Universal  baseplate size 4 -   Tibia  Augments 10 mm  Medial and lateral  Tibia Cone Symmetric Size C ,   Universal stem fluted 15 mm × 50 mm with 25 mm stem extender for tibia.  Femur    The total stabilizer component size 3,   Distal femur augments 15 mm thickness  Medial and lateral ,  Femur Cone augment Size 5   fluted stem 15 mm ×50 mm length with 25 mm stem extender for the femur.    PS  tibial insert size # 4, 9 mm thickness.    Bone cement with vancomycin was utilized for additional stabilization of the implants.    SURGEON: Ha Oh MD     ASSISTANT: Steve Henley MD, Fellow    [facility]   The services of a skilled  first assist were necessary for performing the procedure safely and expeditiously.  The first assist was present for the entire duration of the case and helped with positioning, retraction and closure of the incision.      ANESTHESIA: General anaesthesia with a regional block femoral-sciatic and intraoperative periarticular  Exparel injection.    ESTIMATED BLOOD LOSS: 200 mL    SPECIMENS:  1.Tissue from  knee for culture sensitivity aerobic and anaerobic.  2. Tissue from  knee for frozen section for acute inflammation.      COMPLICATIONS: Nil.     DRAINS: A 10 Bahraini Hemovac drain.     INDICATIONS: The patient is a 53 y.o. female  who is known to me from Her previous revision LEFT total knee arthroplasty.  Unfortunately, she had a prosthetic joint infection with MSSA and had removal of LEFT revision total knee implants, for   prosthetic joint infection. The surgery was done in [facility]. The patient subsequently completed  intravenous antibiotics and received an antibiotic spacer.  Patient was doing well and  is indicated for the second stage for removal of the antibiotic spacer and reimplantation of  knee.  The inflammatory parameters have normalized.    Treatment options and alternatives were discussed in detail with the patient who is indicated for a revision total knee arthroplasty.     Likely risks and benefits of the procedure, including but not limited to infection, DVT, pulmonary embolism, future loosening of the implants, possibility of injury to tendons, ligaments, nerves or vessels and periprosthetic fractures, loss of extensor mechanism, stiffness, future above-the-knee amputation have been discussed in detail. Despite the risks involved, the patient elected to proceed and informed consent was obtained and the patient was scheduled for surgery. The patient was seen in the preoperative holding area and the operative site was marked.       DESCRIPTION OF PROCEDURE:   The patient was transferred to [facility] operating room.     Preoperative antibiotics in the form of  Vancomycin and  Kefzol  intravenously was infused prior to the incision and prior to the tourniquet placement according to the SCIP protocol.     A surgical time out was done with the team and the correct patient, surgical side and site were identified.     After achieving adequate general  anesthesia, a well-padded tourniquet was placed over the proximal aspect of the operative thigh. The operative leg was prepped and draped in the usual sterile fashion. Tourniquet was elevated to a pressure of 250 mm Hg.     Trannexamic acid was given intravenously prior to incision.      A skin incision was made vertically oriented centering over the patella anteriorly incorporating previous surgical scar. Skin and subcutaneous tissue were incised, full thickness flaps were raised and a medial parapatellar approach was developed. There was a no effusion. The patella was subluxed and the knee was inspected.      The antibiotic spacer was removed with the help of osteotomes. This was an articulating spacer.      Tissue from distal femur proximal tibia and patella was sent for frozen section and cultures.  The frozen section returned negative for acute inflammation with  neutrophils about Less than 5 per high power field.      As the knee tissue was looking very benign  and as the  inflammatory markers were negative it was planned to proceed with reimplantation.  The knee joint was thoroughly irrigated with saline.  All traces of previous cement was removed.  Debridement was completed.    Attention was then directed to the proximal tibia. There was significant bone loss.  But the medial and lateral collateral ligaments were intact.  Intramedullary reamer was utilized progressively.  A   a conical proximal reamer was seated for adequate depth for a Asymmetric cone.  A trial cone was seated to achieve adequate metaphyseal stability.  A skim cut was done with an oscillating saw along the proximal end of the tibia.  The proximal tibial cut was found to be satisfactory. Trial baseplate was sized.  It was planned at the utilize  10 mm tibial augments to  Maintain joint line.    Attention was  directed to the distal femur.  Intramedullary reamer was utilized for the medullary canal followed by a  Reamer for the femur cone .   Adequate stability was found with a metaphyseal  Size 5 cone. A bilobed one was used. 4-in-1 cutting block was utilized followed by cutting the box cut for the TS component.  The cuts were completed maintaining correct rotational alignment based upon epicondylar axis.      Trial liner was seated and knee reduced.  Reduction was found to be satisfactory with range of motion from 0° to 120° with the patella tracking well.     As the patella was very thin it was left without resurfacing.     Having been satisfied with the trials, the bony surfaces irrigated with saline.  I have elected to proceed with press-fit cones. Exparel was infiltrated into the posterior capsule medially and laterally and into periarticular tissue and subcutaneously. The tibia and femur were seated appropriately. Followed by this, the tibial component was seated into position followed by the femoral component, followed by seating of the 9 mm thick trial liner.  The implants were assembled on the back table.  I used Simplex cement  and added  2 gm vancomycin powder for obtaining some support of the component to the bone secondary to bone loss.  I used Short stems cementation technique. The cement was allowed to set excess cement was removed.  The trial liner was replaced with a 9  mm Posterior stabilized liner.     Again, range of motion was checked and the range was satisfactory from 0° to 120° with excellent stability throughout the range of motion. Good medial and lateral tissue tension, and soft tissue balancing. The patella was tracking well. Having been satisfied with this, the joint was thoroughly irrigated with saline. Soft tissue hemostasis was secured. A 10-Macedonian Hemovac drain was placed.      The sponge and needle count was found to be correct.  Arthrotomy was closed with Ethibond sutures followed by closure of the incision in layers with Vicryl sutures and staples. Sterile dressings were placed and the patient was transferred to the  recovery room in stable condition. The patient was given a knee immobilizer. The patient tolerated the procedure well and is being admitted for postoperative antibiotics according to the SCIP protocol for 2 more doses /  until I see culture result.     DVT Prophylaxis -  Mechanical - TEDS and venous foot plexipulses and  Aspirin daily will be started daily on postoperative day #1.     The patient will be mobilized in am with physical therapy. WBAT and no restrictions for ROM.    Dr Harvey  Infectious disease consult is being placed for routine follow up and postop antibiotic recommendations.     I discussed the satisfactory performance of the procedure with the patient's family and discussed with them The postoperative management.      Ha Oh M.D.    10/4/2018    CC: Leodan Nam MD; MD Althea Nowak Madhusudhan R,*

## 2018-10-04 NOTE — ANESTHESIA POSTPROCEDURE EVALUATION
Patient: Jsoelyn Grant    Procedure Summary     Date:  10/04/18 Room / Location:  [facility] OR [address] [place] [address] MAIN OR    Anesthesia Start:  0801 Anesthesia Stop:  1124    Procedure:  LEFT TOTAL KNEE ARTHROPLASTY REVISION (Left Knee) Diagnosis:       Infection associated with internal left knee prosthesis, subsequent encounter      (Infection associated with internal left knee prosthesis, subsequent encounter )    Surgeon:  Ha Oh MD Provider:  Nelson Fung MD    Anesthesia Type:  general, regional ASA Status:  3          Anesthesia Type: general, regional  Last vitals  BP   108/92 (10/04/18 1230)   Temp   36.5 °C (97.7 °F) (10/04/18 1230)   Pulse   87 (10/04/18 1230)   Resp   16 (10/04/18 1230)     SpO2   94 % (10/04/18 1230)     Post Anesthesia Care and Evaluation    Patient location during evaluation: bedside  Patient participation: complete - patient participated  Level of consciousness: awake  Pain management: adequate  Airway patency: patent  Anesthetic complications: No anesthetic complications    Cardiovascular status: acceptable  Respiratory status: acceptable  Hydration status: acceptable    Comments: */92 (BP Location: Left arm, Patient Position: Lying)   Pulse 87   Temp 36.5 °C (97.7 °F) (Oral)   Resp 16   SpO2 94%

## 2018-10-04 NOTE — PLAN OF CARE
Problem: Patient Care Overview  Goal: Plan of Care Review  Outcome: Ongoing (interventions implemented as appropriate)   10/04/18 0914   Coping/Psychosocial   Plan of Care Reviewed With patient   Plan of Care Review   Progress improving   OTHER   Outcome Summary Patient admitted to room 782 today from pacu. A&Ox4. Patient is drowsy at times but arrousable. Dressing to left knee c/d/i. Pedal pulses 2+. Good sensation and motion to bilateral feet. Hemovac in place. C/O pain. Tolerated oral pain medication, but stated later her pain was a 9/10. IV diluadid given and tolerated. Patient states she was nauseated, IV zofran given and tolerated. Up with assistance. Orientated to room and call light. Vitals are stable.      Goal: Individualization and Mutuality  Outcome: Ongoing (interventions implemented as appropriate)    Goal: Discharge Needs Assessment  Outcome: Ongoing (interventions implemented as appropriate)    Goal: Interprofessional Rounds/Family Conf  Outcome: Ongoing (interventions implemented as appropriate)      Problem: Fall Risk (Adult)  Goal: Identify Related Risk Factors and Signs and Symptoms  Outcome: Ongoing (interventions implemented as appropriate)    Goal: Absence of Fall  Outcome: Ongoing (interventions implemented as appropriate)      Problem: Knee Arthroplasty (Total, Partial) (Adult)  Goal: Signs and Symptoms of Listed Potential Problems Will be Absent, Minimized or Managed (Knee Arthroplasty)  Outcome: Ongoing (interventions implemented as appropriate)    Goal: Anesthesia/Sedation Recovery  Outcome: Ongoing (interventions implemented as appropriate)

## 2018-10-04 NOTE — ANESTHESIA PROCEDURE NOTES
Airway  Urgency: elective    Date/Time: 10/4/2018 8:12 AM  Airway not difficult    General Information and Staff    Patient location during procedure: OR  Anesthesiologist: PAOLA MCPHERSON  CRNA: JULIA VELASQUEZ    Indications and Patient Condition  Indications for airway management: airway protection    Preoxygenated: yes  Mask difficulty assessment: 1 - vent by mask    Final Airway Details  Final airway type: endotracheal airway      Successful airway: ETT  Cuffed: yes   Successful intubation technique: direct laryngoscopy  Endotracheal tube insertion site: oral  Blade: Jalen  Blade size: 3  ETT size: 7.0 mm  Cormack-Lehane Classification: grade I - full view of glottis  Placement verified by: chest auscultation and capnometry   Cuff volume (mL): 5  Measured from: lips  ETT to lips (cm): 20  Number of attempts at approach: 1    Additional Comments  Smooth IV induction. Trachea intubated. Cuff up. Ett secured. BEBS. Dentition intact without injury.

## 2018-10-04 NOTE — PLAN OF CARE
Problem: Patient Care Overview  Goal: Plan of Care Review  Outcome: Ongoing (interventions implemented as appropriate)   10/04/18 6255   Coping/Psychosocial   Plan of Care Reviewed With patient   Plan of Care Review   Progress improving   OTHER   Outcome Summary Pt agreeable to PT, although declined most activity due to pain and nausea. Pt is s/p L TKR revision. SHe has increased post op pain, weakness, and decreased functional moblitty. She has had previous knee surgeries and states she has everything set up at home. She was only able to ambulate a few steps today to use BSC. Also unable to complete knee exercises due to not feeling well. Pt will continue to benefit from skilled PT.

## 2018-10-04 NOTE — ANESTHESIA PREPROCEDURE EVALUATION
Anesthesia Evaluation     no history of anesthetic complications:               Airway   Mallampati: I  TM distance: >3 FB  Neck ROM: full  no difficulty expected  Dental - normal exam     Pulmonary - negative pulmonary ROS and normal exam   (-) COPD, asthma, sleep apnea, not a smoker    PE comment: nonlabored  Cardiovascular - normal exam    Rhythm: regular  Rate: normal    (+) hypertension poorly controlled 2 medications or greater,   (-) valvular problems/murmurs, past MI, CAD, dysrhythmias, angina      Neuro/Psych  (+) headaches,     (-) seizures, TIA, CVA  GI/Hepatic/Renal/Endo    (+) morbid obesity,  renal disease stones,   (-) GERD, liver disease, diabetes, hypothyroidism, hyperthyroidism    Musculoskeletal     (+) arthralgias,   Abdominal    Substance History      OB/GYN          Other   (+) arthritis                     Anesthesia Plan    ASA 3     general and regional   (AC block for post-op pain PSR)  intravenous induction   Anesthetic plan, all risks, benefits, and alternatives have been provided, discussed and informed consent has been obtained with: patient.

## 2018-10-04 NOTE — THERAPY EVALUATION
Acute Care - Physical Therapy Initial Evaluation  [facility]     Patient Name: Joselyn Grant  : 1965  MRN: [id number]  Today's Date: 10/4/2018   Onset of Illness/Injury or Date of Surgery: 10/04/18  Date of Referral to PT: 10/04/18  Referring Physician: Althea      Admit Date: 10/4/2018    Visit Dx:     ICD-10-CM ICD-9-CM   1. Difficulty walking R26.2 719.7   2. 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)   • Morbid obesity with BMI of 40.0-44.9, adult (CMS/HCC)   • Osteoarthritis of knee   • Infection of left knee (CMS/HCC)     Past Medical History:   Diagnosis Date   • Arthritis    • History of kidney stones    • Hypertension    • Knee pain    • Migraine    • Risk factors for obstructive sleep apnea    • Staph infection     LEFT KNEE ON ANTIBIOTICS   • Urinary incontinence     wears pads     Past Surgical History:   Procedure Laterality Date   • COLONOSCOPY N/A 2017    Procedure: COLONOSCOPY to cecum;  Surgeon: Ino Torres MD;  Location: [facility] ENDOSCOPY;  Service:    • EYE SURGERY      lasix   • HYSTERECTOMY      10 years ago for heavy menses and fibroids   • KNEE SURGERY Left     MULTIPLE SURGERIES TOTAL 7   • LAPAROSCOPIC CHOLECYSTECTOMY     • OOPHORECTOMY     • NY REVISE KNEE JOINT REPLACE,1 PART Left 2017    Procedure: LT TOTAL KNEE ARTHROPLASTY REVISION;  Surgeon: Ha Oh MD;  Location: [facility] OR;  Service: Orthopedics   • NY REVISE KNEE JOINT REPLACE,1 PART Left 2017    Procedure: LT TOTAL KNEE ARTHROPLASTY REVISION;  Surgeon: Ha Oh MD;  Location: [facility] OR;  Service: Orthopedics   • REPLACEMENT TOTAL KNEE      left   • STOMACH SURGERY      lapband        PT ASSESSMENT (last 12 hours)      Physical Therapy Evaluation     Row Name 10/04/18 1524          PT Evaluation Time/Intention    Subjective  Information complains of;pain;nausea/vomiting  -EJ     Document Type evaluation  -EJ     Mode of Treatment physical therapy  -EJ     Patient Effort fair  -EJ     Symptoms Noted During/After Treatment increased pain   nausea  -EJ     Row Name 10/04/18 1524          General Information    Onset of Illness/Injury or Date of Surgery 10/04/18  -EJ     Referring Physician Althea  -EJ     Patient Observations alert;cooperative;agree to therapy  -EJ     Patient/Family Observations family present in room  -EJ     General Observations of Patient sitting on BSC upon entry to room, nsg assistant present  -EJ     Prior Level of Function independent:;all household mobility;community mobility;ADL's  -EJ     Equipment Currently Used at Home none  -EJ     Existing Precautions/Restrictions brace worn when out of bed   KI ON LLE when ambulating until pt can do SLR independently  -EJ     Barriers to Rehab none identified  -EJ     Row Name 10/04/18 1524          Relationship/Environment    Lives With spouse  -EJ     Row Name 10/04/18 1524          Resource/Environmental Concerns    Current Living Arrangements home/apartment/condo  -EJ     Row Name 10/04/18 1524          Cognitive Assessment/Intervention- PT/OT    Orientation Status (Cognition) oriented x 3  -EJ     Follows Commands (Cognition) WNL  -EJ     Personal Safety Interventions fall prevention program maintained;gait belt;nonskid shoes/slippers when out of bed;supervised activity  -EJ     Row Name 10/04/18 1524          Mobility Assessment/Treatment    Extremity Weight-bearing Status left lower extremity  -EJ     Left Lower Extremity (Weight-bearing Status) weight-bearing as tolerated (WBAT)  -EJ     Row Name 10/04/18 1524          Bed Mobility Assessment/Treatment    Bed Mobility Assessment/Treatment supine-sit;sit-supine  -EJ     Supine-Sit [place] (Bed Mobility) not tested  -EJ     Sit-Supine [place] (Bed Mobility) verbal cues;minimum assist (75% patient effort)   -     Assistive Device (Bed Mobility) bed rails  -EJ     Row Name 10/04/18 1524          Transfer Assessment/Treatment    Transfer Assessment/Treatment sit-stand transfer;stand-sit transfer  -     Sit-Stand [place] (Transfers) verbal cues;minimum assist (75% patient effort)  -     Stand-Sit [place] (Transfers) verbal cues;minimum assist (75% patient effort)  -EJ     Row Name 10/04/18 1524          Sit-Stand Transfer    Assistive Device (Sit-Stand Transfers) walker, front-wheeled  -[facility] Name 10/04/18 1524          Stand-Sit Transfer    Assistive Device (Stand-Sit Transfers) walker, front-wheeled  -EJ     Row Name 10/04/18 1524          Gait/Stairs Assessment/Training    [place] Level (Gait) verbal cues;minimum assist (75% patient effort);2 person assist  -     Assistive Device (Gait) walker, front-wheeled  -     Distance in Feet (Gait) 5  -EJ     Deviations/Abnormal Patterns (Gait) antalgic;kimberly decreased;stride length decreased  -     Bilateral Gait Deviations forward flexed posture;heel strike decreased  -     Comment (Gait/Stairs) declined further ambulation 2' pain/nausea  -EJ     Row Name 10/04/18 1524          General ROM    GENERAL ROM COMMENTS WFL, x L knee  -EJ     Row Name 10/04/18 1524          MMT (Manual Muscle Testing)    General MMT Comments post op weakness  -EJ     Row Name 10/04/18 1524          Motor Assessment/Intervention    Additional Documentation Therapeutic Exercise Interventions (Group)  -EJ     Row Name 10/04/18 1524          Therapeutic Exercise    Comment (Therapeutic Exercise) unable to complete exercises 2' pain/nausea, encouraged pt to perform AP's and QS later this evening as she is able  -EJ     Row Name 10/04/18 1524          Pain Assessment    Additional Documentation Pain Scale: Numbers Pre/Post-Treatment (Group)  -EJ     Row Name 10/04/18 1524          Pain Scale: Numbers Pre/Post-Treatment    Pain Scale: Numbers, Pretreatment 10/10  -      Pain Scale: Numbers, Post-Treatment 10/10  -EJ     Pain Location - Side Left  -EJ     Pain Location knee  -EJ     Row Name             Wound 10/04/18 0930 Left knee incision    Wound - Properties Group Date first assessed: 10/04/18  -MB Time first assessed: 0930  -MB Side: Left  -MB Location: knee  -MB Type: incision  -MB    Row Name 10/04/18 1524          Plan of Care Review    Plan of Care Reviewed With patient;family  -EJ     Row Name 10/04/18 1524          Physical Therapy Clinical Impression    Date of Referral to PT 10/04/18  -EJ     PT Diagnosis (PT Clinical Impression) s/p L TKR revision  -EJ     Patient/Family Goals Statement (PT Clinical Impression) Pt plans home at [place]  -EJ     Criteria for Skilled Interventions Met (PT Clinical Impression) treatment indicated  -EJ     Impairments Found (describe specific impairments) gait, locomotion, and balance  -EJ     Rehab Potential (PT Clinical Summary) good, to achieve stated therapy goals  -EJ     Row Name 10/04/18 1524          Physical Therapy Goals    Transfer Goal Selection (PT) transfer, PT goal 1  -EJ     Gait Training Goal Selection (PT) gait training, PT goal 1  -EJ     ROM Goal Selection (PT) ROM, PT goal 1  -EJ     Stairs Goal Selection (PT) stairs, PT goal 1  -EJ     Additional Documentation ROM Goal Selection (PT) (Row);Stairs Goal Selection (PT) (Row)  -EJ     Row Name 10/04/18 1524          Transfer Goal 1 (PT)    Activity/Assistive Device (Transfer Goal 1, PT) transfers, all;walker, rolling  -EJ     [place] Level/Cues Needed (Transfer Goal 1, PT) standby assist  -EJ     Time Frame (Transfer Goal 1, PT) 3 days  -EJ     Row Name 10/04/18 1524          Gait Training Goal 1 (PT)    Activity/Assistive Device (Gait Training Goal 1, PT) gait (walking locomotion);walker, rolling  -EJ     [place] Level (Gait Training Goal 1, PT) standby assist  -EJ     Distance (Gait Goal 1, PT) 100  -EJ     Time Frame (Gait Training Goal 1, PT) 3 days  -EJ     Row  Name 10/04/18 1524          ROM Goal 1 (PT)    ROM Goal 1 (PT) L knee 5-90  -EJ     Time Frame (ROM Goal 1, PT) 3 days  -EJ     Row Name 10/04/18 1524          Stairs Goal 1 (PT)    Activity/Assistive Device (Stairs Goal 1, PT) stairs, all skills  -EJ     [place] Level/Cues Needed (Stairs Goal 1, PT) contact guard assist  -EJ     Number of Stairs (Stairs Goal 1, PT) 4  -EJ     Time Frame (Stairs Goal 1, PT) 3 days  -EJ     Row Name 10/04/18 1524          Positioning and Restraints    Pre-Treatment Position bedside commode  -EJ     Post Treatment Position bed  -EJ     In Bed notified nsg;supine;call light within reach;encouraged to call for assist;with family/caregiver  -EJ       User Key  (r) = Recorded By, (t) = Taken By, (c) = Cosigned By    Initials Name Provider Type    Abiola Noland, RN Registered Nurse    Anastasia Flor, PT Physical Therapist          Physical Therapy Education     Title: PT OT SLP Therapies (Done)     Topic: Physical Therapy (Done)     Point: Mobility training (Done)    Learning Progress Summary     Learner Status Readiness Method Response Comment Documented by    Patient Done Acceptance E,TB,D VU,NR  EJ 10/04/18 1545    Family Done Acceptance E,TB,D VU,NR  EJ 10/04/18 1545          Point: Home exercise program (Done)    Learning Progress Summary     Learner Status Readiness Method Response Comment Documented by    Patient Done Acceptance E,TB,D VU,NR  EJ 10/04/18 1545    Family Done Acceptance E,TB,D VU,NR  EJ 10/04/18 1545          Point: Body mechanics (Done)    Learning Progress Summary     Learner Status Readiness Method Response Comment Documented by    Patient Done Acceptance E,TB,D VU,NR  EJ 10/04/18 1545    Family Done Acceptance E,TB,D VU,NR  EJ 10/04/18 1545          Point: Precautions (Done)    Learning Progress Summary     Learner Status Readiness Method Response Comment Documented by    Patient Done Acceptance E,TB,D VU,NR  EJ 10/04/18 1545    Family Done  Acceptance E,TB,D CIARA,CIPRIANO   10/04/18 1545                      User Key     Initials Effective Dates Name Provider Type Discipline     04/03/18 -  Anastasia Gorman, PT Physical Therapist PT                PT Recommendation and Plan  Anticipated Discharge Disposition (PT): home with assist, home with home health  Planned Therapy Interventions (PT Eval): bed mobility training, gait training, home exercise program, patient/family education, ROM (range of motion), stair training, strengthening, stretching, transfer training  Therapy Frequency (PT Clinical Impression): 2 times/day  Outcome Summary/Treatment Plan (PT)  Anticipated Discharge Disposition (PT): home with assist, home with home health  Plan of Care Reviewed With: patient  Progress: improving  Outcome Summary: Pt agreeable to PT, although declined most activity due to pain and nausea. Pt is s/p L TKR revision. SHe has increased post op pain, weakness, and decreased functional moblitty. She has had previous knee surgeries and states she has everything set up at home. She was only able to ambulate a few steps today to use BSC. Also unable to complete knee exercises due to not feeling well. Pt will continue to benefit from skilled PT.          Outcome Measures     Row Name 10/04/18 1500             How much help from another person do you currently need...    Turning from your back to your side while in flat bed without using bedrails? 3  -EJ      Moving from lying on back to sitting on the side of a flat bed without bedrails? 3  -EJ      Moving to and from a bed to a chair (including a wheelchair)? 3  -EJ      Standing up from a chair using your arms (e.g., wheelchair, bedside chair)? 3  -EJ      Climbing 3-5 steps with a railing? 2  -EJ      To walk in hospital room? 3  -EJ      AM-PAC 6 Clicks Score 17  -EJ         Functional Assessment    Outcome Measure Options AM-PAC 6 Clicks Basic Mobility (PT)  -EJ        User Key  (r) = Recorded By, (t) = Taken By,  (c) = Cosigned By    Initials Name Provider Type    EJ Anastasia Gorman, PT Physical Therapist           Time Calculation:         PT Charges     Row Name 10/04/18 1550             Time Calculation    Start Time 1524  -EJ      Stop Time 1542  -EJ      Time Calculation (min) 18 min  -EJ      PT Received On 10/04/18  -EJ      PT - Next Appointment 10/05/18  -EJ      PT Goal Re-Cert Due Date 10/07/18  -EJ         Timed Charges    82256 - PT Therapeutic Exercise Minutes 8  -EJ        User Key  (r) = Recorded By, (t) = Taken By, (c) = Cosigned By    Initials Name Provider Type    EJ Anastasia Gorman, PT Physical Therapist        Therapy Suggested Charges     Code   Minutes Charges    45259 (CPT®) Hc Pt Neuromusc Re Education Ea 15 Min      99529 (CPT®) Hc Pt Ther Proc Ea 15 Min 8 1    71763 (CPT®) Hc Gait Training Ea 15 Min      50910 (CPT®) Hc Pt Therapeutic Act Ea 15 Min      73899 (CPT®) Hc Pt Manual Therapy Ea 15 Min      75416 (CPT®) Hc Pt Iontophoresis Ea 15 Min      15569 (CPT®) Hc Pt Elec Stim Ea-Per 15 Min      09837 (CPT®) Hc Pt Ultrasound Ea 15 Min      16227 (CPT®) Hc Pt Self Care/Mgmt/Train Ea 15 Min      96765 (CPT®) Hc Pt Prosthetic (S) Train Initial Encounter, Each 15 Min      91252 (CPT®) Hc Pt Orthotic(S)/Prosthetic(S) Encounter, Each 15 Min      87440 (CPT®) Hc Orthotic(S) Mgmt/Train Initial Encounter, Each 15min      Total  8 1        Therapy Charges for Today     Code Description Service Date Service Provider Modifiers Qty    59336335504 HC PT EVAL MOD COMPLEXITY 2 10/4/2018 Anastasia Gorman, PT GP 1    35221084372 HC PT THER PROC EA 15 MIN 10/4/2018 Anastasia Gorman, PT GP 1          PT G-Codes  Outcome Measure Options: AM-PAC 6 Clicks Basic Mobility (PT)  AM-PAC 6 Clicks Score: 17      Anastasia Gorman, PT  10/4/2018

## 2018-10-04 NOTE — ANESTHESIA PROCEDURE NOTES
Peripheral Block      Patient reassessed immediately prior to procedure    Patient location during procedure: holding area  Start time: 10/4/2018 7:09 AM  Stop time: 10/4/2018 7:12 AM  Reason for block: at surgeon's request and post-op pain management  Performed by  Anesthesiologist: PAOLA MCPHERSON  Preanesthetic Checklist  Completed: patient identified, site marked, surgical consent, pre-op evaluation, timeout performed, IV checked, risks and benefits discussed and monitors and equipment checked  Prep:  Sterile barriers:cap, gloves and mask  Prep: ChloraPrep  Patient monitoring: blood pressure monitoring, continuous pulse oximetry and EKG  Procedure  Sedation:yes    Guidance:ultrasound guided  ULTRASOUND INTERPRETATION.  Using ultrasound guidance a 21 G gauge needle was placed in close proximity to the femoral nerve, at which point, under ultrasound guidance anesthetic was injected in the area of the nerve and spread of the anesthesia was seen on ultrasound in close proximity thereto.  There were no abnormalities seen on ultrasound; a digital image was taken; and the patient tolerated the procedure with no complications. Images:still images obtained    Laterality:left  Block Type:adductor canal block (Femoral Nerve at Adductor Canal)  Injection Technique:single-shot  Needle Type:short-bevel  Needle Gauge:21 G    Medications  Local Injected:ropivacaine 0.5% without epinephrine Local Amount Injected:30mL  Post Assessment  Injection Assessment: negative aspiration for heme, no paresthesia on injection and incremental injection  Patient Tolerance:comfortable throughout block  Complications:no

## 2018-10-05 PROBLEM — D62 ACUTE BLOOD LOSS AS CAUSE OF POSTOPERATIVE ANEMIA: Status: ACTIVE | Noted: 2018-10-05

## 2018-10-05 LAB
ANION GAP SERPL CALCULATED.3IONS-SCNC: 12.2 MMOL/L
BASOPHILS # BLD AUTO: 0.02 10*3/MM3 (ref 0–0.2)
BASOPHILS NFR BLD AUTO: 0.3 % (ref 0–1.5)
BUN BLD-MCNC: 12 MG/DL (ref 6–20)
BUN/CREAT SERPL: 17.1 (ref 7–25)
CALCIUM SPEC-SCNC: 9 MG/DL (ref 8.6–10.5)
CHLORIDE SERPL-SCNC: 102 MMOL/L (ref 98–107)
CO2 SERPL-SCNC: 22.8 MMOL/L (ref 22–29)
CREAT BLD-MCNC: 0.7 MG/DL (ref 0.57–1)
CYTO UR: NORMAL
DEPRECATED RDW RBC AUTO: 49.6 FL (ref 37–54)
EOSINOPHIL # BLD AUTO: 0.12 10*3/MM3 (ref 0–0.7)
EOSINOPHIL NFR BLD AUTO: 1.8 % (ref 0.3–6.2)
ERYTHROCYTE [DISTWIDTH] IN BLOOD BY AUTOMATED COUNT: 14.9 % (ref 11.7–13)
GFR SERPL CREATININE-BSD FRML MDRD: 107 ML/MIN/1.73
GLUCOSE BLD-MCNC: 98 MG/DL (ref 65–99)
HCT VFR BLD AUTO: 34.7 % (ref 35.6–45.5)
HGB BLD-MCNC: 10.7 G/DL (ref 11.9–15.5)
IMM GRANULOCYTES # BLD: 0 10*3/MM3 (ref 0–0.03)
IMM GRANULOCYTES NFR BLD: 0 % (ref 0–0.5)
LAB AP CASE REPORT: NORMAL
LYMPHOCYTES # BLD AUTO: 1.43 10*3/MM3 (ref 0.9–4.8)
LYMPHOCYTES NFR BLD AUTO: 22 % (ref 19.6–45.3)
Lab: NORMAL
MCH RBC QN AUTO: 28.1 PG (ref 26.9–32)
MCHC RBC AUTO-ENTMCNC: 30.8 G/DL (ref 32.4–36.3)
MCV RBC AUTO: 91.1 FL (ref 80.5–98.2)
MONOCYTES # BLD AUTO: 0.63 10*3/MM3 (ref 0.2–1.2)
MONOCYTES NFR BLD AUTO: 9.7 % (ref 5–12)
NEUTROPHILS # BLD AUTO: 4.3 10*3/MM3 (ref 1.9–8.1)
NEUTROPHILS NFR BLD AUTO: 66.2 % (ref 42.7–76)
PATH REPORT.FINAL DX SPEC: NORMAL
PATH REPORT.GROSS SPEC: NORMAL
PLATELET # BLD AUTO: 188 10*3/MM3 (ref 140–500)
PMV BLD AUTO: 10.2 FL (ref 6–12)
POTASSIUM BLD-SCNC: 3.9 MMOL/L (ref 3.5–5.2)
RBC # BLD AUTO: 3.81 10*6/MM3 (ref 3.9–5.2)
SODIUM BLD-SCNC: 137 MMOL/L (ref 136–145)
WBC NRBC COR # BLD: 6.5 10*3/MM3 (ref 4.5–10.7)

## 2018-10-05 PROCEDURE — 25010000002 HYDROMORPHONE PER 4 MG: Performed by: ORTHOPAEDIC SURGERY

## 2018-10-05 PROCEDURE — 85025 COMPLETE CBC W/AUTO DIFF WBC: CPT | Performed by: ORTHOPAEDIC SURGERY

## 2018-10-05 PROCEDURE — 25010000002 ONDANSETRON PER 1 MG: Performed by: ORTHOPAEDIC SURGERY

## 2018-10-05 PROCEDURE — 63710000001 PROMETHAZINE PER 12.5 MG: Performed by: ORTHOPAEDIC SURGERY

## 2018-10-05 PROCEDURE — 25010000003 CEFAZOLIN IN DEXTROSE 2-4 GM/100ML-% SOLUTION: Performed by: ORTHOPAEDIC SURGERY

## 2018-10-05 PROCEDURE — 80048 BASIC METABOLIC PNL TOTAL CA: CPT | Performed by: ORTHOPAEDIC SURGERY

## 2018-10-05 RX ORDER — CEFAZOLIN SODIUM 2 G/100ML
2 INJECTION, SOLUTION INTRAVENOUS EVERY 8 HOURS
Status: DISCONTINUED | OUTPATIENT
Start: 2018-10-05 | End: 2018-10-06

## 2018-10-05 RX ADMIN — CELECOXIB 200 MG: 200 CAPSULE ORAL at 08:37

## 2018-10-05 RX ADMIN — PROMETHAZINE HYDROCHLORIDE 12.5 MG: 12.5 TABLET ORAL at 21:12

## 2018-10-05 RX ADMIN — AMLODIPINE BESYLATE 10 MG: 10 TABLET ORAL at 06:44

## 2018-10-05 RX ADMIN — HYDROMORPHONE HYDROCHLORIDE 0.5 MG: 1 INJECTION, SOLUTION INTRAMUSCULAR; INTRAVENOUS; SUBCUTANEOUS at 18:37

## 2018-10-05 RX ADMIN — HYDROCODONE BITARTRATE AND ACETAMINOPHEN 2 TABLET: 7.5; 325 TABLET ORAL at 08:37

## 2018-10-05 RX ADMIN — RIFAMPIN 300 MG: 300 CAPSULE ORAL at 21:12

## 2018-10-05 RX ADMIN — METOPROLOL SUCCINATE 50 MG: 50 TABLET, FILM COATED, EXTENDED RELEASE ORAL at 08:37

## 2018-10-05 RX ADMIN — HYDROCODONE BITARTRATE AND ACETAMINOPHEN 2 TABLET: 7.5; 325 TABLET ORAL at 03:42

## 2018-10-05 RX ADMIN — ASPIRIN 325 MG: 325 TABLET, DELAYED RELEASE ORAL at 21:12

## 2018-10-05 RX ADMIN — CEFAZOLIN SODIUM 2 G: 2 INJECTION, SOLUTION INTRAVENOUS at 00:45

## 2018-10-05 RX ADMIN — HYDROMORPHONE HYDROCHLORIDE 0.5 MG: 1 INJECTION, SOLUTION INTRAMUSCULAR; INTRAVENOUS; SUBCUTANEOUS at 06:49

## 2018-10-05 RX ADMIN — HYDROCODONE BITARTRATE AND ACETAMINOPHEN 2 TABLET: 7.5; 325 TABLET ORAL at 13:14

## 2018-10-05 RX ADMIN — PROMETHAZINE HYDROCHLORIDE 12.5 MG: 12.5 TABLET ORAL at 08:45

## 2018-10-05 RX ADMIN — CEFAZOLIN SODIUM 2 G: 2 INJECTION, SOLUTION INTRAVENOUS at 23:49

## 2018-10-05 RX ADMIN — ASPIRIN 325 MG: 325 TABLET, DELAYED RELEASE ORAL at 08:37

## 2018-10-05 RX ADMIN — ONDANSETRON 4 MG: 4 TABLET, ORALLY DISINTEGRATING ORAL at 18:37

## 2018-10-05 RX ADMIN — HYDROMORPHONE HYDROCHLORIDE 0.5 MG: 1 INJECTION, SOLUTION INTRAMUSCULAR; INTRAVENOUS; SUBCUTANEOUS at 21:12

## 2018-10-05 RX ADMIN — HYDROCODONE BITARTRATE AND ACETAMINOPHEN 2 TABLET: 7.5; 325 TABLET ORAL at 17:52

## 2018-10-05 RX ADMIN — POTASSIUM CHLORIDE 10 MEQ: 750 CAPSULE, EXTENDED RELEASE ORAL at 08:38

## 2018-10-05 RX ADMIN — ONDANSETRON 4 MG: 4 TABLET, FILM COATED ORAL at 03:42

## 2018-10-05 RX ADMIN — PROMETHAZINE HYDROCHLORIDE 12.5 MG: 12.5 TABLET ORAL at 15:42

## 2018-10-05 RX ADMIN — ONDANSETRON 4 MG: 2 INJECTION INTRAMUSCULAR; INTRAVENOUS at 13:14

## 2018-10-05 RX ADMIN — HYDROMORPHONE HYDROCHLORIDE 0.5 MG: 1 INJECTION, SOLUTION INTRAMUSCULAR; INTRAVENOUS; SUBCUTANEOUS at 10:37

## 2018-10-05 RX ADMIN — HYDROCODONE BITARTRATE AND ACETAMINOPHEN 2 TABLET: 7.5; 325 TABLET ORAL at 22:00

## 2018-10-05 RX ADMIN — RIFAMPIN 300 MG: 300 CAPSULE ORAL at 08:37

## 2018-10-05 RX ADMIN — HYDROMORPHONE HYDROCHLORIDE 0.5 MG: 1 INJECTION, SOLUTION INTRAMUSCULAR; INTRAVENOUS; SUBCUTANEOUS at 15:41

## 2018-10-05 NOTE — CONSULTS
CONSULT NOTE    Infectious Diseases - Karen Ramires MD  [facility]       Patient Identification:  Name: Joselyn Grant  Age: 53 y.o.  Sex: female  :  1965  MRN: [id number]             Date of Consultation:  10/4/2018        Primary Care Physician: Leodan Nam MD                               Requesting Physician: Dr. 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  Subsequently she moved to [place] and was treated with IV Rocephin 2 g every 24 under the supervision of Dr. Harvey.  Patient was referred to  for stage II of the two-stage care plan for this prosthetic joint infection.  Patient has completed antibiotic therapy and was observed on oral rifampin and was brought in today for elective removal of antibiotic spacer and a stage II implant.  Patient has undergone this procedure without any complication and was seen postoperatively as cross cover for Dr. Harvey.  Her latest inflammatory markers as of 2018 were essentially normal CRP of 0.36 and sedimentation rate of 31.  1-left knee prosthetic joint infection status post stage II implant following successful course of IV antibiotics after the removal of infected hardware and antibiotic spacer placement with inflammatory markers normalizing.  2-history of MSSA infection  3-hypertension       Recommendations/Discussions: At this juncture I would recommend continuing cefazolin as perioperative antibiotic therapy as you have planned and stop as per guidelines.  Whether she would need subsequent antibiotic treatment following this stage II implantation in the context of normal inflammatory markers with depend upon  operative findings and the results of intraoperative cultures.  At this point theoretically she should not need any chronic suppressive therapy (IDSA recommended use of suppressive therapy following surgery and antibiotic treatment(1)   debridement and retention - indefinite chronic oral suppression   2-stage arthroplasty - not recommended   1-stage - indefinite chronic oral suppression   amputation - not stated)   but  3 months of treatment can be recommended if the operative findings as noted by orthopedic surgery service is concerning for ongoing inflammation.  In that scenario oral Keflex 500 mg by mouth twice a day may be reasonable strategy.  Thank you Dr. 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  Subsequently she moved to [place] and was treated with IV Rocephin 2 g every 24 under the supervision of Dr. Harvey.  Patient was referred to  for stage II of the two-stage care plan for this prosthetic joint infection.  Patient has completed antibiotic therapy and was observed on oral rifampin and was brought in today for elective removal of antibiotic spacer and a stage II implant.  Patient has undergone this procedure without any complication and was seen postoperatively as cross cover for Dr. Harvey.  Her latest inflammatory markers as of 9/28/2018 were essentially normal CRP of 0.36 and sedimentation rate of 31.      Past Medical History:  Past Medical History:   Diagnosis Date   • Arthritis    • History of kidney stones    • Hypertension    • Knee pain    • Migraine    • Risk factors for obstructive sleep apnea    • Staph infection     LEFT KNEE ON ANTIBIOTICS   • Urinary incontinence     wears pads     Past  Surgical History:  Past Surgical History:   Procedure Laterality Date   • COLONOSCOPY N/A 12/20/2017    Procedure: COLONOSCOPY to cecum;  Surgeon: Ino Torres MD;  Location: [facility] ENDOSCOPY;  Service:    • EYE SURGERY      lasix   • HYSTERECTOMY      10 years ago for heavy menses and fibroids   • KNEE SURGERY Left     MULTIPLE SURGERIES TOTAL 7   • LAPAROSCOPIC CHOLECYSTECTOMY     • OOPHORECTOMY     • CT REVISE KNEE JOINT REPLACE,1 PART Left 1/31/2017    Procedure: LT TOTAL KNEE ARTHROPLASTY REVISION;  Surgeon: Ha Oh MD;  Location: [facility] MAIN OR;  Service: Orthopedics   • CT REVISE KNEE JOINT REPLACE,1 PART Left 6/2/2017    Procedure: LT TOTAL KNEE ARTHROPLASTY REVISION;  Surgeon: Ha Oh MD;  Location: [facility] OR;  Service: Orthopedics   • REPLACEMENT TOTAL KNEE      left   • STOMACH SURGERY      lapband      Home Meds:  Prescriptions Prior to Admission   Medication Sig Dispense Refill Last Dose   • amLODIPine (NORVASC) 10 MG tablet Take 1 tablet by mouth Every Morning. 90 tablet 3 10/4/2018 at 0445   • ferrous sulfate 325 (65 FE) MG tablet Take 325 mg by mouth Daily With Breakfast.   10/3/2018 at 0900   • furosemide (LASIX) 40 MG tablet Take 1 tablet by mouth Every Morning. 90 tablet 3 10/3/2018 at 0900   • HYDROcodone-acetaminophen (NORCO)  MG per tablet Take 1 tablet by mouth Every 6 (Six) Hours As Needed for Moderate Pain .   10/4/2018 at 0445   • melatonin 5 MG tablet tablet Take 5 mg by mouth At Night As Needed.   Past Month at Unknown time   • metoprolol succinate XL (TOPROL-XL) 25 MG 24 hr tablet Take 2 tablets by mouth Daily. 180 tablet 2 10/4/2018 at 0445   • rifAMPin (RIFADIN) 300 MG capsule Take 300 mg by mouth Every 12 (Twelve) Hours.   10/4/2018 at 0445   • celecoxib (CeleBREX) 200 MG capsule Take 200 mg by mouth Daily. PT TO CHECK WITH MD FOR STOP DATE   10/1/2018 at 0900   • EPINEPHrine (EPIPEN) 0.3 MG/0.3ML solution auto-injector injection As  Needed.   Not Taking   • estradiol (MINIVELLE, VIVELLE-DOT) 0.075 MG/24HR patch Place 1 patch on the skin as directed by provider 2 (Two) Times a Week. (Patient taking differently: Place 1 patch on the skin as directed by provider 2 (Two) Times a Week. On Tuesdays and Fridays) 24 patch 6 10/2/2018 at 0900   • ibuprofen (ADVIL,MOTRIN) 200 MG tablet Take 200 mg by mouth Every 6 (Six) Hours As Needed for Mild Pain . HELD   9/27/2018   • metoprolol succinate XL (TOPROL-XL) 25 MG 24 hr tablet TAKE ONE TABLET BY MOUTH DAILY 21 tablet 0    • POTASSIUM PO Take 99 mg by mouth Daily. 99 mg   9/28/2018     Current Meds:     Current Facility-Administered Medications:   •  acetaminophen (TYLENOL) tablet 325 mg, 325 mg, Oral, Q4H PRN, Ha Oh MD  •  [START ON 10/5/2018] amLODIPine (NORVASC) tablet 10 mg, 10 mg, Oral, NICOL, Ha Oh MD  •  [START ON 10/5/2018] aspirin EC tablet 325 mg, 325 mg, Oral, Q12H, Ha Oh MD  •  bisacodyl (DULCOLAX) EC tablet 10 mg, 10 mg, Oral, Daily PRN, Ha Oh MD  •  bisacodyl (DULCOLAX) suppository 10 mg, 10 mg, Rectal, Daily PRN, Ha Oh MD  •  ceFAZolin in dextrose (ANCEF) IVPB solution 2 g, 2 g, Intravenous, Q8H, Ha Oh MD, 2 g at 10/04/18 1618  •  celecoxib (CeleBREX) capsule 200 mg, 200 mg, Oral, Daily, Ha Oh MD, 200 mg at 10/04/18 1358  •  docusate sodium (COLACE) capsule 100 mg, 100 mg, Oral, BID, Ha Oh MD  •  [START ON 10/5/2018] furosemide (LASIX) tablet 40 mg, 40 mg, Oral, QAJULY, Ha Oh MD  •  HYDROcodone-acetaminophen (NORCO) 7.5-325 MG per tablet 1 tablet, 1 tablet, Oral, Q4H PRN, Ha Oh MD  •  HYDROcodone-acetaminophen (NORCO) 7.5-325 MG per tablet 2 tablet, 2 tablet, Oral, Q4H PRN, Ha Oh MD, 2 tablet at 10/04/18 1752  •  HYDROmorphone (DILAUDID) injection 0.5 mg, 0.5 mg, Intravenous, Q2H PRN, 0.5 mg  at 10/04/18 1608 **AND** naloxone (NARCAN) injection 0.1 mg, 0.1 mg, Intravenous, Q5 Min PRN, Ha Oh MD  •  melatonin tablet 5 mg, 5 mg, Oral, Nightly PRN, Ha Oh MD  •  metoprolol succinate XL (TOPROL-XL) 24 hr tablet 50 mg, 50 mg, Oral, Daily, Ha Oh MD  •  ondansetron (ZOFRAN) tablet 4 mg, 4 mg, Oral, Q6H PRN **OR** ondansetron ODT (ZOFRAN-ODT) disintegrating tablet 4 mg, 4 mg, Oral, Q6H PRN **OR** ondansetron (ZOFRAN) injection 4 mg, 4 mg, Intravenous, Q6H PRN, Ha Oh MD, 4 mg at 10/04/18 1520  •  potassium chloride (MICRO-K) CR capsule 10 mEq, 10 mEq, Oral, Daily, Ha Oh MD, 10 mEq at 10/04/18 1358  •  rifAMPin (RIFADIN) capsule 300 mg, 300 mg, Oral, Q12H, Ha Oh MD  •  sodium chloride 0.9 % infusion, 100 mL/hr, Intravenous, Continuous, Ha Oh MD, Last Rate: 100 mL/hr at 10/04/18 1444, 100 mL/hr at 10/04/18 1444  Allergies:  No Known Allergies  Social History:   Social History   Substance Use Topics   • Smoking status: Never Smoker   • Smokeless tobacco: Never Used   • Alcohol use 1.8 oz/week     1 Shots of liquor, 2 Glasses of wine per week      Comment: SOCIAL      Family History:  Family History   Problem Relation Age of Onset   • Pancreatic cancer Mother    • Hypertension Mother    • No Known Problems Father    • Fibroids Sister    • Heart murmur Sister    • Lymphoma Maternal Uncle    • Lung cancer Maternal Grandmother    • Bell's palsy Brother    • Hypertension Brother    • No Known Problems Maternal Grandfather    • No Known Problems Brother    • Malig Hyperthermia Neg Hx           Review of Systems  See history of present illness and past medical history.  Patient denies headache, dizziness, syncope, falls, trauma, change in vision, change in hearing, change in taste, changes in weight, changes in appetite, focal weakness, numbness, or paresthesia.  Patient denies chest pain,  "palpitations, dyspnea, orthopnea, PND, cough, sinus pressure, rhinorrhea, epistaxis, hemoptysis, nausea, vomiting,hematemesis, diarrhea, constipation or hematchezia.  Denies cold or heat intolerance, polydipsia, polyuria, polyphagia. Denies hematuria, pyuria, dysuria, hesitancy, frequency or urgency. Denies consumption of raw and under cooked meats foods or change in water source.  Denies fever, chills, sweats, night sweats.  Denies missing any routine medications. Remainder of ROS is negative.      Vitals:   /93 (BP Location: Left arm, Patient Position: Lying)   Pulse 90   Temp 97.2 °F (36.2 °C) (Oral)   Resp 16   Ht 157.5 cm (62.01\")   Wt 103 kg (227 lb 1.2 oz)   SpO2 94%   BMI 41.52 kg/m²   I/O:   Intake/Output Summary (Last 24 hours) at 10/04/18 2047  Last data filed at 10/04/18 1245   Gross per 24 hour   Intake             1500 ml   Output              225 ml   Net             1275 ml     Exam:  General Appearance:    Alert, cooperative, no distress, appears stated age   Head:    Normocephalic, without obvious abnormality, atraumatic   Eyes:    PERRL, conjunctiva/corneas clear, EOM's intact, both eyes   Ears:    Normal external ear canals, both ears   Nose:   Nares normal, septum midline, mucosa normal, no drainage    or sinus tenderness   Throat:   Lips, tongue, gums normal; oral mucosa pink and moist   Neck:   Supple, symmetrical, trachea midline, no adenopathy;     thyroid:  no enlargement/tenderness/nodules; no carotid    bruit or JVD   Back:     Symmetric, no curvature, ROM normal, no CVA tenderness   Lungs:     Clear to auscultation bilaterally, respirations unlabored   Chest Wall:    No tenderness or deformity    Heart:    Regular rate and rhythm, S1 and S2 normal, no murmur, rub   or gallop   Abdomen:     Soft, non-tender, bowel sounds active all four quadrants,     no masses, no hepatomegaly, no splenomegaly   Extremities:   Left knee is in immobilizer    Pulses:   Pulses palpable in all " extremities; symmetric all extremities   Skin:   Skin color normal, Skin is warm and dry,  no rashes or palpable lesions   Neurologic:   CNII-XII intact, motor strength grossly intact, sensation grossly intact to light touch, no focal deficits noted       Data Review:    I reviewed the patient's new clinical results.    Results from last 7 days  Lab Units 09/28/18  1442   WBC 10*3/mm3 3.71*   HEMOGLOBIN g/dL 12.6   PLATELETS 10*3/mm3 241       Results from last 7 days  Lab Units 09/28/18  1442   SODIUM mmol/L 137   POTASSIUM mmol/L 4.4   CHLORIDE mmol/L 99   CO2 mmol/L 26.6   BUN mg/dL 18   CREATININE mg/dL 0.83   CALCIUM mg/dL 9.9   GLUCOSE mg/dL 94     Culture   Date Value Ref Range Status   06/02/2017 No growth at 3 days  Final   ]  Microbiology Results (last 10 days)     Procedure Component Value - Date/Time    Tissue / Bone Culture - Tissue, Knee, Right [[id number]] Collected:  10/04/18 0837    Lab Status:  Preliminary result Specimen:  Tissue from Knee, Right Updated:  10/04/18 1106     Gram Stain Result No WBCs or organisms seen            Assessment:  Active Hospital Problems    Diagnosis Date Noted   • Osteoarthritis of knee [M17.10] 10/04/2018   • Infection of left knee (CMS/HCC) [M00.9] 10/04/2018      Resolved Hospital Problems    Diagnosis Date Noted Date Resolved   No resolved problems to display.         Plan:  See above  Karen Pak MD   10/4/2018  8:47 PM    Much of this encounter note is an electronic transcription/translation of spoken language to printed text. The electronic translation of spoken language may permit erroneous, or at times, nonsensical words or phrases to be inadvertently transcribed; Although I have reviewed the note for such errors, some may still exist

## 2018-10-05 NOTE — PROGRESS NOTES
"  Infectious Diseases Progress Note    Karen Pak MD     [facility]  [place]: 1 day  Patient Identification:  Name: Joselyn Grant  Age: 53 y.o.  Sex: female  :  1965  MRN: [id number]         Primary Care Physician: Leodan Nam MD            Subjective: Feeling better denies any fever and chills.  Able to ambulate and bear weight on her left leg.  Interval History: See consultation note.     Objective:    Scheduled Meds:  amLODIPine 10 mg Oral QAM   aspirin 325 mg Oral Q12H   celecoxib 200 mg Oral Daily   docusate sodium 100 mg Oral BID   furosemide 40 mg Oral QAM   metoprolol succinate XL 50 mg Oral Daily   potassium chloride 10 mEq Oral Daily   rifAMPin 300 mg Oral Q12H     Continuous Infusions:  sodium chloride 100 mL/hr Last Rate: 100 mL/hr (10/04/18 1444)       Vital signs in last 24 hours:  Temp:  [97.2 °F (36.2 °C)-99.9 °F (37.7 °C)] 99.9 °F (37.7 °C)  Heart Rate:  [] 102  Resp:  [12-16] 16  BP: (108-145)/(48-94) 126/70    Intake/Output:    Intake/Output Summary (Last 24 hours) at 10/05/18 0951  Last data filed at 10/05/18 0346   Gross per 24 hour   Intake             1350 ml   Output             1375 ml   Net              -25 ml       Exam:  /70 (BP Location: Left arm, Patient Position: Lying)   Pulse 102   Temp 99.9 °F (37.7 °C) (Oral)   Resp 16   Ht 157.5 cm (62.01\")   Wt 103 kg (227 lb 1.2 oz)   SpO2 95%   BMI 41.52 kg/m²     General Appearance:    Alert, cooperative, no distress, AAOx3                          Head:    Normocephalic, without obvious abnormality, atraumatic                           Eyes:    PERRL, conjunctiva/corneas clear, EOM's intact, both eyes                         Throat:   Lips, tongue, gums normal; oral mucosa pink and moist                           Neck:   Supple, symmetrical, trachea midline, no JVD                         Lungs:    Clear to auscultation bilaterally, respirations unlabored                 Chest Wall:    No " tenderness or deformity                          Heart:    Regular rate and rhythm, S1 and S2 normal, no murmur,no  Rub                                      or gallop                  Abdomen:     Soft, non-tender, bowel sounds active, no masses, no                                                        organomegaly                  Extremities:   Left knee is dressed                        Pulses:   Pulses palpable in all extremities                            Skin:   Skin is warm and dry,  no rashes or palpable lesions                    Data Review:    I reviewed the patient's new clinical results.    Results from last 7 days  Lab Units 10/05/18  0418 09/28/18  1442   WBC 10*3/mm3 6.50 3.71*   HEMOGLOBIN g/dL 10.7* 12.6   PLATELETS 10*3/mm3 188 241       Results from last 7 days  Lab Units 10/05/18  0418 09/28/18  1442   SODIUM mmol/L 137 137   POTASSIUM mmol/L 3.9 4.4   CHLORIDE mmol/L 102 99   CO2 mmol/L 22.8 26.6   BUN mg/dL 12 18   CREATININE mg/dL 0.70 0.83   CALCIUM mg/dL 9.0 9.9   GLUCOSE mg/dL 98 94       Microbiology Results (last 10 days)     Procedure Component Value - Date/Time    Tissue / Bone Culture - Tissue, Knee, Right [[id number]]  (Normal) Collected:  10/04/18 0837    Lab Status:  Preliminary result Specimen:  Tissue from Knee, Right Updated:  10/05/18 0817     Tissue Culture No growth     Gram Stain Result No WBCs or organisms seen      [id number]    Assessment:  Active Problems:    Osteoarthritis of knee    Infection of left knee (CMS/[facility])      Plan:  Finish perioperative antibiotics as prophylaxis for stage II implant.  Would not need for long suppressive antibiotic therapy.  Okay to discharge from infectious disease standpoint with follow-up with Dr. Harvey.  Would recommend discontinuation of rifampin as should not be used alone for staph aureus infection.  Karen Pak MD  10/5/2018  9:51 AM    Much of this encounter note is an electronic transcription/translation of spoken language to printed  text. The electronic translation of spoken language may permit erroneous, or at times, nonsensical words or phrases to be inadvertently transcribed; Although I have reviewed the note for such errors, some may still exist

## 2018-10-05 NOTE — SIGNIFICANT NOTE
10/05/18 0942   Rehab Treatment   Discipline physical therapy assistant   Reason Treatment Not Performed patient/family declined treatment, not feeling well  (Pt refused AM therapy)   Recommendation   PT - Next Appointment 10/05/18

## 2018-10-05 NOTE — PROGRESS NOTES
Discharge Planning Assessment  [facility]     Patient Name: Joselyn Grant  MRN: [id number]  Today's Date: 10/5/2018    Admit Date: 10/4/2018          Discharge Needs Assessment     Row Name 10/05/18 1559       Living Environment    Lives With spouse    Current Living Arrangements home/apartment/condo       Transition Planning    Patient/Family Anticipates Transition to home with family    Patient/Family Anticipated Services at Transition home health care    Transportation Anticipated family or friend will provide       Discharge Needs Assessment    Readmission Within the Last 30 Days no previous admission in last 30 days    Concerns to be Addressed no discharge needs identified    Equipment Currently Used at Home walker, standard    Discharge Facility/Level of Care Needs home with home health    Offered/Gave Vendor List yes            Discharge Plan     Row Name 10/05/18 1559       Plan    Plan Home w/ spouse and [facility]     Patient/Family in Agreement with Plan yes    Plan Comments Met w/ pt and spouse at the bedside verified facesheet and d/c plan. Pt plans to d/c home w/ her spouse and [facility]. Aimee/[facility] notified.         Destination     No service coordination in this encounter.      Durable Medical Equipment     No service coordination in this encounter.      Dialysis/Infusion     No service coordination in this encounter.      Home Medical Care - Selection Complete     Service Request Status Selected Specialties Address Phone Number Fax Number    [facility] Selected [facility] Health Services [address] [address] [phone]      Social Care     No service coordination in this encounter.                Demographic Summary    No documentation.           Functional Status    No documentation.           Psychosocial    No documentation.           Abuse/Neglect    No documentation.           Legal    No documentation.           Substance Abuse    No  documentation.           Patient Forms    No documentation.         Carlita Lares RN

## 2018-10-05 NOTE — PROGRESS NOTES
Orthopedic Progress Note      Patient: Joselyn Grant  YOB: 1965     Date of Admission: 10/4/2018  5:53 AM Medical Record Number: [id number]     Attending Physician: Ha Oh,*    Status Post:  LEFT TOTAL KNEE ARTHROPLASTY REVISION Post Operative Day Number: 1    Subjective : No new orthopaedic complaints     Pain Relief: some relief with present medication.     Systemic Complaints: No Complaints  Vitals:    10/05/18 0415 10/05/18 0728 10/05/18 1150 10/05/18 1152   BP:  126/70  123/79   BP Location:  Left arm  Left arm   Patient Position:  Lying  Lying   Pulse: 99 102 93 93   Resp:       Temp:  99.9 °F (37.7 °C) 98 °F (36.7 °C) 98 °F (36.7 °C)   TempSrc:  Oral Oral Oral   SpO2:  95%  97%   Weight:       Height:           Physical Exam: 53 y.o. female    General Appearance:       Alert, cooperative, in no acute distress                  Extremities:    Dressing Clean, Dry and Intact         Incision healthy without signs or symptoms of infections         No clinical sign of DVT        Able to do good movements of digits    Pulses:   Pulses palpable and equal bilaterally           Diagnostic Tests:       Results from last 7 days  Lab Units 10/05/18  0418   WBC 10*3/mm3 6.50   HEMOGLOBIN g/dL 10.7*   HEMATOCRIT % 34.7*   PLATELETS 10*3/mm3 188       Results from last 7 days  Lab Units 10/05/18  0418   SODIUM mmol/L 137   POTASSIUM mmol/L 3.9   CHLORIDE mmol/L 102   CO2 mmol/L 22.8   BUN mg/dL 12   CREATININE mg/dL 0.70   GLUCOSE mg/dL 98   CALCIUM mg/dL 9.0         No results found for: URICACID  No results found for: CRYSTAL  Microbiology Results (last 10 days)     Procedure Component Value - Date/Time    Tissue / Bone Culture - Tissue, Knee, Right [[id number]]  (Normal) Collected:  10/04/18 0837    Lab Status:  Preliminary result Specimen:  Tissue from Knee, Right Updated:  10/05/18 0817     Tissue Culture No growth     Gram Stain Result No WBCs or organisms seen        Xr Knee 1 Or 2  View Left    Result Date: 10/1/2018   No active disease is seen in the chest.  LEFT KNEE: This is correlated to prior postoperative views of the left knee 06/02/2017. There has been interval revision in the left knee prosthesis. There is a spacer in the left knee joint. There has been removal of the femoral stem and tibial stem and placement of cement in the distal left femur proximal left tibia, cement spacer in the left knee joint. No acute fracture seen. There is swelling in the knee on the lateral view. There is a suggestion of a knee joint effusion in the suprapatellar bursa.  IMPRESSION:  Since prior plain film series the left knee 06/02/2017, there has been removal of the left knee prosthesis and placement of cement in the bony defects and a cement spacer in the knee joint. There is swelling around the knee probable knee joint effusion, no acute fracture seen.  This report was finalized on 10/1/2018 10:13 AM by Dr. Leodan Don M.D.      Xr Chest Pa & Lateral    Result Date: 10/1/2018   No active disease is seen in the chest.  LEFT KNEE: This is correlated to prior postoperative views of the left knee 06/02/2017. There has been interval revision in the left knee prosthesis. There is a spacer in the left knee joint. There has been removal of the femoral stem and tibial stem and placement of cement in the distal left femur proximal left tibia, cement spacer in the left knee joint. No acute fracture seen. There is swelling in the knee on the lateral view. There is a suggestion of a knee joint effusion in the suprapatellar bursa.  IMPRESSION:  Since prior plain film series the left knee 06/02/2017, there has been removal of the left knee prosthesis and placement of cement in the bony defects and a cement spacer in the knee joint. There is swelling around the knee probable knee joint effusion, no acute fracture seen.  This report was finalized on 10/1/2018 10:13 AM by Dr. Leodan Don M.D.          Current  Medications:  Scheduled Meds:  amLODIPine 10 mg Oral QAM   aspirin 325 mg Oral Q12H   celecoxib 200 mg Oral Daily   docusate sodium 100 mg Oral BID   furosemide 40 mg Oral QAM   metoprolol succinate XL 50 mg Oral Daily   potassium chloride 10 mEq Oral Daily   rifAMPin 300 mg Oral Q12H     Continuous Infusions:   PRN Meds:.•  acetaminophen  •  bisacodyl  •  bisacodyl  •  HYDROcodone-acetaminophen  •  HYDROcodone-acetaminophen  •  HYDROmorphone **AND** naloxone  •  melatonin  •  ondansetron **OR** ondansetron ODT **OR** ondansetron  •  promethazine  •  promethazine    Assessment: Status post  LEFT TOTAL KNEE ARTHROPLASTY REVISION    Patient Active Problem List   Diagnosis   • Hypertension   • 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    Ha Oh MD    Date: 10/5/2018    Time: 7:03 PM

## 2018-10-05 NOTE — PLAN OF CARE
Problem: Patient Care Overview  Goal: Plan of Care Review  Outcome: Ongoing (interventions implemented as appropriate)   10/05/18 0144 10/05/18 0832 10/05/18 4500   Coping/Psychosocial   Plan of Care Reviewed With --  patient --    Plan of Care Review   Progress improving --  --    OTHER   Outcome Summary --  --  POD#1 of Left knee revision. A&Ox4. Tolerating oral pain medication, but is also using IV diluadid at times through out the day. Patient also c/o nausea with oral pills and states that zofran does not help as much as phenergen and prefers to have that with pain medication. Patient Educated on how often zofran and phenergen can be given. Patient educated also on the importance of going to therapy. Patient refused therapy today and refused to get cleaned up. Patient encouraged to drink more fluids due to urine being concentrated, but stated she does not want to due to having to get up to go to the bathroom. Patient also refused some of her morning medications this am. Stated she does not want to get up to rush to the bathroom since it is very painful to move. No episodes of nausea/vomiting. Patient states pain is always a 9 or 10. Encouraged use of IS. Dressing to left knee c/d/i. Foot elevated due to swelling. Immobilizer in place. Hemovac in place. UP with assistance. Vitals are stable.      Goal: Individualization and Mutuality  Outcome: Ongoing (interventions implemented as appropriate)    Goal: Discharge Needs Assessment  Outcome: Ongoing (interventions implemented as appropriate)    Goal: Interprofessional Rounds/Family Conf  Outcome: Ongoing (interventions implemented as appropriate)      Problem: Fall Risk (Adult)  Goal: Absence of Fall  Outcome: Ongoing (interventions implemented as appropriate)      Problem: Knee Arthroplasty (Total, Partial) (Adult)  Goal: Signs and Symptoms of Listed Potential Problems Will be Absent, Minimized or Managed (Knee Arthroplasty)  Outcome: Ongoing (interventions  implemented as appropriate)    Goal: Anesthesia/Sedation Recovery  Outcome: Ongoing (interventions implemented as appropriate)

## 2018-10-05 NOTE — PLAN OF CARE
Problem: Patient Care Overview  Goal: Plan of Care Review  Outcome: Ongoing (interventions implemented as appropriate)   10/05/18 0144   Coping/Psychosocial   Plan of Care Reviewed With patient   Plan of Care Review   Progress improving   OTHER   Outcome Summary Pt is a post op of a left knee revision . Pt continues with the ACE wrap, drain and knee immobilizer. Pt has ambulated in the room this shift. Received a new order for Phenergan this shift. Pt received a dose this shift. Pt continues with PO pain meds that provide relief. Pt using the incentive spirometer effectively. Pt had no episodes of nausea or vomiting this shift, but takes anti nausea meds to prevent feeling sick with pain meds. Family remains at bedside. Pt educated on the importance of monitoring blood pressures related to comorbidity of HTN. Pt voiced understanding. Pt is resting at this time, will continue to monitor.     Goal: Individualization and Mutuality  Outcome: Ongoing (interventions implemented as appropriate)    Goal: Discharge Needs Assessment  Outcome: Ongoing (interventions implemented as appropriate)    Goal: Interprofessional Rounds/Family Conf  Outcome: Ongoing (interventions implemented as appropriate)      Problem: Fall Risk (Adult)  Goal: Identify Related Risk Factors and Signs and Symptoms  Outcome: Ongoing (interventions implemented as appropriate)    Goal: Absence of Fall  Outcome: Ongoing (interventions implemented as appropriate)      Problem: Knee Arthroplasty (Total, Partial) (Adult)  Goal: Signs and Symptoms of Listed Potential Problems Will be Absent, Minimized or Managed (Knee Arthroplasty)  Outcome: Ongoing (interventions implemented as appropriate)    Goal: Anesthesia/Sedation Recovery  Outcome: Ongoing (interventions implemented as appropriate)

## 2018-10-05 NOTE — SIGNIFICANT NOTE
10/05/18 0845   Rehab Time/Intention   Evaluation Not Performed other (see comments)  (pt denies need for OT. States has equipment needs and assist as needed. States no concerns for adls. Will not follow for OT)   Rehab Treatment   Discipline occupational therapist

## 2018-10-05 NOTE — SIGNIFICANT NOTE
10/05/18 1338   Rehab Treatment   Discipline physical therapy assistant   Reason Treatment Not Performed patient/family declined treatment  (Pt refusing to do therpay this PM .  CIPRIANO Valero is aware PT to follow up tomorrow.)   Recommendation   PT - Next Appointment 10/06/18

## 2018-10-06 VITALS
RESPIRATION RATE: 16 BRPM | DIASTOLIC BLOOD PRESSURE: 70 MMHG | SYSTOLIC BLOOD PRESSURE: 116 MMHG | TEMPERATURE: 97.5 F | HEART RATE: 91 BPM | WEIGHT: 227.07 LBS | BODY MASS INDEX: 41.79 KG/M2 | OXYGEN SATURATION: 98 % | HEIGHT: 62 IN

## 2018-10-06 PROBLEM — M00.9 INFECTION OF LEFT KNEE (HCC): Status: RESOLVED | Noted: 2018-10-04 | Resolved: 2018-10-06

## 2018-10-06 PROBLEM — M17.9 OSTEOARTHRITIS OF KNEE: Status: RESOLVED | Noted: 2018-10-04 | Resolved: 2018-10-06

## 2018-10-06 LAB
ANION GAP SERPL CALCULATED.3IONS-SCNC: 9.4 MMOL/L
ANISOCYTOSIS BLD QL: NORMAL
BASOPHILS # BLD AUTO: 0.02 10*3/MM3 (ref 0–0.2)
BASOPHILS NFR BLD AUTO: 0.4 % (ref 0–1.5)
BUN BLD-MCNC: 7 MG/DL (ref 6–20)
BUN/CREAT SERPL: 10.8 (ref 7–25)
CALCIUM SPEC-SCNC: 8.8 MG/DL (ref 8.6–10.5)
CHLORIDE SERPL-SCNC: 106 MMOL/L (ref 98–107)
CO2 SERPL-SCNC: 23.6 MMOL/L (ref 22–29)
CREAT BLD-MCNC: 0.65 MG/DL (ref 0.57–1)
DEPRECATED RDW RBC AUTO: 49.2 FL (ref 37–54)
EOSINOPHIL # BLD AUTO: 0.33 10*3/MM3 (ref 0–0.7)
EOSINOPHIL NFR BLD AUTO: 6.4 % (ref 0.3–6.2)
ERYTHROCYTE [DISTWIDTH] IN BLOOD BY AUTOMATED COUNT: 15.1 % (ref 11.7–13)
GFR SERPL CREATININE-BSD FRML MDRD: 116 ML/MIN/1.73
GLUCOSE BLD-MCNC: 105 MG/DL (ref 65–99)
HCT VFR BLD AUTO: 29.4 % (ref 35.6–45.5)
HGB BLD-MCNC: 9.7 G/DL (ref 11.9–15.5)
IMM GRANULOCYTES # BLD: 0.03 10*3/MM3 (ref 0–0.03)
IMM GRANULOCYTES NFR BLD: 0.6 % (ref 0–0.5)
LYMPHOCYTES # BLD AUTO: 1.35 10*3/MM3 (ref 0.9–4.8)
LYMPHOCYTES NFR BLD AUTO: 26.2 % (ref 19.6–45.3)
MCH RBC QN AUTO: 29.5 PG (ref 26.9–32)
MCHC RBC AUTO-ENTMCNC: 33 G/DL (ref 32.4–36.3)
MCV RBC AUTO: 89.4 FL (ref 80.5–98.2)
MONOCYTES # BLD AUTO: 0.58 10*3/MM3 (ref 0.2–1.2)
MONOCYTES NFR BLD AUTO: 11.2 % (ref 5–12)
NEUTROPHILS # BLD AUTO: 2.88 10*3/MM3 (ref 1.9–8.1)
NEUTROPHILS NFR BLD AUTO: 55.8 % (ref 42.7–76)
NRBC BLD MANUAL-RTO: 0 /100 WBC (ref 0–0)
PLAT MORPH BLD: NORMAL
PLATELET # BLD AUTO: 165 10*3/MM3 (ref 140–500)
PMV BLD AUTO: 10.9 FL (ref 6–12)
POTASSIUM BLD-SCNC: 4.2 MMOL/L (ref 3.5–5.2)
RBC # BLD AUTO: 3.29 10*6/MM3 (ref 3.9–5.2)
SODIUM BLD-SCNC: 139 MMOL/L (ref 136–145)
SPHEROCYTES BLD QL SMEAR: NORMAL
WBC MORPH BLD: NORMAL
WBC NRBC COR # BLD: 5.16 10*3/MM3 (ref 4.5–10.7)

## 2018-10-06 PROCEDURE — 63710000001 PROMETHAZINE PER 12.5 MG: Performed by: ORTHOPAEDIC SURGERY

## 2018-10-06 PROCEDURE — 85007 BL SMEAR W/DIFF WBC COUNT: CPT | Performed by: ORTHOPAEDIC SURGERY

## 2018-10-06 PROCEDURE — 97150 GROUP THERAPEUTIC PROCEDURES: CPT

## 2018-10-06 PROCEDURE — 85025 COMPLETE CBC W/AUTO DIFF WBC: CPT | Performed by: ORTHOPAEDIC SURGERY

## 2018-10-06 PROCEDURE — 25010000003 CEFAZOLIN IN DEXTROSE 2-4 GM/100ML-% SOLUTION: Performed by: ORTHOPAEDIC SURGERY

## 2018-10-06 PROCEDURE — 25010000002 HYDROMORPHONE PER 4 MG: Performed by: ORTHOPAEDIC SURGERY

## 2018-10-06 PROCEDURE — 80048 BASIC METABOLIC PNL TOTAL CA: CPT | Performed by: ORTHOPAEDIC SURGERY

## 2018-10-06 PROCEDURE — 97110 THERAPEUTIC EXERCISES: CPT

## 2018-10-06 RX ORDER — PROMETHAZINE HYDROCHLORIDE 12.5 MG/1
25 TABLET ORAL EVERY 6 HOURS PRN
Qty: 30 TABLET | Refills: 1 | OUTPATIENT
Start: 2018-10-06 | End: 2019-08-08

## 2018-10-06 RX ORDER — HYDROCODONE BITARTRATE AND ACETAMINOPHEN 10; 325 MG/1; MG/1
1 TABLET ORAL EVERY 4 HOURS PRN
Qty: 72 TABLET | Refills: 0 | OUTPATIENT
Start: 2018-10-06 | End: 2019-08-08

## 2018-10-06 RX ORDER — MINOCYCLINE HYDROCHLORIDE 100 MG/1
100 CAPSULE ORAL EVERY 12 HOURS SCHEDULED
Qty: 42 CAPSULE | Refills: 0 | Status: SHIPPED | OUTPATIENT
Start: 2018-10-06 | End: 2018-11-13

## 2018-10-06 RX ADMIN — CELECOXIB 200 MG: 200 CAPSULE ORAL at 08:12

## 2018-10-06 RX ADMIN — ASPIRIN 325 MG: 325 TABLET, DELAYED RELEASE ORAL at 08:12

## 2018-10-06 RX ADMIN — PROMETHAZINE HYDROCHLORIDE 12.5 MG: 12.5 TABLET ORAL at 14:06

## 2018-10-06 RX ADMIN — POTASSIUM CHLORIDE 10 MEQ: 750 CAPSULE, EXTENDED RELEASE ORAL at 08:12

## 2018-10-06 RX ADMIN — HYDROCODONE BITARTRATE AND ACETAMINOPHEN 2 TABLET: 7.5; 325 TABLET ORAL at 10:33

## 2018-10-06 RX ADMIN — PROMETHAZINE HYDROCHLORIDE 12.5 MG: 12.5 TABLET ORAL at 06:23

## 2018-10-06 RX ADMIN — RIFAMPIN 300 MG: 300 CAPSULE ORAL at 08:12

## 2018-10-06 RX ADMIN — AMLODIPINE BESYLATE 10 MG: 10 TABLET ORAL at 06:57

## 2018-10-06 RX ADMIN — METOPROLOL SUCCINATE 50 MG: 50 TABLET, FILM COATED, EXTENDED RELEASE ORAL at 08:12

## 2018-10-06 RX ADMIN — ONDANSETRON 4 MG: 4 TABLET, ORALLY DISINTEGRATING ORAL at 09:02

## 2018-10-06 RX ADMIN — HYDROCODONE BITARTRATE AND ACETAMINOPHEN 2 TABLET: 7.5; 325 TABLET ORAL at 06:23

## 2018-10-06 RX ADMIN — HYDROMORPHONE HYDROCHLORIDE 0.5 MG: 1 INJECTION, SOLUTION INTRAMUSCULAR; INTRAVENOUS; SUBCUTANEOUS at 09:02

## 2018-10-06 RX ADMIN — HYDROCODONE BITARTRATE AND ACETAMINOPHEN 2 TABLET: 7.5; 325 TABLET ORAL at 02:10

## 2018-10-06 RX ADMIN — ONDANSETRON 4 MG: 4 TABLET, FILM COATED ORAL at 02:10

## 2018-10-06 RX ADMIN — HYDROCODONE BITARTRATE AND ACETAMINOPHEN 2 TABLET: 7.5; 325 TABLET ORAL at 14:06

## 2018-10-06 NOTE — NURSING NOTE
Pt had a renewed order for IV Ancef X3. Starting Fri night The order was signed by Dr Oh. When this nurse informed patient  about the new order, she said she was not told about that when either Jose or Dr Pak were in her room this evening about that. Placed a call out to the on call dr to clarify, and was given instructions to go ahead with the order. Pt tolerated well. Pt is resting at this time, will continue to monitor.

## 2018-10-06 NOTE — DISCHARGE INSTRUCTIONS
Discharge and Follow up Instructions:      Total Knee Joint Replacement Discharge Instructions:     I. ACTIVITIES:  1. Exercises:  · Complete exercise program as taught by the hospital physical therapist 2 times per day  · Exercise program will be advanced by your home health physical therapist  · During the day be up ambulating every 2 hours (while awake) for short distances  · Complete the ankle pump exercises at least 10 times per hour (while awake)  · Elevate legs most of the day the first week post operatively and thereafter elevate legs when in bed and for at least 30 minutes during the day.   · Caution must be taken to avoid pillow placement under the bend of the knee as this can led to flexion contractures of the knee. Pillow placement under the heel is encouraged.  · Use cold packs 20-30 minutes approximately 5 times per day. This should be done before and after completing your exercises and at any time you are experiencing pain/ stiffness in your operative extremity.        2. Activities of Daily Living:  · No tub baths, hot tubs, or swimming pools for 4 weeks  · May shower and let water run over the incision on post-operative day #5 if no drainage. Do not scrub or rub the incision. Simply let the water run over the incision and pat dry.     II. Restrictions  · Do not cross legs or kneel  · Your surgeon will discuss with you when you will be able to drive again. Usual guidelines are you are to be off pain medications prior to driving.  · Weight bearing is as tolerated  · First week stay inside on even terrain. May go up and down stairs one stair at a time utilizing the hand rail.  · After one week, you may venture outside.     III. Precautions:  · Everyone that comes near you should wash their hands  · No elective dental, genital-urinary, or colon procedures or surgical procedures for 12 weeks after surgery unless absolutely necessary.  ·  If dental work or surgical procedure is deemed absolutely  necessary, you will need to contact your surgeon as you will need to take antibiotics 1 hour prior to any dental work (including teeth cleanings).  · Please discuss with your surgeon prophylactic antibiotics as the length of time this intervention will be necessary for you varies with each patient’s health history and situation.  · Avoid sick people. If you must be around someone who is ill, they should wear a mask.  · Avoid visits to the Emergency Room or Urgent Care. If you feel you need to go to the emergency room, please notify your surgeon.    · Stockings are to be worn for one week after surgery and are to be placed on in the morning and removed at night. Observe your skin when stocking is removed for any problems. Monitor the stockings to ensure that any swelling is not causing the stockings to become too tight. In this case, remove stockings immediately.     IV. INCISION CARE:  · Wash your hands prior to dressing changes  · Change the dressing as needed to keep incision clean and dry. Utilize dry gauze and paper tape. Avoid touching the side of the gauze that goes against the incision with your hands.  · No creams or ointments to the incision  · May remove dressing once the incision is free of drainage  · Do not touch or pick at the incision  · Check incision every day and notify surgeon immediately if any of the following signs or symptoms are noted:  ? Increase in redness  ? Increase in swelling around the incision and of the entire extremity  ? Increase in pain  ? Drainage oozing from the incision  ? Pulling apart of the edges of the incision  ? Increase in overall body temperature (greater than 100.5 degrees)  · Your  Staples will be removed between  10-14 days postoperation.  This may be done by either the home health nurse, rehabilitation nurse or during your return visit to Dr. Oh's office.  You will then be instructed on how to care for the incision.  (Please call the office if your staples  have not been removed within 14 days after surgery).     V. Medications:   1. Anticoagulants: You will be discharged on an anticoagulant. This is a prophylactic medication that helps prevent blood clots during your post-operative period.  You will be on Aspirin 325 mg twice daily for 21 days. If you were on Aspirin 81 mg prior to surgery hold it and restart once your Aspirin 325 mg is completed.      · While taking the anticoagulant, you should avoid taking any additional aspirin, ibuprofen (Advil or Motrin), Aleve (Naprosyn) or other non-steroidal anti-inflammatory medications.   · Notify surgeon immediately if any kevin bleeding is noted in the urine, stool, emesis, or from the nose or the incision. Blood in the stool will often appear as black rather than red. Blood in urine may appear as pink. Blood in emesis may appear as brown/black like coffee grounds.  · You will need to apply pressure for longer periods of time to any cuts or abrasions to stop bleeding  · Avoid alcohol while taking anticoagulants     2. Stool Softeners: You will be at greater risk of constipation after surgery due to being less mobile and the pain medications.   · Take stool softeners as instructed by your surgeon while on pain medications. Over the counter Colace 100 mg 1-2 capsules twice daily.   · If stools become too loose or too frequent, please decreases the dosage or stop the stool softener.  · If constipation occurs despite use of stool softeners, you are to continue the stool softeners and add a laxative (Milk of Magnesia 1 ounce daily as needed).  · Dulcolax oral tabs or suppository, or a fleets enema can also be utilized for constipation and can be obtained over the counter.   · If above interventions are unsuccessful in inducing bowel movements, please contact your surgeon's office / family physician's office.  · Drink plenty of fluids, and eat fruits and vegetables during your recovery time     3. Pain Medications utilized  after surgery are narcotics and the law requires that the following information be given to all patients that are prescribed narcotics:  · CLASSIFICATION: Pain medications are called Opioids and are narcotics  · LEGALITIES: It is illegal to share narcotics with others and to drive within 24 hours of taking narcotics  · POTENTIAL SIDE EFFECTS: Potential side effects of opioids include: nausea, vomiting, itching, dizziness, drowsiness, dry mouth, constipation, and difficulty urinating.  · POTENTIAL ADVERSE EFFECTS:   ? Opioid tolerance can develop with use of pain medications and this simply means that it requires more and more of the medication to control pain; however, this is seen more in patients that use opioids for longer periods of time.  ? Opioid dependence can develop with use of Opioids and this simply means that to stop the medication can cause withdrawal symptoms; however, this is seen with patients that use Opioids for longer periods of time.  ? Opioid addiction can develop with use of Opioids and the incidence of this is very unlikely in patients who take the medications as ordered and stop the medications as instructed.  ? Opioid overdose can be dangerous, but is unlikely when the medication is taken as ordered and stopped when ordered. It is important not to mix opioids with alcohol or with and type of sedative such as Benadryl as this can lead to over sedation and respiratory difficulty.  · DOSAGE:   ? Pain medications will need to be taken consistently for the first week to decrease pain and promote adequate pain relief and participation in physical therapy.  ? After the initial surgical pain begins to resolve, you may begin to decrease the pain medication. By the end of 6 weeks, you should be off of pain medications.  ? Refills will not be given by the office during evening hours, on weekends, or after 6 weeks post-op.  ? To seek refills on pain medications during the initial 6 week post-operative  period, you must call the office 48 hours in advance to request the refill. The office will then notify you when to  the prescription. DO NOT wait until you are out of the medication to request a refill.     V. FOLLOW-UP VISITS:  · You will need to follow up in the office with your surgeon in 2 weeks October 17, 2018. Please call this number [phone] to schedule this appointment.  · If you have any concerns or suspected complications prior to your follow up visit, please call your surgeons office. Do not wait until your appointment time if you suspect complications. These will need to be addressed in the office promptly.

## 2018-10-06 NOTE — PLAN OF CARE
Problem: Patient Care Overview  Goal: Plan of Care Review  Outcome: Outcome(s) achieved Date Met: 10/06/18   10/06/18 1521   Coping/Psychosocial   Plan of Care Reviewed With patient   Plan of Care Review   Progress improving   OTHER   Outcome Summary Patient is ambulating using walker and stand by assist. Vitals are stable and voiding function is intact. Pain is controlled with po meds. anti nausea meds given with narcotics preventatively. Patient d/c'd on po antibiotics. Patient prepared and ready for d/c home with .        Problem: Fall Risk (Adult)  Goal: Identify Related Risk Factors and Signs and Symptoms  Outcome: Outcome(s) achieved Date Met: 10/06/18   10/06/18 1521   Fall Risk (Adult)   Related Risk Factors (Fall Risk) environment unfamiliar;sensory deficits;polypharmacy;culprit medication(s);fatigue/slow reaction;gait/mobility problems;objects hard to reach;slippery/uneven surfaces   Signs and Symptoms (Fall Risk) presence of risk factors     Goal: Absence of Fall  Outcome: Outcome(s) achieved Date Met: 10/06/18   10/06/18 1521   Fall Risk (Adult)   Absence of Fall achieves outcome       Problem: Knee Arthroplasty (Total, Partial) (Adult)  Goal: Signs and Symptoms of Listed Potential Problems Will be Absent, Minimized or Managed (Knee Arthroplasty)  Outcome: Outcome(s) achieved Date Met: 10/06/18   10/06/18 1521   Goal/Outcome Evaluation   Problems Assessed (Knee Arthroplasty) all   Problems Present (Knee Arthroplasty) functional deficit;pain;range of motion decreased     Goal: Anesthesia/Sedation Recovery  Outcome: Outcome(s) achieved Date Met: 10/06/18   10/06/18 1521   Goal/Outcome Evaluation   Anesthesia/Sedation Recovery recovered to baseline

## 2018-10-06 NOTE — THERAPY TREATMENT NOTE
Acute Care - Physical Therapy Treatment Note  [facility]     Patient Name: Joselyn Grant  : 1965  MRN: [id number]  Today's Date: 10/6/2018  Onset of Illness/Injury or Date of Surgery: 10/04/18  Date of Referral to PT: 10/04/18  Referring Physician: Althea    Admit Date: 10/4/2018    Visit Dx:    ICD-10-CM ICD-9-CM   1. Difficulty walking R26.2 719.7   2. 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)   • Morbid obesity with BMI of 40.0-44.9, adult (CMS/HCC)   • Osteoarthritis of knee   • Infection of left knee (CMS/HCC)   • Acute blood loss as cause of postoperative anemia (expected)       Therapy Treatment          Rehabilitation Treatment Summary     Row Name 10/06/18 1057             Treatment Time/Intention    Discipline physical therapist  -MS      Document Type therapy note (daily note)  -MS      Subjective Information complains of;fatigue;pain  -MS      Mode of Treatment physical therapy  -MS      Patient Effort good  -MS      Recorded by [MS] Lul Argueta, PT 10/06/18 1059      Row Name 10/06/18 1057             Cognitive Assessment/Intervention- PT/OT    Orientation Status (Cognition) oriented x 3  -MS      Follows Commands (Cognition) WNL  -MS      Personal Safety Interventions fall prevention program maintained;gait belt;nonskid shoes/slippers when out of bed;supervised activity  -MS      Recorded by [MS] Lul Argueta, PT 10/06/18 1059      Row Name 10/06/18 1057             Mobility Assessment/Intervention    Left Lower Extremity (Weight-bearing Status) weight-bearing as tolerated (WBAT)  -MS      Recorded by [MS] Lul Argueta, PT 10/06/18 1059      Row Name 10/06/18 1057             Bed Mobility Assessment/Treatment    Comment (Bed Mobility) Up in chair  -MS      Recorded by [MS] Lul Argueta, PT 10/06/18 1059      Row Name 10/06/18  1057             Sit-Stand Transfer    Sit-Stand [place] (Transfers) contact guard  -MS      Assistive Device (Sit-Stand Transfers) walker, standard  -MS      Recorded by [MS] Lul Argueta, PT 10/06/18 1059      Row Name 10/06/18 1057             Stand-Sit Transfer    Stand-Sit [place] (Transfers) contact guard  -MS      Assistive Device (Stand-Sit Transfers) walker, standard  -MS      Recorded by [MS] Lul Argueta, PT 10/06/18 1059      Row Name 10/06/18 1057             Gait/Stairs Assessment/Training    Clarion Level (Gait) contact guard  -MS      Assistive Device (Gait) walker, standard  -MS      Distance in Feet (Gait) 90 feet  -MS      Pattern (Gait) step-to  -MS      Deviations/Abnormal Patterns (Gait) antalgic;kimberly decreased  -MS      Recorded by [MS] Lul Argueta, PT 10/06/18 1059      Row Name 10/06/18 1057             General ROM    LT Lower Ext Lt Knee Extension/Flexion  -MS      Recorded by [MS] Lul Argueta, PT 10/06/18 1059      Row Name 10/06/18 1057             Left Lower Ext    Lt Knee Extension/Flexion AROM (6, 58)  -MS      Recorded by [MS] Lul Argueta, PT 10/06/18 1059      Row Name 10/06/18 1057             Therapeutic Exercise    Comment (Therapeutic Exercise) Left TKR protocol x 20 reps completed  -MS      Recorded by [MS] Lul Argueta, PT 10/06/18 1059      Row Name 10/06/18 1057             Positioning and Restraints    Pre-Treatment Position sitting in chair/recliner  -MS      Post Treatment Position chair  -MS      In Chair notified nsg;reclined;sitting;call light within reach;encouraged to call for assist   All lines intact. Ice pack to Left knee.  -MS      Recorded by [MS] Lul Argueta, PT 10/06/18 1059      Row Name 10/06/18 1057             Pain Assessment    Additional Documentation Pain Scale: Numbers Pre/Post-Treatment (Group)  -MS      Recorded by [MS] Lul Argueta, PT 10/06/18 1059      Row Name 10/06/18 1057              Pain Scale: Numbers Pre/Post-Treatment    Pain Scale: Numbers, Pretreatment 6/10  -MS      Pain Scale: Numbers, Post-Treatment 4/10  -MS      Pain Location - Side Left  -MS      Pain Location knee  -MS      Pain Intervention(s) Medication (See MAR);Cold applied;Repositioned;Elevated;Rest  -MS      Recorded by [MS] Lul Argueta, PT 10/06/18 1059      Row Name                Wound 10/04/18 0930 Left knee incision    Wound - Properties Group Date first assessed: 10/04/18 [MB] Time first assessed: 0930 [MB] Side: Left [MB] Location: knee [MB] Type: incision [MB] Recorded by:  [MB] Abiola Garay RN 10/04/18 0930      User Key  (r) = Recorded By, (t) = Taken By, (c) = Cosigned By    Initials Name Effective Dates Discipline    Abiola Noland, RN 06/16/16 -  Nurse    Lul Garg, PT 04/03/18 -  PT          Wound 10/04/18 0930 Left knee incision (Active)   Dressing Appearance intact;dry;no drainage 10/6/2018  8:12 AM   Closure GENET 10/6/2018  8:12 AM   Base dressing in place, unable to visualize 10/6/2018  8:12 AM   Drainage Amount none 10/6/2018  8:12 AM   Dressing Care, Wound elastic bandage;multi-layer wrap 10/6/2018  8:12 AM             Physical Therapy Education     Title: PT OT SLP Therapies (Done)     Topic: Physical Therapy (Done)     Point: Mobility training (Done)    Learning Progress Summary     Learner Status Readiness Method Response Comment Documented by    Patient Done Acceptance MARVEL CANNON NR  MS 10/06/18 1059     Done Acceptance ELIZABETH CANNON D VU,NR  EJ 10/04/18 1545    Family Done Acceptance ELIZABETH CANNON D VU,NR  EJ 10/04/18 1545          Point: Home exercise program (Done)    Learning Progress Summary     Learner Status Readiness Method Response Comment Documented by    Patient Done Acceptance MARVEL CANNON NR  MS 10/06/18 1059     Done Acceptance ELIZABETH CANNON D VU,NR  EJ 10/04/18 1545    Family Done Acceptance EELIZABETH D VU,NR  EJ 10/04/18 1545          Point: Body mechanics (Done)    Learning Progress Summary     Learner  Status Readiness Method Response Comment Documented by    Patient Done Acceptance E,D VU,NR  MS 10/06/18 1059     Done Acceptance E,TB,D VU,NR  EJ 10/04/18 1545    Family Done Acceptance E,TB,D VU,NR  EJ 10/04/18 1545          Point: Precautions (Done)    Learning Progress Summary     Learner Status Readiness Method Response Comment Documented by    Patient Done Acceptance E,D VU,NR  MS 10/06/18 1059     Done Acceptance E,TB,D VU,NR  EJ 10/04/18 1545    Family Done Acceptance E,TB,D VU,NR  EJ 10/04/18 1545                      User Key     Initials Effective Dates Name Provider Type Discipline    MS 04/03/18 -  Lul Argueta, PT Physical Therapist PT     04/03/18 -  Anastasia Gorman, PT Physical Therapist PT                    PT Recommendation and Plan     Plan of Care Reviewed With: patient  Progress: improving  Outcome Summary: Improved tolerance to functional activity this AM with an increase in gait distance and progression of ther. ex. program.            Outcome Measures     Row Name 10/06/18 1100 10/04/18 1500          How much help from another person do you currently need...    Turning from your back to your side while in flat bed without using bedrails? 3  -MS 3  -EJ     Moving from lying on back to sitting on the side of a flat bed without bedrails? 3  -MS 3  -EJ     Moving to and from a bed to a chair (including a wheelchair)? 3  -MS 3  -EJ     Standing up from a chair using your arms (e.g., wheelchair, bedside chair)? 3  -MS 3  -EJ     Climbing 3-5 steps with a railing? 2  -MS 2  -EJ     To walk in hospital room? 3  -MS 3  -EJ     AM-PAC 6 Clicks Score 17  -MS 17  -EJ        Functional Assessment    Outcome Measure Options AM-PAC 6 Clicks Basic Mobility (PT)  -MS AM-PAC 6 Clicks Basic Mobility (PT)  -EJ       User Key  (r) = Recorded By, (t) = Taken By, (c) = Cosigned By    Initials Name Provider Type    MS Argueta Lul LOZADA, PT Physical Therapist    EJ Anastasia Gorman, PT Physical Therapist            Time Calculation:         PT Charges     Row Name 10/06/18 1100             Time Calculation    Start Time 0945  -MS      Stop Time 1015  -MS      Time Calculation (min) 30 min  -MS      PT Received On 10/06/18  -MS      PT - Next Appointment 10/06/18  -MS         Time Calculation- PT    Total Timed Code Minutes- PT 15 minute(s)  -MS        User Key  (r) = Recorded By, (t) = Taken By, (c) = Cosigned By    Initials Name Provider Type    MS Lul Argueta, PT Physical Therapist        Therapy Suggested Charges     Code   Minutes Charges    31503 (CPT®) Hc Pt Neuromusc Re Education Ea 15 Min      61152 (CPT®) Hc Pt Ther Proc Ea 15 Min 8 1    79051 (CPT®) Hc Gait Training Ea 15 Min      82119 (CPT®) Hc Pt Therapeutic Act Ea 15 Min      07380 (CPT®) Hc Pt Manual Therapy Ea 15 Min      12368 (CPT®) Hc Pt Iontophoresis Ea 15 Min      38611 (CPT®) Hc Pt Elec Stim Ea-Per 15 Min      01788 (CPT®) Hc Pt Ultrasound Ea 15 Min      03349 (CPT®) Hc Pt Self Care/Mgmt/Train Ea 15 Min      83154 (CPT®) Hc Pt Prosthetic (S) Train Initial Encounter, Each 15 Min      98160 (CPT®) Hc Pt Orthotic(S)/Prosthetic(S) Encounter, Each 15 Min      13020 (CPT®) Hc Orthotic(S) Mgmt/Train Initial Encounter, Each 15min      Total  8 1        Therapy Charges for Today     Code Description Service Date Service Provider Modifiers Qty    16148004686 HC PT THER PROC EA 15 MIN 10/6/2018 Lul Argueta, PT GP 1    88108340543 HC PT THER PROC GROUP 10/6/2018 Lul Argueta, PT GP 1          PT G-Codes  Outcome Measure Options: AM-PAC 6 Clicks Basic Mobility (PT)  AM-PAC 6 Clicks Score: 17    Lul Argueta, PT  10/6/2018

## 2018-10-06 NOTE — PROGRESS NOTES
Patient: Joselyn Grant  YOB: 1965     Date of Admission: 10/4/2018  5:53 AM Medical Record Number: [id number]     Attending Physician: Ha Oh,*    Status Post:  LEFT TOTAL KNEE ARTHROPLASTY REVISION Post Operative Day Number: 2    Subjective : No new orthopaedic complaints     Pain Relief: some relief with present medication.     Systemic Complaints: No Complaints  Vitals:    10/05/18 2303 10/06/18 0230 10/06/18 0600 10/06/18 0711   BP: 119/72 119/72 141/80 139/87   BP Location: Left arm Left arm Left arm Left arm   Patient Position: Lying Lying Lying Lying   Pulse: 93 93 82 88   Resp: 16 16 16 16   Temp: 97.7 °F (36.5 °C) 97.7 °F (36.5 °C) 98.3 °F (36.8 °C) 97.8 °F (36.6 °C)   TempSrc: Oral Oral Oral Oral   SpO2: 94% 94% 93% 94%   Weight:       Height:           Physical Exam: 53 y.o. female    General Appearance:       Alert, cooperative, in no acute distress                  Extremities:    Dressing Clean, Dry and Intact         Incision healthy without signs or symptoms of infections         No clinical sign of DVT        Able to do good movements of digits    Pulses:   Pulses palpable and equal bilaterally           Diagnostic Tests:       Results from last 7 days  Lab Units 10/06/18  0501 10/05/18  0418   WBC 10*3/mm3 5.16 6.50   HEMOGLOBIN g/dL 9.7* 10.7*   HEMATOCRIT % 29.4* 34.7*   PLATELETS 10*3/mm3 165 188       Results from last 7 days  Lab Units 10/06/18  0501 10/05/18  0418   SODIUM mmol/L 139 137   POTASSIUM mmol/L 4.2 3.9   CHLORIDE mmol/L 106 102   CO2 mmol/L 23.6 22.8   BUN mg/dL 7 12   CREATININE mg/dL 0.65 0.70   GLUCOSE mg/dL 105* 98   CALCIUM mg/dL 8.8 9.0         Lab Results   Component Value Date    CRP 0.36 09/28/2018     Lab Results   Component Value Date    SEDRATE 31 (H) 09/28/2018     No results found for: URICACID  No results found for: CRYSTAL  Microbiology Results (last 10 days)     Procedure Component Value - Date/Time    Tissue / Bone  Culture - Tissue, Knee, Right [[id number]]  (Normal) Collected:  10/04/18 0837    Lab Status:  Preliminary result Specimen:  Tissue from Knee, Right Updated:  10/06/18 1120     Tissue Culture No growth at 2 days     Gram Stain Result No WBCs or organisms seen        Xr Knee 1 Or 2 View Left    Result Date: 10/1/2018   No active disease is seen in the chest.  LEFT KNEE: This is correlated to prior postoperative views of the left knee 06/02/2017. There has been interval revision in the left knee prosthesis. There is a spacer in the left knee joint. There has been removal of the femoral stem and tibial stem and placement of cement in the distal left femur proximal left tibia, cement spacer in the left knee joint. No acute fracture seen. There is swelling in the knee on the lateral view. There is a suggestion of a knee joint effusion in the suprapatellar bursa.  IMPRESSION:  Since prior plain film series the left knee 06/02/2017, there has been removal of the left knee prosthesis and placement of cement in the bony defects and a cement spacer in the knee joint. There is swelling around the knee probable knee joint effusion, no acute fracture seen.  This report was finalized on 10/1/2018 10:13 AM by Dr. Leodan Don M.D.      Xr Chest Pa & Lateral    Result Date: 10/1/2018   No active disease is seen in the chest.  LEFT KNEE: This is correlated to prior postoperative views of the left knee 06/02/2017. There has been interval revision in the left knee prosthesis. There is a spacer in the left knee joint. There has been removal of the femoral stem and tibial stem and placement of cement in the distal left femur proximal left tibia, cement spacer in the left knee joint. No acute fracture seen. There is swelling in the knee on the lateral view. There is a suggestion of a knee joint effusion in the suprapatellar bursa.  IMPRESSION:  Since prior plain film series the left knee 06/02/2017, there has been removal of the left knee  prosthesis and placement of cement in the bony defects and a cement spacer in the knee joint. There is swelling around the knee probable knee joint effusion, no acute fracture seen.  This report was finalized on 10/1/2018 10:13 AM by Dr. Leodan Don M.D.              Current Medications:  Scheduled Meds:  amLODIPine 10 mg Oral QAM   aspirin 325 mg Oral Q12H   ceFAZolin 2 g Intravenous Q8H   celecoxib 200 mg Oral Daily   docusate sodium 100 mg Oral BID   furosemide 40 mg Oral QAM   metoprolol succinate XL 50 mg Oral Daily   potassium chloride 10 mEq Oral Daily   rifAMPin 300 mg Oral Q12H     Continuous Infusions:   PRN Meds:.•  acetaminophen  •  bisacodyl  •  bisacodyl  •  HYDROcodone-acetaminophen  •  HYDROcodone-acetaminophen  •  HYDROmorphone **AND** naloxone  •  melatonin  •  ondansetron **OR** ondansetron ODT **OR** ondansetron  •  promethazine  •  promethazine    Assessment: Status post  LEFT TOTAL KNEE ARTHROPLASTY REVISION    Patient Active Problem List   Diagnosis   • Hypertension   • 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    Ha Oh MD    Date: 10/6/2018    Time: 11:48 AM

## 2018-10-06 NOTE — PROGRESS NOTES
Chart reviewed  BPs good  Monitor Hgb  Abx per ID  Would not continue estrogen replacement therapy per pt's request--risk of post-op DVT too high  Will continue to follow

## 2018-10-06 NOTE — DISCHARGE SUMMARY
Orthopedic Discharge Summary      Patient: Joselyn Grant   YOB: 1965    Medical Record Number: [id number]    Attending Physician: Ha Oh,*  Consulting Physician(s):   Date of Admission: 10/4/2018  5:53 AM  Date of Discharge:      Admitting Diagnosis: Infection of left knee (CMS/[facility]) [M00.9] Status post removal TKA and spacer admitted after second stage.     Procedures Performed  Procedure(s):  LEFT TOTAL KNEE ARTHROPLASTY REVISION with removal of spacer         LEFT TOTAL KNEE ARTHROPLASTY REVISION    Patient Active Problem List   Diagnosis   • Hypertension   • Mechanical loosening of internal left knee prosthetic joint (CMS/HCC)   • Morbid obesity with BMI of 40.0-44.9, adult (CMS/[facility])   • Acute blood loss as cause of postoperative anemia (expected)        No Known Allergies       Discharge Medications      New Medications      Instructions Start Date   aspirin 325 MG EC tablet   325 mg, Oral, Every 12 Hours Scheduled      minocycline 100 MG capsule  Commonly known as:  MINOCIN   100 mg, Oral, Every 12 Hours Scheduled         Changes to Medications      Instructions Start Date   estradiol 0.075 MG/24HR patch  Commonly known as:  ROBERTO JOHN  What changed:  additional instructions   1 patch, Transdermal, 2 Times Weekly      HYDROcodone-acetaminophen  MG per tablet  Commonly known as:  NORCO  What changed:  when to take this   1 tablet, Oral, Every 4 Hours PRN         Continue These Medications      Instructions Start Date   amLODIPine 10 MG tablet  Commonly known as:  NORVASC   10 mg, Oral, Every Morning      celecoxib 200 MG capsule  Commonly known as:  CeleBREX   200 mg, Oral, Daily, PT TO CHECK WITH MD FOR STOP DATE      EPINEPHrine 0.3 MG/0.3ML solution auto-injector injection  Commonly known as:  EPIPEN   As Needed      ferrous sulfate 325 (65 FE) MG tablet   325 mg, Oral, Daily With Breakfast      furosemide 40 MG tablet  Commonly known as:  LASIX   40 mg,  Oral, Every Morning      melatonin 5 MG tablet tablet   5 mg, Oral, Nightly PRN      metoprolol succinate XL 25 MG 24 hr tablet  Commonly known as:  TOPROL-XL   TAKE ONE TABLET BY MOUTH DAILY      metoprolol succinate XL 25 MG 24 hr tablet  Commonly known as:  TOPROL-XL   50 mg, Oral, Daily      POTASSIUM PO   99 mg, Oral, Daily, 99 mg         Stop These Medications    ibuprofen 200 MG tablet  Commonly known as:  ADVIL,MOTRIN     rifAMPin 300 MG capsule  Commonly known as:  RIFADIN               Past Medical History:   Diagnosis Date   • Arthritis    • History of kidney stones    • Hypertension    • Knee pain    • Migraine    • Risk factors for obstructive sleep apnea    • Staph infection     LEFT KNEE ON ANTIBIOTICS   • Urinary incontinence     wears pads        Past Surgical History:   Procedure Laterality Date   • COLONOSCOPY N/A 12/20/2017    Procedure: COLONOSCOPY to cecum;  Surgeon: Ino Torres MD;  Location: [facility] ENDOSCOPY;  Service:    • EYE SURGERY      lasix   • HYSTERECTOMY      10 years ago for heavy menses and fibroids   • KNEE SURGERY Left     MULTIPLE SURGERIES TOTAL 7   • LAPAROSCOPIC CHOLECYSTECTOMY     • OOPHORECTOMY     • IL REVISE KNEE JOINT REPLACE,1 PART Left 1/31/2017    Procedure: LT TOTAL KNEE ARTHROPLASTY REVISION;  Surgeon: Ha Oh MD;  Location: [facility] OR;  Service: Orthopedics   • IL REVISE KNEE JOINT REPLACE,1 PART Left 6/2/2017    Procedure: LT TOTAL KNEE ARTHROPLASTY REVISION;  Surgeon: Ha Oh MD;  Location: [facility] OR;  Service: Orthopedics   • REPLACEMENT TOTAL KNEE      left   • STOMACH SURGERY      lapband        Social History     Occupational History   • Not on file.     Social History Main Topics   • Smoking status: Never Smoker   • Smokeless tobacco: Never Used   • Alcohol use 1.8 oz/week     1 Shots of liquor, 2 Glasses of wine per week      Comment: SOCIAL   • Drug use: No   • Sexual activity: Defer      Social History      Social History Narrative   • No narrative on file        Family History   Problem Relation Age of Onset   • Pancreatic cancer Mother    • Hypertension Mother    • No Known Problems Father    • Fibroids Sister    • Heart murmur Sister    • Lymphoma Maternal Uncle    • Lung cancer Maternal Grandmother    • Bell's palsy Brother    • Hypertension Brother    • No Known Problems Maternal Grandfather    • No Known Problems Brother    • Malig Hyperthermia Neg Hx        Physical Exam: 53 y.o. female  General Appearance:    Alert, cooperative, in no acute distress                      Vitals:    10/05/18 2303 10/06/18 0230 10/06/18 0600 10/06/18 0711   BP: 119/72 119/72 141/80 139/87   BP Location: Left arm Left arm Left arm Left arm   Patient Position: Lying Lying Lying Lying   Pulse: 93 93 82 88   Resp: 16 16 16 16   Temp: 97.7 °F (36.5 °C) 97.7 °F (36.5 °C) 98.3 °F (36.8 °C) 97.8 °F (36.6 °C)   TempSrc: Oral Oral Oral Oral   SpO2: 94% 94% 93% 94%   Weight:       Height:            Hospital Course:  53 y.o. female admitted to [facility] to services of Ha Oh * with antibiotic spacer left knee  on 10/4/2018 and underwent a removal of the spacer and revision total knee arthroplasty Per Ha Oh MD. Antibiotic and VTE prophylaxis were per [facility] protocols and included  Kefzol  every 8 hours and Aspirin 325 mg twice daily . Post-operatively the patient transferred to the post-operative floor where the patient underwent mobilization therapy that included active as well as passive ROM exercises. Opioids were titrated to achieve appropriate pain management to allow for participation in mobilization exercises. Vital signs are now stable. The incision is intact without signs or symptoms of infection. Operative extremity neurovascular status remains intact.     Appropriate education re: incision care, activity levels, medications, and follow up visits was completed and all questions were  answered. The patient is now deemed stable for discharge.    Her cultures were negative on POD # 2, she was seen by Dr Pak , covering for Dr Harvey , I have Called Dr Harvey and we decided patient will benefit from Minocycline 100 mg PO for 3 weeks.       DISCHARGE DISPOSITION AND PLAN:  The  Patient is being discharged home with home health for PT /OT 2-3 X per week for 2-3 weeks and nursing care as needed.     DIAGNOSTIC TESTS:     Admission on 10/04/2018   Component Date Value Ref Range Status   • Tissue Culture 10/04/2018 No growth at 2 days   Preliminary   • Gram Stain Result 10/04/2018 No WBCs or organisms seen   Preliminary   • Case Report 10/04/2018    Final                    Value:Surgical Pathology Report                         Case: [id number]                                  Authorizing Provider:  Ha Oh,   Collected:           10/04/2018 08:45 AM                                 MD                                                                           Ordering Location:     [facility]  Received:            10/04/2018 09:04 AM                                 MAIN OR                                                                      Pathologist:           Liam Márquez MD                                                         Specimen:    Knee, Right, left knee                                                                    • Final Diagnosis 10/04/2018    Final                    Value:This result contains rich text formatting which cannot be displayed here.   • Intraoperative Consultation 10/04/2018    Final                    Value:This result contains rich text formatting which cannot be displayed here.   • Gross Description 10/04/2018    Final                    Value:This result contains rich text formatting which cannot be displayed here.   • Microscopic Description 10/04/2018    Final                    Value:This result contains rich text formatting  which cannot be displayed here.   • Glucose 10/05/2018 98  65 - 99 mg/dL Final   • BUN 10/05/2018 12  6 - 20 mg/dL Final   • Creatinine 10/05/2018 0.70  0.57 - 1.00 mg/dL Final   • Sodium 10/05/2018 137  136 - 145 mmol/L Final   • Potassium 10/05/2018 3.9  3.5 - 5.2 mmol/L Final   • Chloride 10/05/2018 102  98 - 107 mmol/L Final   • CO2 10/05/2018 22.8  22.0 - 29.0 mmol/L Final   • Calcium 10/05/2018 9.0  8.6 - 10.5 mg/dL Final   • eGFR   Amer 10/05/2018 107  >60 mL/min/1.73 Final   • BUN/Creatinine Ratio 10/05/2018 17.1  7.0 - 25.0 Final   • Anion Gap 10/05/2018 12.2  mmol/L Final   • WBC 10/05/2018 6.50  4.50 - 10.70 10*3/mm3 Final   • RBC 10/05/2018 3.81* 3.90 - 5.20 10*6/mm3 Final   • Hemoglobin 10/05/2018 10.7* 11.9 - 15.5 g/dL Final   • Hematocrit 10/05/2018 34.7* 35.6 - 45.5 % Final   • MCV 10/05/2018 91.1  80.5 - 98.2 fL Final   • MCH 10/05/2018 28.1  26.9 - 32.0 pg Final   • MCHC 10/05/2018 30.8* 32.4 - 36.3 g/dL Final   • RDW 10/05/2018 14.9* 11.7 - 13.0 % Final   • RDW-SD 10/05/2018 49.6  37.0 - 54.0 fl Final   • MPV 10/05/2018 10.2  6.0 - 12.0 fL Final   • Platelets 10/05/2018 188  140 - 500 10*3/mm3 Final   • Neutrophil % 10/05/2018 66.2  42.7 - 76.0 % Final   • Lymphocyte % 10/05/2018 22.0  19.6 - 45.3 % Final   • Monocyte % 10/05/2018 9.7  5.0 - 12.0 % Final   • Eosinophil % 10/05/2018 1.8  0.3 - 6.2 % Final   • Basophil % 10/05/2018 0.3  0.0 - 1.5 % Final   • Immature Grans % 10/05/2018 0.0  0.0 - 0.5 % Final   • Neutrophils, Absolute 10/05/2018 4.30  1.90 - 8.10 10*3/mm3 Final   • Lymphocytes, Absolute 10/05/2018 1.43  0.90 - 4.80 10*3/mm3 Final   • Monocytes, Absolute 10/05/2018 0.63  0.20 - 1.20 10*3/mm3 Final   • Eosinophils, Absolute 10/05/2018 0.12  0.00 - 0.70 10*3/mm3 Final   • Basophils, Absolute 10/05/2018 0.02  0.00 - 0.20 10*3/mm3 Final   • Immature Grans, Absolute 10/05/2018 0.00  0.00 - 0.03 10*3/mm3 Final   • Glucose 10/06/2018 105* 65 - 99 mg/dL Final   • BUN 10/06/2018 7  6 -  20 mg/dL Final   • Creatinine 10/06/2018 0.65  0.57 - 1.00 mg/dL Final   • Sodium 10/06/2018 139  136 - 145 mmol/L Final   • Potassium 10/06/2018 4.2  3.5 - 5.2 mmol/L Final   • Chloride 10/06/2018 106  98 - 107 mmol/L Final   • CO2 10/06/2018 23.6  22.0 - 29.0 mmol/L Final   • Calcium 10/06/2018 8.8  8.6 - 10.5 mg/dL Final   • eGFR   Amer 10/06/2018 116  >60 mL/min/1.73 Final   • BUN/Creatinine Ratio 10/06/2018 10.8  7.0 - 25.0 Final   • Anion Gap 10/06/2018 9.4  mmol/L Final   • WBC 10/06/2018 5.16  4.50 - 10.70 10*3/mm3 Final   • RBC 10/06/2018 3.29* 3.90 - 5.20 10*6/mm3 Final   • Hemoglobin 10/06/2018 9.7* 11.9 - 15.5 g/dL Final   • Hematocrit 10/06/2018 29.4* 35.6 - 45.5 % Final   • MCV 10/06/2018 89.4  80.5 - 98.2 fL Final   • MCH 10/06/2018 29.5  26.9 - 32.0 pg Final   • MCHC 10/06/2018 33.0  32.4 - 36.3 g/dL Final   • RDW 10/06/2018 15.1* 11.7 - 13.0 % Final   • RDW-SD 10/06/2018 49.2  37.0 - 54.0 fl Final   • MPV 10/06/2018 10.9  6.0 - 12.0 fL Final   • Platelets 10/06/2018 165  140 - 500 10*3/mm3 Final   • Neutrophil % 10/06/2018 55.8  42.7 - 76.0 % Final   • Lymphocyte % 10/06/2018 26.2  19.6 - 45.3 % Final   • Monocyte % 10/06/2018 11.2  5.0 - 12.0 % Final   • Eosinophil % 10/06/2018 6.4* 0.3 - 6.2 % Final   • Basophil % 10/06/2018 0.4  0.0 - 1.5 % Final   • Immature Grans % 10/06/2018 0.6* 0.0 - 0.5 % Final   • Neutrophils, Absolute 10/06/2018 2.88  1.90 - 8.10 10*3/mm3 Final   • Lymphocytes, Absolute 10/06/2018 1.35  0.90 - 4.80 10*3/mm3 Final   • Monocytes, Absolute 10/06/2018 0.58  0.20 - 1.20 10*3/mm3 Final   • Eosinophils, Absolute 10/06/2018 0.33  0.00 - 0.70 10*3/mm3 Final   • Basophils, Absolute 10/06/2018 0.02  0.00 - 0.20 10*3/mm3 Final   • Immature Grans, Absolute 10/06/2018 0.03  0.00 - 0.03 10*3/mm3 Final   • nRBC 10/06/2018 0.0  0.0 - 0.0 /100 WBC Final   • Anisocytosis 10/06/2018 Mod/2+  None Seen Final   • Spherocytes 10/06/2018 Slight/1+  None Seen Final   • WBC Morphology  10/06/2018 Normal  Normal Final   • Platelet Morphology 10/06/2018 Normal  Normal Final       No results found for: URICACID  No results found for: CRYSTAL  Microbiology Results (last 10 days)     Procedure Component Value - Date/Time    Tissue / Bone Culture - Tissue, Knee, Right [[id number]]  (Normal) Collected:  10/04/18 0837    Lab Status:  Preliminary result Specimen:  Tissue from Knee, Right Updated:  10/06/18 1120     Tissue Culture No growth at 2 days     Gram Stain Result No WBCs or organisms seen        Xr Knee 1 Or 2 View Left    Result Date: 10/1/2018   No active disease is seen in the chest.  LEFT KNEE: This is correlated to prior postoperative views of the left knee 06/02/2017. There has been interval revision in the left knee prosthesis. There is a spacer in the left knee joint. There has been removal of the femoral stem and tibial stem and placement of cement in the distal left femur proximal left tibia, cement spacer in the left knee joint. No acute fracture seen. There is swelling in the knee on the lateral view. There is a suggestion of a knee joint effusion in the suprapatellar bursa.  IMPRESSION:  Since prior plain film series the left knee 06/02/2017, there has been removal of the left knee prosthesis and placement of cement in the bony defects and a cement spacer in the knee joint. There is swelling around the knee probable knee joint effusion, no acute fracture seen.  This report was finalized on 10/1/2018 10:13 AM by Dr. Leodan Don M.D.      Xr Chest Pa & Lateral    Result Date: 10/1/2018   No active disease is seen in the chest.  LEFT KNEE: This is correlated to prior postoperative views of the left knee 06/02/2017. There has been interval revision in the left knee prosthesis. There is a spacer in the left knee joint. There has been removal of the femoral stem and tibial stem and placement of cement in the distal left femur proximal left tibia, cement spacer in the left knee joint. No  acute fracture seen. There is swelling in the knee on the lateral view. There is a suggestion of a knee joint effusion in the suprapatellar bursa.  IMPRESSION:  Since prior plain film series the left knee 06/02/2017, there has been removal of the left knee prosthesis and placement of cement in the bony defects and a cement spacer in the knee joint. There is swelling around the knee probable knee joint effusion, no acute fracture seen.  This report was finalized on 10/1/2018 10:13 AM by Dr. Leodan Don M.D.        Discharge and Follow up Instructions:     Total Knee Joint Replacement Discharge Instructions:    I. ACTIVITIES:  1. Exercises:  ? Complete exercise program as taught by the hospital physical therapist 2 times per day  ? Exercise program will be advanced by your home health physical therapist  ? During the day be up ambulating every 2 hours (while awake) for short distances  ? Complete the ankle pump exercises at least 10 times per hour (while awake)  ? Elevate legs most of the day the first week post operatively and thereafter elevate legs when in bed and for at least 30 minutes during the day.   ? Caution must be taken to avoid pillow placement under the bend of the knee as this can led to flexion contractures of the knee. Pillow placement under the heel is encouraged.  ? Use cold packs 20-30 minutes approximately 5 times per day. This should be done before and after completing your exercises and at any time you are experiencing pain/ stiffness in your operative extremity.      2. Activities of Daily Living:  ? No tub baths, hot tubs, or swimming pools for 4 weeks  ? May shower and let water run over the incision on post-operative day #5 if no drainage. Do not scrub or rub the incision. Simply let the water run over the incision and pat dry.    II. Restrictions  ? Do not cross legs or kneel  ? Your surgeon will discuss with you when you will be able to drive again. Usual guidelines are you are to be off  pain medications prior to driving.  ? Weight bearing is as tolerated  ? First week stay inside on even terrain. May go up and down stairs one stair at a time utilizing the hand rail.  ? After one week, you may venture outside.    III. Precautions:  ? Everyone that comes near you should wash their hands  ? No elective dental, genital-urinary, or colon procedures or surgical procedures for 12 weeks after surgery unless absolutely necessary.  ?  If dental work or surgical procedure is deemed absolutely necessary, you will need to contact your surgeon as you will need to take antibiotics 1 hour prior to any dental work (including teeth cleanings).  ? Please discuss with your surgeon prophylactic antibiotics as the length of time this intervention will be necessary for you varies with each patient’s health history and situation.  ? Avoid sick people. If you must be around someone who is ill, they should wear a mask.  ? Avoid visits to the Emergency Room or Urgent Care. If you feel you need to go to the emergency room, please notify your surgeon.    ? Stockings are to be worn for one week after surgery and are to be placed on in the morning and removed at night. Observe your skin when stocking is removed for any problems. Monitor the stockings to ensure that any swelling is not causing the stockings to become too tight. In this case, remove stockings immediately.    IV. INCISION CARE:  ? Wash your hands prior to dressing changes  ? Change the dressing as needed to keep incision clean and dry. Utilize dry gauze and paper tape. Avoid touching the side of the gauze that goes against the incision with your hands.  ? No creams or ointments to the incision  ? May remove dressing once the incision is free of drainage  ? Do not touch or pick at the incision  ? Check incision every day and notify surgeon immediately if any of the following signs or symptoms are noted:  o Increase in redness  o Increase in swelling around the  incision and of the entire extremity  o Increase in pain  o Drainage oozing from the incision  o Pulling apart of the edges of the incision  o Increase in overall body temperature (greater than 100.5 degrees)  ? Your  Staples will be removed between  10-14 days postoperation.  This may be done by either the home health nurse, rehabilitation nurse or during your return visit to Dr. Oh's office.  You will then be instructed on how to care for the incision.  (Please call the office if your staples have not been removed within 14 days after surgery).    V. Medications:   1. Anticoagulants: You will be discharged on an anticoagulant. This is a prophylactic medication that helps prevent blood clots during your post-operative period.  You will be on Aspirin 325 mg twice daily for 21 days. If you were on Aspirin 81 mg prior to surgery hold it and restart once your Aspirin 325 mg is completed.     ? While taking the anticoagulant, you should avoid taking any additional aspirin, ibuprofen (Advil or Motrin), Aleve (Naprosyn) or other non-steroidal anti-inflammatory medications.   ? Notify surgeon immediately if any kevin bleeding is noted in the urine, stool, emesis, or from the nose or the incision. Blood in the stool will often appear as black rather than red. Blood in urine may appear as pink. Blood in emesis may appear as brown/black like coffee grounds.  ? You will need to apply pressure for longer periods of time to any cuts or abrasions to stop bleeding  ? Avoid alcohol while taking anticoagulants    2. Stool Softeners: You will be at greater risk of constipation after surgery due to being less mobile and the pain medications.   ? Take stool softeners as instructed by your surgeon while on pain medications. Over the counter Colace 100 mg 1-2 capsules twice daily.   ? If stools become too loose or too frequent, please decreases the dosage or stop the stool softener.  ? If constipation occurs despite use of stool  softeners, you are to continue the stool softeners and add a laxative (Milk of Magnesia 1 ounce daily as needed).  ? Dulcolax oral tabs or suppository, or a fleets enema can also be utilized for constipation and can be obtained over the counter.   ? If above interventions are unsuccessful in inducing bowel movements, please contact your surgeon's office / family physician's office.  ? Drink plenty of fluids, and eat fruits and vegetables during your recovery time    3. Pain Medications utilized after surgery are narcotics and the law requires that the following information be given to all patients that are prescribed narcotics:  ? CLASSIFICATION: Pain medications are called Opioids and are narcotics  ? LEGALITIES: It is illegal to share narcotics with others and to drive within 24 hours of taking narcotics  ? POTENTIAL SIDE EFFECTS: Potential side effects of opioids include: nausea, vomiting, itching, dizziness, drowsiness, dry mouth, constipation, and difficulty urinating.  ? POTENTIAL ADVERSE EFFECTS:   o Opioid tolerance can develop with use of pain medications and this simply means that it requires more and more of the medication to control pain; however, this is seen more in patients that use opioids for longer periods of time.  o Opioid dependence can develop with use of Opioids and this simply means that to stop the medication can cause withdrawal symptoms; however, this is seen with patients that use Opioids for longer periods of time.  o Opioid addiction can develop with use of Opioids and the incidence of this is very unlikely in patients who take the medications as ordered and stop the medications as instructed.  o Opioid overdose can be dangerous, but is unlikely when the medication is taken as ordered and stopped when ordered. It is important not to mix opioids with alcohol or with and type of sedative such as Benadryl as this can lead to over sedation and respiratory difficulty.  ? DOSAGE:   o Pain  medications will need to be taken consistently for the first week to decrease pain and promote adequate pain relief and participation in physical therapy.  o After the initial surgical pain begins to resolve, you may begin to decrease the pain medication. By the end of 6 weeks, you should be off of pain medications.  o Refills will not be given by the office during evening hours, on weekends, or after 6 weeks post-op.  o To seek refills on pain medications during the initial 6 week post-operative period, you must call the office 48 hours in advance to request the refill. The office will then notify you when to  the prescription. DO NOT wait until you are out of the medication to request a refill.    V. FOLLOW-UP VISITS:  ? You will need to follow up in the office with your surgeon in 2 weeks October 17, 2018. Please call this number [phone] to schedule this appointment.  ? If you have any concerns or suspected complications prior to your follow up visit, please call your surgeons office. Do not wait until your appointment time if you suspect complications. These will need to be addressed in the office promptly.      Date:     Ha Oh MD    CC: Leodan Nam MD; MD Althea Nowak Madhusudhan R,*

## 2018-10-06 NOTE — PLAN OF CARE
Problem: Patient Care Overview  Goal: Plan of Care Review   10/06/18 1059   Coping/Psychosocial   Plan of Care Reviewed With patient   Plan of Care Review   Progress improving   OTHER   Outcome Summary Improved tolerance to functional activity this AM with an increase in gait distance and progression of ther. ex. program.

## 2018-10-06 NOTE — PLAN OF CARE
Problem: Patient Care Overview  Goal: Plan of Care Review  Outcome: Ongoing (interventions implemented as appropriate)   10/06/18 0159   Coping/Psychosocial   Plan of Care Reviewed With patient   Plan of Care Review   Progress improving   OTHER   Outcome Summary Pt is a post op day 1 of a left knee revision. Dressing is clean dry and intact. Pt continues with the knee immobilizer. Pt still is having some pain issues but is doing better than  the night before. Pt continues to alternate Phenergan and Zofran with Pain meds to prevent nausea. Pt had lots of family in the room today. New order for 3 doses of additional antibiotics started this shift. Pt voiding via the bathroom. Pt educated on the importance of monitoring blood pressures related to comorbidity of HTN. Pt voiced undertstanding, Pt possibly going home in AM. Pt is resting at this time, will continue to monitor. Family remains at bedside.     Goal: Individualization and Mutuality  Outcome: Ongoing (interventions implemented as appropriate)    Goal: Discharge Needs Assessment  Outcome: Ongoing (interventions implemented as appropriate)    Goal: Interprofessional Rounds/Family Conf  Outcome: Ongoing (interventions implemented as appropriate)      Problem: Fall Risk (Adult)  Goal: Identify Related Risk Factors and Signs and Symptoms  Outcome: Ongoing (interventions implemented as appropriate)    Goal: Absence of Fall  Outcome: Ongoing (interventions implemented as appropriate)      Problem: Knee Arthroplasty (Total, Partial) (Adult)  Goal: Signs and Symptoms of Listed Potential Problems Will be Absent, Minimized or Managed (Knee Arthroplasty)  Outcome: Ongoing (interventions implemented as appropriate)    Goal: Anesthesia/Sedation Recovery  Outcome: Ongoing (interventions implemented as appropriate)

## 2018-10-07 LAB
BACTERIA SPEC AEROBE CULT: NORMAL
GRAM STN SPEC: NORMAL

## 2018-10-09 LAB — BACTERIA SPEC ANAEROBE CULT: NORMAL

## 2018-10-18 ENCOUNTER — DOCUMENTATION (OUTPATIENT)
Dept: PHYSICAL THERAPY | Facility: CLINIC | Age: 53
End: 2018-10-18

## 2018-10-19 ENCOUNTER — TREATMENT (OUTPATIENT)
Dept: PHYSICAL THERAPY | Facility: CLINIC | Age: 53
End: 2018-10-19

## 2018-10-19 DIAGNOSIS — M25.662 KNEE STIFFNESS, LEFT: ICD-10-CM

## 2018-10-19 DIAGNOSIS — M25.562 LEFT KNEE PAIN, UNSPECIFIED CHRONICITY: Primary | ICD-10-CM

## 2018-10-19 DIAGNOSIS — Z96.652 STATUS POST REVISION OF TOTAL KNEE, LEFT: ICD-10-CM

## 2018-10-19 PROCEDURE — G0283 ELEC STIM OTHER THAN WOUND: HCPCS | Performed by: PHYSICAL THERAPIST

## 2018-10-19 PROCEDURE — 97110 THERAPEUTIC EXERCISES: CPT | Performed by: PHYSICAL THERAPIST

## 2018-10-19 PROCEDURE — 97161 PT EVAL LOW COMPLEX 20 MIN: CPT | Performed by: PHYSICAL THERAPIST

## 2018-10-19 NOTE — PROGRESS NOTES
Physical Therapy Initial Evaluation and Plan of Care    Patient: Joselyn Grant   : 1965  Diagnosis/ICD-10 Code:  Left knee pain, unspecified chronicity [M25.562]  Referring practitioner: Ha Oh MD  Date of Initial Visit: 10/19/2018  Today's Date: 10/19/2018    Subjective Evaluation    History of Present Illness  Date of surgery: 10/4/2018  Mechanism of injury: Pt with history of L TKA revisions x 2 last year. In 2018, onset of severe pain and fever while in [place] for work. Was seen in ER, found to be septic. Antibiotic spacer placed, with knee immobilizer for 2 months, then revision of TKA on 10/4/2018. Pt is WBAT with walker, currently using a standard walker. Limited to very short distances when walking due to pain and fatigue. Had home health PT until several days ago.    Quality of life: good    Pain  Current pain ratin  At best pain ratin  At worst pain ratin (with ROM)  Location: left knee, anterior mid tibia  Quality: sharp, dull ache and throbbing  Relieving factors: ice and rest (elevation)  Aggravating factors: ambulation, standing, prolonged positioning and movement (bending/straightening knee)  Progression: improved    Social Support  Lives in: multiple-level home  Lives with: spouse    Diagnostic Tests  Abnormal x-ray: well positioned prosthesis at last MD visit.    Treatments  Previous treatment: physical therapy  Discharged from (in last 30 days): inpatient hospitalization and home health care  Patient Goals  Patient goals for therapy: decreased pain, increased strength, independence with ADLs/IADLs, return to sport/leisure activities, return to work, increased motion and improved balance             Objective       Observations   Left Knee   Positive for atrophy, edema and incision. Negative for deformity and drainage.     Additional Observation Details  Quad atrophy noted; overall incision is healing well without signs of infection. Calf is soft and  nontender.    Neurological Testing     Sensation     Knee   Left Knee   Intact: light touch    Right Knee   Intact: light touch     Active Range of Motion   Left Knee   Flexion: 77 degrees   Extensor la degrees     Right Knee   Flexion: 125 degrees   Extension: 0 degrees     Strength/Myotome Testing     Left Knee   Flexion: 3-  Extension: 3-  Quadriceps contraction: fair    Right Knee   Flexion: 5  Extension: 5    Tests     Additional Tests Details  Special tests deferred secondary to post op status.    Ambulation     Ambulation: Level Surfaces   Ambulation with assistive device: independent    Additional Level Surfaces Ambulation Details  With walker    Observational Gait   Gait: antalgic   Increased right stance time and left swing time. Decreased walking speed, stride length, left stance time and right swing time.   Left foot contact pattern: heel to toe  Right foot contact pattern: heel to toe    Additional Observational Gait Details  Step-to pattern with standard walker         Assessment & Plan     Assessment  Impairments: abnormal gait, abnormal or restricted ROM, activity intolerance, impaired balance, impaired physical strength, lacks appropriate home exercise program and pain with function  Assessment details: Ms. Grant is a pleasant 53 year old female who presents with limitation in ROM, strength and gait consistent with pre-op immobilization and post-op status. She will benefit from PT to address impairments and normalize gait so that she can return to ADL's, IADL's and work without limitation.  Prognosis: good  Functional Limitations: walking, uncomfortable because of pain and standing  Goals  Plan Goals: Short Term Goals: 6 weeks. Patient will:  1. Patient to be adherent with stretching and HEP.  2. Able to transfer sit to stand without need for UE's and with < 4/10 pain.   3. (L) MMT 4-/5 for ability to ascend/descend 5 steps and transfer sit to stand x 5 with knee pain <4/10  4. Increased hip  joint, patellar mobility to allow for decreased stress at tibiofemoral, patellofemoral joint. (knee ROM 5°-90°)  5. Pt. to exhibit increased LE soft tissue extensibility to allow for increased ease with walking 150 feet.     Long Term Goals: 12 weeks. Patient will:  1. Pt to score 60% perceived normal ability on LEFS  2. (L) LE strength to at least 4+/5 to ascend/descend 5 steps without pain >2/10.  3. Pt to exhibit improved Knee AROM 0 degrees - 100 degrees to allow for improved gait, kneeling, bending squatting as is necessary for daily tasks.    4. Pt to exhibit LE endurance/strength to 4+/5 to allow for returning to unrestricted walking > 20 min.    5. Negative findings for special testing for dysfunction.   6. Pt to demonstrate increased stability of the knee to balance on left for 20 seconds as needed to traverse uneven terrain .      Plan  Therapy options: will be seen for skilled physical therapy services  Planned modality interventions: cryotherapy, electrical stimulation/Russian stimulation, TENS and thermotherapy (hydrocollator packs)  Planned therapy interventions: abdominal trunk stabilization, manual therapy, soft tissue mobilization, strengthening, stretching, therapeutic activities, home exercise program, gait training, functional ROM exercises and flexibility  Frequency: 2-3x per week.  Duration in weeks: 12  Treatment plan discussed with: patient        Manual Therapy:    0     mins  24645;  Therapeutic Exercise:    10     mins  17541;     Neuromuscular Driss:    0    mins  34860;    Therapeutic Activity:     0     mins  95578;     Gait Trainin     mins  28441;     Ultrasound:     0     mins  36224;    Electrical Stimulation:    15     mins  38712 ( );    Timed Treatment:   10   mins   Total Treatment:     60   mins    PT SIGNATURE: Zena Longo PT, DPT          Physical Therapist                               [place] License #[id number]    DATE TREATMENT INITIATED: 10/19/2018    Initial  Certification  Certification Period: 1/21/2019  I certify that the therapy services are furnished while this patient is under my care.  The services outlined above are required by this patient, and will be reviewed every 90 days.     PHYSICIAN:       DATE:     Please sign and return via fax to [phone].. Thank you, [facility].

## 2018-10-22 ENCOUNTER — TREATMENT (OUTPATIENT)
Dept: PHYSICAL THERAPY | Facility: CLINIC | Age: 53
End: 2018-10-22

## 2018-10-22 DIAGNOSIS — M25.662 KNEE STIFFNESS, LEFT: ICD-10-CM

## 2018-10-22 DIAGNOSIS — M25.562 LEFT KNEE PAIN, UNSPECIFIED CHRONICITY: Primary | ICD-10-CM

## 2018-10-22 DIAGNOSIS — Z96.652 STATUS POST REVISION OF TOTAL KNEE, LEFT: ICD-10-CM

## 2018-10-22 PROCEDURE — G0283 ELEC STIM OTHER THAN WOUND: HCPCS | Performed by: PHYSICAL THERAPIST

## 2018-10-22 PROCEDURE — 97110 THERAPEUTIC EXERCISES: CPT | Performed by: PHYSICAL THERAPIST

## 2018-10-22 NOTE — PATIENT INSTRUCTIONS
Pt was educated regarding relevant anatomy/physiology and plan of care. Also discussed changing front legs of walker to fixed wheels for increased ease with gait, pt agrees.

## 2018-10-25 NOTE — PROGRESS NOTES
" Physical Therapy Daily Progress Note    Visit #2    Subjective     Joselyn Grant reports: she switched out the front legs of her walker for fixed wheels as recommended, and this has made walking a lot easier. Walked a half a block today, \"it felt good\"      Objective   See Exercise, Manual, and Modality Logs for complete treatment.       Assessment/Plan  Pt is able to walk with step through pattern with rolling walker safely. ROM is improving. Pt fatigues very easily, is not able to perform a SAQ independently. May benefit from [place] estim to increase quad contraction if OK with MD.  Progress per Plan of Care           Manual Therapy:    0     mins  15617;  Therapeutic Exercise:    35     mins  75605;     Neuromuscular Driss:    0    mins  66842;    Therapeutic Activity:     0     mins  05823;     Gait Trainin     mins  05780;     Ultrasound:     0     mins  79797;    Electrical Stimulation:    15     mins  20185 ( );    Timed Treatment:   35   mins   Total Treatment:     50   mins    Zena Longo, PT, DPT  Physical Therapist  [place] License #[id number]                    "

## 2018-10-26 ENCOUNTER — TREATMENT (OUTPATIENT)
Dept: PHYSICAL THERAPY | Facility: CLINIC | Age: 53
End: 2018-10-26

## 2018-10-26 DIAGNOSIS — Z96.652 STATUS POST REVISION OF TOTAL KNEE, LEFT: ICD-10-CM

## 2018-10-26 DIAGNOSIS — M25.562 LEFT KNEE PAIN, UNSPECIFIED CHRONICITY: Primary | ICD-10-CM

## 2018-10-26 DIAGNOSIS — M25.662 KNEE STIFFNESS, LEFT: ICD-10-CM

## 2018-10-26 PROCEDURE — G0283 ELEC STIM OTHER THAN WOUND: HCPCS | Performed by: PHYSICAL THERAPIST

## 2018-10-26 PROCEDURE — 97110 THERAPEUTIC EXERCISES: CPT | Performed by: PHYSICAL THERAPIST

## 2018-10-26 NOTE — PROGRESS NOTES
" Physical Therapy Daily Progress Note    Visit #3    Subjective     Joselyn Grant reports: very sore after last session, but states \"I want to get moving again.\"      Objective   See Exercise, Manual, and Modality Logs for complete treatment.       Assessment/Plan  Quad fatigues very easily. Gait speed and step length are increased.  Progress per Plan of Care           Manual Therapy:    0     mins  62887;  Therapeutic Exercise:    30     mins  64511;     Neuromuscular Driss:    0    mins  11490;    Therapeutic Activity:     0     mins  96228;     Gait Trainin     mins  91910;     Ultrasound:     0     mins  36095;    Electrical Stimulation:    15     mins  28981 ( );    Timed Treatment:   30   mins   Total Treatment:     45   mins    Zena Longo PT, DPT  Physical Therapist  [place] License #[id number]                    "

## 2018-10-29 ENCOUNTER — TREATMENT (OUTPATIENT)
Dept: PHYSICAL THERAPY | Facility: CLINIC | Age: 53
End: 2018-10-29

## 2018-10-29 DIAGNOSIS — M25.662 KNEE STIFFNESS, LEFT: ICD-10-CM

## 2018-10-29 DIAGNOSIS — M25.562 LEFT KNEE PAIN, UNSPECIFIED CHRONICITY: Primary | ICD-10-CM

## 2018-10-29 DIAGNOSIS — Z96.652 STATUS POST REVISION OF TOTAL KNEE, LEFT: ICD-10-CM

## 2018-10-29 PROCEDURE — 97110 THERAPEUTIC EXERCISES: CPT | Performed by: PHYSICAL THERAPIST

## 2018-10-29 PROCEDURE — G0283 ELEC STIM OTHER THAN WOUND: HCPCS | Performed by: PHYSICAL THERAPIST

## 2018-10-29 NOTE — PROGRESS NOTES
Physical Therapy Daily Progress Note    Visit #4    Subjective     Joselyn Grant reports: she went to see a movie over the weekend, is gradually increasing her walking distance/activity level.      Objective   See Exercise, Manual, and Modality Logs for complete treatment.       Assessment/Plan  Trial of exercises in sitting today to assess pain level, will return to supine exercises or continue sitting depending on patient report. Flexion ROM is improving.  Progress per Plan of Care           Manual Therapy:    0     mins  90425;  Therapeutic Exercise:    30     mins  91937;     Neuromuscular Driss:    0    mins  41745;    Therapeutic Activity:     0     mins  29018;     Gait Trainin     mins  56393;     Ultrasound:     0     mins  97321;    Electrical Stimulation:    15     mins  68617 ( );    Timed Treatment:   30   mins   Total Treatment:     45   mins    Zena Longo PT, DPT  Physical Therapist  [place] License #[id number]

## 2018-10-31 ENCOUNTER — TREATMENT (OUTPATIENT)
Dept: PHYSICAL THERAPY | Facility: CLINIC | Age: 53
End: 2018-10-31

## 2018-10-31 DIAGNOSIS — M25.662 KNEE STIFFNESS, LEFT: ICD-10-CM

## 2018-10-31 DIAGNOSIS — Z96.652 STATUS POST REVISION OF TOTAL KNEE, LEFT: ICD-10-CM

## 2018-10-31 DIAGNOSIS — M25.562 LEFT KNEE PAIN, UNSPECIFIED CHRONICITY: Primary | ICD-10-CM

## 2018-10-31 PROCEDURE — G0283 ELEC STIM OTHER THAN WOUND: HCPCS | Performed by: PHYSICAL THERAPIST

## 2018-10-31 PROCEDURE — 97110 THERAPEUTIC EXERCISES: CPT | Performed by: PHYSICAL THERAPIST

## 2018-10-31 NOTE — PROGRESS NOTES
Physical Therapy Daily Progress Note    Visit # : 5  Joselyn Grant reports: my knee woke me up this morning; the lower tibia bone.  More effort required to get up and out of bed this morning.  Has been trying to walk more at home.       Subjective     Objective   See Exercise, Manual, and Modality Logs for complete treatment.       Assessment/Plan  Pt noted some increased soreness with seated toe/heel raises but was able to complete both sets.  Pt was able to perform a second set of seated resisted HS curls without reports of increased symptoms.   Progress per Plan of Care           Manual Therapy:    -     mins  49091;  Therapeutic Exercise:    29     mins  61839;     Neuromuscular Driss:    -    mins  64891;    Therapeutic Activity:     -     mins  54689;     Gait Training:      -     mins  36319;     Ultrasound:     -     mins  18388;    Electrical Stimulation:    15     mins  28876 ( );  Iontophoresis                 -     mins 62707      Timed Treatment:   29   mins   Total Treatment:     45   mins        Marii Pike PT  Physical Therapist  [place] License # [id number]

## 2018-11-06 ENCOUNTER — TREATMENT (OUTPATIENT)
Dept: PHYSICAL THERAPY | Facility: CLINIC | Age: 53
End: 2018-11-06

## 2018-11-06 DIAGNOSIS — M25.562 LEFT KNEE PAIN, UNSPECIFIED CHRONICITY: Primary | ICD-10-CM

## 2018-11-06 PROCEDURE — 97110 THERAPEUTIC EXERCISES: CPT | Performed by: PHYSICAL THERAPIST

## 2018-11-06 PROCEDURE — G0283 ELEC STIM OTHER THAN WOUND: HCPCS | Performed by: PHYSICAL THERAPIST

## 2018-11-06 PROCEDURE — 97112 NEUROMUSCULAR REEDUCATION: CPT | Performed by: PHYSICAL THERAPIST

## 2018-11-06 NOTE — PROGRESS NOTES
Physical Therapy Daily Progress Note      Patient: Joselyn Grant   : 1965  Diagnosis/ICD-10 Code:  Left knee pain, unspecified chronicity [M25.562]  Referring practitioner: No ref. provider found  Date of Initial Visit: Type: THERAPY  Noted: 10/19/2018  Today's Date: 2018  Patient seen for 6 sessions             Subjective Pt states she has been sore since the knee extension exercises last session.    Objective   See Exercise, Manual, and Modality Logs for complete treatment.        Assessment/Plan Pt tolerated all ex today but very hesitant about any palpation at the L knee or addition of any exercises.        Manual Therapy:    0     mins  89416;  Therapeutic Exercise:    20     mins  19784;     Neuromuscular Driss:    9    mins  37891;    Therapeutic Activity:     0     mins  69599;     Gait Trainin     mins  02678;     Ultrasound:     0     mins  62865;    Electrical Stimulation:    15     mins  54600 ( );  Dry Needling     0     mins self-pay    Timed Treatment:   29   mins   Total Treatment:     44   mins    Cesilia Phillips, PT  Physical Therapist

## 2018-11-08 ENCOUNTER — TREATMENT (OUTPATIENT)
Dept: PHYSICAL THERAPY | Facility: CLINIC | Age: 53
End: 2018-11-08

## 2018-11-08 DIAGNOSIS — M25.662 KNEE STIFFNESS, LEFT: ICD-10-CM

## 2018-11-08 DIAGNOSIS — M25.562 LEFT KNEE PAIN, UNSPECIFIED CHRONICITY: Primary | ICD-10-CM

## 2018-11-08 DIAGNOSIS — Z96.652 STATUS POST REVISION OF TOTAL KNEE, LEFT: ICD-10-CM

## 2018-11-08 PROCEDURE — G0283 ELEC STIM OTHER THAN WOUND: HCPCS | Performed by: PHYSICAL THERAPIST

## 2018-11-08 PROCEDURE — 97110 THERAPEUTIC EXERCISES: CPT | Performed by: PHYSICAL THERAPIST

## 2018-11-08 NOTE — PROGRESS NOTES
Physical Therapy Daily Progress Note    Visit #7    Subjective     Joselyn Grant reports: she has been gradually increasing her activity level at home.       Objective   See Exercise, Manual, and Modality Logs for complete treatment.       Assessment/Plan  Pt with significant difficulty with weight shift to left side, improved with cueing for quad activation.   Progress per Plan of Care           Manual Therapy:    0     mins  58171;  Therapeutic Exercise:    35     mins  42578;     Neuromuscular Driss:    0    mins  18102;    Therapeutic Activity:     0     mins  04704;     Gait Trainin     mins  08049;     Ultrasound:     0     mins  35680;    Electrical Stimulation:    15     mins  31047 ( );    Timed Treatment:   35   mins   Total Treatment:     50   mins    Zena Longo, PT, DPT  Physical Therapist  [place] License #[id number]

## 2018-11-09 ENCOUNTER — TREATMENT (OUTPATIENT)
Dept: PHYSICAL THERAPY | Facility: CLINIC | Age: 53
End: 2018-11-09

## 2018-11-09 DIAGNOSIS — M25.562 LEFT KNEE PAIN, UNSPECIFIED CHRONICITY: Primary | ICD-10-CM

## 2018-11-09 DIAGNOSIS — M25.662 KNEE STIFFNESS, LEFT: ICD-10-CM

## 2018-11-09 DIAGNOSIS — Z96.652 STATUS POST REVISION OF TOTAL KNEE, LEFT: ICD-10-CM

## 2018-11-09 PROCEDURE — 97116 GAIT TRAINING THERAPY: CPT | Performed by: PHYSICAL THERAPIST

## 2018-11-09 PROCEDURE — G0283 ELEC STIM OTHER THAN WOUND: HCPCS | Performed by: PHYSICAL THERAPIST

## 2018-11-09 PROCEDURE — 97110 THERAPEUTIC EXERCISES: CPT | Performed by: PHYSICAL THERAPIST

## 2018-11-09 NOTE — PROGRESS NOTES
" Physical Therapy Daily Progress Note    Visit #8    Subjective     Joselyn Grant reports: her leg was very painful after PT yesterday, but \"it feels stronger today, so it was worth it\"      Objective   See Exercise, Manual, and Modality Logs for complete treatment.       Assessment/Plan  Held toe taps on step today, added gait training in parallel bars.  Progress per Plan of Care           Manual Therapy:    0     mins  00117;  Therapeutic Exercise:    25     mins  35325;     Neuromuscular Driss:    0    mins  21388;    Therapeutic Activity:     0     mins  47718;     Gait Trainin     mins  03549;     Ultrasound:     0     mins  08640;    Electrical Stimulation:    15     mins  19805 ( );    Timed Treatment:   33   mins   Total Treatment:     48   mins    Zena Longo PT, DPT  Physical Therapist  [place] License #[id number]                    "

## 2018-11-12 ENCOUNTER — TREATMENT (OUTPATIENT)
Dept: PHYSICAL THERAPY | Facility: CLINIC | Age: 53
End: 2018-11-12

## 2018-11-12 DIAGNOSIS — M25.562 LEFT KNEE PAIN, UNSPECIFIED CHRONICITY: Primary | ICD-10-CM

## 2018-11-12 DIAGNOSIS — M25.662 KNEE STIFFNESS, LEFT: ICD-10-CM

## 2018-11-12 DIAGNOSIS — Z96.652 STATUS POST REVISION OF TOTAL KNEE, LEFT: ICD-10-CM

## 2018-11-12 PROCEDURE — 97110 THERAPEUTIC EXERCISES: CPT | Performed by: PHYSICAL THERAPIST

## 2018-11-12 PROCEDURE — G0283 ELEC STIM OTHER THAN WOUND: HCPCS | Performed by: PHYSICAL THERAPIST

## 2018-11-12 PROCEDURE — 97116 GAIT TRAINING THERAPY: CPT | Performed by: PHYSICAL THERAPIST

## 2018-11-12 NOTE — PROGRESS NOTES
Physical Therapy Daily Progress Note    Visit #9    Subjective     Joselyn Grant reports: she felt good over the weekend, is working hard to increase her activity level at home.      Objective       Active Range of Motion   Left Knee   Flexion: 89 degrees      See Exercise, Manual, and Modality Logs for complete treatment.       Assessment/Plan  ROM is increasing, as is step length/speed with gait.  Progress per Plan of Care, reassess next visit           Manual Therapy:    0     mins  39575;  Therapeutic Exercise:    30     mins  99589;     Neuromuscular Driss:    0    mins  14508;    Therapeutic Activity:     0     mins  70465;     Gait Trainin     mins  99040;     Ultrasound:     0     mins  02698;    Electrical Stimulation:    15     mins  26888 ( );    Timed Treatment:   38   mins   Total Treatment:     53   mins    Zena Longo PT, DPT  Physical Therapist  [place] License #[id number]

## 2018-11-13 ENCOUNTER — TREATMENT (OUTPATIENT)
Dept: PHYSICAL THERAPY | Facility: CLINIC | Age: 53
End: 2018-11-13

## 2018-11-13 ENCOUNTER — OFFICE VISIT (OUTPATIENT)
Dept: FAMILY MEDICINE CLINIC | Facility: CLINIC | Age: 53
End: 2018-11-13

## 2018-11-13 VITALS
BODY MASS INDEX: 42.69 KG/M2 | SYSTOLIC BLOOD PRESSURE: 118 MMHG | OXYGEN SATURATION: 99 % | TEMPERATURE: 98 F | DIASTOLIC BLOOD PRESSURE: 66 MMHG | WEIGHT: 232 LBS | HEIGHT: 62 IN | HEART RATE: 70 BPM

## 2018-11-13 DIAGNOSIS — Z23 NEED FOR INFLUENZA VACCINATION: ICD-10-CM

## 2018-11-13 DIAGNOSIS — Z96.652 STATUS POST REVISION OF TOTAL KNEE, LEFT: ICD-10-CM

## 2018-11-13 DIAGNOSIS — Z96.652 S/P TOTAL KNEE ARTHROPLASTY, LEFT: Primary | ICD-10-CM

## 2018-11-13 DIAGNOSIS — M25.562 LEFT KNEE PAIN, UNSPECIFIED CHRONICITY: Primary | ICD-10-CM

## 2018-11-13 DIAGNOSIS — M25.662 KNEE STIFFNESS, LEFT: ICD-10-CM

## 2018-11-13 DIAGNOSIS — E66.01 MORBID OBESITY WITH BMI OF 40.0-44.9, ADULT (HCC): ICD-10-CM

## 2018-11-13 DIAGNOSIS — T84.033D MECHANICAL LOOSENING OF INTERNAL LEFT KNEE PROSTHETIC JOINT, SUBSEQUENT ENCOUNTER: ICD-10-CM

## 2018-11-13 DIAGNOSIS — Z87.39 HISTORY OF INFECTION OF TOTAL JOINT PROSTHESIS OF KNEE: ICD-10-CM

## 2018-11-13 DIAGNOSIS — I10 ESSENTIAL HYPERTENSION: ICD-10-CM

## 2018-11-13 LAB
ERYTHROCYTE [DISTWIDTH] IN BLOOD BY AUTOMATED COUNT: 14.7 % (ref 4.5–15)
HCT VFR BLD AUTO: 37.5 % (ref 31–42)
HGB BLD-MCNC: 12.2 G/DL (ref 12–18)
LYMPHOCYTES # BLD AUTO: 1.3 10*3/MM3 (ref 1.2–3.4)
LYMPHOCYTES NFR BLD AUTO: 33 % (ref 21–51)
MCH RBC QN AUTO: 29.3 PG (ref 26.1–33.1)
MCHC RBC AUTO-ENTMCNC: 32.4 G/DL (ref 33–37)
MCV RBC AUTO: 90.3 FL (ref 80–99)
MONOCYTES # BLD AUTO: 0.2 10*3/MM3 (ref 0.1–0.6)
MONOCYTES NFR BLD AUTO: 6.1 % (ref 2–9)
NEUTROPHILS # BLD AUTO: 2.4 10*3/MM3 (ref 1.4–6.5)
NEUTROPHILS NFR BLD AUTO: 60.9 % (ref 42–75)
PLATELET # BLD AUTO: 256 10*3/MM3 (ref 150–450)
PMV BLD AUTO: 8.2 FL (ref 7.1–10.5)
RBC # BLD AUTO: 4.15 10*6/MM3 (ref 4–6)
WBC NRBC COR # BLD: 3.9 10*3/MM3 (ref 4.5–10)

## 2018-11-13 PROCEDURE — G0283 ELEC STIM OTHER THAN WOUND: HCPCS | Performed by: PHYSICAL THERAPIST

## 2018-11-13 PROCEDURE — 97110 THERAPEUTIC EXERCISES: CPT | Performed by: PHYSICAL THERAPIST

## 2018-11-13 PROCEDURE — 36415 COLL VENOUS BLD VENIPUNCTURE: CPT | Performed by: NURSE PRACTITIONER

## 2018-11-13 PROCEDURE — 85025 COMPLETE CBC W/AUTO DIFF WBC: CPT | Performed by: NURSE PRACTITIONER

## 2018-11-13 PROCEDURE — 97116 GAIT TRAINING THERAPY: CPT | Performed by: PHYSICAL THERAPIST

## 2018-11-13 PROCEDURE — 99214 OFFICE O/P EST MOD 30 MIN: CPT | Performed by: NURSE PRACTITIONER

## 2018-11-13 PROCEDURE — 90471 IMMUNIZATION ADMIN: CPT | Performed by: NURSE PRACTITIONER

## 2018-11-13 PROCEDURE — 90674 CCIIV4 VAC NO PRSV 0.5 ML IM: CPT | Performed by: NURSE PRACTITIONER

## 2018-11-13 NOTE — PROGRESS NOTES
Re-Assessment / Re-Certification    Patient: Joselyn Grant   : 1965  Diagnosis/ICD-10 Code:  Left knee pain, unspecified chronicity [M25.562]  Referring practitioner: Ha Oh MD  Date of Initial Visit: 10/19/2018  Today's Date: 2018  Patient seen for 10 sessions      Subjective:   Joselyn Grant reports: she feels like she is walking better, and her knee feels looser. Pain is down to 7/10 at worst, compared to 10/10 before starting PT.  Subjective Questionnaire: LEFS: 37/80 (improved from 25/80 at initial evaluation)  Clinical Progress: improved  Home Program Compliance: Yes  Treatment has included: therapeutic exercise, neuromuscular re-education, gait training, electrical stimulation and cryotherapy    Subjective   Objective       Active Range of Motion   Left Knee   Flexion: 92 degrees   Extension: 5 degrees      Assessment/Plan  Progress toward previous goals: Partially Met    Short Term Goals: 6 weeks. Patient will:  1. Patient to be adherent with stretching and HEP. (MET)  2. Able to transfer sit to stand without need for UE's and with < 4/10 pain. (NOT MET)  3. (L) MMT 4-/5 for ability to ascend/descend 5 steps and transfer sit to stand x 5 with knee pain <4/10 (NOT MET)  4. Increased hip joint, patellar mobility to allow for decreased stress at tibiofemoral, patellofemoral joint. (knee ROM 5°-90°) (MET)  5. Pt. to exhibit increased LE soft tissue extensibility to allow for increased ease with walking 150 feet.  (MET)    Long Term Goals: 12 weeks. Patient will:  1. Pt to score 60% perceived normal ability on LEFS (NOT MET)  2. (L) LE strength to at least 4+/5 to ascend/descend 5 steps without pain >2/10. (NOT MET)  3. Pt to exhibit improved Knee AROM 0 degrees - 100 degrees to allow for improved gait, kneeling, bending squatting as is necessary for daily tasks.  (NOT MET)  4. Pt to exhibit LE endurance/strength to 4+/5 to allow for returning to unrestricted walking > 20 min.   (NOT MET)  5. Negative findings for special testing for dysfunction.  (MET)  6. Pt to demonstrate increased stability of the knee to balance on left for 20 seconds as needed to traverse uneven terrain .  (NOT MET)      Recommendations: Continue as planned  Timeframe: 1 month  Prognosis to achieve goals: good    PT Signature: Zena Longo, PT, DPT                         Physical Therapist                         [place] License #[id number]    Based upon review of the patient's progress and continued therapy plan, it is my medical opinion that Joselyn Grant should continue physical therapy treatment at [facility]  [address] [place] [id number] [phone].    Signature: __________________________________      Manual Therapy:    0     mins  63597;  Therapeutic Exercise:    25     mins  56105;     Neuromuscular Driss:    8    mins  49125;    Therapeutic Activity:     0     mins  97718;     Gait Trainin     mins  36275;     Ultrasound:     0     mins  21043;    Electrical Stimulation:    15     mins  33158 ( );    Timed Treatment:   33   mins   Total Treatment:     48   mins

## 2018-11-13 NOTE — PATIENT INSTRUCTIONS
check labs today CBC shows WBC sl low 3.9 will recheck 1 wk FU, cont all chronic dz meds and FU with Dr Russo 1 wk and PT this week, check BP at home, Enc healthy diet and regular exercise for wt loss, flu vaccine today

## 2018-11-13 NOTE — PROGRESS NOTES
Subjective   Joselyn Grant is a 53 y.o. female.     History of Present Illness   Here to FU on recent L knee arthroplasty with removal of spacer d/t infection by Dr Russo 10/04/18 still doing PT with cont pain and swelling 7/10 on Norco 10/325, now concerned about swelling since prev had prior to infection of mechanical hardware and sepsis of L knee replacement, with hospitalization [place] 07/10/18-07/24/18 with irrigation and extensive excisional debridement of left knee including skin, subcutaneous tissue, muscle, fascia and bone, removal of left total knee arthroplasty and placement of antibiotic spacer, placement of incisional wound VAC measuring 20 cm, request lab check WBC today, was prev seeing Dr Wes garcia dz and dc on minocycline 100 mg BID x 3 wks finished all medication, no fevers, no SOA wheezing coughing, still using walker, here with  supportive, with some worsening back and sciatica pain attributes to walking with knee but doing stretches at home, request flu shot today  With chronic HTN well controlled on amlodipine 10 mg and metoprolol XL 25 mg daily no CP dizziness HA LE edema, some weight gain approx 5 lbs since 10/18 d/t no regular exercise    The following portions of the patient's history were reviewed and updated as appropriate: allergies, current medications, past family history, past medical history, past social history, past surgical history and problem list.    Review of Systems   Constitutional: Negative for fever.   Respiratory: Negative for cough, shortness of breath and wheezing.    Cardiovascular: Negative for chest pain, palpitations and leg swelling.   Musculoskeletal: Positive for arthralgias, gait problem and joint swelling. Negative for back pain, myalgias, neck pain and neck stiffness.   Neurological: Negative for dizziness and headaches.   All other systems reviewed and are negative.      Objective   Physical Exam   Constitutional: She is oriented to  person, place, and time. She appears well-developed and well-nourished.   HENT:   Head: Normocephalic and atraumatic.   Eyes: Conjunctivae and EOM are normal. Pupils are equal, round, and reactive to light.   Cardiovascular: Normal rate, regular rhythm and normal heart sounds.   Pulmonary/Chest: Effort normal and breath sounds normal.   Musculoskeletal: Normal range of motion. She exhibits tenderness (L knee with effusion lateral superior, scarring well approximated, using walker, stiff ROM).   Neurological: She is alert and oriented to person, place, and time.   Skin: Skin is warm and dry.   Psychiatric: She has a normal mood and affect. Her behavior is normal. Judgment and thought content normal.   Vitals reviewed.      Assessment/Plan   Joselyn was seen today for follow-up.    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

## 2018-11-15 ENCOUNTER — TREATMENT (OUTPATIENT)
Dept: PHYSICAL THERAPY | Facility: CLINIC | Age: 53
End: 2018-11-15

## 2018-11-15 DIAGNOSIS — M25.562 LEFT KNEE PAIN, UNSPECIFIED CHRONICITY: Primary | ICD-10-CM

## 2018-11-15 DIAGNOSIS — Z96.652 STATUS POST REVISION OF TOTAL KNEE, LEFT: ICD-10-CM

## 2018-11-15 DIAGNOSIS — M25.662 KNEE STIFFNESS, LEFT: ICD-10-CM

## 2018-11-15 PROCEDURE — 97110 THERAPEUTIC EXERCISES: CPT | Performed by: PHYSICAL THERAPIST

## 2018-11-15 PROCEDURE — G0283 ELEC STIM OTHER THAN WOUND: HCPCS | Performed by: PHYSICAL THERAPIST

## 2018-11-15 PROCEDURE — 97116 GAIT TRAINING THERAPY: CPT | Performed by: PHYSICAL THERAPIST

## 2018-11-15 NOTE — PROGRESS NOTES
Physical Therapy Daily Progress Note    Visit #11    Subjective     Joselyn Grant reports: feeling good today.      Objective   See Exercise, Manual, and Modality Logs for complete treatment.       Assessment/Plan  Pt did well with trial walking with straight cane. Requires cueing for heel strike, but is able to walk with step through pattern rather than step to.   Progress per Plan of Care           Manual Therapy:    0     mins  33129;  Therapeutic Exercise:    30     mins  90328;     Neuromuscular Driss:    0    mins  25978;    Therapeutic Activity:     0     mins  19367;     Gait Trainin     mins  65758;     Ultrasound:     0     mins  55613;    Electrical Stimulation:    15     mins  10697 ( );    Timed Treatment:   38   mins   Total Treatment:     53   mins    Zena Longo, PT, DPT  Physical Therapist  [place] License #[id number]

## 2018-11-19 ENCOUNTER — TREATMENT (OUTPATIENT)
Dept: PHYSICAL THERAPY | Facility: CLINIC | Age: 53
End: 2018-11-19

## 2018-11-19 DIAGNOSIS — Z96.652 STATUS POST REVISION OF TOTAL KNEE, LEFT: ICD-10-CM

## 2018-11-19 DIAGNOSIS — M25.562 LEFT KNEE PAIN, UNSPECIFIED CHRONICITY: Primary | ICD-10-CM

## 2018-11-19 DIAGNOSIS — M25.662 KNEE STIFFNESS, LEFT: ICD-10-CM

## 2018-11-19 PROCEDURE — 97116 GAIT TRAINING THERAPY: CPT | Performed by: PHYSICAL THERAPIST

## 2018-11-19 PROCEDURE — 97110 THERAPEUTIC EXERCISES: CPT | Performed by: PHYSICAL THERAPIST

## 2018-11-19 PROCEDURE — 97530 THERAPEUTIC ACTIVITIES: CPT | Performed by: PHYSICAL THERAPIST

## 2018-11-19 PROCEDURE — G0283 ELEC STIM OTHER THAN WOUND: HCPCS | Performed by: PHYSICAL THERAPIST

## 2018-11-19 NOTE — PROGRESS NOTES
" Physical Therapy Daily Progress Note    Visit #12    Subjective     Joselyn Grant reports: she is gradually increasing her activity level at home. \"It talks to me, but then it feels better and I can do more.\"      Objective   See Exercise, Manual, and Modality Logs for complete treatment.       Assessment/Plan  Gait speed and step length are increasing.   Progress per Plan of Care           Manual Therapy:    0     mins  83455;  Therapeutic Exercise:    15     mins  55134;     Neuromuscular Driss:    0    mins  21628;    Therapeutic Activity:     15     mins  30472;     Gait Trainin     mins  75734;     Ultrasound:     0     mins  08103;    Electrical Stimulation:    15     mins  15710 ( );    Timed Treatment:   38   mins   Total Treatment:     53   mins    Zena Longo PT, DPT  Physical Therapist  [place] License #[id number]                    "

## 2018-11-20 ENCOUNTER — TREATMENT (OUTPATIENT)
Dept: PHYSICAL THERAPY | Facility: CLINIC | Age: 53
End: 2018-11-20

## 2018-11-20 DIAGNOSIS — M25.662 KNEE STIFFNESS, LEFT: ICD-10-CM

## 2018-11-20 DIAGNOSIS — Z96.652 STATUS POST REVISION OF TOTAL KNEE, LEFT: ICD-10-CM

## 2018-11-20 DIAGNOSIS — M25.562 LEFT KNEE PAIN, UNSPECIFIED CHRONICITY: Primary | ICD-10-CM

## 2018-11-20 PROCEDURE — 97116 GAIT TRAINING THERAPY: CPT | Performed by: PHYSICAL THERAPIST

## 2018-11-20 PROCEDURE — G0283 ELEC STIM OTHER THAN WOUND: HCPCS | Performed by: PHYSICAL THERAPIST

## 2018-11-20 PROCEDURE — 97112 NEUROMUSCULAR REEDUCATION: CPT | Performed by: PHYSICAL THERAPIST

## 2018-11-20 PROCEDURE — 97110 THERAPEUTIC EXERCISES: CPT | Performed by: PHYSICAL THERAPIST

## 2018-11-26 ENCOUNTER — TREATMENT (OUTPATIENT)
Dept: PHYSICAL THERAPY | Facility: CLINIC | Age: 53
End: 2018-11-26

## 2018-11-26 DIAGNOSIS — M25.662 KNEE STIFFNESS, LEFT: ICD-10-CM

## 2018-11-26 DIAGNOSIS — Z96.652 STATUS POST REVISION OF TOTAL KNEE, LEFT: ICD-10-CM

## 2018-11-26 DIAGNOSIS — M25.562 LEFT KNEE PAIN, UNSPECIFIED CHRONICITY: Primary | ICD-10-CM

## 2018-11-26 PROCEDURE — 97110 THERAPEUTIC EXERCISES: CPT | Performed by: PHYSICAL THERAPIST

## 2018-11-26 PROCEDURE — G0283 ELEC STIM OTHER THAN WOUND: HCPCS | Performed by: PHYSICAL THERAPIST

## 2018-11-26 NOTE — PROGRESS NOTES
Physical Therapy Daily Progress Note    Visit #14    Subjective     Joselyn Grant reports: she saw MD, who is pleased with progress. OK to transition to cane, progress CKC exercises when able.      Objective   See Exercise, Manual, and Modality Logs for complete treatment.       Assessment/Plan   Progressed standing exercises today, fatigues very easily and requires cueing for symmetry.   Progress per Plan of Care           Manual Therapy:    0     mins  91625;  Therapeutic Exercise:    35     mins  36810;     Neuromuscular Driss:    0    mins  67669;    Therapeutic Activity:     0     mins  67183;     Gait Trainin     mins  39596;     Ultrasound:     0     mins  40913;    Electrical Stimulation:    15     mins  47120 ( );    Timed Treatment:   35   mins   Total Treatment:     50   mins    Zena Longo PT, DPT  Physical Therapist  [place] License #[id number]

## 2018-11-29 ENCOUNTER — TREATMENT (OUTPATIENT)
Dept: PHYSICAL THERAPY | Facility: CLINIC | Age: 53
End: 2018-11-29

## 2018-11-29 DIAGNOSIS — M25.662 KNEE STIFFNESS, LEFT: ICD-10-CM

## 2018-11-29 DIAGNOSIS — M25.562 LEFT KNEE PAIN, UNSPECIFIED CHRONICITY: Primary | ICD-10-CM

## 2018-11-29 DIAGNOSIS — Z96.652 STATUS POST REVISION OF TOTAL KNEE, LEFT: ICD-10-CM

## 2018-11-29 PROCEDURE — 97110 THERAPEUTIC EXERCISES: CPT | Performed by: PHYSICAL THERAPIST

## 2018-11-29 PROCEDURE — G0283 ELEC STIM OTHER THAN WOUND: HCPCS | Performed by: PHYSICAL THERAPIST

## 2018-11-29 NOTE — PROGRESS NOTES
Physical Therapy Daily Progress Note    Visit #15    Subjective     Joselyn Grant reports: she feels good today. Is walking more at home and in the community.      Objective   See Exercise, Manual, and Modality Logs for complete treatment.       Assessment/Plan  Activity tolerance and gait continue to steadily improve. Will add leg press next visit.  Progress per Plan of Care           Manual Therapy:    0     mins  79379;  Therapeutic Exercise:    40     mins  16909;     Neuromuscular Driss:    0    mins  15054;    Therapeutic Activity:     0     mins  49533;     Gait Trainin     mins  30793;     Ultrasound:     0     mins  21325;    Electrical Stimulation:    15     mins  53822 ( );    Timed Treatment:   40   mins   Total Treatment:     55   mins    Zena Longo PT, DPT  Physical Therapist  [place] License #[id number]

## 2018-11-30 ENCOUNTER — TREATMENT (OUTPATIENT)
Dept: PHYSICAL THERAPY | Facility: CLINIC | Age: 53
End: 2018-11-30

## 2018-11-30 DIAGNOSIS — Z96.652 STATUS POST REVISION OF TOTAL KNEE, LEFT: ICD-10-CM

## 2018-11-30 DIAGNOSIS — M25.662 KNEE STIFFNESS, LEFT: ICD-10-CM

## 2018-11-30 DIAGNOSIS — M25.562 LEFT KNEE PAIN, UNSPECIFIED CHRONICITY: Primary | ICD-10-CM

## 2018-11-30 PROCEDURE — 97110 THERAPEUTIC EXERCISES: CPT | Performed by: PHYSICAL THERAPIST

## 2018-11-30 PROCEDURE — G0283 ELEC STIM OTHER THAN WOUND: HCPCS | Performed by: PHYSICAL THERAPIST

## 2018-12-03 ENCOUNTER — TREATMENT (OUTPATIENT)
Dept: PHYSICAL THERAPY | Facility: CLINIC | Age: 53
End: 2018-12-03

## 2018-12-03 DIAGNOSIS — M25.562 LEFT KNEE PAIN, UNSPECIFIED CHRONICITY: Primary | ICD-10-CM

## 2018-12-03 DIAGNOSIS — Z96.652 STATUS POST REVISION OF TOTAL KNEE, LEFT: ICD-10-CM

## 2018-12-03 DIAGNOSIS — M25.662 KNEE STIFFNESS, LEFT: ICD-10-CM

## 2018-12-03 PROCEDURE — G0283 ELEC STIM OTHER THAN WOUND: HCPCS | Performed by: PHYSICAL THERAPIST

## 2018-12-03 PROCEDURE — 97110 THERAPEUTIC EXERCISES: CPT | Performed by: PHYSICAL THERAPIST

## 2018-12-03 NOTE — PROGRESS NOTES
Physical Therapy Daily Progress Note    Visit #17    Subjective     Joselyn Grant reports: it feels good to progress her exercises      Objective   See Exercise, Manual, and Modality Logs for complete treatment.       Assessment/Plan  Good quad contraction with single leg press.  Progress per Plan of Care           Manual Therapy:    0     mins  87514;  Therapeutic Exercise:    35     mins  87935;     Neuromuscular Driss:    0    mins  16786;    Therapeutic Activity:     0     mins  69609;     Gait Trainin     mins  10171;     Ultrasound:     0     mins  11417;    Electrical Stimulation:    15     mins  90164 ( );    Timed Treatment:   35   mins   Total Treatment:     50   mins    Zena Longo PT, DPT  Physical Therapist  [place] License #[id number]

## 2018-12-04 ENCOUNTER — TREATMENT (OUTPATIENT)
Dept: PHYSICAL THERAPY | Facility: CLINIC | Age: 53
End: 2018-12-04

## 2018-12-04 DIAGNOSIS — M25.562 LEFT KNEE PAIN, UNSPECIFIED CHRONICITY: Primary | ICD-10-CM

## 2018-12-04 DIAGNOSIS — Z96.652 STATUS POST REVISION OF TOTAL KNEE, LEFT: ICD-10-CM

## 2018-12-04 DIAGNOSIS — M25.662 KNEE STIFFNESS, LEFT: ICD-10-CM

## 2018-12-04 PROCEDURE — 97110 THERAPEUTIC EXERCISES: CPT | Performed by: PHYSICAL THERAPIST

## 2018-12-04 PROCEDURE — G0283 ELEC STIM OTHER THAN WOUND: HCPCS | Performed by: PHYSICAL THERAPIST

## 2018-12-04 NOTE — PROGRESS NOTES
Physical Therapy Daily Progress Note    Visit #18    Subjective     Joselyn Grant reports: she was very sore yesterday, more her back than her knee. Used ice/TENS, and was able to decorate her [place] tree yesterday evening.       Objective   See Exercise, Manual, and Modality Logs for complete treatment.       Assessment/Plan  Activity tolerance is steadily improving, encouraged pt to continue to slowly increase her activity at home. Overall progressing well.  Progress per Plan of Care           Manual Therapy:    0     mins  06107;  Therapeutic Exercise:    35     mins  58229;     Neuromuscular Driss:    0    mins  62405;    Therapeutic Activity:     0     mins  47530;     Gait Trainin     mins  34424;     Ultrasound:     0     mins  45914;    Electrical Stimulation:    15     mins  54647 ( );    Timed Treatment:   35   mins   Total Treatment:     50   mins    Zena Longo PT, DPT  Physical Therapist  [place] License #[id number]

## 2018-12-06 ENCOUNTER — TREATMENT (OUTPATIENT)
Dept: PHYSICAL THERAPY | Facility: CLINIC | Age: 53
End: 2018-12-06

## 2018-12-06 DIAGNOSIS — Z96.652 STATUS POST REVISION OF TOTAL KNEE, LEFT: ICD-10-CM

## 2018-12-06 DIAGNOSIS — M25.662 KNEE STIFFNESS, LEFT: ICD-10-CM

## 2018-12-06 DIAGNOSIS — M25.562 LEFT KNEE PAIN, UNSPECIFIED CHRONICITY: Primary | ICD-10-CM

## 2018-12-06 PROCEDURE — 97110 THERAPEUTIC EXERCISES: CPT | Performed by: PHYSICAL THERAPIST

## 2018-12-06 PROCEDURE — G0283 ELEC STIM OTHER THAN WOUND: HCPCS | Performed by: PHYSICAL THERAPIST

## 2018-12-06 NOTE — PROGRESS NOTES
" Physical Therapy Daily Progress Note    Visit #19    Subjective     Joselyn Grant reports: she walked two blocks and stood for 3 1/2 hours for a cooking class yesterday. \"I was exhausted and hurt all over but it felt so good to do something.\"      Objective   See Exercise, Manual, and Modality Logs for complete treatment.       Assessment/Plan  Pt is consistently increasing activity level at home, and exercise performance and gait speed are improving in clinic. Overall progressing well toward goals.  Progress per Plan of Care           Manual Therapy:    0     mins  30070;  Therapeutic Exercise:    40     mins  89780;     Neuromuscular Driss:    0    mins  92906;    Therapeutic Activity:     0     mins  23294;     Gait Trainin     mins  22181;     Ultrasound:     0     mins  98454;    Electrical Stimulation:    15     mins  47282 ( );    Timed Treatment:   40   mins   Total Treatment:     55   mins    Zena Longo, PT, DPT  Physical Therapist  [place] License #[id number]                    "

## 2018-12-11 ENCOUNTER — TREATMENT (OUTPATIENT)
Dept: PHYSICAL THERAPY | Facility: CLINIC | Age: 53
End: 2018-12-11

## 2018-12-11 DIAGNOSIS — Z96.652 STATUS POST REVISION OF TOTAL KNEE, LEFT: ICD-10-CM

## 2018-12-11 DIAGNOSIS — M25.562 LEFT KNEE PAIN, UNSPECIFIED CHRONICITY: Primary | ICD-10-CM

## 2018-12-11 DIAGNOSIS — M25.662 KNEE STIFFNESS, LEFT: ICD-10-CM

## 2018-12-11 PROCEDURE — G0283 ELEC STIM OTHER THAN WOUND: HCPCS | Performed by: PHYSICAL THERAPIST

## 2018-12-11 PROCEDURE — 97110 THERAPEUTIC EXERCISES: CPT | Performed by: PHYSICAL THERAPIST

## 2018-12-11 NOTE — PROGRESS NOTES
Re-Assessment / Re-Certification    Patient: Joselyn Grant   : 1965  Diagnosis/ICD-10 Code:  Left knee pain, unspecified chronicity [M25.562]  Referring practitioner: Ha Oh MD  Date of Initial Visit: 10/19/2018  Today's Date: 2018  Patient seen for 20 sessions      Subjective:   Joselyn Grant reports: she sees a big improvement in her knee motion and strength. Pain is also steadily decreasing. Low back/sciatica pain is affected her mobility.  Subjective Questionnaire: LEFS: 43/80 (improved from 37/80 at last reassessment)  Clinical Progress: improved  Home Program Compliance: Yes  Treatment has included: therapeutic exercise, manual therapy, therapeutic activity, gait training, electrical stimulation, moist heat and cryotherapy    Subjective   Objective       Active Range of Motion   Left Knee   Flexion: 104 degrees   Extension: 0 degrees      Assessment/Plan  Progress toward previous goals: Partially Met    Short Term Goals: 6 weeks. Patient will:  1. Patient to be adherent with stretching and HEP. (MET)  2. Able to transfer sit to stand without need for UE's and with < 4/10 pain. (NOT MET)  3. (L) MMT 4-/5 for ability to ascend/descend 5 steps and transfer sit to stand x 5 with knee pain <4/10 (MET)  4. Increased hip joint, patellar mobility to allow for decreased stress at tibiofemoral, patellofemoral joint. (knee ROM 5°-90°) (MET)  5. Pt. to exhibit increased LE soft tissue extensibility to allow for increased ease with walking 150 feet.  (MET)    Long Term Goals: 12 weeks. Patient will:  1. Pt to score 60% perceived normal ability on LEFS (NOT MET)  2. (L) LE strength to at least 4+/5 to ascend/descend 5 steps without pain >2/10. (NOT MET)  3. Pt to exhibit improved Knee AROM 0 degrees - 100 degrees to allow for improved gait, kneeling, bending squatting as is necessary for daily tasks.  (MET)  4. Pt to exhibit LE endurance/strength to 4+/5 to allow for returning to  unrestricted walking > 20 min.  (NOT MET)  5. Negative findings for special testing for dysfunction.  (MET)  6. Pt to demonstrate increased stability of the knee to balance on left for 20 seconds as needed to traverse uneven terrain .  (NOT MET)      Recommendations: Continue as planned  Timeframe: 1 month  Prognosis to achieve goals: good    PT Signature: Zena Longo, PT, DPT                         Physical Therapist                         [place] License #[id number]    Based upon review of the patient's progress and continued therapy plan, it is my medical opinion that Joselyn Grant should continue physical therapy treatment at [facility]  [address] [place] [id number] [phone].    Signature: __________________________________      Manual Therapy:    0     mins  31504;  Therapeutic Exercise:    40     mins  22598;     Neuromuscular Driss:    0    mins  16601;    Therapeutic Activity:     0     mins  99752;     Gait Trainin     mins  98018;     Ultrasound:     0     mins  11085;    Electrical Stimulation:    15     mins  15172 ( );    Timed Treatment:   40   mins   Total Treatment:     55   mins

## 2018-12-13 ENCOUNTER — TREATMENT (OUTPATIENT)
Dept: PHYSICAL THERAPY | Facility: CLINIC | Age: 53
End: 2018-12-13

## 2018-12-13 DIAGNOSIS — M25.562 LEFT KNEE PAIN, UNSPECIFIED CHRONICITY: Primary | ICD-10-CM

## 2018-12-13 DIAGNOSIS — M25.662 KNEE STIFFNESS, LEFT: ICD-10-CM

## 2018-12-13 DIAGNOSIS — Z96.652 STATUS POST REVISION OF TOTAL KNEE, LEFT: ICD-10-CM

## 2018-12-13 PROCEDURE — 97110 THERAPEUTIC EXERCISES: CPT | Performed by: PHYSICAL THERAPIST

## 2018-12-13 PROCEDURE — G0283 ELEC STIM OTHER THAN WOUND: HCPCS | Performed by: PHYSICAL THERAPIST

## 2018-12-17 ENCOUNTER — TREATMENT (OUTPATIENT)
Dept: PHYSICAL THERAPY | Facility: CLINIC | Age: 53
End: 2018-12-17

## 2018-12-17 DIAGNOSIS — M25.662 KNEE STIFFNESS, LEFT: ICD-10-CM

## 2018-12-17 DIAGNOSIS — Z96.652 STATUS POST REVISION OF TOTAL KNEE, LEFT: ICD-10-CM

## 2018-12-17 DIAGNOSIS — M25.562 LEFT KNEE PAIN, UNSPECIFIED CHRONICITY: Primary | ICD-10-CM

## 2018-12-17 PROCEDURE — 97110 THERAPEUTIC EXERCISES: CPT | Performed by: PHYSICAL THERAPIST

## 2018-12-17 PROCEDURE — G0283 ELEC STIM OTHER THAN WOUND: HCPCS | Performed by: PHYSICAL THERAPIST

## 2018-12-17 NOTE — PROGRESS NOTES
Physical Therapy Daily Progress Note    Visit #21    Subjective     Joselyn Grant reports: feeling better compared to earlier this week, has been getting back to walking, exercising.      Objective   See Exercise, Manual, and Modality Logs for complete treatment.       Assessment/Plan  Tolerated well, does still require cueing for symmetrical weightbearing with exercises. May benefit from force plate testing soon.  Progress per Plan of Care           Manual Therapy:    0     mins  18397;  Therapeutic Exercise:    45     mins  77237;     Neuromuscular Driss:    0    mins  33854;    Therapeutic Activity:     0     mins  86256;     Gait Trainin     mins  19878;     Ultrasound:     0     mins  56585;    Electrical Stimulation:    15     mins  21305 ( );    Timed Treatment:   45   mins   Total Treatment:     60   mins    Zena Longo PT, DPT  Physical Therapist  [place] License #[id number]

## 2018-12-17 NOTE — PROGRESS NOTES
Physical Therapy Daily Progress Note    Visit #22    Subjective     Joselyn Grant reports: back pain/radicular pain is limiting her activity more than her knee.      Objective   See Exercise, Manual, and Modality Logs for complete treatment.       Assessment/Plan  Discussed potential benefit of PT for low back, pt is interested in this. She will speak with surgeon, will schedule eval for back as appropriate.  Progress per Plan of Care           Manual Therapy:    0     mins  24530;  Therapeutic Exercise:    35     mins  97856;     Neuromuscular Driss:    0    mins  66710;    Therapeutic Activity:     0     mins  35538;     Gait Trainin     mins  60918;     Ultrasound:     0     mins  74658;    Electrical Stimulation:    15     mins  06543 ( );    Timed Treatment:   35   mins   Total Treatment:     50   mins    Zena Longo, PT, DPT  Physical Therapist  [place] License #[id number]

## 2018-12-18 ENCOUNTER — TRANSCRIBE ORDERS (OUTPATIENT)
Dept: PHYSICAL THERAPY | Facility: CLINIC | Age: 53
End: 2018-12-18

## 2018-12-18 ENCOUNTER — TREATMENT (OUTPATIENT)
Dept: PHYSICAL THERAPY | Facility: CLINIC | Age: 53
End: 2018-12-18

## 2018-12-18 DIAGNOSIS — M54.16 RADICULOPATHY, LUMBAR REGION: Primary | ICD-10-CM

## 2018-12-18 DIAGNOSIS — M25.662 KNEE STIFFNESS, LEFT: ICD-10-CM

## 2018-12-18 DIAGNOSIS — Z96.652 STATUS POST REVISION OF TOTAL KNEE, LEFT: ICD-10-CM

## 2018-12-18 DIAGNOSIS — M25.562 LEFT KNEE PAIN, UNSPECIFIED CHRONICITY: Primary | ICD-10-CM

## 2018-12-18 PROCEDURE — G0283 ELEC STIM OTHER THAN WOUND: HCPCS | Performed by: PHYSICAL THERAPIST

## 2018-12-18 PROCEDURE — 97110 THERAPEUTIC EXERCISES: CPT | Performed by: PHYSICAL THERAPIST

## 2018-12-18 NOTE — PROGRESS NOTES
Physical Therapy Daily Progress Note    Visit #23    Subjective     Joselyn Grant reports: back continues to limit her progress with her knee. Has asked MD for PT referral for her back.      Objective   See Exercise, Manual, and Modality Logs for complete treatment.       Assessment/Plan  As pt reports, back pain/radiculopathy limits her ability to progress her knee exercises. Will benefit from lumbosacral evaluation.  Progress per Plan of Care           Manual Therapy:    0     mins  80039;  Therapeutic Exercise:    40     mins  67400;     Neuromuscular Driss:    0    mins  62031;    Therapeutic Activity:     0     mins  15753;     Gait Trainin     mins  35751;     Ultrasound:     0     mins  50409;    Electrical Stimulation:    15     mins  21128 ( );    Timed Treatment:   40   mins   Total Treatment:     55   mins    Zena Longo, PT, DPT  Physical Therapist  [place] License #[id number]

## 2018-12-20 ENCOUNTER — TREATMENT (OUTPATIENT)
Dept: PHYSICAL THERAPY | Facility: CLINIC | Age: 53
End: 2018-12-20

## 2018-12-20 DIAGNOSIS — M54.41 CHRONIC BILATERAL LOW BACK PAIN WITH RIGHT-SIDED SCIATICA: Primary | ICD-10-CM

## 2018-12-20 DIAGNOSIS — G89.29 CHRONIC BILATERAL LOW BACK PAIN WITH RIGHT-SIDED SCIATICA: Primary | ICD-10-CM

## 2018-12-20 PROCEDURE — G0283 ELEC STIM OTHER THAN WOUND: HCPCS | Performed by: PHYSICAL THERAPIST

## 2018-12-20 PROCEDURE — 97161 PT EVAL LOW COMPLEX 20 MIN: CPT | Performed by: PHYSICAL THERAPIST

## 2018-12-20 PROCEDURE — 97012 MECHANICAL TRACTION THERAPY: CPT | Performed by: PHYSICAL THERAPIST

## 2018-12-20 NOTE — PROGRESS NOTES
Physical Therapy Initial Evaluation and Plan of Care    Patient: Joselyn Grant   : 1965  Diagnosis/ICD-10 Code:  Chronic bilateral low back pain with right-sided sciatica [M54.41, G89.29]  Referring practitioner: Ha Oh,*  Date of Initial Visit: 2018  Today's Date: 2018    Subjective Evaluation    History of Present Illness  Onset date: 2-3 weeks ago.  Mechanism of injury: Acute on chronic low back pain with radiculopathy, current episode began when increasing exercise during PT for TKA revision. Can have radicular symptoms on either side, but currently on posterior/lateral thigh on the right. Worse with activity, especially standing for long periods. Pain is limiting her ability to progress exercises for knee.Pt denies numbness/tingling or bowel/bladder changes. Pt has gone to chiropractor for past episodes, states traction has helped symptoms.    Quality of life: good    Pain  Current pain ratin  At best pain ratin  At worst pain rating: 10  Location: low back  Quality: burning and sharp  Relieving factors: heat and rest  Aggravating factors: prolonged positioning  Progression: worsening    Social Support  Lives in: multiple-level home  Lives with: spouse    Diagnostic Tests  No diagnostic tests performed    Treatments  Previous treatment: chiropractic  Patient Goals  Patient goals for therapy: decreased pain, increased strength and return to sport/leisure activities             Objective     Special Questions      Additional Special Questions  No red flags noted      Postural Observations  Seated posture: good  Standing posture: fair        Palpation   Left   No palpable tenderness to the lumbar paraspinals and quadratus lumborum.     Right   No palpable tenderness to the lumbar paraspinals and quadratus lumborum.     Tenderness     Lumbar Spine  Tenderness in the facet joint.     Left Hip   No tenderness in the ASIS and PSIS.     Right Hip   No tenderness in the  ASIS and PSIS.     Neurological Testing     Sensation     Lumbar   Left   Intact: light touch    Right   Intact: light touch    Reflexes   Left   Patellar (L4): normal (2+)  Achilles (S1): normal (2+)    Right   Patellar (L4): normal (2+)  Achilles (S1): normal (2+)    Active Range of Motion     Lumbar   Flexion: 30 degrees with pain  Extension: 10 degrees with pain  Left lateral flexion: 25 degrees   Right lateral flexion: 25 degrees     Strength/Myotome Testing     Left Hip   Planes of Motion   Flexion: 4+  Extension: 4-  Abduction: 4-    Right Hip   Planes of Motion   Flexion: 4+  Extension: 4-  Abduction: 4-    Left Knee   Flexion: 4-  Extension: 4-    Right Knee   Flexion: 5  Extension: 5    Left Ankle/Foot   Dorsiflexion: 4  Plantar flexion: 4  Great toe extension: 5    Right Ankle/Foot   Dorsiflexion: 5  Plantar flexion: 5  Great toe extension: 5    Tests       Thoracic   Positive slump.     Lumbar     Left   Negative passive SLR and quadrant.     Right   Positive passive SLR and quadrant.     Ambulation     Observational Gait   Gait: antalgic   Decreased walking speed and stride length.   Left foot contact pattern: heel to toe  Right foot contact pattern: heel to toe    Additional Observational Gait Details  Flexed trunk, pt ambulates with straight cane due to left TKA.         Assessment & Plan     Assessment  Impairments: activity intolerance, impaired physical strength, lacks appropriate home exercise program and pain with function  Assessment details: Ms. Grant is a pleasant 53 year old female who presents with signs/symptoms consistent with lumbar radiculopathy. She will benefit from PT to decrease nerve impingement, improve core strength and normalize gait so that she can return to normal daily skills and tolerate exercise progression for TKA rehab.    Prognosis: good  Functional Limitations: lifting, sleeping, walking, uncomfortable because of pain, moving in bed and sitting  Goals  Plan Goals: Short  Term Goals: 4 weeks. Patient will:  1. Be independent with initial HEP  2. Be instructed in posture and body mechanics  3. Demonstrate TrA contraction during exercises in clinic without need for cueing.  4. Report 25% improvement in radicular symptoms.     Long Term Goals: 8 weeks. Patient will:  1. Demonstrate improved Bilateral lower extremity/lower abdominal MMT of >/= 4+/5 to allow for performance of daily activities without pain.  2. Demonstrate lumbar ROM WFL, pain free to allow for return to household & recreational activities w/o increased symptoms  3. Report 75% improvement in radicular symptoms.  4. Perceived disability </= 20% as measured by Oswestry Questionnaire.  5. Be independent with long term HEP    Plan  Therapy options: will be seen for skilled physical therapy services  Planned modality interventions: cryotherapy, thermotherapy (hydrocollator packs), TENS, ultrasound and traction  Planned therapy interventions: abdominal trunk stabilization, manual therapy, neuromuscular re-education, body mechanics training, postural training, soft tissue mobilization, spinal/joint mobilization, strengthening, stretching, home exercise program, functional ROM exercises and flexibility  Frequency: 2x week  Duration in weeks: 12  Treatment plan discussed with: patient        Manual Therapy:    0     mins  78067;  Therapeutic Exercise:    0     mins  56882;     Neuromuscular Driss:    0    mins  39296;    Therapeutic Activity:     0     mins  18923;     Gait Trainin     mins  89035;     Ultrasound:     0     mins  51559;    Electrical Stimulation:    15     mins  01001 ( );  Mechanical traction 15 minutes    Timed Treatment:   0   mins   Total Treatment:     60   mins    PT SIGNATURE: Zena Longo PT, DPT          Physical Therapist                               [place] License #[id number]    DATE TREATMENT INITIATED: 2018    Initial Certification  Certification Period: 3/20/2018  I certify that the  therapy services are furnished while this patient is under my care.  The services outlined above are required by this patient, and will be reviewed every 90 days.     PHYSICIAN: Ha Oh MD      DATE:     Please sign and return via fax to [phone].. Thank you, [facility].

## 2018-12-28 ENCOUNTER — TREATMENT (OUTPATIENT)
Dept: PHYSICAL THERAPY | Facility: CLINIC | Age: 53
End: 2018-12-28

## 2018-12-28 DIAGNOSIS — M54.41 CHRONIC BILATERAL LOW BACK PAIN WITH RIGHT-SIDED SCIATICA: Primary | ICD-10-CM

## 2018-12-28 DIAGNOSIS — M25.562 LEFT KNEE PAIN, UNSPECIFIED CHRONICITY: ICD-10-CM

## 2018-12-28 DIAGNOSIS — M25.662 KNEE STIFFNESS, LEFT: ICD-10-CM

## 2018-12-28 DIAGNOSIS — G89.29 CHRONIC BILATERAL LOW BACK PAIN WITH RIGHT-SIDED SCIATICA: Primary | ICD-10-CM

## 2018-12-28 DIAGNOSIS — Z96.652 STATUS POST REVISION OF TOTAL KNEE, LEFT: ICD-10-CM

## 2018-12-28 PROCEDURE — G0283 ELEC STIM OTHER THAN WOUND: HCPCS | Performed by: PHYSICAL THERAPIST

## 2018-12-28 PROCEDURE — 97012 MECHANICAL TRACTION THERAPY: CPT | Performed by: PHYSICAL THERAPIST

## 2019-01-03 ENCOUNTER — TREATMENT (OUTPATIENT)
Dept: PHYSICAL THERAPY | Facility: CLINIC | Age: 54
End: 2019-01-03

## 2019-01-03 DIAGNOSIS — Z96.652 STATUS POST REVISION OF TOTAL KNEE, LEFT: ICD-10-CM

## 2019-01-03 DIAGNOSIS — M25.662 KNEE STIFFNESS, LEFT: ICD-10-CM

## 2019-01-03 DIAGNOSIS — G89.29 CHRONIC BILATERAL LOW BACK PAIN WITH RIGHT-SIDED SCIATICA: Primary | ICD-10-CM

## 2019-01-03 DIAGNOSIS — M25.562 LEFT KNEE PAIN, UNSPECIFIED CHRONICITY: ICD-10-CM

## 2019-01-03 DIAGNOSIS — M54.41 CHRONIC BILATERAL LOW BACK PAIN WITH RIGHT-SIDED SCIATICA: Primary | ICD-10-CM

## 2019-01-03 PROCEDURE — 97012 MECHANICAL TRACTION THERAPY: CPT | Performed by: PHYSICAL THERAPIST

## 2019-01-03 PROCEDURE — G0283 ELEC STIM OTHER THAN WOUND: HCPCS | Performed by: PHYSICAL THERAPIST

## 2019-01-03 PROCEDURE — 97110 THERAPEUTIC EXERCISES: CPT | Performed by: PHYSICAL THERAPIST

## 2019-01-03 NOTE — PROGRESS NOTES
Physical Therapy Daily Progress Note    Visit #24    Subjective     Joselyn Grant reports: traction is helping      Objective   See Exercise, Manual, and Modality Logs for complete treatment.       Assessment/Plan  Increased traction pull today, tolerated well.   Progress per Plan of Care, initiate some core stabilization exercises in clinic next session.           Manual Therapy:    0     mins  65051;  Therapeutic Exercise:    0     mins  41059;     Neuromuscular Driss:    0    mins  16980;    Therapeutic Activity:     0     mins  50467;     Gait Trainin     mins  02956;     Ultrasound:     0     mins  28722;    Electrical Stimulation:    15     mins  18192 ( );  Mechanical traction 15 minutes    Timed Treatment:   15   mins   Total Treatment:     45   mins    Zena Longo PT, DPT  Physical Therapist  [place] License #[id number]

## 2019-01-03 NOTE — PROGRESS NOTES
Physical Therapy Daily Progress Note    Visit #25    Subjective     Joselyn Grant reports: she has been increasing activity level at home because traction is helping.       Objective   See Exercise, Manual, and Modality Logs for complete treatment.       Assessment/Plan  Resumed and progressed exercise in clinic today, pt did very well. Will continue with traction/estim with progression of core stabilization and CKC exercise as tolerated.  Progress per Plan of Care           Manual Therapy:    0     mins  18984;  Therapeutic Exercise:    30     mins  74580;     Neuromuscular Driss:    0    mins  72309;    Therapeutic Activity:     0     mins  15376;     Gait Trainin     mins  10334;     Ultrasound:     0     mins  05376;    Electrical Stimulation:    15     mins  25324 ( );  Mechanical traction 15 minutes    Timed Treatment:   30   mins   Total Treatment:     60   mins    Zena Longo PT, DPT  Physical Therapist  [place] License #[id number]

## 2019-01-07 ENCOUNTER — TREATMENT (OUTPATIENT)
Dept: PHYSICAL THERAPY | Facility: CLINIC | Age: 54
End: 2019-01-07

## 2019-01-07 DIAGNOSIS — Z96.652 STATUS POST REVISION OF TOTAL KNEE, LEFT: ICD-10-CM

## 2019-01-07 DIAGNOSIS — M25.562 LEFT KNEE PAIN, UNSPECIFIED CHRONICITY: ICD-10-CM

## 2019-01-07 DIAGNOSIS — G89.29 CHRONIC BILATERAL LOW BACK PAIN WITH RIGHT-SIDED SCIATICA: Primary | ICD-10-CM

## 2019-01-07 DIAGNOSIS — M25.662 KNEE STIFFNESS, LEFT: ICD-10-CM

## 2019-01-07 DIAGNOSIS — M54.41 CHRONIC BILATERAL LOW BACK PAIN WITH RIGHT-SIDED SCIATICA: Primary | ICD-10-CM

## 2019-01-07 PROCEDURE — 97012 MECHANICAL TRACTION THERAPY: CPT | Performed by: PHYSICAL THERAPIST

## 2019-01-07 PROCEDURE — 97110 THERAPEUTIC EXERCISES: CPT | Performed by: PHYSICAL THERAPIST

## 2019-01-07 PROCEDURE — G0283 ELEC STIM OTHER THAN WOUND: HCPCS | Performed by: PHYSICAL THERAPIST

## 2019-01-07 NOTE — PROGRESS NOTES
Physical Therapy Daily Progress Note    Visit #26    Subjective     Joselyn Grant reports: her back still bothers her, but it's coming along. Sees Dr. Oh on Wednesday.      Objective   See Exercise, Manual, and Modality Logs for complete treatment.       Assessment/Plan  Posture is more upright with gait, speed is also faster.   Progress per Plan of Care           Manual Therapy:    0     mins  89951;  Therapeutic Exercise:    35     mins  59087;     Neuromuscular Driss:    0    mins  51559;    Therapeutic Activity:     0     mins  17442;     Gait Trainin     mins  87940;     Ultrasound:     0     mins  36277;    Electrical Stimulation:    15     mins  71076 ( );  Mechanical traction 15 minutes    Timed Treatment:   35   mins   Total Treatment:     65   mins    Zena Longo PT, DPT  Physical Therapist  [place] License #[id number]

## 2019-01-11 ENCOUNTER — TREATMENT (OUTPATIENT)
Dept: PHYSICAL THERAPY | Facility: CLINIC | Age: 54
End: 2019-01-11

## 2019-01-11 DIAGNOSIS — M54.41 CHRONIC BILATERAL LOW BACK PAIN WITH RIGHT-SIDED SCIATICA: Primary | ICD-10-CM

## 2019-01-11 DIAGNOSIS — Z96.652 STATUS POST REVISION OF TOTAL KNEE, LEFT: ICD-10-CM

## 2019-01-11 DIAGNOSIS — G89.29 CHRONIC BILATERAL LOW BACK PAIN WITH RIGHT-SIDED SCIATICA: Primary | ICD-10-CM

## 2019-01-11 DIAGNOSIS — M25.562 LEFT KNEE PAIN, UNSPECIFIED CHRONICITY: ICD-10-CM

## 2019-01-11 DIAGNOSIS — M25.662 KNEE STIFFNESS, LEFT: ICD-10-CM

## 2019-01-11 PROCEDURE — 97012 MECHANICAL TRACTION THERAPY: CPT | Performed by: PHYSICAL THERAPIST

## 2019-01-11 PROCEDURE — 97110 THERAPEUTIC EXERCISES: CPT | Performed by: PHYSICAL THERAPIST

## 2019-01-11 PROCEDURE — G0283 ELEC STIM OTHER THAN WOUND: HCPCS | Performed by: PHYSICAL THERAPIST

## 2019-01-11 NOTE — PROGRESS NOTES
Physical Therapy Daily Progress Note    Visit #27    Subjective     Joselyn Grant reports: she saw Dr. Oh, who released her to return to work but still no traveling. Lumbar/radicular symptoms are overall 50-60% improved.      Objective   See Exercise, Manual, and Modality Logs for complete treatment.       Assessment/Plan  Gait speed is increased, quad strength is 4+/5. Activity tolerance is limited by back/radicular symptoms, but this is increasing as symptoms improve.  Progress per Plan of Care           Manual Therapy:    0     mins  27338;  Therapeutic Exercise:    35     mins  84570;     Neuromuscular Driss:    0    mins  77786;    Therapeutic Activity:     0     mins  62684;     Gait Trainin     mins  61410;     Ultrasound:     0     mins  05506;    Electrical Stimulation:    15     mins  80838 ( );  Mechanical traction 15 minutes    Timed Treatment:   35   mins   Total Treatment:     65   mins    Zena Longo PT, DPT  Physical Therapist  [place] License #[id number]

## 2019-01-15 ENCOUNTER — TREATMENT (OUTPATIENT)
Dept: PHYSICAL THERAPY | Facility: CLINIC | Age: 54
End: 2019-01-15

## 2019-01-15 DIAGNOSIS — G89.29 CHRONIC BILATERAL LOW BACK PAIN WITH RIGHT-SIDED SCIATICA: Primary | ICD-10-CM

## 2019-01-15 DIAGNOSIS — M25.662 KNEE STIFFNESS, LEFT: ICD-10-CM

## 2019-01-15 DIAGNOSIS — Z96.652 STATUS POST REVISION OF TOTAL KNEE, LEFT: ICD-10-CM

## 2019-01-15 DIAGNOSIS — M54.41 CHRONIC BILATERAL LOW BACK PAIN WITH RIGHT-SIDED SCIATICA: Primary | ICD-10-CM

## 2019-01-15 DIAGNOSIS — M25.562 LEFT KNEE PAIN, UNSPECIFIED CHRONICITY: ICD-10-CM

## 2019-01-15 PROCEDURE — G0283 ELEC STIM OTHER THAN WOUND: HCPCS | Performed by: PHYSICAL THERAPIST

## 2019-01-15 PROCEDURE — 97110 THERAPEUTIC EXERCISES: CPT | Performed by: PHYSICAL THERAPIST

## 2019-01-15 PROCEDURE — 97530 THERAPEUTIC ACTIVITIES: CPT | Performed by: PHYSICAL THERAPIST

## 2019-01-15 PROCEDURE — 97012 MECHANICAL TRACTION THERAPY: CPT | Performed by: PHYSICAL THERAPIST

## 2019-01-15 NOTE — PROGRESS NOTES
Physical Therapy Daily Progress Note    Visit #28    Subjective     Joselyn Grant reports: she went back to work yesterday, very tired but overall did OK. States back continues to improve but is not yet resolved, primary functional limitation with knee is ascending/descending stairs.      Objective   See Exercise, Manual, and Modality Logs for complete treatment.       Assessment/Plan  Increased exercises today to improve stair climbing ability, including eccentric quad control.   Progress per Plan of Care           Manual Therapy:    0     mins  66727;  Therapeutic Exercise:    20     mins  23641;     Neuromuscular Driss:   0    mins  26351;    Therapeutic Activity:     15     mins  24360;     Gait Trainin     mins  51377;     Ultrasound:     0     mins  32893;    Electrical Stimulation:    15     mins  89714 ( );  Mechanical traction 15 minutes    Timed Treatment:   35   mins   Total Treatment:     65   mins    Zena Longo PT, DPT  Physical Therapist  [place] License #[id number]

## 2019-01-21 ENCOUNTER — TREATMENT (OUTPATIENT)
Dept: PHYSICAL THERAPY | Facility: CLINIC | Age: 54
End: 2019-01-21

## 2019-01-21 DIAGNOSIS — G89.29 CHRONIC BILATERAL LOW BACK PAIN WITH RIGHT-SIDED SCIATICA: Primary | ICD-10-CM

## 2019-01-21 DIAGNOSIS — Z96.652 STATUS POST REVISION OF TOTAL KNEE, LEFT: ICD-10-CM

## 2019-01-21 DIAGNOSIS — M25.562 LEFT KNEE PAIN, UNSPECIFIED CHRONICITY: ICD-10-CM

## 2019-01-21 DIAGNOSIS — M54.41 CHRONIC BILATERAL LOW BACK PAIN WITH RIGHT-SIDED SCIATICA: Primary | ICD-10-CM

## 2019-01-21 DIAGNOSIS — M25.662 KNEE STIFFNESS, LEFT: ICD-10-CM

## 2019-01-21 PROCEDURE — 97110 THERAPEUTIC EXERCISES: CPT | Performed by: PHYSICAL THERAPIST

## 2019-01-21 PROCEDURE — 97012 MECHANICAL TRACTION THERAPY: CPT | Performed by: PHYSICAL THERAPIST

## 2019-01-21 PROCEDURE — G0283 ELEC STIM OTHER THAN WOUND: HCPCS | Performed by: PHYSICAL THERAPIST

## 2019-01-21 NOTE — PROGRESS NOTES
Physical Therapy Daily Progress Note    Visit #29    Subjective     Joselyn Grant reports: her knee is doing well, she was able to get up and down from the floor over the weekend. Back is steadily improving, but she reports exacerbations in symptoms when she increases activity level.      Objective   See Exercise, Manual, and Modality Logs for complete treatment.       Assessment/Plan  Gait is nearly symmetrical, remaining limitation seems to be from radiculopathy.   Progress per Plan of Care, reassess next visit, likely D/C knee Rx and focus on lumbar radiculopathy.           Manual Therapy:    0     mins  28254;  Therapeutic Exercise:    30     mins  10644;     Neuromuscular Driss:    0    mins  76452;    Therapeutic Activity:     0     mins  95932;     Gait Trainin     mins  16805;     Ultrasound:     0     mins  90234;    Electrical Stimulation:    15     mins  86521 ( );  Mechanical traction 15 minutes    Timed Treatment:   30   mins   Total Treatment:     60   mins    Zena Longo PT, DPT  Physical Therapist  [place] License #[id number]

## 2019-01-23 RX ORDER — FUROSEMIDE 40 MG/1
TABLET ORAL
Qty: 90 TABLET | Refills: 0 | Status: SHIPPED | OUTPATIENT
Start: 2019-01-23 | End: 2019-05-16 | Stop reason: SDUPTHER

## 2019-01-24 ENCOUNTER — TREATMENT (OUTPATIENT)
Dept: PHYSICAL THERAPY | Facility: CLINIC | Age: 54
End: 2019-01-24

## 2019-01-24 DIAGNOSIS — M25.662 KNEE STIFFNESS, LEFT: ICD-10-CM

## 2019-01-24 DIAGNOSIS — M25.562 LEFT KNEE PAIN, UNSPECIFIED CHRONICITY: ICD-10-CM

## 2019-01-24 DIAGNOSIS — M54.41 CHRONIC BILATERAL LOW BACK PAIN WITH RIGHT-SIDED SCIATICA: ICD-10-CM

## 2019-01-24 DIAGNOSIS — G89.29 CHRONIC BILATERAL LOW BACK PAIN WITH RIGHT-SIDED SCIATICA: ICD-10-CM

## 2019-01-24 DIAGNOSIS — Z96.652 STATUS POST REVISION OF TOTAL KNEE, LEFT: Primary | ICD-10-CM

## 2019-01-24 PROCEDURE — 97012 MECHANICAL TRACTION THERAPY: CPT | Performed by: PHYSICAL THERAPIST

## 2019-01-24 PROCEDURE — G0283 ELEC STIM OTHER THAN WOUND: HCPCS | Performed by: PHYSICAL THERAPIST

## 2019-01-24 NOTE — PROGRESS NOTES
Re-Assessment / Re-Certification    Patient: Joselyn Grant   : 1965  Diagnosis/ICD-10 Code:  Status post revision of total knee, left [Z96.652]  Referring practitioner: Ha Oh,*  Date of Initial Visit: 2019  Today's Date: 2019  Patient seen for 8 sessions      Subjective:   Joselyn Grant reports: her knee feels really good, she is exercising at the gym and has returned to work.  Subjective Questionnaire: LEFS: 57/80 (improved from 43/80 at last reassessment)  Clinical Progress: improved  Home Program Compliance: Yes  Treatment has included: therapeutic exercise, neuromuscular re-education, electrical stimulation and cryotherapy    Subjective   Objective       Active Range of Motion   Left Knee   Flexion: 106 degrees   Extension: 0 degrees     Strength/Myotome Testing     Left Knee   Flexion: 5  Extension: 5     Assessment/Plan  Progress toward previous goals: All Met    Short Term Goals: 6 weeks. Patient will:  1. Patient to be adherent with stretching and HEP. (MET)  2. Able to transfer sit to stand without need for UE's and with < 4/10 pain. (NOT MET)  3. (L) MMT 4-/5 for ability to ascend/descend 5 steps and transfer sit to stand x 5 with knee pain <4/10 (MET)  4. Increased hip joint, patellar mobility to allow for decreased stress at tibiofemoral, patellofemoral joint. (knee ROM 5°-90°) (MET)  5. Pt. to exhibit increased LE soft tissue extensibility to allow for increased ease with walking 150 feet.  (MET)    Long Term Goals: 12 weeks. Patient will:  1. Pt to score 60% perceived normal ability on LEFS (MET)  2. (L) LE strength to at least 4+/5 to ascend/descend 5 steps without pain >2/10. (MET)  3. Pt to exhibit improved Knee AROM 0 degrees - 100 degrees to allow for improved gait, kneeling, bending squatting as is necessary for daily tasks.  (MET)  4. Pt to exhibit LE endurance/strength to 4+/5 to allow for returning to unrestricted walking > 20 min.  (MET)  5. Negative  findings for special testing for dysfunction.  (MET)  6. Pt to demonstrate increased stability of the knee to balance on left for 20 seconds as needed to traverse uneven terrain .  (MET)      Recommendations: Discharge care related to TKA, pt to continue with HEP at the gym. Will continue care for lumbar radiculopathy.  Timeframe: 1 month  Prognosis to achieve goals: good    PT Signature: Zena Longo, PT, DPT                         Physical Therapist                         [place] License #[id number]    Based upon review of the patient's progress and continued therapy plan, it is my medical opinion that Joselyn Grant should continue physical therapy treatment at [facility]  [address] [facility], [address] [address] [phone].    Signature: __________________________________  Ha Oh MD    Manual Therapy:    0     mins  98991;  Therapeutic Exercise:    0     mins  97242;   Exercise is independent in clinic, not charged  Neuromuscular Driss:    0    mins  08596;    Therapeutic Activity:     0     mins  39871;     Gait Trainin     mins  14051;     Ultrasound:     0     mins  05123;    Electrical Stimulation:    15     mins  17031 ( );  Mechanical traction 15 minutes    Timed Treatment:   0   mins   Total Treatment:     60   mins

## 2019-01-31 ENCOUNTER — DOCUMENTATION (OUTPATIENT)
Dept: PHYSICAL THERAPY | Facility: CLINIC | Age: 54
End: 2019-01-31

## 2019-03-06 ENCOUNTER — OFFICE VISIT (OUTPATIENT)
Dept: OBSTETRICS AND GYNECOLOGY | Facility: CLINIC | Age: 54
End: 2019-03-06

## 2019-03-06 ENCOUNTER — PROCEDURE VISIT (OUTPATIENT)
Dept: OBSTETRICS AND GYNECOLOGY | Facility: CLINIC | Age: 54
End: 2019-03-06

## 2019-03-06 ENCOUNTER — APPOINTMENT (OUTPATIENT)
Dept: WOMENS IMAGING | Facility: HOSPITAL | Age: 54
End: 2019-03-06

## 2019-03-06 VITALS
HEIGHT: 62 IN | SYSTOLIC BLOOD PRESSURE: 130 MMHG | DIASTOLIC BLOOD PRESSURE: 84 MMHG | WEIGHT: 238 LBS | BODY MASS INDEX: 43.79 KG/M2

## 2019-03-06 DIAGNOSIS — Z12.31 VISIT FOR SCREENING MAMMOGRAM: Primary | ICD-10-CM

## 2019-03-06 DIAGNOSIS — Z00.00 ANNUAL PHYSICAL EXAM: ICD-10-CM

## 2019-03-06 DIAGNOSIS — Z01.419 ENCOUNTER FOR ANNUAL ROUTINE GYNECOLOGICAL EXAMINATION: ICD-10-CM

## 2019-03-06 DIAGNOSIS — Z96.652 HISTORY OF LEFT KNEE REPLACEMENT: Primary | ICD-10-CM

## 2019-03-06 PROCEDURE — 77067 SCR MAMMO BI INCL CAD: CPT | Performed by: RADIOLOGY

## 2019-03-06 PROCEDURE — 99396 PREV VISIT EST AGE 40-64: CPT | Performed by: OBSTETRICS & GYNECOLOGY

## 2019-03-06 PROCEDURE — 77067 SCR MAMMO BI INCL CAD: CPT | Performed by: OBSTETRICS & GYNECOLOGY

## 2019-03-06 RX ORDER — CYCLOBENZAPRINE HCL 10 MG
TABLET ORAL
COMMUNITY
Start: 2019-02-04 | End: 2021-03-16

## 2019-03-06 NOTE — PROGRESS NOTES
Subjective:    Patient Joselyn Grant is a 53 y.o. female.   Chief Complaint   Patient presents with   • Gynecologic Exam     AE,      CC annual exam no issues had a knee replacement done and had to have it removed because of infection and that was read on the last 6 months and she has done very well    HPI      The following portions of the patient's history were reviewed and updated as appropriate: allergies, current medications, past family history, past medical history, past social history, past surgical history and problem list.      Review of Systems   Constitutional: Negative.    HENT: Negative.    Eyes: Negative.    Respiratory: Negative.    Cardiovascular: Negative.    Gastrointestinal: Negative for abdominal distention, abdominal pain, anal bleeding, blood in stool, constipation, diarrhea, nausea, rectal pain and vomiting.   Endocrine: Negative for cold intolerance, heat intolerance, polydipsia, polyphagia and polyuria.   Genitourinary: Negative.  Negative for decreased urine volume, dyspareunia, dysuria, enuresis, flank pain, frequency, genital sores, hematuria, menstrual problem, pelvic pain, urgency, vaginal bleeding, vaginal discharge and vaginal pain.   Musculoskeletal: Negative.    Skin: Negative.    Allergic/Immunologic: Negative.    Neurological: Negative.    Hematological: Negative for adenopathy. Does not bruise/bleed easily.   Psychiatric/Behavioral: Negative for agitation, confusion and sleep disturbance. The patient is not nervous/anxious.          Objective:      Physical Exam   Constitutional: She appears well-developed and well-nourished. She is not intubated.   HENT:   Head: Hair is normal.   Nose: Nose normal.   Mouth/Throat: Oropharynx is clear and moist.   Eyes: Conjunctivae are normal.   Neck: Normal carotid pulses and no JVD present. No tracheal tenderness, no spinous process tenderness and no muscular tenderness present. Carotid bruit is not present. No neck rigidity. No edema, no  erythema and normal range of motion present. No thyroid mass and no thyromegaly present.   Cardiovascular: Normal rate, regular rhythm, S1 normal and normal heart sounds. Exam reveals no gallop.   No murmur heard.  Pulmonary/Chest: Effort normal. No accessory muscle usage or stridor. No apnea, no tachypnea and no bradypnea. She is not intubated. No respiratory distress. She has no wheezes. She has no rales. She exhibits no tenderness. Right breast exhibits no inverted nipple, no mass, no nipple discharge, no skin change and no tenderness. Left breast exhibits no inverted nipple, no mass, no nipple discharge, no skin change and no tenderness.   Abdominal: Soft. Bowel sounds are normal. She exhibits no distension and no mass. There is no tenderness. There is no rebound and no guarding. No hernia.   Genitourinary: Vagina normal and uterus normal. Rectal exam shows no external hemorrhoid, no internal hemorrhoid, no fissure, no mass, no tenderness and anal tone normal. There is no rash, tenderness, lesion or injury on the right labia. There is no rash, tenderness, lesion or injury on the left labia. Uterus is not deviated, not enlarged, not fixed and not tender. Cervix exhibits no motion tenderness, no discharge and no friability. Right adnexum displays no mass and no tenderness. Left adnexum displays no mass and no tenderness. No erythema, tenderness or bleeding in the vagina. No foreign body in the vagina. No signs of injury around the vagina. No vaginal discharge found.   Musculoskeletal: She exhibits no edema or tenderness.        Right shoulder: She exhibits no tenderness, no swelling, no pain and no spasm.   Left knee replacement   Lymphadenopathy:        Head (right side): No submental, no submandibular, no tonsillar, no preauricular, no posterior auricular and no occipital adenopathy present.        Head (left side): No submental, no submandibular, no tonsillar, no preauricular, no posterior auricular and no  occipital adenopathy present.     She has no cervical adenopathy.        Right cervical: No superficial cervical, no deep cervical and no posterior cervical adenopathy present.       Left cervical: No superficial cervical, no deep cervical and no posterior cervical adenopathy present.        Right axillary: No pectoral and no lateral adenopathy present.        Left axillary: No pectoral and no lateral adenopathy present.       Right: No inguinal, no supraclavicular and no epitrochlear adenopathy present.        Left: No inguinal, no supraclavicular and no epitrochlear adenopathy present.   Neurological: No cranial nerve deficit. Coordination normal.   Skin: Skin is warm and dry. No abrasion, no bruising, no burn, no lesion, no petechiae, no purpura and no rash noted. Rash is not macular, not maculopapular, not nodular and not urticarial. No cyanosis or erythema. No pallor. Nails show no clubbing.   Psychiatric: She has a normal mood and affect. Her behavior is normal.         Assessment and Plan: Patient got an infection in her left knee replacement had to have it removed and then after a couple of months had a replaced no GYN complaints normal annual exam    Patient has been instructed to perform a self breast exam on a weekly basis, a yearly mammogram, pap smear yearly unless instructed otherwise and bone density every 2 years.  I recommended that the patient not smoke, and discussed smoking cessation when appropriate.     Joselyn was seen today for gynecologic exam.    Diagnoses and all orders for this visit:    History of left knee replacement  -     CBC & Differential; Future

## 2019-03-07 LAB
BASOPHILS # BLD AUTO: 0.04 10*3/MM3 (ref 0–0.2)
BASOPHILS NFR BLD AUTO: 0.6 % (ref 0–1.5)
DIFFERENTIAL COMMENT: NORMAL
EOSINOPHIL # BLD AUTO: 0.24 10*3/MM3 (ref 0–0.4)
EOSINOPHIL NFR BLD AUTO: 3.7 % (ref 0.3–6.2)
ERYTHROCYTE [DISTWIDTH] IN BLOOD BY AUTOMATED COUNT: 14.6 % (ref 12.3–15.4)
HCT VFR BLD AUTO: 38.8 % (ref 34–46.6)
HGB BLD-MCNC: 12.3 G/DL (ref 12–15.9)
IMM GRANULOCYTES # BLD AUTO: 0.02 10*3/MM3 (ref 0–0.05)
IMM GRANULOCYTES NFR BLD AUTO: 0.3 % (ref 0–0.5)
LYMPHOCYTES # BLD AUTO: 2.42 10*3/MM3 (ref 0.7–3.1)
LYMPHOCYTES NFR BLD AUTO: 37.7 % (ref 19.6–45.3)
MCH RBC QN AUTO: 28.9 PG (ref 26.6–33)
MCHC RBC AUTO-ENTMCNC: 31.7 G/DL (ref 31.5–35.7)
MCV RBC AUTO: 91.3 FL (ref 79–97)
MONOCYTES # BLD AUTO: 0.61 10*3/MM3 (ref 0.1–0.9)
MONOCYTES NFR BLD AUTO: 9.5 % (ref 5–12)
NEUTROPHILS # BLD AUTO: 3.09 10*3/MM3 (ref 1.4–7)
NEUTROPHILS NFR BLD AUTO: 48.2 % (ref 42.7–76)
PLATELET # BLD AUTO: 292 10*3/MM3 (ref 140–450)
PLATELET BLD QL SMEAR: NORMAL
RBC # BLD AUTO: 4.25 10*6/MM3 (ref 3.77–5.28)
RBC MORPH BLD: NORMAL
WBC # BLD AUTO: 6.42 10*3/MM3 (ref 3.4–10.8)

## 2019-03-13 ENCOUNTER — TELEPHONE (OUTPATIENT)
Dept: OBSTETRICS AND GYNECOLOGY | Facility: CLINIC | Age: 54
End: 2019-03-13

## 2019-03-13 DIAGNOSIS — N64.89 BREAST ASYMMETRY: Primary | ICD-10-CM

## 2019-03-19 ENCOUNTER — APPOINTMENT (OUTPATIENT)
Dept: WOMENS IMAGING | Facility: HOSPITAL | Age: 54
End: 2019-03-19

## 2019-03-19 PROCEDURE — 76641 ULTRASOUND BREAST COMPLETE: CPT | Performed by: RADIOLOGY

## 2019-03-19 PROCEDURE — G0279 TOMOSYNTHESIS, MAMMO: HCPCS | Performed by: RADIOLOGY

## 2019-03-19 PROCEDURE — 77065 DX MAMMO INCL CAD UNI: CPT | Performed by: RADIOLOGY

## 2019-03-19 PROCEDURE — 77061 BREAST TOMOSYNTHESIS UNI: CPT | Performed by: RADIOLOGY

## 2019-03-19 PROCEDURE — MDREVIEWSP: Performed by: RADIOLOGY

## 2019-03-20 DIAGNOSIS — N64.89 BREAST ASYMMETRY: ICD-10-CM

## 2019-05-10 ENCOUNTER — OFFICE VISIT (OUTPATIENT)
Dept: FAMILY MEDICINE CLINIC | Facility: CLINIC | Age: 54
End: 2019-05-10

## 2019-05-10 ENCOUNTER — TELEPHONE (OUTPATIENT)
Dept: FAMILY MEDICINE CLINIC | Facility: CLINIC | Age: 54
End: 2019-05-10

## 2019-05-10 VITALS
BODY MASS INDEX: 43.79 KG/M2 | OXYGEN SATURATION: 99 % | SYSTOLIC BLOOD PRESSURE: 124 MMHG | HEART RATE: 73 BPM | HEIGHT: 62 IN | TEMPERATURE: 98.1 F | DIASTOLIC BLOOD PRESSURE: 72 MMHG | WEIGHT: 238 LBS

## 2019-05-10 DIAGNOSIS — T84.033D MECHANICAL LOOSENING OF INTERNAL LEFT KNEE PROSTHETIC JOINT, SUBSEQUENT ENCOUNTER: ICD-10-CM

## 2019-05-10 DIAGNOSIS — Z87.39 HISTORY OF INFECTION OF TOTAL JOINT PROSTHESIS OF KNEE: ICD-10-CM

## 2019-05-10 DIAGNOSIS — Z96.652 S/P TOTAL KNEE ARTHROPLASTY, LEFT: Primary | ICD-10-CM

## 2019-05-10 DIAGNOSIS — M25.562 ACUTE PAIN OF LEFT KNEE: ICD-10-CM

## 2019-05-10 LAB
ERYTHROCYTE [DISTWIDTH] IN BLOOD BY AUTOMATED COUNT: 14.3 % (ref 12.3–15.4)
HCT VFR BLD AUTO: 37 % (ref 34–46.6)
HGB BLD-MCNC: 12.2 G/DL (ref 12–15.9)
LYMPHOCYTES # BLD AUTO: 1.9 10*3/MM3 (ref 0.7–3.1)
LYMPHOCYTES NFR BLD AUTO: 38 % (ref 19.6–45.3)
MCH RBC QN AUTO: 29.4 PG (ref 26.6–33)
MCHC RBC AUTO-ENTMCNC: 32.9 G/DL (ref 31.5–35.7)
MCV RBC AUTO: 89.5 FL (ref 79–97)
MONOCYTES # BLD AUTO: 0.4 10*3/MM3 (ref 0.1–0.9)
MONOCYTES NFR BLD AUTO: 7.4 % (ref 5–12)
NEUTROPHILS # BLD AUTO: 2.7 10*3/MM3 (ref 1.7–7)
NEUTROPHILS NFR BLD AUTO: 54.6 % (ref 42.7–76)
PLATELET # BLD AUTO: 264 10*3/MM3 (ref 140–450)
PMV BLD AUTO: 8.6 FL (ref 6–12)
RBC # BLD AUTO: 4.13 10*6/MM3 (ref 3.77–5.28)
WBC NRBC COR # BLD: 5 10*3/MM3 (ref 3.4–10.8)

## 2019-05-10 PROCEDURE — 99213 OFFICE O/P EST LOW 20 MIN: CPT | Performed by: NURSE PRACTITIONER

## 2019-05-10 PROCEDURE — 36415 COLL VENOUS BLD VENIPUNCTURE: CPT | Performed by: NURSE PRACTITIONER

## 2019-05-10 PROCEDURE — 85025 COMPLETE CBC W/AUTO DIFF WBC: CPT | Performed by: NURSE PRACTITIONER

## 2019-05-10 NOTE — TELEPHONE ENCOUNTER
If she had surgery within the last 30 days she is still within global time period for surgeon to look at her and recommend FU apt with him, where did she have done, with who and please get records so I can review

## 2019-05-10 NOTE — TELEPHONE ENCOUNTER
Its been about a year. Dr kay is in surgery and she is paranoid. she said it feels like it did the last time the infection came. Its swollen.

## 2019-05-10 NOTE — PROGRESS NOTES
Subjective   Joselyn Grant is a 53 y.o. female.     History of Present Illness   C/o L knee pain 7/10 both when sitting and walking x 3 days, walks average 8000 steps daily wears compression stockings to help, icing but c/o swelling and stiffness and warmth but no redness, no hx of DVT, s/p L knee arthroplasty with removal of spacer d/t infection by Dr Russo 10/04/18 and had FU apt recently last month and released back to full duty and has started going to gym, prev had prior to infection of mechanical hardware and sepsis of L knee replacement, with hospitalization [place] [facility] 07/10/18-07/24/18 with irrigation and extensive excisional debridement of left knee including skin, subcutaneous tissue, muscle, fascia and bone, removal of left total knee arthroplasty and placement of antibiotic spacer, placement of incisional wound VAC measuring 20 cm, was prev seeing Dr Wes garcia dz finished all abx, no fevers, no SOA wheezing coughing    The following portions of the patient's history were reviewed and updated as appropriate: allergies, current medications, past family history, past medical history, past social history, past surgical history and problem list.    Review of Systems   Constitutional: Negative for fatigue and fever.   Respiratory: Negative for cough, shortness of breath and wheezing.    Cardiovascular: Negative for chest pain, palpitations and leg swelling.   Musculoskeletal: Positive for arthralgias, gait problem, joint swelling and myalgias. Negative for back pain, neck pain and neck stiffness.   Neurological: Negative for dizziness and headaches.   All other systems reviewed and are negative.      Objective   Physical Exam   Constitutional: She is oriented to person, place, and time. She appears well-developed and well-nourished.   HENT:   Head: Normocephalic and atraumatic.   Eyes: Conjunctivae and EOM are normal. Pupils are equal, round, and reactive to light.   Cardiovascular: Normal rate,  regular rhythm and normal heart sounds.   Pulmonary/Chest: Effort normal and breath sounds normal.   Musculoskeletal: Normal range of motion. She exhibits tenderness (L knee stiff ROM, effusion, secondary scarring and some warmth, edema).   Neurological: She is alert and oriented to person, place, and time.   Skin: Skin is warm and dry.   Psychiatric: She has a normal mood and affect. Her behavior is normal. Judgment and thought content normal.   Vitals reviewed.      Assessment/Plan   Joselyn was seen today for follow-up.    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

## 2019-05-10 NOTE — PATIENT INSTRUCTIONS
CBC today normal and sent to ortho Dr Russo to review, enc pt to call his office to let him know about effusion, pain and warmth despite normal CBC and get FU apt with him next week, ER if worsens over the weekend patient verbalized understanding

## 2019-05-10 NOTE — TELEPHONE ENCOUNTER
PT SAYS SHE RECENTLY HAD ANOTHER KNEE REPLACEMENT, THINKS IT COULD POSSIBLE BE INFECTED. WANTED TO KNOW IF MTM COULD DO BLOOD WORK ON HER, A CBC TO CHECK HER WBC?

## 2019-05-16 RX ORDER — AMLODIPINE BESYLATE 10 MG/1
TABLET ORAL
Qty: 90 TABLET | Refills: 4 | Status: SHIPPED | OUTPATIENT
Start: 2019-05-16 | End: 2020-06-19 | Stop reason: SDUPTHER

## 2019-05-16 RX ORDER — FUROSEMIDE 40 MG/1
TABLET ORAL
Qty: 90 TABLET | Refills: 0 | Status: SHIPPED | OUTPATIENT
Start: 2019-05-16 | End: 2019-10-09 | Stop reason: SDUPTHER

## 2019-10-08 ENCOUNTER — TRANSCRIBE ORDERS (OUTPATIENT)
Dept: ADMINISTRATIVE | Facility: HOSPITAL | Age: 54
End: 2019-10-08

## 2019-10-08 ENCOUNTER — LAB (OUTPATIENT)
Dept: LAB | Facility: HOSPITAL | Age: 54
End: 2019-10-08

## 2019-10-08 DIAGNOSIS — M25.50 ARTHRALGIA, UNSPECIFIED JOINT: ICD-10-CM

## 2019-10-08 DIAGNOSIS — M25.50 ARTHRALGIA, UNSPECIFIED JOINT: Primary | ICD-10-CM

## 2019-10-08 LAB
BASOPHILS # BLD AUTO: 0.04 10*3/MM3 (ref 0–0.2)
BASOPHILS NFR BLD AUTO: 0.9 % (ref 0–1.5)
DEPRECATED RDW RBC AUTO: 47.1 FL (ref 37–54)
EOSINOPHIL # BLD AUTO: 0.09 10*3/MM3 (ref 0–0.4)
EOSINOPHIL NFR BLD AUTO: 2 % (ref 0.3–6.2)
ERYTHROCYTE [DISTWIDTH] IN BLOOD BY AUTOMATED COUNT: 13.9 % (ref 12.3–15.4)
HCT VFR BLD AUTO: 41.5 % (ref 34–46.6)
HGB BLD-MCNC: 13.4 G/DL (ref 12–15.9)
IMM GRANULOCYTES # BLD AUTO: 0.01 10*3/MM3 (ref 0–0.05)
IMM GRANULOCYTES NFR BLD AUTO: 0.2 % (ref 0–0.5)
LYMPHOCYTES # BLD AUTO: 1.54 10*3/MM3 (ref 0.7–3.1)
LYMPHOCYTES NFR BLD AUTO: 34.9 % (ref 19.6–45.3)
MCH RBC QN AUTO: 29.6 PG (ref 26.6–33)
MCHC RBC AUTO-ENTMCNC: 32.3 G/DL (ref 31.5–35.7)
MCV RBC AUTO: 91.8 FL (ref 79–97)
MONOCYTES # BLD AUTO: 0.37 10*3/MM3 (ref 0.1–0.9)
MONOCYTES NFR BLD AUTO: 8.4 % (ref 5–12)
NEUTROPHILS # BLD AUTO: 2.36 10*3/MM3 (ref 1.7–7)
NEUTROPHILS NFR BLD AUTO: 53.6 % (ref 42.7–76)
NRBC BLD AUTO-RTO: 0 /100 WBC (ref 0–0.2)
PLATELET # BLD AUTO: 253 10*3/MM3 (ref 140–450)
PMV BLD AUTO: 10.6 FL (ref 6–12)
RBC # BLD AUTO: 4.52 10*6/MM3 (ref 3.77–5.28)
WBC NRBC COR # BLD: 4.41 10*3/MM3 (ref 3.4–10.8)

## 2019-10-08 PROCEDURE — 85025 COMPLETE CBC W/AUTO DIFF WBC: CPT

## 2019-10-08 PROCEDURE — 36415 COLL VENOUS BLD VENIPUNCTURE: CPT

## 2019-10-10 RX ORDER — METOPROLOL SUCCINATE 25 MG/1
TABLET, EXTENDED RELEASE ORAL
Qty: 180 TABLET | Refills: 4 | Status: SHIPPED | OUTPATIENT
Start: 2019-10-10 | End: 2020-11-25

## 2019-10-10 RX ORDER — FUROSEMIDE 40 MG/1
TABLET ORAL
Qty: 90 TABLET | Refills: 4 | Status: SHIPPED | OUTPATIENT
Start: 2019-10-10 | End: 2020-11-25

## 2019-11-25 RX ORDER — ESTRADIOL 0.07 MG/D
FILM, EXTENDED RELEASE TRANSDERMAL
Qty: 8 PATCH | Refills: 4 | Status: SHIPPED | OUTPATIENT
Start: 2019-11-25 | End: 2020-05-06

## 2019-12-16 ENCOUNTER — TELEPHONE (OUTPATIENT)
Dept: FAMILY MEDICINE CLINIC | Facility: CLINIC | Age: 54
End: 2019-12-16

## 2019-12-16 NOTE — TELEPHONE ENCOUNTER
Please call patient to check on her-she called on call provider 12/11/19 7:40 PM c/o hives traveling in [place] has tried OTC erx medrol dose pack

## 2019-12-23 ENCOUNTER — TELEPHONE (OUTPATIENT)
Dept: FAMILY MEDICINE CLINIC | Facility: CLINIC | Age: 54
End: 2019-12-23

## 2019-12-23 NOTE — TELEPHONE ENCOUNTER
Pt called and stated the steroid pack worked. Now she has a cold. She will back in [place] on 12/23/2019. Wants to get wbc count checked

## 2019-12-23 NOTE — TELEPHONE ENCOUNTER
The steroid pack can elevate the WBC. Recommend schedule FU apt to discuss. We don't prescribe abx without being seen and recommend OTC cough and cold medicine prn

## 2019-12-27 ENCOUNTER — TELEPHONE (OUTPATIENT)
Dept: FAMILY MEDICINE CLINIC | Facility: CLINIC | Age: 54
End: 2019-12-27

## 2019-12-27 NOTE — TELEPHONE ENCOUNTER
SHE FINISHED THE MEDS FOR HER RASH AND NOW IT HAS COME BACK. LEAVING TO GO OUT OF TOWN ON 1-2-20 CAN YOU SEND IT IN AGAIN?

## 2019-12-27 NOTE — TELEPHONE ENCOUNTER
Will not call in steroids without being seen, please schedule same day apt on Monday before she leave town

## 2019-12-30 ENCOUNTER — OFFICE VISIT (OUTPATIENT)
Dept: FAMILY MEDICINE CLINIC | Facility: CLINIC | Age: 54
End: 2019-12-30

## 2019-12-30 VITALS
TEMPERATURE: 98 F | OXYGEN SATURATION: 99 % | SYSTOLIC BLOOD PRESSURE: 110 MMHG | WEIGHT: 239 LBS | HEIGHT: 62 IN | DIASTOLIC BLOOD PRESSURE: 82 MMHG | BODY MASS INDEX: 43.98 KG/M2 | HEART RATE: 100 BPM

## 2019-12-30 DIAGNOSIS — I10 ESSENTIAL HYPERTENSION: ICD-10-CM

## 2019-12-30 DIAGNOSIS — B86 SCABIES: Primary | ICD-10-CM

## 2019-12-30 LAB
ALBUMIN SERPL-MCNC: 4.2 G/DL (ref 3.5–5.2)
ALBUMIN/GLOB SERPL: 1.2 G/DL
ALP SERPL-CCNC: 109 U/L (ref 39–117)
ALT SERPL W P-5'-P-CCNC: 18 U/L (ref 1–33)
ANION GAP SERPL CALCULATED.3IONS-SCNC: 12.9 MMOL/L (ref 5–15)
AST SERPL-CCNC: 16 U/L (ref 1–32)
BILIRUB SERPL-MCNC: 0.2 MG/DL (ref 0.2–1.2)
BUN BLD-MCNC: 13 MG/DL (ref 6–20)
BUN/CREAT SERPL: 15.3 (ref 7–25)
CALCIUM SPEC-SCNC: 9.4 MG/DL (ref 8.6–10.5)
CHLORIDE SERPL-SCNC: 96 MMOL/L (ref 98–107)
CO2 SERPL-SCNC: 26.1 MMOL/L (ref 22–29)
CREAT BLD-MCNC: 0.85 MG/DL (ref 0.57–1)
ERYTHROCYTE [DISTWIDTH] IN BLOOD BY AUTOMATED COUNT: 14.7 % (ref 12.3–15.4)
GFR SERPL CREATININE-BSD FRML MDRD: 85 ML/MIN/1.73
GLOBULIN UR ELPH-MCNC: 3.6 GM/DL
GLUCOSE BLD-MCNC: 133 MG/DL (ref 65–99)
HCT VFR BLD AUTO: 42.2 % (ref 34–46.6)
HGB BLD-MCNC: 13.4 G/DL (ref 12–15.9)
LYMPHOCYTES # BLD AUTO: 1.6 10*3/MM3 (ref 0.7–3.1)
LYMPHOCYTES NFR BLD AUTO: 26.7 % (ref 19.6–45.3)
MCH RBC QN AUTO: 28.7 PG (ref 26.6–33)
MCHC RBC AUTO-ENTMCNC: 31.7 G/DL (ref 31.5–35.7)
MCV RBC AUTO: 90.3 FL (ref 79–97)
MONOCYTES # BLD AUTO: 0.4 10*3/MM3 (ref 0.1–0.9)
MONOCYTES NFR BLD AUTO: 6.4 % (ref 5–12)
NEUTROPHILS # BLD AUTO: 4 10*3/MM3 (ref 1.7–7)
NEUTROPHILS NFR BLD AUTO: 66.9 % (ref 42.7–76)
PLATELET # BLD AUTO: 268 10*3/MM3 (ref 140–450)
PMV BLD AUTO: 8 FL (ref 6–12)
POTASSIUM BLD-SCNC: 4 MMOL/L (ref 3.5–5.2)
PROT SERPL-MCNC: 7.8 G/DL (ref 6–8.5)
RBC # BLD AUTO: 4.67 10*6/MM3 (ref 3.77–5.28)
SODIUM BLD-SCNC: 135 MMOL/L (ref 136–145)
WBC NRBC COR # BLD: 6 10*3/MM3 (ref 3.4–10.8)

## 2019-12-30 PROCEDURE — 85025 COMPLETE CBC W/AUTO DIFF WBC: CPT | Performed by: NURSE PRACTITIONER

## 2019-12-30 PROCEDURE — 80053 COMPREHEN METABOLIC PANEL: CPT | Performed by: NURSE PRACTITIONER

## 2019-12-30 PROCEDURE — 36415 COLL VENOUS BLD VENIPUNCTURE: CPT | Performed by: NURSE PRACTITIONER

## 2019-12-30 PROCEDURE — 99213 OFFICE O/P EST LOW 20 MIN: CPT | Performed by: NURSE PRACTITIONER

## 2019-12-30 RX ORDER — PERMETHRIN 50 MG/G
CREAM TOPICAL ONCE
Qty: 60 G | Refills: 1 | Status: SHIPPED | OUTPATIENT
Start: 2019-12-30 | End: 2019-12-30

## 2019-12-30 NOTE — PROGRESS NOTES
Subjective   Joselyn Grant is a 54 y.o. female.     History of Present Illness   C/o rash B UE, trunk, abdomen worsening 3 wks started after traveling to [place] 11/19 staying at hotel, wondering if new soap or detergent contributed now washing all clothes with allergy free detergent and extra wash without help, using same hair dye, soaps and perfumes, with itching worse HS using benadryl not much help, spreading from fingers to arms, trunk down legs called on call provider me 12/19 trial medrol dose pack without much change, NKDA,  doesn't have rash  With chronic HTN on amlodipine 10 mg, furosemide 40 mg, metoprolol XL 25 mg no CP dizziness HA LE edema, nonfasting today    The following portions of the patient's history were reviewed and updated as appropriate: allergies, current medications, past family history, past medical history, past social history, past surgical history and problem list.    Review of Systems   Constitutional: Negative for fever.   Respiratory: Negative for cough, shortness of breath and wheezing.    Cardiovascular: Negative for chest pain.   Skin: Positive for rash.   Neurological: Negative for dizziness and headaches.   All other systems reviewed and are negative.      Objective   Physical Exam   Constitutional: She is oriented to person, place, and time. She appears well-developed and well-nourished.   HENT:   Head: Normocephalic and atraumatic.   Eyes: Pupils are equal, round, and reactive to light. Conjunctivae and EOM are normal.   Cardiovascular: Normal rate, regular rhythm and normal heart sounds.   Pulmonary/Chest: Effort normal and breath sounds normal.   Musculoskeletal: Normal range of motion.   Neurological: She is alert and oriented to person, place, and time.   Skin: Skin is warm and dry. There is erythema (papules B UE, trunk, no pustules or satellite lesions, no dc or bleeding, no plaques).   Psychiatric: She has a normal mood and affect. Her behavior is normal.  Judgment and thought content normal.   Vitals reviewed.      Assessment/Plan   Joselyn was seen today for rash.    Diagnoses and all orders for this visit:    Scabies    Essential hypertension  -     CBC & Differential  -     Comprehensive Metabolic Panel  -     CBC Auto Differential    Other orders  -     permethrin (ELIMITE) 5 % cream; Apply  topically to the appropriate area as directed 1 (One) Time for 1 dose.    suspect scabies trial permethrin neck to feet x 10 hours wash off with soap and water, cont benadryl and monitor, check labs and call with results, cont BP meds

## 2019-12-30 NOTE — PATIENT INSTRUCTIONS
suspect scabies trial permethrin neck to feet x 10 hours wash off with soap and water, cont benadryl and monitor, check labs and call with results, cont BP meds  Scabies, Adult  Scabies is a skin condition that happens when very small insects get under the skin (infestation). This causes a rash and severe itchiness. Scabies can spread from person to person (is contagious). If you get scabies, it is common for others in your household to get scabies too.  With proper treatment, symptoms usually go away in 2-4 weeks. Scabies usually does not cause lasting problems.  What are the causes?  This condition is caused by mites (Sarcoptes scabiei, or human itch mites) that can only be seen with a microscope. The mites get into the top layer of skin and lay eggs. Scabies can spread from person to person through:  · Close contact with a person who has scabies.  · Contact with infested items, such as towels, bedding, or clothing.  What increases the risk?  This condition is more likely to develop in:  · People who live in nursing homes and other extended-care facilities.  · People who have sexual contact with a partner who has scabies.  · Young children who attend  facilities.  · People who care for others who are at increased risk for scabies.  What are the signs or symptoms?  Symptoms of this condition may include:  · Severe itchiness. This is often worse at night.  · A rash that includes tiny red bumps or blisters. The rash commonly occurs on the wrist, elbow, armpit, fingers, waist, groin, or buttocks. Bumps may form a line (burrow) in some areas.  · Skin irritation. This can include scaly patches or sores.  How is this diagnosed?  This condition is diagnosed with a physical exam. Your health care provider will look closely at your skin. In some cases, your health care provider may take a sample of your affected skin (skin scraping) and have it examined under a microscope.  How is this treated?  This condition may  be treated with:  · Medicated cream or lotion that kills the mites. This is spread on the entire body and left on for several hours. Usually, one treatment with medicated cream or lotion is enough to kill all of the mites. In severe cases, the treatment may be repeated.  · Medicated cream that relieves itching.  · Medicines that help to relieve itching.  · Medicines that kill the mites. This treatment is rarely used.  Follow these instructions at home:    Medicines  · Take or apply over-the-counter and prescription medicines as told by your health care provider.  · Apply medicated cream or lotion as told by your health care provider.  · Do not wash off the medicated cream or lotion until the necessary amount of time has passed.  Skin Care  · Avoid scratching your affected skin.  · Keep your fingernails closely trimmed to reduce injury from scratching.  · Take cool baths or apply cool washcloths to help reduce itching.  General instructions  · Clean all items that you recently had contact with, including bedding, clothing, and furniture. Do this on the same day that your treatment starts.  ? Use hot water when you wash items.  ? Place unwashable items into closed, airtight plastic bags for at least 3 days. The mites cannot live for more than 3 days away from human skin.  ? Vacuum furniture and mattresses that you use.  · Make sure that other people who may have been infested are examined by a health care provider. These include members of your household and anyone who may have had contact with infested items.  · Keep all follow-up visits as told by your health care provider. This is important.  Contact a health care provider if:  · You have itching that does not go away after 4 weeks of treatment.  · You continue to develop new bumps or burrows.  · You have redness, swelling, or pain in your rash area after treatment.  · You have fluid, blood, or pus coming from your rash.  This information is not intended to replace  advice given to you by your health care provider. Make sure you discuss any questions you have with your health care provider.  Document Released: 09/07/2016 Document Revised: 05/25/2017 Document Reviewed: 07/19/2016  Elsevier Interactive Patient Education © 2019 [employer]

## 2019-12-31 ENCOUNTER — TELEPHONE (OUTPATIENT)
Dept: FAMILY MEDICINE CLINIC | Facility: CLINIC | Age: 54
End: 2019-12-31

## 2019-12-31 DIAGNOSIS — R73.9 HYPERGLYCEMIA: Primary | ICD-10-CM

## 2019-12-31 LAB — HBA1C MFR BLD: 5.89 % (ref 4.8–5.6)

## 2019-12-31 PROCEDURE — 83036 HEMOGLOBIN GLYCOSYLATED A1C: CPT | Performed by: NURSE PRACTITIONER

## 2020-01-06 ENCOUNTER — TELEPHONE (OUTPATIENT)
Dept: FAMILY MEDICINE CLINIC | Facility: CLINIC | Age: 55
End: 2020-01-06

## 2020-01-06 DIAGNOSIS — L30.9 DERMATITIS: Primary | ICD-10-CM

## 2020-01-06 NOTE — TELEPHONE ENCOUNTER
lmtrc      She can retreat with permethrin if helped. I don't recommend steroid pack she she recently used and didn't help. I have placed derm referral in      Pt stated rash in coming and going. It is itching and burning very bad, she has used OTC medication with no relief

## 2020-05-06 RX ORDER — ESTRADIOL 0.07 MG/D
FILM, EXTENDED RELEASE TRANSDERMAL
Qty: 8 EACH | Refills: 0 | Status: SHIPPED | OUTPATIENT
Start: 2020-05-06 | End: 2020-06-19 | Stop reason: SDUPTHER

## 2020-06-17 RX ORDER — AMLODIPINE BESYLATE 10 MG/1
TABLET ORAL
Qty: 90 TABLET | Refills: 3 | OUTPATIENT
Start: 2020-06-17

## 2020-06-17 RX ORDER — ESTRADIOL 0.07 MG/D
FILM, EXTENDED RELEASE TRANSDERMAL
Qty: 8 EACH | Refills: 12 | OUTPATIENT
Start: 2020-06-17

## 2020-06-19 ENCOUNTER — TELEMEDICINE (OUTPATIENT)
Dept: FAMILY MEDICINE CLINIC | Facility: CLINIC | Age: 55
End: 2020-06-19

## 2020-06-19 DIAGNOSIS — Z12.31 ENCOUNTER FOR SCREENING MAMMOGRAM FOR BREAST CANCER: ICD-10-CM

## 2020-06-19 DIAGNOSIS — E66.01 MORBID OBESITY WITH BMI OF 40.0-44.9, ADULT (HCC): ICD-10-CM

## 2020-06-19 DIAGNOSIS — I10 ESSENTIAL HYPERTENSION: ICD-10-CM

## 2020-06-19 DIAGNOSIS — Z78.0 POSTMENOPAUSAL: Primary | ICD-10-CM

## 2020-06-19 DIAGNOSIS — R73.03 PREDIABETES: ICD-10-CM

## 2020-06-19 DIAGNOSIS — L50.9 HIVES: ICD-10-CM

## 2020-06-19 PROCEDURE — 99213 OFFICE O/P EST LOW 20 MIN: CPT | Performed by: NURSE PRACTITIONER

## 2020-06-19 RX ORDER — IBUPROFEN 200 MG
600 TABLET ORAL
COMMUNITY
Start: 2018-07-31

## 2020-06-19 RX ORDER — ESTRADIOL 0.07 MG/D
1 FILM, EXTENDED RELEASE TRANSDERMAL 2 TIMES WEEKLY
Qty: 8 EACH | Refills: 0 | Status: SHIPPED | OUTPATIENT
Start: 2020-06-22 | End: 2020-07-15

## 2020-06-19 RX ORDER — HYDROXYZINE HYDROCHLORIDE 10 MG/1
TABLET, FILM COATED ORAL
COMMUNITY
Start: 2020-05-28 | End: 2020-06-19

## 2020-06-19 RX ORDER — AMLODIPINE BESYLATE 10 MG/1
10 TABLET ORAL DAILY
Qty: 90 TABLET | Refills: 3 | Status: SHIPPED | OUTPATIENT
Start: 2020-06-19 | End: 2021-10-28 | Stop reason: SDUPTHER

## 2020-06-19 NOTE — PROGRESS NOTES
Subjective   Joselyn Grant is a 54 y.o. female.     History of Present Illness   Here to FU on chronic HTN on metoprolol XL 25 mg, amlodipine 10 mg, and lasix 40 mg no CP dizziness HA LE edema, last labs 12/19 no recent fasting labs > 4 years, states well controlled BP when went to [facility] for finger laceration last mo  Sees GYN Dr Winston last mammo 03/19 abnl FU dx mammo and US normal, last pap 03/19 normal, with chronic PM hot flashes and mood on estradiol patches request refill , UTD colonoscopy  With chronic itching and hives has seen derm on pepcid and allegra and seran lotion recommended shot to help for hives but hasn't heard if approved or scheduled, no recent allergist consult    The following portions of the patient's history were reviewed and updated as appropriate: allergies, current medications, past family history, past medical history, past social history, past surgical history and problem list.    Review of Systems   Constitutional: Negative for fever.   Respiratory: Negative for cough, shortness of breath and wheezing.    Cardiovascular: Negative for chest pain, palpitations and leg swelling.   Gastrointestinal: Negative for abdominal distention, abdominal pain, anal bleeding, blood in stool, constipation, diarrhea, nausea, rectal pain and vomiting.   Endocrine: Positive for heat intolerance. Negative for cold intolerance, polydipsia, polyphagia and polyuria.   Neurological: Negative for dizziness and headaches.   All other systems reviewed and are negative.      Objective   Physical Exam   Constitutional: She is oriented to person, place, and time. She appears well-developed and well-nourished.   HENT:   Head: Normocephalic and atraumatic.   Eyes: Pupils are equal, round, and reactive to light.   Neck: Normal range of motion. Neck supple.   Pulmonary/Chest: Effort normal.   Musculoskeletal: Normal range of motion.   Neurological: She is alert and oriented to person, place, and time.    Psychiatric: She has a normal mood and affect. Her behavior is normal. Judgment and thought content normal.       Assessment/Plan   Joselyn was seen today for hot flashes.    Diagnoses and all orders for this visit:    Postmenopausal    Encounter for screening mammogram for breast cancer  -     Mammo Screening Bilateral With CAD; Future    Essential hypertension  -     Comprehensive Metabolic Panel; Future  -     Lipid Panel; Future  -     TSH; Future    Prediabetes  -     Hemoglobin A1c; Future    Morbid obesity with BMI of 40.0-44.9, adult (CMS/[facility])    Hives    Other orders  -     estradiol (MINIVELLE, VIVELLE-DOT) 0.075 MG/24HR patch; Place 1 patch on the skin as directed by provider 2 (Two) Times a Week.  -     amLODIPine (NORVASC) 10 MG tablet; Take 1 tablet by mouth Daily.    You have chosen to receive care through a telehealth visit.  Do you consent to use a video/audio connection for your medical care today? Yes spent 22 min on video with patient, refill estradiol and order overdue mammo, will schedule overdue FU with Dr Winston for WH annual or come to clinic for CBE, encourage SBE at home, RTC overdue fasting labs and call with results, check BP at home and cont all chronic dz meds, will FU with derm and if sx persist or worsen recommend FU with allergist for testing

## 2020-06-29 ENCOUNTER — APPOINTMENT (OUTPATIENT)
Dept: WOMENS IMAGING | Facility: HOSPITAL | Age: 55
End: 2020-06-29

## 2020-06-29 ENCOUNTER — OFFICE VISIT (OUTPATIENT)
Dept: OBSTETRICS AND GYNECOLOGY | Facility: CLINIC | Age: 55
End: 2020-06-29

## 2020-06-29 ENCOUNTER — PROCEDURE VISIT (OUTPATIENT)
Dept: OBSTETRICS AND GYNECOLOGY | Facility: CLINIC | Age: 55
End: 2020-06-29

## 2020-06-29 VITALS
WEIGHT: 247.6 LBS | BODY MASS INDEX: 45.56 KG/M2 | SYSTOLIC BLOOD PRESSURE: 129 MMHG | DIASTOLIC BLOOD PRESSURE: 91 MMHG | HEART RATE: 83 BPM | HEIGHT: 62 IN

## 2020-06-29 DIAGNOSIS — I10 ESSENTIAL HYPERTENSION: ICD-10-CM

## 2020-06-29 DIAGNOSIS — Z01.419 WOMEN'S ANNUAL ROUTINE GYNECOLOGICAL EXAMINATION: Primary | ICD-10-CM

## 2020-06-29 DIAGNOSIS — R60.0 LOWER EXTREMITY EDEMA: ICD-10-CM

## 2020-06-29 DIAGNOSIS — R73.03 PREDIABETES: ICD-10-CM

## 2020-06-29 DIAGNOSIS — Z12.31 VISIT FOR SCREENING MAMMOGRAM: Primary | ICD-10-CM

## 2020-06-29 DIAGNOSIS — Z78.0 POSTMENOPAUSE: ICD-10-CM

## 2020-06-29 PROCEDURE — 77063 BREAST TOMOSYNTHESIS BI: CPT | Performed by: NURSE PRACTITIONER

## 2020-06-29 PROCEDURE — 99396 PREV VISIT EST AGE 40-64: CPT | Performed by: NURSE PRACTITIONER

## 2020-06-29 PROCEDURE — 77067 SCR MAMMO BI INCL CAD: CPT | Performed by: NURSE PRACTITIONER

## 2020-06-29 PROCEDURE — 77063 BREAST TOMOSYNTHESIS BI: CPT | Performed by: RADIOLOGY

## 2020-06-29 PROCEDURE — 77067 SCR MAMMO BI INCL CAD: CPT | Performed by: RADIOLOGY

## 2020-06-29 RX ORDER — HYDROXYZINE HYDROCHLORIDE 10 MG/1
TABLET, FILM COATED ORAL
COMMUNITY
Start: 2020-06-22 | End: 2022-02-02

## 2020-06-29 NOTE — PROGRESS NOTES
"GYN Annual Exam     Chief Complaint   Patient presents with   • Gynecologic Exam     AE; last pap ; Mammo-3/19; Colonscopy-age 51       Joselyn Grant is a 54 y.o. female who presents for annual well woman exam. Periods absent since hysterectomy. Hysterectomy completed due to AUD and fibroids. She denies vaginal spotting or discharge. She complete SBE monthly. C/o BLE edema that has worsened in last 3 days, intermittent BLE swelling since last left knee surgery in , was supposed to have orthopedic f/u this Spring, but that appt was canceled due to Covid. She has not yet reached out to orthopedic's to reschedule this appt.. Denies lower extremity pain.She reports a hx of sepsis that started as swelling.  She is concerned for sepsis. Requesting to have \"white blood cell count\" completed today.  Recent telehealth visit with PCP, with future lab orders entered, pt states PCP wanted labs collected at OB/GYN visit due to [place] pandemic. Pt did not discuss BLE edema with PCP    Using estrogen patches that have been refilled by PCP for 1 year. She has been taking since approx     OB History        1    Para   1    Term   1            AB        Living           SAB        TAB        Ectopic        Molar        Multiple        Live Births                    Mammogram: 3/2019, scheduled for mammogram   Dexa scan: no  Colonoscopy: current  Last Pap : 2019-negative  History of abnormal Pap smear: no  Family history of uterine, colon or ovarian cancer: no  Family history of breast cancer: no  History of abnormal mammogram: yes - f/u WNL    Menopause:  Bleeding since? no  Vasomotor symptoms: no  HRT: yes  Incontinence?  no  Dyspareunia: no    History of physical abuse: no, History of sexual abuse: no and History of verbal/emotional abuse: no    Past Medical History:   Diagnosis Date   • Allergic    • Anemia    • Anxiety    • Arthritis    • History of kidney stones    • Hypertension    • Knee pain    • " Migraine    • Obesity    • Risk factors for obstructive sleep apnea    • Staph infection     LEFT KNEE ON ANTIBIOTICS   • Urinary incontinence     wears pads       Past Surgical History:   Procedure Laterality Date   • COLONOSCOPY N/A 12/20/2017    Procedure: COLONOSCOPY to cecum;  Surgeon: Ino Torres MD;  Location: [facility] ENDOSCOPY;  Service:    • EYE SURGERY      lasix   • HYSTERECTOMY      10 years ago for heavy menses and fibroids   • KNEE SURGERY Left     MULTIPLE SURGERIES TOTAL 7   • LAPAROSCOPIC ASSISTED VAGINAL HYSTERECTOMY SALPINGO OOPHORECTOMY     • LAPAROSCOPIC CHOLECYSTECTOMY     • OOPHORECTOMY     • VA REVISE KNEE JOINT REPLACE,1 PART Left 1/31/2017    Procedure: LT TOTAL KNEE ARTHROPLASTY REVISION;  Surgeon: Ha Oh MD;  Location: [facility] MAIN OR;  Service: Orthopedics   • VA REVISE KNEE JOINT REPLACE,1 PART Left 6/2/2017    Procedure: LT TOTAL KNEE ARTHROPLASTY REVISION;  Surgeon: Ha Oh MD;  Location: [facility] MAIN OR;  Service: Orthopedics   • REPLACEMENT TOTAL KNEE      left   • STOMACH SURGERY      lapband   • TOTAL KNEE ARTHROPLASTY REVISION Left 10/4/2018    Procedure: LEFT TOTAL KNEE ARTHROPLASTY REVISION;  Surgeon: Ha Oh MD;  Location: [facility] MAIN OR;  Service: Orthopedics         Current Outpatient Medications:   •  amLODIPine (NORVASC) 10 MG tablet, Take 1 tablet by mouth Daily., Disp: 90 tablet, Rfl: 3  •  Cyanocobalamin 1000 MCG capsule, Apply  topically to the appropriate area as directed., Disp: , Rfl:   •  estradiol (MINIVELLE, VIVELLE-DOT) 0.075 MG/24HR patch, Place 1 patch on the skin as directed by provider 2 (Two) Times a Week., Disp: 8 each, Rfl: 0  •  furosemide (LASIX) 40 MG tablet, TAKE 1 TABLET EVERY MORNING, Disp: 90 tablet, Rfl: 4  •  hydrOXYzine (ATARAX) 10 MG tablet, , Disp: , Rfl:   •  ibuprofen (ADVIL,MOTRIN) 200 MG tablet, Take 600 mg by mouth., Disp: , Rfl:   •  metoprolol succinate XL (TOPROL-XL) 25 MG 24 hr  tablet, TAKE 2 TABLETS DAILY, Disp: 180 tablet, Rfl: 4  •  Multiple Vitamins-Minerals (MULTIVITAMIN ADULT PO), Take 1 tablet by mouth Daily., Disp: , Rfl:   •  POTASSIUM PO, Take 99 mg by mouth Daily. 99 mg, Disp: , Rfl:   •  CBD (cannabidiol) oral oil, Take  by mouth., Disp: , Rfl:   •  cyclobenzaprine (FLEXERIL) 10 MG tablet, , Disp: , Rfl:   •  melatonin 5 MG tablet tablet, Take 5 mg by mouth At Night As Needed., Disp: , Rfl:     No Known Allergies    Social History     Tobacco Use   • Smoking status: Never Smoker   • Smokeless tobacco: Never Used   Substance Use Topics   • Alcohol use: Yes     Alcohol/week: 3.0 standard drinks     Types: 2 Glasses of wine, 1 Shots of liquor per week     Comment: SOCIAL   • Drug use: No       Family History   Problem Relation Age of Onset   • Pancreatic cancer Mother    • Hypertension Mother    • No Known Problems Father    • Fibroids Sister    • Heart murmur Sister    • Lymphoma Maternal Uncle    • Lung cancer Maternal Grandmother    • Bell's palsy Brother    • Hypertension Brother    • No Known Problems Maternal Grandfather    • No Known Problems Brother    • Malig Hyperthermia Neg Hx    • Breast cancer Neg Hx    • Ovarian cancer Neg Hx    • Uterine cancer Neg Hx    • Colon cancer Neg Hx        Review of Systems   Constitutional: Positive for fatigue. Negative for chills and fever.   Respiratory: Negative for cough and shortness of breath.    Cardiovascular: Positive for leg swelling. Negative for chest pain and palpitations.   Gastrointestinal: Negative for abdominal distention, abdominal pain and blood in stool.   Endocrine: Positive for polydipsia. Negative for polyphagia and polyuria.   Genitourinary: Negative for dyspareunia, menstrual problem, pelvic pain, vaginal bleeding, vaginal discharge and vaginal pain.   Musculoskeletal: Negative for gait problem.   Neurological: Negative for dizziness and headaches.   Psychiatric/Behavioral: Negative for behavioral problems and  "dysphoric mood.       /91   Pulse 83   Ht 157.5 cm (62\")   Wt 112 kg (247 lb 9.6 oz)   BMI 45.29 kg/m²     Physical Exam   Constitutional: She is oriented to person, place, and time. She appears well-developed and well-nourished.   HENT:   Head: Normocephalic and atraumatic.   Eyes: Pupils are equal, round, and reactive to light. Right eye exhibits no discharge.   Neck: Normal range of motion. Neck supple. No thyromegaly present.   Cardiovascular: Normal rate, regular rhythm and normal heart sounds.   No murmur heard.  Pulmonary/Chest: Effort normal and breath sounds normal. No respiratory distress. She has no wheezes. Right breast exhibits no inverted nipple, no mass, no nipple discharge, no skin change and no tenderness. Left breast exhibits no inverted nipple, no mass, no nipple discharge, no skin change and no tenderness. No breast swelling, tenderness, discharge or bleeding. Breasts are symmetrical.   Abdominal: Soft. She exhibits no distension and no mass. There is no tenderness. There is no rebound and no guarding. No hernia. Hernia confirmed negative in the right inguinal area and confirmed negative in the left inguinal area.   Genitourinary: No labial fusion. There is no rash, tenderness, lesion, injury or Bartholin's cyst on the right labia. There is no rash, tenderness, lesion, injury or Bartholin's cyst on the left labia. Uterus is absent.   Cervix is absent. Right adnexum displays no mass, no tenderness and no fullness. Right adnexum is palpable.Left adnexum displays no mass, no tenderness and no fullness. Left adnexum is palpable.Vagina exhibits no lesion and no loss of rugae. No erythema, tenderness or bleeding in the vagina. No foreign body in the vagina. No signs of injury around the vagina. No vaginal discharge found.   Musculoskeletal: Normal range of motion. She exhibits edema (+2-3 pitting edema, left leg slightly greater than right. No streaking, no tenderness, no warmth, no redness). "   Lymphadenopathy:     She has no cervical adenopathy.        Right: No inguinal adenopathy present.        Left: No inguinal adenopathy present.   Neurological: She is alert and oriented to person, place, and time. No cranial nerve deficit. Coordination normal.   Skin: Skin is warm and dry. No erythema.   Left knee scar from prior surgery, well healed, pinpoint open lesion distally. No drainage, no edema, no erythema, no pustules.  Reports occurred from scratching at scar.   Psychiatric: She has a normal mood and affect. Her behavior is normal. Judgment and thought content normal.   Nursing note and vitals reviewed.         Assessment     1) GYN annual well woman exam.   2) Postmenopausal   3) BLE edema       Plan     1) Breast Health - Clinical breast exam & mammogram yearly, Self breast awareness. Schedule screening mammogram.   2) Pap - current  3) STD-no  4) Smoking status- nonsmoker  5) Colon health - screening colonoscopy recommended if not up to date  6) Patient's Body mass index is 45.29 kg/m². BMI is above normal parameters. Recommendations include: exercise counseling and nutrition counseling.  7) Bone health - Weight bearing exercise, dietary calcium recommendations and vitamin D  reviewed.   8) CBC today per pt request. Reassured pt regarding BLE edema, afebrile, HR WNL, no pain, discussed S&S of infection.   9) Encouraged pt to call to reschedule f/u appt with orthopedic surgeon  10) Encouraged to go to ED if she develops pain in lower extremities, streaking, redness, if hot to touch, any fevers, chills, N&V, or SOA.   11) Patient counseled that hormone treatment should be used to help with specific symptoms related to menopause such as hot flashes, night sweats and vaginal atrophy.  Hormones should not be given for “general wellbeing” or to avoid aging. The lowest dose hormone that treats symptoms should be used for the shortest about of time possible.  Although estrogen is the most effective  treatment for hot flashes, other non hormonal options exist (such as Brisdelle or venlafaxine) and should also be considered.  Anyone with an intact uterus should also receive progestogen to prevent uterine overgrowth that can lead to uterine cancer.  All hormone therapy, whether it is synthetic or bio identical, can lead to increased risk of stroke, heart attack and thromboembolic diseases.  These adverse events are more likely to develop in the first year of use and in patients who are older than the typical menopausal age.  Risks of estrogen dependent cancers such as breast cancer increase with prolonged use.  Attempts to wean hormone therapy should be discussed annually and particularly after three to five years of use.  Any side effects such as vaginal bleeding or pain should be reported immediately.    Follow up prn and one year    Rebeca Hood, APRN  6/29/2020  13:05

## 2020-06-30 ENCOUNTER — TELEPHONE (OUTPATIENT)
Dept: OBSTETRICS AND GYNECOLOGY | Facility: CLINIC | Age: 55
End: 2020-06-30

## 2020-06-30 ENCOUNTER — TELEPHONE (OUTPATIENT)
Dept: FAMILY MEDICINE CLINIC | Facility: CLINIC | Age: 55
End: 2020-06-30

## 2020-06-30 LAB
ALBUMIN SERPL-MCNC: 4.5 G/DL (ref 3.5–5.2)
ALBUMIN/GLOB SERPL: 1.5 G/DL
ALP SERPL-CCNC: 87 U/L (ref 39–117)
ALT SERPL-CCNC: 11 U/L (ref 1–33)
AST SERPL-CCNC: 14 U/L (ref 1–32)
BASOPHILS # BLD AUTO: 0.03 10*3/MM3 (ref 0–0.2)
BASOPHILS NFR BLD AUTO: 0.7 % (ref 0–1.5)
BILIRUB SERPL-MCNC: 0.3 MG/DL (ref 0.2–1.2)
BUN SERPL-MCNC: 16 MG/DL (ref 6–20)
BUN/CREAT SERPL: 17.8 (ref 7–25)
CALCIUM SERPL-MCNC: 9.4 MG/DL (ref 8.6–10.5)
CHLORIDE SERPL-SCNC: 101 MMOL/L (ref 98–107)
CHOLEST SERPL-MCNC: 240 MG/DL (ref 0–200)
CO2 SERPL-SCNC: 25.5 MMOL/L (ref 22–29)
CREAT SERPL-MCNC: 0.9 MG/DL (ref 0.57–1)
EOSINOPHIL # BLD AUTO: 0.15 10*3/MM3 (ref 0–0.4)
EOSINOPHIL NFR BLD AUTO: 3.3 % (ref 0.3–6.2)
ERYTHROCYTE [DISTWIDTH] IN BLOOD BY AUTOMATED COUNT: 13.8 % (ref 12.3–15.4)
GLOBULIN SER CALC-MCNC: 3 GM/DL
GLUCOSE SERPL-MCNC: 92 MG/DL (ref 65–99)
HBA1C MFR BLD: 5.7 % (ref 4.8–5.6)
HCT VFR BLD AUTO: 37.8 % (ref 34–46.6)
HDLC SERPL-MCNC: 74 MG/DL (ref 40–60)
HGB BLD-MCNC: 12.8 G/DL (ref 12–15.9)
IMM GRANULOCYTES # BLD AUTO: 0.01 10*3/MM3 (ref 0–0.05)
IMM GRANULOCYTES NFR BLD AUTO: 0.2 % (ref 0–0.5)
LDLC SERPL CALC-MCNC: 140 MG/DL (ref 0–100)
LYMPHOCYTES # BLD AUTO: 1.97 10*3/MM3 (ref 0.7–3.1)
LYMPHOCYTES NFR BLD AUTO: 43 % (ref 19.6–45.3)
MCH RBC QN AUTO: 29.9 PG (ref 26.6–33)
MCHC RBC AUTO-ENTMCNC: 33.9 G/DL (ref 31.5–35.7)
MCV RBC AUTO: 88.3 FL (ref 79–97)
MONOCYTES # BLD AUTO: 0.42 10*3/MM3 (ref 0.1–0.9)
MONOCYTES NFR BLD AUTO: 9.2 % (ref 5–12)
NEUTROPHILS # BLD AUTO: 2 10*3/MM3 (ref 1.7–7)
NEUTROPHILS NFR BLD AUTO: 43.6 % (ref 42.7–76)
NRBC BLD AUTO-RTO: 0 /100 WBC (ref 0–0.2)
PLATELET # BLD AUTO: 230 10*3/MM3 (ref 140–450)
POTASSIUM SERPL-SCNC: 4.2 MMOL/L (ref 3.5–5.2)
PROT SERPL-MCNC: 7.5 G/DL (ref 6–8.5)
RBC # BLD AUTO: 4.28 10*6/MM3 (ref 3.77–5.28)
SODIUM SERPL-SCNC: 137 MMOL/L (ref 136–145)
TRIGL SERPL-MCNC: 132 MG/DL (ref 0–150)
TSH SERPL DL<=0.005 MIU/L-ACNC: 1.57 UIU/ML (ref 0.27–4.2)
VLDLC SERPL CALC-MCNC: 26.4 MG/DL
WBC # BLD AUTO: 4.58 10*3/MM3 (ref 3.4–10.8)

## 2020-06-30 NOTE — TELEPHONE ENCOUNTER
Called patient 06/30/20 10:55 AM, BS sl elevated prediabetic, needs to work on healthy diet and regular exercise to prevent DM. Chol elevated , goal around 71 states will recheck in 3 mo after diet and exercise defer medication, pt verbalized understanding

## 2020-06-30 NOTE — TELEPHONE ENCOUNTER
PT CALLED WANTING TO SPEAK TO A NURSE ABOUT LAB RESULTS SHE RECEIVED ON [facility]. PT DOES NOT UNDERSTAND HER RESULTS.     ATTEMPTED TO TRANSFER AND SPOKE WITH GARRISON. GARRISON INFORMED ME TO TAKE A MESSAGE.     PLEASE ADVISE     PT CALL BACK   [phone]

## 2020-07-10 DIAGNOSIS — R92.1 CALCIFICATION OF LEFT BREAST ON MAMMOGRAPHY: ICD-10-CM

## 2020-07-10 DIAGNOSIS — R92.8 ABNORMALITY OF LEFT BREAST ON SCREENING MAMMOGRAM: Primary | ICD-10-CM

## 2020-07-13 ENCOUNTER — TELEPHONE (OUTPATIENT)
Dept: OBSTETRICS AND GYNECOLOGY | Facility: CLINIC | Age: 55
End: 2020-07-13

## 2020-07-13 NOTE — TELEPHONE ENCOUNTER
Patient states that she forgot to get her hormone patch refilled and was wondering if you could call in an RX for her please?    estradiol (MINIVELLE, VIVELLE-DOT) 0.075 MG/24HR patch [[id number]] (Order [id number])     Sent to     [facility] [address] [place] [address] RD. - [phone]  - [phone] (Phone)  [phone] (Fax)

## 2020-07-15 RX ORDER — ESTRADIOL 0.05 MG/D
1 FILM, EXTENDED RELEASE TRANSDERMAL 2 TIMES WEEKLY
Qty: 8 EACH | Refills: 6 | Status: SHIPPED | OUTPATIENT
Start: 2020-07-16 | End: 2020-07-31

## 2020-07-20 ENCOUNTER — TELEPHONE (OUTPATIENT)
Dept: OBSTETRICS AND GYNECOLOGY | Facility: CLINIC | Age: 55
End: 2020-07-20

## 2020-07-20 NOTE — TELEPHONE ENCOUNTER
Rx for patients patch was sent to [facility] in system, however it was sent for the wrong dose. She needs the 0.075 mg please. Thank You.

## 2020-07-20 NOTE — TELEPHONE ENCOUNTER
I would like for Mrs. Grant to try a lesser dose. We discussed at her last appointment that she consider weaning off of hormone replacement therapy. -Rebeca

## 2020-07-27 ENCOUNTER — APPOINTMENT (OUTPATIENT)
Dept: MAMMOGRAPHY | Facility: HOSPITAL | Age: 55
End: 2020-07-27

## 2020-07-31 ENCOUNTER — TELEPHONE (OUTPATIENT)
Dept: OBSTETRICS AND GYNECOLOGY | Facility: CLINIC | Age: 55
End: 2020-07-31

## 2020-07-31 RX ORDER — ESTRADIOL 0.07 MG/D
1 FILM, EXTENDED RELEASE TRANSDERMAL 2 TIMES WEEKLY
Qty: 8 EACH | Refills: 12 | Status: SHIPPED | OUTPATIENT
Start: 2020-08-03 | End: 2021-08-02

## 2020-07-31 NOTE — TELEPHONE ENCOUNTER
Patient called she said that she was switched to 0.05 on her estradiol patch and she said she feels out of place sense. She said she is still having hot flashes, dizziness, and having hard time putting thoughts together. She said she didn't have any these issues when she was on the stronger dose of .075 and wanted to know if it could be switched back.

## 2020-08-06 ENCOUNTER — APPOINTMENT (OUTPATIENT)
Dept: WOMENS IMAGING | Facility: HOSPITAL | Age: 55
End: 2020-08-06

## 2020-08-06 PROCEDURE — G0279 TOMOSYNTHESIS, MAMMO: HCPCS | Performed by: RADIOLOGY

## 2020-08-06 PROCEDURE — 77065 DX MAMMO INCL CAD UNI: CPT | Performed by: RADIOLOGY

## 2020-08-06 PROCEDURE — 77061 BREAST TOMOSYNTHESIS UNI: CPT | Performed by: RADIOLOGY

## 2020-08-10 ENCOUNTER — TELEPHONE (OUTPATIENT)
Dept: OBSTETRICS AND GYNECOLOGY | Facility: CLINIC | Age: 55
End: 2020-08-10

## 2020-08-10 DIAGNOSIS — R92.1 CALCIFICATION OF LEFT BREAST ON MAMMOGRAPHY: ICD-10-CM

## 2020-08-10 DIAGNOSIS — R92.8 ABNORMALITY OF LEFT BREAST ON SCREENING MAMMOGRAM: Primary | ICD-10-CM

## 2020-08-10 DIAGNOSIS — R92.8 ABNORMALITY OF LEFT BREAST ON SCREENING MAMMOGRAM: ICD-10-CM

## 2020-08-10 NOTE — TELEPHONE ENCOUNTER
Called pt to discuss diagnostic mammogram and u/s results. Notified left breast has area of concern requiring further evaluation. Plan to left breast biopsy and clip placement.     Rebeca Hood, APRN  8/10/2020  12:39pm

## 2020-08-10 NOTE — TELEPHONE ENCOUNTER
Please call patient to discuss abnormal findings from her recent DX Mammo.  She is not aware.  Referrals have been entered and report in Epic.   SR

## 2020-08-17 ENCOUNTER — APPOINTMENT (OUTPATIENT)
Dept: WOMENS IMAGING | Facility: HOSPITAL | Age: 55
End: 2020-08-17

## 2020-08-20 ENCOUNTER — TELEPHONE (OUTPATIENT)
Dept: OBSTETRICS AND GYNECOLOGY | Facility: CLINIC | Age: 55
End: 2020-08-20

## 2020-08-20 NOTE — TELEPHONE ENCOUNTER
Patient was supposed to have MAMMO GUIDED BREAST CLIP PLACEMENT and MAMMO STEREOTACTIC BREAST BIOPSY on 8/17/20 at [facility] but [facility] states procedure was cancelled. Would you happen to know why the procedure was cancelled or do I need to call [facility]?

## 2020-08-25 ENCOUNTER — APPOINTMENT (OUTPATIENT)
Dept: WOMENS IMAGING | Facility: HOSPITAL | Age: 55
End: 2020-08-25

## 2020-08-25 PROCEDURE — 19081 BX BREAST 1ST LESION STRTCTC: CPT | Performed by: RADIOLOGY

## 2020-08-25 PROCEDURE — 0: Performed by: RADIOLOGY

## 2020-08-25 PROCEDURE — 77080 DXA BONE DENSITY AXIAL: CPT | Performed by: RADIOLOGY

## 2020-08-27 DIAGNOSIS — R92.1 CALCIFICATION OF LEFT BREAST ON MAMMOGRAPHY: ICD-10-CM

## 2020-08-27 DIAGNOSIS — R92.8 ABNORMALITY OF LEFT BREAST ON SCREENING MAMMOGRAM: ICD-10-CM

## 2020-11-25 RX ORDER — FUROSEMIDE 40 MG/1
TABLET ORAL
Qty: 90 TABLET | Refills: 3 | Status: SHIPPED | OUTPATIENT
Start: 2020-11-25 | End: 2022-03-23

## 2020-11-25 RX ORDER — METOPROLOL SUCCINATE 25 MG/1
TABLET, EXTENDED RELEASE ORAL
Qty: 180 TABLET | Refills: 3 | Status: SHIPPED | OUTPATIENT
Start: 2020-11-25 | End: 2022-02-02

## 2021-03-16 ENCOUNTER — OFFICE VISIT (OUTPATIENT)
Dept: FAMILY MEDICINE CLINIC | Facility: CLINIC | Age: 56
End: 2021-03-16

## 2021-03-16 VITALS
DIASTOLIC BLOOD PRESSURE: 88 MMHG | OXYGEN SATURATION: 99 % | WEIGHT: 260 LBS | SYSTOLIC BLOOD PRESSURE: 120 MMHG | BODY MASS INDEX: 47.84 KG/M2 | HEIGHT: 62 IN | TEMPERATURE: 97.3 F | HEART RATE: 90 BPM

## 2021-03-16 DIAGNOSIS — M54.41 ACUTE BILATERAL LOW BACK PAIN WITH BILATERAL SCIATICA: ICD-10-CM

## 2021-03-16 DIAGNOSIS — R73.03 PREDIABETES: ICD-10-CM

## 2021-03-16 DIAGNOSIS — E78.5 HYPERLIPIDEMIA, UNSPECIFIED HYPERLIPIDEMIA TYPE: ICD-10-CM

## 2021-03-16 DIAGNOSIS — E66.01 CLASS 3 SEVERE OBESITY DUE TO EXCESS CALORIES WITH SERIOUS COMORBIDITY AND BODY MASS INDEX (BMI) OF 45.0 TO 49.9 IN ADULT (HCC): ICD-10-CM

## 2021-03-16 DIAGNOSIS — Z98.890 S/P GASTRIC SURGERY: ICD-10-CM

## 2021-03-16 DIAGNOSIS — M54.42 ACUTE BILATERAL LOW BACK PAIN WITH BILATERAL SCIATICA: ICD-10-CM

## 2021-03-16 DIAGNOSIS — I10 ESSENTIAL HYPERTENSION: ICD-10-CM

## 2021-03-16 DIAGNOSIS — R60.0 BILATERAL LEG EDEMA: Primary | ICD-10-CM

## 2021-03-16 DIAGNOSIS — M51.36 DDD (DEGENERATIVE DISC DISEASE), LUMBAR: ICD-10-CM

## 2021-03-16 LAB
ALBUMIN SERPL-MCNC: 4.5 G/DL (ref 3.5–5.2)
ALBUMIN/GLOB SERPL: 1.4 G/DL
ALP SERPL-CCNC: 89 U/L (ref 39–117)
ALT SERPL W P-5'-P-CCNC: 12 U/L (ref 1–33)
ANION GAP SERPL CALCULATED.3IONS-SCNC: 8.7 MMOL/L (ref 5–15)
AST SERPL-CCNC: 15 U/L (ref 1–32)
BILIRUB SERPL-MCNC: <0.2 MG/DL (ref 0–1.2)
BUN SERPL-MCNC: 17 MG/DL (ref 6–20)
BUN/CREAT SERPL: 22.4 (ref 7–25)
CALCIUM SPEC-SCNC: 9.5 MG/DL (ref 8.6–10.5)
CHLORIDE SERPL-SCNC: 104 MMOL/L (ref 98–107)
CO2 SERPL-SCNC: 27.3 MMOL/L (ref 22–29)
CREAT SERPL-MCNC: 0.76 MG/DL (ref 0.57–1)
ERYTHROCYTE [DISTWIDTH] IN BLOOD BY AUTOMATED COUNT: 13.9 % (ref 12.3–15.4)
GFR SERPL CREATININE-BSD FRML MDRD: 96 ML/MIN/1.73
GLOBULIN UR ELPH-MCNC: 3.2 GM/DL
GLUCOSE SERPL-MCNC: 103 MG/DL (ref 65–99)
HBA1C MFR BLD: 5.5 % (ref 4.8–5.6)
HCT VFR BLD AUTO: 38.9 % (ref 34–46.6)
HGB BLD-MCNC: 12.6 G/DL (ref 12–15.9)
LYMPHOCYTES # BLD AUTO: 2 10*3/MM3 (ref 0.7–3.1)
LYMPHOCYTES NFR BLD AUTO: 34.8 % (ref 19.6–45.3)
MCH RBC QN AUTO: 29.7 PG (ref 26.6–33)
MCHC RBC AUTO-ENTMCNC: 32.4 G/DL (ref 31.5–35.7)
MCV RBC AUTO: 91.5 FL (ref 79–97)
MONOCYTES # BLD AUTO: 0.4 10*3/MM3 (ref 0.1–0.9)
MONOCYTES NFR BLD AUTO: 6.3 % (ref 5–12)
NEUTROPHILS NFR BLD AUTO: 3.4 10*3/MM3 (ref 1.7–7)
NEUTROPHILS NFR BLD AUTO: 58.9 % (ref 42.7–76)
PLATELET # BLD AUTO: 265 10*3/MM3 (ref 140–450)
PMV BLD AUTO: 8.5 FL (ref 6–12)
POTASSIUM SERPL-SCNC: 4.5 MMOL/L (ref 3.5–5.2)
PROT SERPL-MCNC: 7.7 G/DL (ref 6–8.5)
RBC # BLD AUTO: 4.26 10*6/MM3 (ref 3.77–5.28)
SODIUM SERPL-SCNC: 140 MMOL/L (ref 136–145)
T-UPTAKE NFR SERPL: 1.13 TBI (ref 0.8–1.3)
T4 SERPL-MCNC: 7.5 MCG/DL (ref 4.5–11.7)
TSH SERPL DL<=0.05 MIU/L-ACNC: 2.03 UIU/ML (ref 0.27–4.2)
WBC # BLD AUTO: 5.7 10*3/MM3 (ref 3.4–10.8)

## 2021-03-16 PROCEDURE — 84443 ASSAY THYROID STIM HORMONE: CPT | Performed by: NURSE PRACTITIONER

## 2021-03-16 PROCEDURE — 83036 HEMOGLOBIN GLYCOSYLATED A1C: CPT | Performed by: NURSE PRACTITIONER

## 2021-03-16 PROCEDURE — 99214 OFFICE O/P EST MOD 30 MIN: CPT | Performed by: NURSE PRACTITIONER

## 2021-03-16 PROCEDURE — 84479 ASSAY OF THYROID (T3 OR T4): CPT | Performed by: NURSE PRACTITIONER

## 2021-03-16 PROCEDURE — 84436 ASSAY OF TOTAL THYROXINE: CPT | Performed by: NURSE PRACTITIONER

## 2021-03-16 PROCEDURE — 80053 COMPREHEN METABOLIC PANEL: CPT | Performed by: NURSE PRACTITIONER

## 2021-03-16 PROCEDURE — 72100 X-RAY EXAM L-S SPINE 2/3 VWS: CPT | Performed by: NURSE PRACTITIONER

## 2021-03-16 PROCEDURE — 85025 COMPLETE CBC W/AUTO DIFF WBC: CPT | Performed by: NURSE PRACTITIONER

## 2021-03-16 PROCEDURE — 36415 COLL VENOUS BLD VENIPUNCTURE: CPT | Performed by: NURSE PRACTITIONER

## 2021-03-16 RX ORDER — BACLOFEN 10 MG/1
10 TABLET ORAL NIGHTLY
Qty: 30 TABLET | Refills: 2 | Status: SHIPPED | OUTPATIENT
Start: 2021-03-16 | End: 2021-08-10

## 2021-03-16 NOTE — PATIENT INSTRUCTIONS
Check labs and call with results, cont lasix 40 mg AM and decrease amlodipine 10 mg 1/2 PO and increase metoprolol XL 3 PO daily and monitor swelling, xray lumbar today gave back exercises and trial baclofen 10 mg HS heating pad and warm showers, Enc healthy diet and regular exercise for wt loss and FU with bariatrics overdue  Low Back Sprain or Strain Rehab  Ask your health care provider which exercises are safe for you. Do exercises exactly as told by your health care provider and adjust them as directed. It is normal to feel mild stretching, pulling, tightness, or discomfort as you do these exercises. Stop right away if you feel sudden pain or your pain gets worse. Do not begin these exercises until told by your health care provider.  Stretching and range-of-motion exercises  These exercises warm up your muscles and joints and improve the movement and flexibility of your back. These exercises also help to relieve pain, numbness, and tingling.  Lumbar rotation    1. Lie on your back on a firm surface and bend your knees.  2. Straighten your arms out to your sides so each arm forms a 90-degree angle (right angle) with a side of your body.  3. Slowly move (rotate) both of your knees to one side of your body until you feel a stretch in your lower back (lumbar). Try not to let your shoulders lift off the floor.  4. Hold this position for __________ seconds.  5. Tense your abdominal muscles and slowly move your knees back to the starting position.  6. Repeat this exercise on the other side of your body.  Repeat __________ times. Complete this exercise __________ times a day.  Single knee to chest    1. Lie on your back on a firm surface with both legs straight.  2. Bend one of your knees. Use your hands to move your knee up toward your chest until you feel a gentle stretch in your lower back and buttock.  ? Hold your leg in this position by holding on to the front of your knee.  ? Keep your other leg as straight as  possible.  3. Hold this position for __________ seconds.  4. Slowly return to the starting position.  5. Repeat with your other leg.  Repeat __________ times. Complete this exercise __________ times a day.  Prone extension on elbows    1. Lie on your abdomen on a firm surface (prone position).  2. Prop yourself up on your elbows.  3. Use your arms to help lift your chest up until you feel a gentle stretch in your abdomen and your lower back.  ? This will place some of your body weight on your elbows. If this is uncomfortable, try stacking pillows under your chest.  ? Your hips should stay down, against the surface that you are lying on. Keep your hip and back muscles relaxed.  4. Hold this position for __________ seconds.  5. Slowly relax your upper body and return to the starting position.  Repeat __________ times. Complete this exercise __________ times a day.  Strengthening exercises  These exercises build strength and endurance in your back. Endurance is the ability to use your muscles for a long time, even after they get tired.  Pelvic tilt  This exercise strengthens the muscles that lie deep in the abdomen.  1. Lie on your back on a firm surface. Bend your knees and keep your feet flat on the floor.  2. Tense your abdominal muscles. Tip your pelvis up toward the ceiling and flatten your lower back into the floor.  ? To help with this exercise, you may place a small towel under your lower back and try to push your back into the towel.  3. Hold this position for __________ seconds.  4. Let your muscles relax completely before you repeat this exercise.  Repeat __________ times. Complete this exercise __________ times a day.  Alternating arm and leg raises    1. Get on your hands and knees on a firm surface. If you are on a hard floor, you may want to use padding, such as an exercise mat, to cushion your knees.  2. Line up your arms and legs. Your hands should be directly below your shoulders, and your knees  should be directly below your hips.  3. Lift your left leg behind you. At the same time, raise your right arm and straighten it in front of you.  ? Do not lift your leg higher than your hip.  ? Do not lift your arm higher than your shoulder.  ? Keep your abdominal and back muscles tight.  ? Keep your hips facing the ground.  ? Do not arch your back.  ? Keep your balance carefully, and do not hold your breath.  4. Hold this position for __________ seconds.  5. Slowly return to the starting position.  6. Repeat with your right leg and your left arm.  Repeat __________ times. Complete this exercise __________ times a day.  Abdominal set with straight leg raise    1. Lie on your back on a firm surface.  2. Bend one of your knees and keep your other leg straight.  3. Tense your abdominal muscles and lift your straight leg up, 4-6 inches (10-15 cm) off the ground.  4. Keep your abdominal muscles tight and hold this position for __________ seconds.  ? Do not hold your breath.  ? Do not arch your back. Keep it flat against the ground.  5. Keep your abdominal muscles tense as you slowly lower your leg back to the starting position.  6. Repeat with your other leg.  Repeat __________ times. Complete this exercise __________ times a day.  Single leg lower with bent knees  1. Lie on your back on a firm surface.  2. Tense your abdominal muscles and lift your feet off the floor, one foot at a time, so your knees and hips are bent in 90-degree angles (right angles).  ? Your knees should be over your hips and your lower legs should be parallel to the floor.  3. Keeping your abdominal muscles tense and your knee bent, slowly lower one of your legs so your toe touches the ground.  4. Lift your leg back up to return to the starting position.  ? Do not hold your breath.  ? Do not let your back arch. Keep your back flat against the ground.  5. Repeat with your other leg.  Repeat __________ times. Complete this exercise __________ times a  day.  Posture and body mechanics  Good posture and healthy body mechanics can help to relieve stress in your body's tissues and joints. Body mechanics refers to the movements and positions of your body while you do your daily activities. Posture is part of body mechanics. Good posture means:  · Your spine is in its natural S-curve position (neutral).  · Your shoulders are pulled back slightly.  · Your head is not tipped forward.  Follow these guidelines to improve your posture and body mechanics in your everyday activities.  Standing    · When standing, keep your spine neutral and your feet about hip width apart. Keep a slight bend in your knees. Your ears, shoulders, and hips should line up.  · When you do a task in which you  one place for a long time, place one foot up on a stable object that is 2-4 inches (5-10 cm) high, such as a footstool. This helps keep your spine neutral.  Sitting    · When sitting, keep your spine neutral and keep your feet flat on the floor. Use a footrest, if necessary, and keep your thighs parallel to the floor. Avoid rounding your shoulders, and avoid tilting your head forward.  · When working at a desk or a computer, keep your desk at a height where your hands are slightly lower than your elbows. Slide your chair under your desk so you are close enough to maintain good posture.  · When working at a computer, place your monitor at a height where you are looking straight ahead and you do not have to tilt your head forward or downward to look at the screen.  Resting  · When lying down and resting, avoid positions that are most painful for you.  · If you have pain with activities such as sitting, bending, stooping, or squatting, lie in a position in which your body does not bend very much. For example, avoid curling up on your side with your arms and knees near your chest (fetal position).  · If you have pain with activities such as standing for a long time or reaching with your  arms, lie with your spine in a neutral position and bend your knees slightly. Try the following positions:  ? Lying on your side with a pillow between your knees.  ? Lying on your back with a pillow under your knees.  Lifting    · When lifting objects, keep your feet at least shoulder width apart and tighten your abdominal muscles.  · Bend your knees and hips and keep your spine neutral. It is important to lift using the strength of your legs, not your back. Do not lock your knees straight out.  · Always ask for help to lift heavy or awkward objects.  This information is not intended to replace advice given to you by your health care provider. Make sure you discuss any questions you have with your health care provider.  Document Revised: 04/10/2020 Document Reviewed: 01/09/2020  Elsevier Patient Education © 2020 [employer]

## 2021-03-16 NOTE — PROGRESS NOTES
"Chief Complaint  Edema (feet) and Back Pain    Subjective          Joselyn Grant presents to [facility]  History of Present Illness  Here to FU on B LE edema with chronic HTN on metoprolol XL 25 mg BID, amlodipine 10 mg and lasix 40 mg but states increased 2 PO when notices more swelling, taking potassium OTC, denies salting increased H20 and states has followed healthy diet following diet and s/p lap band Dr Lyons last saw 3 years ago weight gain 13 lbs since 06/20, notes decreased exercise d/t back pain B radiating B LE with tingling 6/10 with \"clicking\" > 1 mo taking IBU and heating pad and TENS unit, works teacher and sits, last labs A1C 5.7 TSH nl and chol elevated, Last colonoscopy age 51 normal, hx of knee sgy and secondary infection wondering if not walking correctly c/t back pain, doesn't want to see PT at this time, has taken flexeril in past     Objective   Vital Signs:   /88 (BP Location: Left arm, Patient Position: Sitting, Cuff Size: Large Adult)   Pulse 90   Temp 97.3 °F (36.3 °C) (Temporal)   Ht 157.5 cm (62\")   Wt 118 kg (260 lb)   SpO2 99%   BMI 47.55 kg/m²     Physical Exam  Vitals reviewed.   Constitutional:       Appearance: She is well-developed.   HENT:      Head: Normocephalic and atraumatic.   Eyes:      Conjunctiva/sclera: Conjunctivae normal.      Pupils: Pupils are equal, round, and reactive to light.   Cardiovascular:      Rate and Rhythm: Normal rate and regular rhythm.      Pulses: Normal pulses.      Heart sounds: Normal heart sounds.   Pulmonary:      Effort: Pulmonary effort is normal.      Breath sounds: Normal breath sounds.   Abdominal:      General: There is no distension.      Palpations: Abdomen is soft. There is no mass.      Tenderness: There is no abdominal tenderness. There is no right CVA tenderness, left CVA tenderness, guarding or rebound.      Hernia: No hernia is present.   Musculoskeletal:         General: Tenderness " (lumbar L3-4,4-5 with stiff ROM, gait affected) present. Normal range of motion.      Cervical back: Normal range of motion.      Right lower leg: Edema (nonpitting) present.      Left lower leg: Edema (nonpitting) present.   Skin:     General: Skin is warm and dry.   Neurological:      Mental Status: She is alert and oriented to person, place, and time.   Psychiatric:         Mood and Affect: Mood normal.         Behavior: Behavior normal.         Thought Content: Thought content normal.         Judgment: Judgment normal.      lumbar xray 2v without comparison for back pain shows narrowing of joint space awaiting radiology review    Result Review :                 Assessment and Plan    Diagnoses and all orders for this visit:    1. Bilateral leg edema (Primary)  -     Thyroid Panel With TSH  -     CBC & Differential  -     Comprehensive Metabolic Panel    2. Essential hypertension  -     Thyroid Panel With TSH  -     CBC & Differential  -     Comprehensive Metabolic Panel    3. Class 3 severe obesity due to excess calories with serious comorbidity and body mass index (BMI) of 45.0 to 49.9 in adult (CMS/[facility])  -     Thyroid Panel With TSH    4. Acute bilateral low back pain with bilateral sciatica  -     XR Spine Lumbar 2 or 3 View (In Office)    5. DDD (degenerative disc disease), lumbar    6. S/P gastric surgery    7. Hyperlipidemia, unspecified hyperlipidemia type  -     CBC & Differential  -     Comprehensive Metabolic Panel    8. Prediabetes  -     Hemoglobin A1c    Other orders  -     baclofen (LIORESAL) 10 MG tablet; Take 1 tablet by mouth Every Night.  Dispense: 30 tablet; Refill: 2        Follow Up   No follow-ups on file.  Patient was given instructions and counseling regarding her condition or for health maintenance advice. Please see specific information pulled into the AVS if appropriate.   Check labs and call with results, cont lasix 40 mg AM and decrease amlodipine 10 mg 1/2 PO and increase metoprolol  XL 3 PO daily and monitor swelling, xray lumbar today gave back exercises and trial baclofen 10 mg HS heating pad and warm showers, Enc healthy diet and regular exercise for wt loss and FU with bariatrics overdue

## 2021-03-26 ENCOUNTER — BULK ORDERING (OUTPATIENT)
Dept: CASE MANAGEMENT | Facility: OTHER | Age: 56
End: 2021-03-26

## 2021-03-26 DIAGNOSIS — Z23 IMMUNIZATION DUE: ICD-10-CM

## 2021-04-07 NOTE — PROGRESS NOTES
Physical Therapy Daily Progress Note    Subjective     Joselyn Grant reports: she saw her doctor, who is pleased with progress, advised her to increase muscle building at PT.  Pt states she is noticing improvements in ADL's, not as fatigued after taking a shower, increased ease lifting her leg.      Objective   See Exercise, Manual, and Modality Logs for complete treatment.       Assessment/Plan  Did well with new exercises, gait continues to improve.  Progress per Plan of Care            Manual Therapy:    0     mins  33054;  Therapeutic Exercise:    45     mins  30163;     Neuromuscular Driss:    0    mins  29293;    Therapeutic Activity:     0     mins  41999;     Gait Trainin     mins  77726;     Ultrasound:     0     mins  57223;    Electrical Stimulation:    15     mins  06687 ( );    Timed Treatment:   60   mins   Total Treatment:     60   mins    Zena Longo PT, DPT  Physical Therapist  [place] License #[id number]                    
no

## 2021-08-02 RX ORDER — ESTRADIOL 0.07 MG/D
FILM, EXTENDED RELEASE TRANSDERMAL
Qty: 8 EACH | Refills: 2 | Status: SHIPPED | OUTPATIENT
Start: 2021-08-02 | End: 2021-08-10 | Stop reason: SDUPTHER

## 2021-08-10 ENCOUNTER — OFFICE VISIT (OUTPATIENT)
Dept: OBSTETRICS AND GYNECOLOGY | Facility: CLINIC | Age: 56
End: 2021-08-10

## 2021-08-10 VITALS
SYSTOLIC BLOOD PRESSURE: 157 MMHG | BODY MASS INDEX: 47.29 KG/M2 | DIASTOLIC BLOOD PRESSURE: 95 MMHG | HEIGHT: 62 IN | WEIGHT: 257 LBS

## 2021-08-10 DIAGNOSIS — R53.83 OTHER FATIGUE: Primary | ICD-10-CM

## 2021-08-10 DIAGNOSIS — Z01.419 ENCOUNTER FOR ANNUAL ROUTINE GYNECOLOGICAL EXAMINATION: ICD-10-CM

## 2021-08-10 DIAGNOSIS — Z78.0 POSTMENOPAUSE: ICD-10-CM

## 2021-08-10 DIAGNOSIS — Z79.890 HORMONE REPLACEMENT THERAPY (HRT): ICD-10-CM

## 2021-08-10 DIAGNOSIS — L65.9 HAIR LOSS: ICD-10-CM

## 2021-08-10 PROCEDURE — 99213 OFFICE O/P EST LOW 20 MIN: CPT | Performed by: NURSE PRACTITIONER

## 2021-08-10 PROCEDURE — 99396 PREV VISIT EST AGE 40-64: CPT | Performed by: NURSE PRACTITIONER

## 2021-08-10 RX ORDER — OMALIZUMAB 150 MG/ML
INJECTION, SOLUTION SUBCUTANEOUS
COMMUNITY
Start: 2021-07-16

## 2021-08-10 RX ORDER — POTASSIUM CHLORIDE 750 MG/1
10 TABLET, EXTENDED RELEASE ORAL DAILY
COMMUNITY
End: 2022-02-02

## 2021-08-10 RX ORDER — BACLOFEN 20 MG/1
TABLET ORAL
COMMUNITY
Start: 2021-06-24 | End: 2022-09-19

## 2021-08-10 RX ORDER — ESTRADIOL 0.07 MG/D
1 FILM, EXTENDED RELEASE TRANSDERMAL 2 TIMES WEEKLY
Qty: 8 EACH | Refills: 12 | Status: SHIPPED | OUTPATIENT
Start: 2021-08-12 | End: 2023-01-06

## 2021-08-10 NOTE — PROGRESS NOTES
"GYN Annual Exam     Chief Complaint   Patient presents with   • Gynecologic Exam     Annual exam - last pap 3/2019 negative, c-scope 2017, MG scheduled 21.       Joselyn Grant is a 55 y.o. female who presents for annual well woman exam. She is postmenopausal. Periods absent since hysterectomy. Hysterectomy completed due to AUB and fibroids.  She denies vaginal spotting or discharge. She completes SBE monthly. She states she is fasting today for labs. She would like to recheck WBC due to hx of sepsis, left knee surgery X8, with the last being 2 years ago. reports difficulty in incisional healing. C/o new onset hair loss and fatigue.  Reports newly dx \"pre-diabetes\" and would like repeat A1C testing today.  She is sexually active.    BP elevated today, she reports compliance with anti-hypertensives. She does not monitor BP at home. BP managed by PCP, however she was last seen by PCP 2 years ago.     Using minivelle patches for HRT.  This was started in . She is taking for hot flashes and mood changes. She would like to continue    OB History        1    Para   1    Term   1            AB        Living           SAB        TAB        Ectopic        Molar        Multiple        Live Births                    Mammogram: upcoming appt scheduled 21  Dexa scan: 2017  Colonoscopy: 2017  Last Pap :  negative  History of abnormal Pap smear: no  Family history of uterine, colon or ovarian cancer: no  Family history of breast cancer: no  History of abnormal mammogram: yes - prior benign biopsy    Menopause:  Bleeding since? no  Vasomotor symptoms: no  HRT: yes  Dyspareunia: no      Past Medical History:   Diagnosis Date   • Allergic    • Anemia    • Anxiety    • Arthritis    • History of kidney stones    • Hypertension    • Knee pain    • Migraine    • Obesity    • Risk factors for obstructive sleep apnea    • Staph infection     LEFT KNEE ON ANTIBIOTICS   • Urinary incontinence     wears pads "       Past Surgical History:   Procedure Laterality Date   • COLONOSCOPY N/A 12/20/2017    Procedure: COLONOSCOPY to cecum;  Surgeon: Ino Torres MD;  Location: [facility] ENDOSCOPY;  Service:    • EYE SURGERY      lasix   • HYSTERECTOMY      10 years ago for heavy menses and fibroids   • KNEE SURGERY Left     MULTIPLE SURGERIES TOTAL 7   • LAPAROSCOPIC ASSISTED VAGINAL HYSTERECTOMY SALPINGO OOPHORECTOMY     • LAPAROSCOPIC CHOLECYSTECTOMY     • OOPHORECTOMY     • IN REVISE KNEE JOINT REPLACE,1 PART Left 1/31/2017    Procedure: LT TOTAL KNEE ARTHROPLASTY REVISION;  Surgeon: Ha Oh MD;  Location: [facility] MAIN OR;  Service: Orthopedics   • IN REVISE KNEE JOINT REPLACE,1 PART Left 6/2/2017    Procedure: LT TOTAL KNEE ARTHROPLASTY REVISION;  Surgeon: Ha Oh MD;  Location: [facility] MAIN OR;  Service: Orthopedics   • REPLACEMENT TOTAL KNEE      left   • STOMACH SURGERY      lapband   • TOTAL KNEE ARTHROPLASTY REVISION Left 10/4/2018    Procedure: LEFT TOTAL KNEE ARTHROPLASTY REVISION;  Surgeon: Ha Oh MD;  Location: [facility] MAIN OR;  Service: Orthopedics         Current Outpatient Medications:   •  amLODIPine (NORVASC) 10 MG tablet, Take 1 tablet by mouth Daily. (Patient taking differently: Take 5 mg by mouth Daily.), Disp: 90 tablet, Rfl: 3  •  baclofen (LIORESAL) 20 MG tablet, , Disp: , Rfl:   •  Cyanocobalamin 1000 MCG capsule, Apply  topically to the appropriate area as directed., Disp: , Rfl:   •  estradiol (MINIVELLE, VIVELLE-DOT) 0.075 MG/24HR patch, PLACE 1 PATCH ON THE SKIN AS DIRECTED BY PROVIDER TWO TIMES A WEEK, Disp: 8 each, Rfl: 2  •  furosemide (LASIX) 40 MG tablet, TAKE 1 TABLET EVERY MORNING, Disp: 90 tablet, Rfl: 3  •  hydrOXYzine (ATARAX) 10 MG tablet, , Disp: , Rfl:   •  ibuprofen (ADVIL,MOTRIN) 200 MG tablet, Take 600 mg by mouth., Disp: , Rfl:   •  melatonin 5 MG tablet tablet, Take 5 mg by mouth At Night As Needed., Disp: , Rfl:   •  metoprolol  succinate XL (TOPROL-XL) 25 MG 24 hr tablet, TAKE 2 TABLETS DAILY (Patient taking differently: Take 25 mg by mouth 3 (Three) Times a Day.), Disp: 180 tablet, Rfl: 3  •  Multiple Vitamins-Minerals (MULTIVITAMIN ADULT PO), Take 1 tablet by mouth Daily., Disp: , Rfl:   •  potassium chloride (K-DUR,KLOR-CON) 10 MEQ CR tablet, Take 10 mEq by mouth Daily., Disp: , Rfl:   •  POTASSIUM PO, Take 99 mg by mouth Daily. 99 mg, Disp: , Rfl:   •  Xolair 150 MG/ML solution prefilled syringe injection, , Disp: , Rfl:     No Known Allergies    Social History     Tobacco Use   • Smoking status: Never Smoker   • Smokeless tobacco: Never Used   Substance Use Topics   • Alcohol use: Yes     Alcohol/week: 3.0 standard drinks     Types: 2 Glasses of wine, 1 Shots of liquor per week     Comment: SOCIAL   • Drug use: No       Family History   Problem Relation Age of Onset   • Pancreatic cancer Mother    • Hypertension Mother    • No Known Problems Father    • Fibroids Sister    • Heart murmur Sister    • Lymphoma Maternal Uncle    • Lung cancer Maternal Grandmother    • Bell's palsy Brother    • Hypertension Brother    • No Known Problems Maternal Grandfather    • No Known Problems Brother    • Malig Hyperthermia Neg Hx    • Breast cancer Neg Hx    • Ovarian cancer Neg Hx    • Uterine cancer Neg Hx    • Colon cancer Neg Hx        Review of Systems   Constitutional: Positive for fatigue. Negative for chills, diaphoresis, fever and unexpected weight change.   HENT:        Hair loss   Gastrointestinal: Negative for abdominal distention, abdominal pain, nausea and vomiting.   Endocrine: Negative for cold intolerance, heat intolerance, polydipsia, polyphagia and polyuria.   Genitourinary: Negative for dyspareunia, dysuria, pelvic pain, vaginal bleeding, vaginal discharge and vaginal pain.   Musculoskeletal: Positive for arthralgias and back pain. Negative for gait problem.   Skin: Negative for rash.   Neurological: Negative for dizziness and  "headaches.   Hematological: Does not bruise/bleed easily.   Psychiatric/Behavioral: Negative for behavioral problems.       /95   Ht 157.5 cm (62\")   Wt 117 kg (257 lb)   BMI 47.01 kg/m²     Physical Exam  Constitutional:       Appearance: Normal appearance.   Genitourinary:      Pelvic exam was performed with patient in the lithotomy position.      Vulva, inguinal canal, urethra and vagina normal.      No posterior fourchette tenderness present.      Bladder is not tender.      Cervix is absent.      Uterus is absent.      Right adnexa absent.      Left adnexa absent.   Rectum:      No external hemorrhoid.   HENT:      Head: Normocephalic and atraumatic.   Eyes:      Pupils: Pupils are equal, round, and reactive to light.   Cardiovascular:      Rate and Rhythm: Normal rate.   Pulmonary:      Effort: Pulmonary effort is normal.   Chest:      Breasts: Breasts are symmetrical.         Right: Normal.         Left: Normal.   Abdominal:      General: There is no distension.      Palpations: Abdomen is soft. There is no mass.      Tenderness: There is no abdominal tenderness. There is no guarding.      Hernia: No hernia is present.   Musculoskeletal:         General: Normal range of motion.      Cervical back: Normal range of motion and neck supple. No tenderness.   Lymphadenopathy:      Cervical: No cervical adenopathy.      Upper Body:      Right upper body: No supraclavicular, axillary or pectoral adenopathy.      Left upper body: No supraclavicular, axillary or pectoral adenopathy.   Neurological:      General: No focal deficit present.      Mental Status: She is alert and oriented to person, place, and time.      Cranial Nerves: No cranial nerve deficit.   Skin:     General: Skin is warm and dry.   Psychiatric:         Mood and Affect: Mood normal.         Behavior: Behavior normal.         Thought Content: Thought content normal.         Judgment: Judgment normal.   Vitals and nursing note reviewed. "         Assessment/Plan    1) GYN annual well woman exam.   2) Postmenopausal   Breast Health - Clinical breast exam & mammogram yearly, Self breast awareness. Schedule screening mammogram.   Pap - not indicated secondary to prior hysterectomy, reviewed recommendations with pt  STD-no  Smoking status- nonsmoker  Colon health - screening colonoscopy recommended if not up to date  Patient's Body mass index is 47.01 kg/m². indicating that she is morbidly obese by BMI (BMI > 40 or > 35 with obesity - related health condition).   Bone health - Weight bearing exercise, dietary calcium recommendations and vitamin D  reviewed.   Encouraged 150 minutes of exercise per week if not medically contraindicated    3) HRT  Patient counseled that hormone treatment should be used to help with specific symptoms related to menopause such as hot flashes, night sweats and vaginal atrophy.  Hormones should not be given for “general wellbeing” or to avoid aging. The lowest dose hormone that treats symptoms should be used for the shortest about of time possible.  Although estrogen is the most effective treatment for hot flashes, other non hormonal options exist (such as Brisdelle or venlafaxine) and should also be considered.  Anyone with an intact uterus should also receive progestogen to prevent uterine overgrowth that can lead to uterine cancer.  All hormone therapy, whether it is synthetic or bio identical, can lead to increased risk of stroke, heart attack and thromboembolic diseases.  These adverse events are more likely to develop in the first year of use and in patients who are older than the typical menopausal age.  Risks of estrogen dependent cancers such as breast cancer increase with prolonged use.  Attempts to wean hormone therapy should be discussed annually and particularly after three to five years of use.  Any side effects such as vaginal bleeding or pain should be reported immediately.  Minivelle patches refilled    4)  Hair loss  5) Fatigue  CBC, A1C, and TSH collected today    6) Elevated BP  Encouraged to f/u with PCP regarding HTN  Encouraged routine PCP visits    Follow up prn and one year    ARNAV Reyes  8/10/2021  14:43 EDT

## 2021-08-11 LAB
BASOPHILS # BLD AUTO: 0.04 10*3/MM3 (ref 0–0.2)
BASOPHILS NFR BLD AUTO: 0.8 % (ref 0–1.5)
EOSINOPHIL # BLD AUTO: 0.12 10*3/MM3 (ref 0–0.4)
EOSINOPHIL NFR BLD AUTO: 2.3 % (ref 0.3–6.2)
ERYTHROCYTE [DISTWIDTH] IN BLOOD BY AUTOMATED COUNT: 13.5 % (ref 12.3–15.4)
HBA1C MFR BLD: 5.7 % (ref 4.8–5.6)
HCT VFR BLD AUTO: 40.3 % (ref 34–46.6)
HGB BLD-MCNC: 13.5 G/DL (ref 12–15.9)
IMM GRANULOCYTES # BLD AUTO: 0.02 10*3/MM3 (ref 0–0.05)
IMM GRANULOCYTES NFR BLD AUTO: 0.4 % (ref 0–0.5)
LYMPHOCYTES # BLD AUTO: 2.16 10*3/MM3 (ref 0.7–3.1)
LYMPHOCYTES NFR BLD AUTO: 41.7 % (ref 19.6–45.3)
MCH RBC QN AUTO: 29.8 PG (ref 26.6–33)
MCHC RBC AUTO-ENTMCNC: 33.5 G/DL (ref 31.5–35.7)
MCV RBC AUTO: 89 FL (ref 79–97)
MONOCYTES # BLD AUTO: 0.39 10*3/MM3 (ref 0.1–0.9)
MONOCYTES NFR BLD AUTO: 7.5 % (ref 5–12)
NEUTROPHILS # BLD AUTO: 2.45 10*3/MM3 (ref 1.7–7)
NEUTROPHILS NFR BLD AUTO: 47.3 % (ref 42.7–76)
NRBC BLD AUTO-RTO: 0 /100 WBC (ref 0–0.2)
PLATELET # BLD AUTO: 271 10*3/MM3 (ref 140–450)
RBC # BLD AUTO: 4.53 10*6/MM3 (ref 3.77–5.28)
TSH SERPL DL<=0.005 MIU/L-ACNC: 2.62 UIU/ML (ref 0.27–4.2)
WBC # BLD AUTO: 5.18 10*3/MM3 (ref 3.4–10.8)

## 2021-08-12 ENCOUNTER — PROCEDURE VISIT (OUTPATIENT)
Dept: OBSTETRICS AND GYNECOLOGY | Facility: CLINIC | Age: 56
End: 2021-08-12

## 2021-08-12 ENCOUNTER — APPOINTMENT (OUTPATIENT)
Dept: WOMENS IMAGING | Facility: HOSPITAL | Age: 56
End: 2021-08-12

## 2021-08-12 DIAGNOSIS — Z12.31 VISIT FOR SCREENING MAMMOGRAM: Primary | ICD-10-CM

## 2021-08-12 PROCEDURE — 77063 BREAST TOMOSYNTHESIS BI: CPT | Performed by: NURSE PRACTITIONER

## 2021-08-12 PROCEDURE — 77063 BREAST TOMOSYNTHESIS BI: CPT | Performed by: RADIOLOGY

## 2021-08-12 PROCEDURE — 77067 SCR MAMMO BI INCL CAD: CPT | Performed by: RADIOLOGY

## 2021-08-12 PROCEDURE — 77067 SCR MAMMO BI INCL CAD: CPT | Performed by: NURSE PRACTITIONER

## 2021-08-16 ENCOUNTER — PATIENT MESSAGE (OUTPATIENT)
Dept: OBSTETRICS AND GYNECOLOGY | Facility: CLINIC | Age: 56
End: 2021-08-16

## 2021-08-17 NOTE — TELEPHONE ENCOUNTER
From: Rebeca Hood, ARNAV  To: Joselyn Grant  Sent: 8/16/2021 5:34 PM EDT  Subject: Results    Joselyn, your mammogram was negative. Thanks

## 2021-10-28 ENCOUNTER — TELEPHONE (OUTPATIENT)
Dept: FAMILY MEDICINE CLINIC | Facility: CLINIC | Age: 56
End: 2021-10-28

## 2021-10-28 RX ORDER — AMLODIPINE BESYLATE 10 MG/1
10 TABLET ORAL DAILY
Qty: 30 TABLET | Refills: 0 | Status: SHIPPED | OUTPATIENT
Start: 2021-10-28 | End: 2022-02-02

## 2021-10-28 NOTE — TELEPHONE ENCOUNTER
Caller: [email or web address] [facility] FOR [facility], [place] - [address] [facility] [phone] [facility] [phone] [facility] (Pharmacy)    Relationship to Patient: PHARMACY    Reason for Call: DANIELA FROM [email or web address] CALLED REQUESTING FOR A RENEWAL FOR PATIENT'S amLODIPine (NORVASC) 10 MG tablet

## 2022-02-02 ENCOUNTER — OFFICE VISIT (OUTPATIENT)
Dept: FAMILY MEDICINE CLINIC | Facility: CLINIC | Age: 57
End: 2022-02-02

## 2022-02-02 VITALS
BODY MASS INDEX: 48.03 KG/M2 | WEIGHT: 261 LBS | OXYGEN SATURATION: 99 % | SYSTOLIC BLOOD PRESSURE: 136 MMHG | RESPIRATION RATE: 20 BRPM | HEART RATE: 70 BPM | DIASTOLIC BLOOD PRESSURE: 88 MMHG | TEMPERATURE: 97 F | HEIGHT: 62 IN

## 2022-02-02 DIAGNOSIS — R53.83 FATIGUE, UNSPECIFIED TYPE: ICD-10-CM

## 2022-02-02 DIAGNOSIS — I10 PRIMARY HYPERTENSION: ICD-10-CM

## 2022-02-02 DIAGNOSIS — Z00.00 HEALTHCARE MAINTENANCE: Primary | ICD-10-CM

## 2022-02-02 DIAGNOSIS — M25.562 ACUTE PAIN OF LEFT KNEE: ICD-10-CM

## 2022-02-02 DIAGNOSIS — R73.03 PRE-DIABETES: ICD-10-CM

## 2022-02-02 LAB
ERYTHROCYTE [DISTWIDTH] IN BLOOD BY AUTOMATED COUNT: 14.1 % (ref 12.3–15.4)
HCT VFR BLD AUTO: 40.9 % (ref 34–46.6)
HGB BLD-MCNC: 13.2 G/DL (ref 12–15.9)
LYMPHOCYTES # BLD AUTO: 2.1 10*3/MM3 (ref 0.7–3.1)
LYMPHOCYTES NFR BLD AUTO: 44.2 % (ref 19.6–45.3)
MCH RBC QN AUTO: 28.6 PG (ref 26.6–33)
MCHC RBC AUTO-ENTMCNC: 32.3 G/DL (ref 31.5–35.7)
MCV RBC AUTO: 88.7 FL (ref 79–97)
MONOCYTES # BLD AUTO: 0.2 10*3/MM3 (ref 0.1–0.9)
MONOCYTES NFR BLD AUTO: 4 % (ref 5–12)
NEUTROPHILS NFR BLD AUTO: 2.5 10*3/MM3 (ref 1.7–7)
NEUTROPHILS NFR BLD AUTO: 51.8 % (ref 42.7–76)
PLATELET # BLD AUTO: 236 10*3/MM3 (ref 140–450)
PMV BLD AUTO: 8.2 FL (ref 6–12)
RBC # BLD AUTO: 4.61 10*6/MM3 (ref 3.77–5.28)
WBC NRBC COR # BLD: 4.8 10*3/MM3 (ref 3.4–10.8)

## 2022-02-02 PROCEDURE — 84479 ASSAY OF THYROID (T3 OR T4): CPT | Performed by: NURSE PRACTITIONER

## 2022-02-02 PROCEDURE — 36415 COLL VENOUS BLD VENIPUNCTURE: CPT | Performed by: NURSE PRACTITIONER

## 2022-02-02 PROCEDURE — 86803 HEPATITIS C AB TEST: CPT | Performed by: NURSE PRACTITIONER

## 2022-02-02 PROCEDURE — 85025 COMPLETE CBC W/AUTO DIFF WBC: CPT | Performed by: NURSE PRACTITIONER

## 2022-02-02 PROCEDURE — 84443 ASSAY THYROID STIM HORMONE: CPT | Performed by: NURSE PRACTITIONER

## 2022-02-02 PROCEDURE — 83036 HEMOGLOBIN GLYCOSYLATED A1C: CPT | Performed by: NURSE PRACTITIONER

## 2022-02-02 PROCEDURE — 80061 LIPID PANEL: CPT | Performed by: NURSE PRACTITIONER

## 2022-02-02 PROCEDURE — 99396 PREV VISIT EST AGE 40-64: CPT | Performed by: NURSE PRACTITIONER

## 2022-02-02 PROCEDURE — 80053 COMPREHEN METABOLIC PANEL: CPT | Performed by: NURSE PRACTITIONER

## 2022-02-02 PROCEDURE — 84436 ASSAY OF TOTAL THYROXINE: CPT | Performed by: NURSE PRACTITIONER

## 2022-02-02 PROCEDURE — 99214 OFFICE O/P EST MOD 30 MIN: CPT | Performed by: NURSE PRACTITIONER

## 2022-02-02 RX ORDER — METOPROLOL SUCCINATE 100 MG/1
100 TABLET, EXTENDED RELEASE ORAL DAILY
Qty: 30 TABLET | Refills: 0 | Status: SHIPPED | OUTPATIENT
Start: 2022-02-02 | End: 2022-02-25

## 2022-02-02 RX ORDER — AMLODIPINE BESYLATE 5 MG/1
5 TABLET ORAL DAILY
Qty: 30 TABLET | Refills: 0 | Status: SHIPPED | OUTPATIENT
Start: 2022-02-02 | End: 2022-02-25

## 2022-02-02 NOTE — PATIENT INSTRUCTIONS
"https://www.nhlbi.nih.gov/files/docs/public/heart/dash_brief.pdf\">   DASH Eating Plan  DASH stands for Dietary Approaches to Stop Hypertension. The DASH eating plan is a healthy eating plan that has been shown to:  · Reduce high blood pressure (hypertension).  · Reduce your risk for type 2 diabetes, heart disease, and stroke.  · Help with weight loss.  What are tips for following this plan?  Reading food labels  · Check food labels for the amount of salt (sodium) per serving. Choose foods with less than 5 percent of the Daily Value of sodium. Generally, foods with less than 300 milligrams (mg) of sodium per serving fit into this eating plan.  · To find whole grains, look for the word \"whole\" as the first word in the ingredient list.  Shopping  · Buy products labeled as \"low-sodium\" or \"no salt added.\"  · Buy fresh foods. Avoid canned foods and pre-made or frozen meals.  Cooking  · Avoid adding salt when cooking. Use salt-free seasonings or herbs instead of table salt or sea salt. Check with your health care provider or pharmacist before using salt substitutes.  · Do not rucker foods. Cook foods using healthy methods such as baking, boiling, grilling, roasting, and broiling instead.  · Cook with heart-healthy oils, such as olive, canola, avocado, soybean, or sunflower oil.  Meal planning    · Eat a balanced diet that includes:  ? 4 or more servings of fruits and 4 or more servings of vegetables each day. Try to fill one-half of your plate with fruits and vegetables.  ? 6-8 servings of whole grains each day.  ? Less than 6 oz (170 g) of lean meat, poultry, or fish each day. A 3-oz (85-g) serving of meat is about the same size as a deck of cards. One egg equals 1 oz (28 g).  ? 2-3 servings of low-fat dairy each day. One serving is 1 cup (237 mL).  ? 1 serving of nuts, seeds, or beans 5 times each week.  ? 2-3 servings of heart-healthy fats. Healthy fats called omega-3 fatty acids are found in foods such as walnuts, " flaxseeds, fortified milks, and eggs. These fats are also found in cold-water fish, such as sardines, salmon, and mackerel.  · Limit how much you eat of:  ? Canned or prepackaged foods.  ? Food that is high in trans fat, such as some fried foods.  ? Food that is high in saturated fat, such as fatty meat.  ? Desserts and other sweets, sugary drinks, and other foods with added sugar.  ? Full-fat dairy products.  · Do not salt foods before eating.  · Do not eat more than 4 egg yolks a week.  · Try to eat at least 2 vegetarian meals a week.  · Eat more home-cooked food and less restaurant, buffet, and fast food.    Lifestyle  · When eating at a restaurant, ask that your food be prepared with less salt or no salt, if possible.  · If you drink alcohol:  ? Limit how much you use to:  § 0-1 drink a day for women who are not pregnant.  § 0-2 drinks a day for men.  ? Be aware of how much alcohol is in your drink. In the [place] one drink equals one 12 oz bottle of beer (355 mL), one 5 oz glass of wine (148 mL), or one 1½ oz glass of hard liquor (44 mL).  General information  · Avoid eating more than 2,300 mg of salt a day. If you have hypertension, you may need to reduce your sodium intake to 1,500 mg a day.  · Work with your health care provider to maintain a healthy body weight or to lose weight. Ask what an ideal weight is for you.  · Get at least 30 minutes of exercise that causes your heart to beat faster (aerobic exercise) most days of the week. Activities may include walking, swimming, or biking.  · Work with your health care provider or dietitian to adjust your eating plan to your individual calorie needs.  What foods should I eat?  Fruits  All fresh, dried, or frozen fruit. Canned fruit in natural juice (without added sugar).  Vegetables  Fresh or frozen vegetables (raw, steamed, roasted, or grilled). Low-sodium or reduced-sodium tomato and vegetable juice. Low-sodium or reduced-sodium tomato sauce and tomato paste.  Low-sodium or reduced-sodium canned vegetables.  Grains  Whole-grain or whole-wheat bread. Whole-grain or whole-wheat pasta. Brown rice. Oatmeal. Quinoa. Bulgur. Whole-grain and low-sodium cereals. Evelia bread. Low-fat, low-sodium crackers. Whole-wheat flour tortillas.  Meats and other proteins  Skinless chicken or turkey. Ground chicken or turkey. Pork with fat trimmed off. Fish and seafood. Egg whites. Dried beans, peas, or lentils. Unsalted nuts, nut butters, and seeds. Unsalted canned beans. Lean cuts of beef with fat trimmed off. Low-sodium, lean precooked or cured meat, such as sausages or meat loaves.  Dairy  Low-fat (1%) or fat-free (skim) milk. Reduced-fat, low-fat, or fat-free cheeses. Nonfat, low-sodium ricotta or cottage cheese. Low-fat or nonfat yogurt. Low-fat, low-sodium cheese.  Fats and oils  Soft margarine without trans fats. Vegetable oil. Reduced-fat, low-fat, or light mayonnaise and salad dressings (reduced-sodium). Canola, safflower, olive, avocado, soybean, and sunflower oils. Avocado.  Seasonings and condiments  Herbs. Spices. Seasoning mixes without salt.  Other foods  Unsalted popcorn and pretzels. Fat-free sweets.  The items listed above may not be a complete list of foods and beverages you can eat. Contact a dietitian for more information.  What foods should I avoid?  Fruits  Canned fruit in a light or heavy syrup. Fried fruit. Fruit in cream or butter sauce.  Vegetables  Creamed or fried vegetables. Vegetables in a cheese sauce. Regular canned vegetables (not low-sodium or reduced-sodium). Regular canned tomato sauce and paste (not low-sodium or reduced-sodium). Regular tomato and vegetable juice (not low-sodium or reduced-sodium). Pickles. Olives.  Grains  Baked goods made with fat, such as croissants, muffins, or some breads. Dry pasta or rice meal packs.  Meats and other proteins  Fatty cuts of meat. Ribs. Fried meat. Noble. Bologna, salami, and other precooked or cured meats, such as  sausages or meat loaves. Fat from the back of a pig (fatback). Bratwurst. Salted nuts and seeds. Canned beans with added salt. Canned or smoked fish. Whole eggs or egg yolks. Chicken or turkey with skin.  Dairy  Whole or 2% milk, cream, and half-and-half. Whole or full-fat cream cheese. Whole-fat or sweetened yogurt. Full-fat cheese. Nondairy creamers. Whipped toppings. Processed cheese and cheese spreads.  Fats and oils  Butter. Stick margarine. Lard. Shortening. Ghee. Noble fat. Tropical oils, such as coconut, palm kernel, or palm oil.  Seasonings and condiments  Onion salt, garlic salt, seasoned salt, table salt, and sea salt. Worcestershire sauce. Tartar sauce. Barbecue sauce. Teriyaki sauce. Soy sauce, including reduced-sodium. Steak sauce. Canned and packaged gravies. Fish sauce. Oyster sauce. Cocktail sauce. Store-bought horseradish. Ketchup. Mustard. Meat flavorings and tenderizers. Bouillon cubes. Hot sauces. Pre-made or packaged marinades. Pre-made or packaged taco seasonings. Relishes. Regular salad dressings.  Other foods  Salted popcorn and pretzels.  The items listed above may not be a complete list of foods and beverages you should avoid. Contact a dietitian for more information.  Where to find more information  · National Heart, Lung, and Blood Cherry: www.nhlbi.nih.gov  · American Heart Association: www.heart.org  · Academy of Nutrition and Dietetics: www.eatright.org  · National Kidney Foundation: www.kidney.org  Summary  · The DASH eating plan is a healthy eating plan that has been shown to reduce high blood pressure (hypertension). It may also reduce your risk for type 2 diabetes, heart disease, and stroke.  · When on the DASH eating plan, aim to eat more fresh fruits and vegetables, whole grains, lean proteins, low-fat dairy, and heart-healthy fats.  · With the DASH eating plan, you should limit salt (sodium) intake to 2,300 mg a day. If you have hypertension, you may need to reduce your  sodium intake to 1,500 mg a day.  · Work with your health care provider or dietitian to adjust your eating plan to your individual calorie needs.  This information is not intended to replace advice given to you by your health care provider. Make sure you discuss any questions you have with your health care provider.  Document Revised: 11/20/2020 Document Reviewed: 11/20/2020  [employer] Patient Education © 2021 [employer]

## 2022-02-02 NOTE — PROGRESS NOTES
"Chief Complaint  Hypertension, Fatigue, and Knee Pain (been hurting since had dental work 2 week ago has TKR)    Subjective          Joselyn Grant presents to [facility]  History of Present Illness   56 yr old, former [facility] pt, new to [place], presenting to [facility] care with complaints of L knee pain, states has had multiple surgeries of L knee and pain is similar to episode of infection she encounter of L knee in 2018, denies any known injury, chills, fever, warmth, edema or erythema of knee, plans for appointment with Dr. Oh with Ortho for her knee pain. With HTN, not controlled with amlodipine 5 mg and Metoprolol 50 mg, states Amlodipine was decreased from 10 mg to 5 mg R/T peripheral edema, will trail increasing Metoprolol to 100 mg. With Insomnia, takes Melatonin 5 mg. She is pre-diabetic, no current medications, last A1C 5.70 on 8/10/21. She complains of mild fatigue which may be related to Insomnia. Denies SOA, CP, HA, dizziness, LE edema.     Objective   Vital Signs:   /88 (BP Location: Left arm, Patient Position: Sitting)   Pulse 70   Temp 97 °F (36.1 °C) (Infrared)   Resp 20   Ht 157.5 cm (62\")   Wt 118 kg (261 lb)   SpO2 99%   BMI 47.74 kg/m²     Physical Exam  Vitals and nursing note reviewed.   Constitutional:       Appearance: She is well-developed.   HENT:      Head: Normocephalic.   Eyes:      Pupils: Pupils are equal, round, and reactive to light.   Cardiovascular:      Rate and Rhythm: Normal rate and regular rhythm.      Heart sounds: Normal heart sounds.   Pulmonary:      Effort: Pulmonary effort is normal.      Breath sounds: Normal breath sounds.   Abdominal:      General: Bowel sounds are normal.      Palpations: Abdomen is soft.   Musculoskeletal:         General: Normal range of motion.      Cervical back: Normal range of motion and neck supple.      Left knee: Tenderness present.   Skin:     General: Skin is warm and dry.      Findings: No " rash.   Neurological:      Mental Status: She is alert and oriented to person, place, and time.   Psychiatric:         Behavior: Behavior normal.         Thought Content: Thought content normal.         Judgment: Judgment normal.        Result Review :                 Assessment and Plan    Diagnoses and all orders for this visit:    1. Healthcare maintenance (Primary)  -     Thyroid Panel With TSH  -     CBC w AUTO Differential  -     Comprehensive metabolic panel  -     Lipid Panel  -     Hepatitis C Antibody  -     Hemoglobin A1c    2. Acute pain of left knee  -     CBC w AUTO Differential    3. Primary hypertension  -     metoprolol succinate XL (Toprol XL) 100 MG 24 hr tablet; Take 1 tablet by mouth Daily.  Dispense: 30 tablet; Refill: 0  -     amLODIPine (NORVASC) 5 MG tablet; Take 1 tablet by mouth Daily.  Dispense: 30 tablet; Refill: 0    4. Fatigue, unspecified type  -     Thyroid Panel With TSH  -     CBC w AUTO Differential    5. Pre-diabetes  -     CBC w AUTO Differential  -     Comprehensive metabolic panel  -     Lipid Panel  -     Hemoglobin A1c        Follow Up   Return in about 2 weeks (around 2/16/2022).  Patient was given instructions and counseling regarding her condition or for health maintenance advice. Please see specific information pulled into the AVS if appropriate.

## 2022-02-03 LAB
ALBUMIN SERPL-MCNC: 4.4 G/DL (ref 3.5–5.2)
ALBUMIN/GLOB SERPL: 1.4 G/DL
ALP SERPL-CCNC: 97 U/L (ref 39–117)
ALT SERPL W P-5'-P-CCNC: 13 U/L (ref 1–33)
ANION GAP SERPL CALCULATED.3IONS-SCNC: 10.9 MMOL/L (ref 5–15)
AST SERPL-CCNC: 18 U/L (ref 1–32)
BILIRUB SERPL-MCNC: 0.2 MG/DL (ref 0–1.2)
BUN SERPL-MCNC: 15 MG/DL (ref 6–20)
BUN/CREAT SERPL: 36.6 (ref 7–25)
CALCIUM SPEC-SCNC: 9.7 MG/DL (ref 8.6–10.5)
CHLORIDE SERPL-SCNC: 100 MMOL/L (ref 98–107)
CHOLEST SERPL-MCNC: 227 MG/DL (ref 0–200)
CO2 SERPL-SCNC: 27.1 MMOL/L (ref 22–29)
CREAT SERPL-MCNC: 0.41 MG/DL (ref 0.57–1)
GFR SERPL CREATININE-BSD FRML MDRD: >150 ML/MIN/1.73
GLOBULIN UR ELPH-MCNC: 3.1 GM/DL
GLUCOSE SERPL-MCNC: 89 MG/DL (ref 65–99)
HBA1C MFR BLD: 5.9 % (ref 4.8–5.6)
HCV AB SER DONR QL: NORMAL
HDLC SERPL-MCNC: 71 MG/DL (ref 40–60)
LDLC SERPL CALC-MCNC: 138 MG/DL (ref 0–100)
LDLC/HDLC SERPL: 1.91 {RATIO}
POTASSIUM SERPL-SCNC: 4 MMOL/L (ref 3.5–5.2)
PROT SERPL-MCNC: 7.5 G/DL (ref 6–8.5)
SODIUM SERPL-SCNC: 138 MMOL/L (ref 136–145)
T-UPTAKE NFR SERPL: 1.07 TBI (ref 0.8–1.3)
T4 SERPL-MCNC: 7.69 MCG/DL (ref 4.5–11.7)
TRIGL SERPL-MCNC: 102 MG/DL (ref 0–150)
TSH SERPL DL<=0.05 MIU/L-ACNC: 1.87 UIU/ML (ref 0.27–4.2)
VLDLC SERPL-MCNC: 18 MG/DL (ref 5–40)

## 2022-02-17 ENCOUNTER — OFFICE VISIT (OUTPATIENT)
Dept: FAMILY MEDICINE CLINIC | Facility: CLINIC | Age: 57
End: 2022-02-17

## 2022-02-17 VITALS
BODY MASS INDEX: 48.51 KG/M2 | DIASTOLIC BLOOD PRESSURE: 102 MMHG | HEART RATE: 60 BPM | HEIGHT: 62 IN | WEIGHT: 263.6 LBS | SYSTOLIC BLOOD PRESSURE: 152 MMHG | TEMPERATURE: 96.9 F | OXYGEN SATURATION: 94 %

## 2022-02-17 DIAGNOSIS — Z09 FOLLOW-UP EXAM: ICD-10-CM

## 2022-02-17 DIAGNOSIS — E66.01 MORBID OBESITY WITH BMI OF 40.0-44.9, ADULT: ICD-10-CM

## 2022-02-17 DIAGNOSIS — R60.0 MILD PERIPHERAL EDEMA: ICD-10-CM

## 2022-02-17 DIAGNOSIS — I10 PRIMARY HYPERTENSION: Primary | ICD-10-CM

## 2022-02-17 PROCEDURE — 99213 OFFICE O/P EST LOW 20 MIN: CPT | Performed by: NURSE PRACTITIONER

## 2022-02-17 RX ORDER — HYDROCHLOROTHIAZIDE 12.5 MG/1
12.5 TABLET ORAL DAILY
Qty: 30 TABLET | Refills: 0 | Status: SHIPPED | OUTPATIENT
Start: 2022-02-17 | End: 2022-03-16

## 2022-02-17 NOTE — PROGRESS NOTES
"Chief Complaint  Hypertension    Subjective          Joselyn Grant presents to [facility]  History of Present Illness   56-year-old AA female presenting for follow-up on hypertension, during last visit on 2022 I increased her metoprolol from 100 mg, today BP was 152/102, states is in process of a divorce could be stress related but does not feel stressed out, also continues with intermittent LE edema, will add HCTZ 12.5 mg and possible future referral to Cardio, denies CP, SOA, HA, dizziness.       Objective   Vital Signs:   BP (!) 152/102 (BP Location: Left arm, Patient Position: Sitting, Cuff Size: Large Adult)   Pulse 60   Temp 96.9 °F (36.1 °C) (Infrared)   Ht 157.5 cm (62\")   Wt 120 kg (263 lb 9.6 oz)   SpO2 94%   BMI 48.21 kg/m²     Physical Exam  Constitutional:       Appearance: Normal appearance.   HENT:      Head: Normocephalic.   Eyes:      Pupils: Pupils are equal, round, and reactive to light.   Cardiovascular:      Rate and Rhythm: Normal rate and regular rhythm.      Pulses: Normal pulses.      Heart sounds: Normal heart sounds.   Pulmonary:      Effort: Pulmonary effort is normal.      Breath sounds: Normal breath sounds.   Musculoskeletal:      Right lower le+ Edema present.      Left lower le+ Edema present.   Neurological:      Mental Status: She is alert and oriented to person, place, and time.   Psychiatric:         Mood and Affect: Mood normal.         Behavior: Behavior normal.        Result Review :                 Assessment and Plan    Diagnoses and all orders for this visit:    1. Primary hypertension (Primary)  -     hydroCHLOROthiazide (HYDRODIURIL) 12.5 MG tablet; Take 1 tablet by mouth Daily.  Dispense: 30 tablet; Refill: 0    2. Mild peripheral edema  -     hydroCHLOROthiazide (HYDRODIURIL) 12.5 MG tablet; Take 1 tablet by mouth Daily.  Dispense: 30 tablet; Refill: 0    3. Morbid obesity with BMI of 40.0-44.9, adult (HCC)    4. " Follow-up exam        Follow Up   Return in about 3 weeks (around 3/10/2022), or if symptoms worsen or fail to improve.  Patient was given instructions and counseling regarding her condition or for health maintenance advice. Please see specific information pulled into the AVS if appropriate.       Answers for HPI/ROS submitted by the patient on 2/16/2022  What is the primary reason for your visit?: High Blood Pressure

## 2022-02-17 NOTE — PATIENT INSTRUCTIONS
"https://www.nhlbi.nih.gov/files/docs/public/heart/dash_brief.pdf\">   DASH Eating Plan  DASH stands for Dietary Approaches to Stop Hypertension. The DASH eating plan is a healthy eating plan that has been shown to:  · Reduce high blood pressure (hypertension).  · Reduce your risk for type 2 diabetes, heart disease, and stroke.  · Help with weight loss.  What are tips for following this plan?  Reading food labels  · Check food labels for the amount of salt (sodium) per serving. Choose foods with less than 5 percent of the Daily Value of sodium. Generally, foods with less than 300 milligrams (mg) of sodium per serving fit into this eating plan.  · To find whole grains, look for the word \"whole\" as the first word in the ingredient list.  Shopping  · Buy products labeled as \"low-sodium\" or \"no salt added.\"  · Buy fresh foods. Avoid canned foods and pre-made or frozen meals.  Cooking  · Avoid adding salt when cooking. Use salt-free seasonings or herbs instead of table salt or sea salt. Check with your health care provider or pharmacist before using salt substitutes.  · Do not rucker foods. Cook foods using healthy methods such as baking, boiling, grilling, roasting, and broiling instead.  · Cook with heart-healthy oils, such as olive, canola, avocado, soybean, or sunflower oil.  Meal planning    · Eat a balanced diet that includes:  ? 4 or more servings of fruits and 4 or more servings of vegetables each day. Try to fill one-half of your plate with fruits and vegetables.  ? 6-8 servings of whole grains each day.  ? Less than 6 oz (170 g) of lean meat, poultry, or fish each day. A 3-oz (85-g) serving of meat is about the same size as a deck of cards. One egg equals 1 oz (28 g).  ? 2-3 servings of low-fat dairy each day. One serving is 1 cup (237 mL).  ? 1 serving of nuts, seeds, or beans 5 times each week.  ? 2-3 servings of heart-healthy fats. Healthy fats called omega-3 fatty acids are found in foods such as walnuts, " flaxseeds, fortified milks, and eggs. These fats are also found in cold-water fish, such as sardines, salmon, and mackerel.  · Limit how much you eat of:  ? Canned or prepackaged foods.  ? Food that is high in trans fat, such as some fried foods.  ? Food that is high in saturated fat, such as fatty meat.  ? Desserts and other sweets, sugary drinks, and other foods with added sugar.  ? Full-fat dairy products.  · Do not salt foods before eating.  · Do not eat more than 4 egg yolks a week.  · Try to eat at least 2 vegetarian meals a week.  · Eat more home-cooked food and less restaurant, buffet, and fast food.    Lifestyle  · When eating at a restaurant, ask that your food be prepared with less salt or no salt, if possible.  · If you drink alcohol:  ? Limit how much you use to:  § 0-1 drink a day for women who are not pregnant.  § 0-2 drinks a day for men.  ? Be aware of how much alcohol is in your drink. In the [place] one drink equals one 12 oz bottle of beer (355 mL), one 5 oz glass of wine (148 mL), or one 1½ oz glass of hard liquor (44 mL).  General information  · Avoid eating more than 2,300 mg of salt a day. If you have hypertension, you may need to reduce your sodium intake to 1,500 mg a day.  · Work with your health care provider to maintain a healthy body weight or to lose weight. Ask what an ideal weight is for you.  · Get at least 30 minutes of exercise that causes your heart to beat faster (aerobic exercise) most days of the week. Activities may include walking, swimming, or biking.  · Work with your health care provider or dietitian to adjust your eating plan to your individual calorie needs.  What foods should I eat?  Fruits  All fresh, dried, or frozen fruit. Canned fruit in natural juice (without added sugar).  Vegetables  Fresh or frozen vegetables (raw, steamed, roasted, or grilled). Low-sodium or reduced-sodium tomato and vegetable juice. Low-sodium or reduced-sodium tomato sauce and tomato paste.  Low-sodium or reduced-sodium canned vegetables.  Grains  Whole-grain or whole-wheat bread. Whole-grain or whole-wheat pasta. Brown rice. Oatmeal. Quinoa. Bulgur. Whole-grain and low-sodium cereals. Evelia bread. Low-fat, low-sodium crackers. Whole-wheat flour tortillas.  Meats and other proteins  Skinless chicken or turkey. Ground chicken or turkey. Pork with fat trimmed off. Fish and seafood. Egg whites. Dried beans, peas, or lentils. Unsalted nuts, nut butters, and seeds. Unsalted canned beans. Lean cuts of beef with fat trimmed off. Low-sodium, lean precooked or cured meat, such as sausages or meat loaves.  Dairy  Low-fat (1%) or fat-free (skim) milk. Reduced-fat, low-fat, or fat-free cheeses. Nonfat, low-sodium ricotta or cottage cheese. Low-fat or nonfat yogurt. Low-fat, low-sodium cheese.  Fats and oils  Soft margarine without trans fats. Vegetable oil. Reduced-fat, low-fat, or light mayonnaise and salad dressings (reduced-sodium). Canola, safflower, olive, avocado, soybean, and sunflower oils. Avocado.  Seasonings and condiments  Herbs. Spices. Seasoning mixes without salt.  Other foods  Unsalted popcorn and pretzels. Fat-free sweets.  The items listed above may not be a complete list of foods and beverages you can eat. Contact a dietitian for more information.  What foods should I avoid?  Fruits  Canned fruit in a light or heavy syrup. Fried fruit. Fruit in cream or butter sauce.  Vegetables  Creamed or fried vegetables. Vegetables in a cheese sauce. Regular canned vegetables (not low-sodium or reduced-sodium). Regular canned tomato sauce and paste (not low-sodium or reduced-sodium). Regular tomato and vegetable juice (not low-sodium or reduced-sodium). Pickles. Olives.  Grains  Baked goods made with fat, such as croissants, muffins, or some breads. Dry pasta or rice meal packs.  Meats and other proteins  Fatty cuts of meat. Ribs. Fried meat. Noble. Bologna, salami, and other precooked or cured meats, such as  sausages or meat loaves. Fat from the back of a pig (fatback). Bratwurst. Salted nuts and seeds. Canned beans with added salt. Canned or smoked fish. Whole eggs or egg yolks. Chicken or turkey with skin.  Dairy  Whole or 2% milk, cream, and half-and-half. Whole or full-fat cream cheese. Whole-fat or sweetened yogurt. Full-fat cheese. Nondairy creamers. Whipped toppings. Processed cheese and cheese spreads.  Fats and oils  Butter. Stick margarine. Lard. Shortening. Ghee. Noble fat. Tropical oils, such as coconut, palm kernel, or palm oil.  Seasonings and condiments  Onion salt, garlic salt, seasoned salt, table salt, and sea salt. Worcestershire sauce. Tartar sauce. Barbecue sauce. Teriyaki sauce. Soy sauce, including reduced-sodium. Steak sauce. Canned and packaged gravies. Fish sauce. Oyster sauce. Cocktail sauce. Store-bought horseradish. Ketchup. Mustard. Meat flavorings and tenderizers. Bouillon cubes. Hot sauces. Pre-made or packaged marinades. Pre-made or packaged taco seasonings. Relishes. Regular salad dressings.  Other foods  Salted popcorn and pretzels.  The items listed above may not be a complete list of foods and beverages you should avoid. Contact a dietitian for more information.  Where to find more information  · National Heart, Lung, and Blood [place]: www.nhlbi.nih.gov  · American Heart Association: www.heart.org  · Academy of Nutrition and Dietetics: www.eatright.org  · National Kidney Foundation: www.kidney.org  Summary  · The DASH eating plan is a healthy eating plan that has been shown to reduce high blood pressure (hypertension). It may also reduce your risk for type 2 diabetes, heart disease, and stroke.  · When on the DASH eating plan, aim to eat more fresh fruits and vegetables, whole grains, lean proteins, low-fat dairy, and heart-healthy fats.  · With the DASH eating plan, you should limit salt (sodium) intake to 2,300 mg a day. If you have hypertension, you may need to reduce your  "sodium intake to 1,500 mg a day.  · Work with your health care provider or dietitian to adjust your eating plan to your individual calorie needs.  This information is not intended to replace advice given to you by your health care provider. Make sure you discuss any questions you have with your health care provider.  Document Revised: 11/20/2020 Document Reviewed: 11/20/2020  [employer] Patient Education © 2021 [employer]  Hypertension, Adult  High blood pressure (hypertension) is when the force of blood pumping through the arteries is too strong. The arteries are the blood vessels that carry blood from the heart throughout the body. Hypertension forces the heart to work harder to pump blood and may cause arteries to become narrow or stiff. Untreated or uncontrolled hypertension can cause a heart attack, heart failure, a stroke, kidney disease, and other problems.  A blood pressure reading consists of a higher number over a lower number. Ideally, your blood pressure should be below 120/80. The first (\"top\") number is called the systolic pressure. It is a measure of the pressure in your arteries as your heart beats. The second (\"bottom\") number is called the diastolic pressure. It is a measure of the pressure in your arteries as the heart relaxes.  What are the causes?  The exact cause of this condition is not known. There are some conditions that result in or are related to high blood pressure.  What increases the risk?  Some risk factors for high blood pressure are under your control. The following factors may make you more likely to develop this condition:  · Smoking.  · Having type 2 diabetes mellitus, high cholesterol, or both.  · Not getting enough exercise or physical activity.  · Being overweight.  · Having too much fat, sugar, calories, or salt (sodium) in your diet.  · Drinking too much alcohol.  Some risk factors for high blood pressure may be difficult or impossible to change. Some of these factors " include:  · Having chronic kidney disease.  · Having a family history of high blood pressure.  · Age. Risk increases with age.  · Race. You may be at higher risk if you are .  · Gender. Men are at higher risk than women before age 45. After age 65, women are at higher risk than men.  · Having obstructive sleep apnea.  · Stress.  What are the signs or symptoms?  High blood pressure may not cause symptoms. Very high blood pressure (hypertensive crisis) may cause:  · Headache.  · Anxiety.  · Shortness of breath.  · Nosebleed.  · Nausea and vomiting.  · Vision changes.  · Severe chest pain.  · Seizures.  How is this diagnosed?  This condition is diagnosed by measuring your blood pressure while you are seated, with your arm resting on a flat surface, your legs uncrossed, and your feet flat on the floor. The cuff of the blood pressure monitor will be placed directly against the skin of your upper arm at the level of your heart. It should be measured at least twice using the same arm. Certain conditions can cause a difference in blood pressure between your right and left arms.  Certain factors can cause blood pressure readings to be lower or higher than normal for a short period of time:  · When your blood pressure is higher when you are in a health care provider's office than when you are at home, this is called white coat hypertension. Most people with this condition do not need medicines.  · When your blood pressure is higher at home than when you are in a health care provider's office, this is called masked hypertension. Most people with this condition may need medicines to control blood pressure.  If you have a high blood pressure reading during one visit or you have normal blood pressure with other risk factors, you may be asked to:  · Return on a different day to have your blood pressure checked again.  · Monitor your blood pressure at home for 1 week or longer.  If you are diagnosed with  hypertension, you may have other blood or imaging tests to help your health care provider understand your overall risk for other conditions.  How is this treated?  This condition is treated by making healthy lifestyle changes, such as eating healthy foods, exercising more, and reducing your alcohol intake. Your health care provider may prescribe medicine if lifestyle changes are not enough to get your blood pressure under control, and if:  · Your systolic blood pressure is above 130.  · Your diastolic blood pressure is above 80.  Your personal target blood pressure may vary depending on your medical conditions, your age, and other factors.  Follow these instructions at home:  Eating and drinking    · Eat a diet that is high in fiber and potassium, and low in sodium, added sugar, and fat. An example eating plan is called the DASH (Dietary Approaches to Stop Hypertension) diet. To eat this way:  ? Eat plenty of fresh fruits and vegetables. Try to fill one half of your plate at each meal with fruits and vegetables.  ? Eat whole grains, such as whole-wheat pasta, brown rice, or whole-grain bread. Fill about one fourth of your plate with whole grains.  ? Eat or drink low-fat dairy products, such as skim milk or low-fat yogurt.  ? Avoid fatty cuts of meat, processed or cured meats, and poultry with skin. Fill about one fourth of your plate with lean proteins, such as fish, chicken without skin, beans, eggs, or tofu.  ? Avoid pre-made and processed foods. These tend to be higher in sodium, added sugar, and fat.  · Reduce your daily sodium intake. Most people with hypertension should eat less than 1,500 mg of sodium a day.  · Do not drink alcohol if:  ? Your health care provider tells you not to drink.  ? You are pregnant, may be pregnant, or are planning to become pregnant.  · If you drink alcohol:  ? Limit how much you use to:  § 0-1 drink a day for women.  § 0-2 drinks a day for men.  ? Be aware of how much alcohol is in  your drink. In the [place] one drink equals one 12 oz bottle of beer (355 mL), one 5 oz glass of wine (148 mL), or one 1½ oz glass of hard liquor (44 mL).    Lifestyle    · Work with your health care provider to maintain a healthy body weight or to lose weight. Ask what an ideal weight is for you.  · Get at least 30 minutes of exercise most days of the week. Activities may include walking, swimming, or biking.  · Include exercise to strengthen your muscles (resistance exercise), such as Pilates or lifting weights, as part of your weekly exercise routine. Try to do these types of exercises for 30 minutes at least 3 days a week.  · Do not use any products that contain nicotine or tobacco, such as cigarettes, e-cigarettes, and chewing tobacco. If you need help quitting, ask your health care provider.  · Monitor your blood pressure at home as told by your health care provider.  · Keep all follow-up visits as told by your health care provider. This is important.    Medicines  · Take over-the-counter and prescription medicines only as told by your health care provider. Follow directions carefully. Blood pressure medicines must be taken as prescribed.  · Do not skip doses of blood pressure medicine. Doing this puts you at risk for problems and can make the medicine less effective.  · Ask your health care provider about side effects or reactions to medicines that you should watch for.  Contact a health care provider if you:  · Think you are having a reaction to a medicine you are taking.  · Have headaches that keep coming back (recurring).  · Feel dizzy.  · Have swelling in your ankles.  · Have trouble with your vision.  Get help right away if you:  · Develop a severe headache or confusion.  · Have unusual weakness or numbness.  · Feel faint.  · Have severe pain in your chest or abdomen.  · Vomit repeatedly.  · Have trouble breathing.  Summary  · Hypertension is when the force of blood pumping through your arteries is too  strong. If this condition is not controlled, it may put you at risk for serious complications.  · Your personal target blood pressure may vary depending on your medical conditions, your age, and other factors. For most people, a normal blood pressure is less than 120/80.  · Hypertension is treated with lifestyle changes, medicines, or a combination of both. Lifestyle changes include losing weight, eating a healthy, low-sodium diet, exercising more, and limiting alcohol.  This information is not intended to replace advice given to you by your health care provider. Make sure you discuss any questions you have with your health care provider.  Document Revised: 08/28/2019 Document Reviewed: 08/28/2019  [employer] Patient Education © 2021 [employer]  Hydrochlorothiazide, HCTZ Oral Capsules or Tablets  What is this medicine?  HYDROCHLOROTHIAZIDE (avani droe klor oh THYE a zide) is a diuretic. It helps you make more urine and to lose salt and excess water from your body. It treats swelling from heart, kidney, or liver disease. It also treats high blood pressure.  This medicine may be used for other purposes; ask your health care provider or pharmacist if you have questions.  COMMON BRAND NAME(S): Esidrix, Ezide, HydroDIURIL, Microzide, Oretic, Zide  What should I tell my health care provider before I take this medicine?  They need to know if you have any of these conditions:  · diabetes  · gout  · kidney disease  · liver disease  · lupus  · pancreatitis  · an unusual or allergic reaction to hydrochlorothiazide, sulfa drugs, other medicines, foods, dyes, or preservatives  · pregnant or trying to get pregnant  · breast-feeding  How should I use this medicine?  Take this medicine by mouth. Take it as directed on the prescription label at the same time every day. You can take it with or without food. If it upsets your stomach, take it with food. Keep taking it unless your health care provider tells you to stop.  Talk to your  health care provider about the use of this medicine in children. While it may be prescribed for children as young as newborns for selected conditions, precautions do apply.  Overdosage: If you think you have taken too much of this medicine contact a poison control center or emergency room at once.  NOTE: This medicine is only for you. Do not share this medicine with others.  What if I miss a dose?  If you miss a dose, take it as soon as you can. If it is almost time for your next dose, take only that dose. Do not take double or extra doses.  What may interact with this medicine?  · cholestyramine  · colestipol  · digoxin  · dofetilide  · lithium  · medicines for blood pressure  · medicines for diabetes  · medicines that relax muscles for surgery  · other diuretics  · steroid medicines like prednisone or cortisone  This list may not describe all possible interactions. Give your health care provider a list of all the medicines, herbs, non-prescription drugs, or dietary supplements you use. Also tell them if you smoke, drink alcohol, or use illegal drugs. Some items may interact with your medicine.  What should I watch for while using this medicine?  Visit your health care provider for regular check ups. Check your blood pressure as directed. Ask your health care provider what your blood pressure should be. Also, find out when you should contact him or her.  Do not treat yourself for coughs, colds, or pain while you are using this medicine without asking your health care provider for advice. Some medicines may increase your blood pressure.  You may get drowsy or dizzy. Do not drive, use machinery, or do anything that needs mental alertness until you know how this medicine affects you. Do not stand or sit up quickly, especially if you are an older patient. This reduces the risk of dizzy or fainting spells. Alcohol can make you more drowsy and dizzy. Avoid alcoholic drinks.  Talk to your health care professional about  your risk of skin cancer. You may be more at risk for skin cancer if you take this medicine.  This medicine can make you more sensitive to the sun. Keep out of the sun. If you cannot avoid being in the sun, wear protective clothing and use sunscreen. Do not use sun lamps or tanning beds/booths.  You may need to be on a special diet while taking this medicine. Ask your health care provider. Also, find out how many glasses of fluids you need to drink each day.  Check with your health care provider if you get an attack of severe diarrhea, nausea and vomiting, or if you sweat a lot. The loss of too much body fluid can make it dangerous for you to take this medicine.  This medicine may increase blood sugar. Ask your healthcare provider if changes in diet or medicines are needed if you have diabetes.  What side effects may I notice from receiving this medicine?  Side effects that you should report to your doctor or health care professional as soon as possible:  · allergic reactions (skin rash, itching or hives; swelling of the face, lips, or tongue)  · gout (severe pain, redness, or swelling in joints like the big toe)  · high blood sugar (increased hunger, thirst or urination; unusually weak or tired; blurry vision)  · kidney injury (trouble passing urine or change in the amount of urine)  · low blood pressure (dizziness; feeling faint or lightheaded, falls; unusually weak or tired)  · low potassium levels (trouble breathing; chest pain; dizziness; fast, irregular heartbeat; feeling faint or lightheaded, falls; muscle cramps or pain)  · sudden change in vision or eye pain  Side effects that usually do not require medical attention (report to your doctor or health care professional if they continue or are bothersome):  · change in sex drive or performance  · dry mouth  · headache  · stomach upset  This list may not describe all possible side effects. Call your doctor for medical advice about side effects. You may report  side effects to FDA at 2-952-FDA-0334.  Where should I keep my medicine?  Keep out of the reach of children and pets.  Store at room temperature between 20 and 25 degrees C (68 and 77 degrees F). Protect from light and moisture. Keep the container tightly closed. Do not freeze. Get rid of any unused medicine after the expiration date.  To get rid of medicines that are no longer needed or have :  · Take the medicine to a medicine take-back program. Check with your pharmacy or law enforcement to find a location.  · If you cannot return the medicine, check the label or package insert to see if the medicine should be thrown out in the garbage or flushed down the toilet. If you are not sure, ask your health care provider. If it is safe to put in the trash, empty the medicine out of the container. Mix the medicine with cat litter, dirt, coffee grounds, or other unwanted substance. Seal the mixture in a bag or container. Put it in the trash.  NOTE: This sheet is a summary. It may not cover all possible information. If you have questions about this medicine, talk to your doctor, pharmacist, or health care provider.  ©  [employer] (2021 10:40:15)

## 2022-02-25 DIAGNOSIS — I10 PRIMARY HYPERTENSION: ICD-10-CM

## 2022-02-25 RX ORDER — METOPROLOL SUCCINATE 100 MG/1
TABLET, EXTENDED RELEASE ORAL
Qty: 30 TABLET | Refills: 1 | Status: SHIPPED | OUTPATIENT
Start: 2022-02-25 | End: 2022-05-16

## 2022-02-25 RX ORDER — AMLODIPINE BESYLATE 5 MG/1
TABLET ORAL
Qty: 30 TABLET | Refills: 1 | Status: SHIPPED | OUTPATIENT
Start: 2022-02-25 | End: 2022-05-16

## 2022-03-16 DIAGNOSIS — R60.0 MILD PERIPHERAL EDEMA: ICD-10-CM

## 2022-03-16 DIAGNOSIS — I10 PRIMARY HYPERTENSION: ICD-10-CM

## 2022-03-16 RX ORDER — HYDROCHLOROTHIAZIDE 12.5 MG/1
TABLET ORAL
Qty: 30 TABLET | Refills: 0 | Status: SHIPPED | OUTPATIENT
Start: 2022-03-16 | End: 2022-03-23 | Stop reason: SDUPTHER

## 2022-03-23 ENCOUNTER — OFFICE VISIT (OUTPATIENT)
Dept: FAMILY MEDICINE CLINIC | Facility: CLINIC | Age: 57
End: 2022-03-23

## 2022-03-23 VITALS
TEMPERATURE: 97.1 F | BODY MASS INDEX: 47.55 KG/M2 | WEIGHT: 258.4 LBS | HEART RATE: 63 BPM | SYSTOLIC BLOOD PRESSURE: 120 MMHG | DIASTOLIC BLOOD PRESSURE: 66 MMHG | OXYGEN SATURATION: 99 % | HEIGHT: 62 IN

## 2022-03-23 DIAGNOSIS — I10 ESSENTIAL HYPERTENSION: Primary | ICD-10-CM

## 2022-03-23 DIAGNOSIS — R60.0 MILD PERIPHERAL EDEMA: ICD-10-CM

## 2022-03-23 DIAGNOSIS — M25.532 WRIST PAIN, ACUTE, LEFT: ICD-10-CM

## 2022-03-23 PROBLEM — Z86.19 PERSONAL HISTORY OF OTHER INFECTIOUS AND PARASITIC DISEASES: Status: ACTIVE | Noted: 2018-11-13

## 2022-03-23 PROBLEM — E66.01 MORBID OBESITY (HCC): Status: ACTIVE | Noted: 2017-05-25

## 2022-03-23 PROBLEM — Z72.4 INAPPROPRIATE DIET AND EATING HABITS: Status: ACTIVE | Noted: 2017-05-25

## 2022-03-23 PROCEDURE — 99214 OFFICE O/P EST MOD 30 MIN: CPT | Performed by: NURSE PRACTITIONER

## 2022-03-23 RX ORDER — HYDROCHLOROTHIAZIDE 12.5 MG/1
12.5 TABLET ORAL DAILY
Qty: 90 TABLET | Refills: 2 | Status: SHIPPED | OUTPATIENT
Start: 2022-03-23 | End: 2022-06-15 | Stop reason: SDUPTHER

## 2022-03-23 NOTE — PROGRESS NOTES
"Chief Complaint  Hypertension (Pain left arm)    Subjective          Joselyn Grant presents to [facility] PRIMARY CARE  History of Present Illness   56-year-old female presenting for follow-up hypertension.  During last visit she complained of intermittent lower extremity edema since stopping diuretic, BP was also not controlled with metoprolol 100 mg and Norvasc 5mg, added HCTZ 12.5 mg, BP better controlled at 120/66, denies dizziness, CP, SOA, edema improved with HCTZ, also recommended use of compression stocking for edema.  She has been monitoring diet and carb intake, reports eating frequent small meals a day which has increased her energy and feels she has lost a few pounds. Also complains of L wrist pain and tenderness with radiating pain up arm, she has history of cyst removal in same wrist, she plans to follow-up with hand specialist and start use of wrist brace, she is to follow-up if symptoms not improved.     Objective   Vital Signs:   /66   Pulse 63   Temp 97.1 °F (36.2 °C)   Ht 157.5 cm (62\")   Wt 117 kg (258 lb 6.4 oz)   SpO2 99%   BMI 47.26 kg/m²     BMI is above normal parameters. Recommendations: educational material discussed/shared in after visit summary       Physical Exam  Cardiovascular:      Rate and Rhythm: Normal rate and regular rhythm.      Pulses: Normal pulses.      Heart sounds: Normal heart sounds.   Pulmonary:      Effort: Pulmonary effort is normal.      Breath sounds: Normal breath sounds.   Musculoskeletal:      Left wrist: Tenderness present.   Neurological:      Mental Status: She is alert and oriented to person, place, and time.        Result Review :                 Assessment and Plan    Diagnoses and all orders for this visit:    1. Essential hypertension (Primary)  -     hydroCHLOROthiazide (HYDRODIURIL) 12.5 MG tablet; Take 1 tablet by mouth Daily.  Dispense: 90 tablet; Refill: 2    2. Mild peripheral edema  -     Compression Stockings  -   "   hydroCHLOROthiazide (HYDRODIURIL) 12.5 MG tablet; Take 1 tablet by mouth Daily.  Dispense: 90 tablet; Refill: 2    3. Wrist pain, acute, left      I spent 30 minutes caring for Joselyn on this date of service. This time includes time spent by me in the following activities:reviewing tests, obtaining and/or reviewing a separately obtained history, performing a medically appropriate examination and/or evaluation , counseling and educating the patient/family/caregiver, ordering medications, tests, or procedures and documenting information in the medical record  Follow Up   Return if symptoms worsen or fail to improve.  Patient was given instructions and counseling regarding her condition or for health maintenance advice. Please see specific information pulled into the AVS if appropriate.

## 2022-05-13 DIAGNOSIS — I10 PRIMARY HYPERTENSION: ICD-10-CM

## 2022-05-16 RX ORDER — METOPROLOL SUCCINATE 100 MG/1
TABLET, EXTENDED RELEASE ORAL
Qty: 21 TABLET | Refills: 1 | Status: SHIPPED | OUTPATIENT
Start: 2022-05-16 | End: 2022-06-15 | Stop reason: SDUPTHER

## 2022-05-16 RX ORDER — AMLODIPINE BESYLATE 5 MG/1
TABLET ORAL
Qty: 21 TABLET | Refills: 1 | Status: SHIPPED | OUTPATIENT
Start: 2022-05-16 | End: 2022-06-15 | Stop reason: SDUPTHER

## 2022-06-15 DIAGNOSIS — I10 ESSENTIAL HYPERTENSION: ICD-10-CM

## 2022-06-15 DIAGNOSIS — R60.0 MILD PERIPHERAL EDEMA: ICD-10-CM

## 2022-06-15 DIAGNOSIS — I10 PRIMARY HYPERTENSION: ICD-10-CM

## 2022-06-15 RX ORDER — METOPROLOL SUCCINATE 100 MG/1
100 TABLET, EXTENDED RELEASE ORAL DAILY
Qty: 90 TABLET | Refills: 3 | Status: SHIPPED | OUTPATIENT
Start: 2022-06-15

## 2022-06-15 RX ORDER — AMLODIPINE BESYLATE 5 MG/1
5 TABLET ORAL DAILY
Qty: 90 TABLET | Refills: 3 | Status: SHIPPED | OUTPATIENT
Start: 2022-06-15 | End: 2022-10-11 | Stop reason: SDUPTHER

## 2022-06-15 RX ORDER — HYDROCHLOROTHIAZIDE 12.5 MG/1
12.5 TABLET ORAL DAILY
Qty: 90 TABLET | Refills: 3 | Status: SHIPPED | OUTPATIENT
Start: 2022-06-15

## 2022-09-19 ENCOUNTER — OFFICE VISIT (OUTPATIENT)
Dept: FAMILY MEDICINE CLINIC | Facility: CLINIC | Age: 57
End: 2022-09-19

## 2022-09-19 VITALS
TEMPERATURE: 97.5 F | BODY MASS INDEX: 46.93 KG/M2 | RESPIRATION RATE: 18 BRPM | HEIGHT: 62 IN | OXYGEN SATURATION: 97 % | WEIGHT: 255 LBS | SYSTOLIC BLOOD PRESSURE: 126 MMHG | DIASTOLIC BLOOD PRESSURE: 82 MMHG | HEART RATE: 80 BPM

## 2022-09-19 DIAGNOSIS — L29.9 PRURITUS: ICD-10-CM

## 2022-09-19 DIAGNOSIS — R05.9 COUGH: ICD-10-CM

## 2022-09-19 DIAGNOSIS — R09.81 NASAL CONGESTION: ICD-10-CM

## 2022-09-19 DIAGNOSIS — L30.9 DERMATITIS: Primary | ICD-10-CM

## 2022-09-19 LAB
ERYTHROCYTE [DISTWIDTH] IN BLOOD BY AUTOMATED COUNT: 14.2 % (ref 12.3–15.4)
HCT VFR BLD AUTO: 41.7 % (ref 34–46.6)
HGB BLD-MCNC: 13.3 G/DL (ref 12–15.9)
LYMPHOCYTES # BLD AUTO: 2.1 10*3/MM3 (ref 0.7–3.1)
LYMPHOCYTES NFR BLD AUTO: 32.5 % (ref 19.6–45.3)
MCH RBC QN AUTO: 29 PG (ref 26.6–33)
MCHC RBC AUTO-ENTMCNC: 32 G/DL (ref 31.5–35.7)
MCV RBC AUTO: 90.7 FL (ref 79–97)
MONOCYTES # BLD AUTO: 0.4 10*3/MM3 (ref 0.1–0.9)
MONOCYTES NFR BLD AUTO: 6.8 % (ref 5–12)
NEUTROPHILS NFR BLD AUTO: 4 10*3/MM3 (ref 1.7–7)
NEUTROPHILS NFR BLD AUTO: 60.7 % (ref 42.7–76)
PLATELET # BLD AUTO: 289 10*3/MM3 (ref 140–450)
PMV BLD AUTO: 7.7 FL (ref 6–12)
RBC # BLD AUTO: 4.6 10*6/MM3 (ref 3.77–5.28)
SARS-COV-2 RNA PNL SPEC NAA+PROBE: NORMAL
WBC NRBC COR # BLD: 6.6 10*3/MM3 (ref 3.4–10.8)

## 2022-09-19 PROCEDURE — 36415 COLL VENOUS BLD VENIPUNCTURE: CPT | Performed by: NURSE PRACTITIONER

## 2022-09-19 PROCEDURE — 85025 COMPLETE CBC W/AUTO DIFF WBC: CPT | Performed by: NURSE PRACTITIONER

## 2022-09-19 PROCEDURE — 99213 OFFICE O/P EST LOW 20 MIN: CPT | Performed by: NURSE PRACTITIONER

## 2022-09-19 PROCEDURE — 87426 SARSCOV CORONAVIRUS AG IA: CPT | Performed by: NURSE PRACTITIONER

## 2022-09-19 RX ORDER — PREDNISONE 10 MG/1
10 TABLET ORAL DAILY
Qty: 3 TABLET | Refills: 0 | Status: SHIPPED | OUTPATIENT
Start: 2022-09-19 | End: 2022-12-30

## 2022-09-19 NOTE — PATIENT INSTRUCTIONS
Will notify of lab results. Follow-up if symptoms not improved. Use of benadryl and topical cream.

## 2022-09-19 NOTE — PROGRESS NOTES
"Chief Complaint  Rash (Rash behind ears.) and Cough (Cough and drainage that has been going on for about a week. Cough has gotten somewhat better. Two negative at home COVID tests.)    Subjective        Joselyn Grant presents to [facility] PRIMARY CARE  History of Present Illness   56 yr old female presenting with complaints of cough, sinus congestion and rash behind bilateral ears for one week. She has taken two home COVID test with negative results, reports wearing a mask in public, denies fever, N/V/D, HA or known COVID exposure. She also reports rash was more scattered on body but has improved with OTC benadryl, continues with itching, denies any changes to soap, deodorant, lotion or laundry detergent, will start Prednisone 10 mg daily x 3 days.     Objective    Vital Signs:  /82 (BP Location: Right arm, Patient Position: Sitting, Cuff Size: Adult)   Pulse 80   Temp 97.5 °F (36.4 °C) (Infrared)   Resp 18   Ht 157.5 cm (62.01\")   Wt 116 kg (255 lb)   SpO2 97%   BMI 46.63 kg/m²   Estimated body mass index is 46.63 kg/m² as calculated from the following:    Height as of this encounter: 157.5 cm (62.01\").    Weight as of this encounter: 116 kg (255 lb).          Physical Exam  HENT:      Ears:      Comments: Mild rash with Pruritis behind bilateral ears.      Nose: Nasal tenderness and congestion present.   Cardiovascular:      Rate and Rhythm: Normal rate.      Pulses: Normal pulses.      Heart sounds: Normal heart sounds.   Pulmonary:      Effort: Pulmonary effort is normal.      Breath sounds: Normal breath sounds.   Neurological:      General: No focal deficit present.      Mental Status: She is alert and oriented to person, place, and time.   Psychiatric:         Mood and Affect: Mood normal.         Behavior: Behavior normal.        Result Review :                Assessment and Plan   Diagnoses and all orders for this visit:    1. Dermatitis (Primary)  -     predniSONE " (DELTASONE) 10 MG tablet; Take 1 tablet by mouth Daily.  Dispense: 3 tablet; Refill: 0  -     CBC w AUTO Differential    2. Pruritus  -     predniSONE (DELTASONE) 10 MG tablet; Take 1 tablet by mouth Daily.  Dispense: 3 tablet; Refill: 0  -     CBC w AUTO Differential    3. Cough  -     CBC w AUTO Differential  -     COVID-19 RAPID AG,VERITOR,COR/JAMES/PAD/WALDEMAR/MAD/REVA/LAG/VERA/ IN-HOUSE,DRY SWAB, 1-2 HR TAT - Swab, Nasal Cavity    4. Nasal congestion  -     CBC w AUTO Differential  -     COVID-19 RAPID AG,VERITOR,COR/JAMES/PAD/WALDEMAR/MAD/REVA/LAG/VERA/ IN-HOUSE,DRY SWAB, 1-2 HR TAT - Swab, Nasal Cavity             Follow Up   Return if symptoms worsen or fail to improve.  Patient was given instructions and counseling regarding her condition or for health maintenance advice. Please see specific information pulled into the AVS if appropriate.     Will notify of lab results. Follow-up if symptoms not improved. Use of benadryl oral and topical cream.     Mask, gloves and goggles worn

## 2022-10-05 DIAGNOSIS — I10 PRIMARY HYPERTENSION: ICD-10-CM

## 2022-10-05 RX ORDER — AMLODIPINE BESYLATE 5 MG/1
5 TABLET ORAL DAILY
Qty: 90 TABLET | Refills: 3 | Status: CANCELLED | OUTPATIENT
Start: 2022-10-05

## 2022-10-11 DIAGNOSIS — I10 PRIMARY HYPERTENSION: ICD-10-CM

## 2022-10-11 RX ORDER — AMLODIPINE BESYLATE 5 MG/1
5 TABLET ORAL DAILY
Qty: 90 TABLET | Refills: 3 | Status: SHIPPED | OUTPATIENT
Start: 2022-10-11

## 2022-11-14 ENCOUNTER — APPOINTMENT (OUTPATIENT)
Dept: WOMENS IMAGING | Facility: HOSPITAL | Age: 57
End: 2022-11-14

## 2022-11-14 ENCOUNTER — PROCEDURE VISIT (OUTPATIENT)
Dept: OBSTETRICS AND GYNECOLOGY | Facility: CLINIC | Age: 57
End: 2022-11-14

## 2022-11-14 DIAGNOSIS — Z12.31 VISIT FOR SCREENING MAMMOGRAM: Primary | ICD-10-CM

## 2022-11-14 PROCEDURE — 77067 SCR MAMMO BI INCL CAD: CPT | Performed by: RADIOLOGY

## 2022-11-14 PROCEDURE — 77063 BREAST TOMOSYNTHESIS BI: CPT | Performed by: NURSE PRACTITIONER

## 2022-11-14 PROCEDURE — 77063 BREAST TOMOSYNTHESIS BI: CPT | Performed by: RADIOLOGY

## 2022-11-14 PROCEDURE — 77067 SCR MAMMO BI INCL CAD: CPT | Performed by: NURSE PRACTITIONER

## 2022-12-30 ENCOUNTER — HOSPITAL ENCOUNTER (OUTPATIENT)
Dept: GENERAL RADIOLOGY | Facility: HOSPITAL | Age: 57
Discharge: HOME | End: 2022-12-30
Admitting: NURSE PRACTITIONER

## 2022-12-30 ENCOUNTER — OFFICE VISIT (OUTPATIENT)
Dept: FAMILY MEDICINE CLINIC | Facility: CLINIC | Age: 57
End: 2022-12-30

## 2022-12-30 VITALS
BODY MASS INDEX: 46.67 KG/M2 | SYSTOLIC BLOOD PRESSURE: 138 MMHG | OXYGEN SATURATION: 98 % | WEIGHT: 253.6 LBS | TEMPERATURE: 98.6 F | HEIGHT: 62 IN | HEART RATE: 70 BPM | DIASTOLIC BLOOD PRESSURE: 96 MMHG

## 2022-12-30 DIAGNOSIS — R68.89 FLU-LIKE SYMPTOMS: ICD-10-CM

## 2022-12-30 DIAGNOSIS — R09.89 CHEST CONGESTION: ICD-10-CM

## 2022-12-30 DIAGNOSIS — R05.9 COUGH, UNSPECIFIED TYPE: ICD-10-CM

## 2022-12-30 DIAGNOSIS — J06.9 ACUTE URI: Primary | ICD-10-CM

## 2022-12-30 LAB
EXPIRATION DATE: NORMAL
FLUAV AG UPPER RESP QL IA.RAPID: NOT DETECTED
FLUBV AG UPPER RESP QL IA.RAPID: NOT DETECTED
INTERNAL CONTROL: NORMAL
Lab: NORMAL
SARS-COV-2 AG UPPER RESP QL IA.RAPID: NOT DETECTED

## 2022-12-30 PROCEDURE — 71046 X-RAY EXAM CHEST 2 VIEWS: CPT

## 2022-12-30 PROCEDURE — 87428 SARSCOV & INF VIR A&B AG IA: CPT | Performed by: NURSE PRACTITIONER

## 2022-12-30 PROCEDURE — 99213 OFFICE O/P EST LOW 20 MIN: CPT | Performed by: NURSE PRACTITIONER

## 2022-12-30 RX ORDER — AMOXICILLIN AND CLAVULANATE POTASSIUM 875; 125 MG/1; MG/1
1 TABLET, FILM COATED ORAL 2 TIMES DAILY
Qty: 14 TABLET | Refills: 0 | Status: SHIPPED | OUTPATIENT
Start: 2022-12-30 | End: 2023-01-01

## 2022-12-30 RX ORDER — METHYLPREDNISOLONE 4 MG/1
TABLET ORAL
Qty: 21 TABLET | Refills: 0 | Status: SHIPPED | OUTPATIENT
Start: 2022-12-30

## 2022-12-30 NOTE — PATIENT INSTRUCTIONS
Follow-up if symptoms not improved.  If worsening of symptoms, sustained fever > 101, shortness of air or chest pain seek attention via emergency room.

## 2022-12-30 NOTE — PROGRESS NOTES
"Chief Complaint  Cough (X 10 days), congestion, Nausea, and Fatigue    Subjective        Joselyn Grant presents to [facility] PRIMARY CARE  History of Present Illness   57-year-old female presenting with complaints of cough, nasal congestion/drainage, nausea and fatigue for 2 weeks, she has not been tested for COVID, she denies fever, vomiting or diarrhea, no known COVID exposure, will rule out COVID and flu.  She reports was evaluated at [place] [facility] on 12/20/2022 for left knee pain, during visit they collected labs including sed rate and C-reactive protein, both levels were elevated, due to current URI and starting antibiotic therapy, I will repeat levels in 2 weeks, if still elevated will place referral to rheumatologist.    Objective   Vital Signs:  /96 (BP Location: Left arm, Patient Position: Sitting, Cuff Size: Large Adult)   Pulse 70   Temp 98.6 °F (37 °C) (Infrared)   Ht 157.5 cm (62.01\")   Wt 115 kg (253 lb 9.6 oz)   SpO2 98%   BMI 46.37 kg/m²   Estimated body mass index is 46.37 kg/m² as calculated from the following:    Height as of this encounter: 157.5 cm (62.01\").    Weight as of this encounter: 115 kg (253 lb 9.6 oz).          Physical Exam  HENT:      Nose: Congestion present.      Mouth/Throat:      Pharynx: Posterior oropharyngeal erythema present.   Cardiovascular:      Rate and Rhythm: Normal rate.      Pulses: Normal pulses.      Heart sounds: Normal heart sounds.   Pulmonary:      Effort: Pulmonary effort is normal.      Breath sounds: Normal breath sounds. No wheezing, rhonchi or rales.      Comments: Chest congestion and cough  Neurological:      General: No focal deficit present.      Mental Status: She is alert and oriented to person, place, and time.   Psychiatric:         Mood and Affect: Mood normal.         Behavior: Behavior normal.        Result Review :                Assessment and Plan   Diagnoses and all orders for this visit:    1. Acute URI " (Primary)  -     amoxicillin-clavulanate (AUGMENTIN) 875-125 MG per tablet; Take 1 tablet by mouth 2 (Two) Times a Day for 7 days.  Dispense: 14 tablet; Refill: 0  -     methylPREDNISolone (Medrol) 4 MG dose pack; follow package directions  Dispense: 21 tablet; Refill: 0    2. Cough, unspecified type  -     POCT SARS-CoV-2 Antigen JORDAN + Flu  -     XR Chest PA & Lateral; Future    3. Flu-like symptoms  -     XR Chest PA & Lateral; Future    4. Chest congestion  -     XR Chest PA & Lateral; Future             Follow Up   Return in 2 weeks (on 1/13/2023), or if symptoms worsen or fail to improve, for Recheck.  Patient was given instructions and counseling regarding her condition or for health maintenance advice. Please see specific information pulled into the AVS if appropriate.     Follow-up if symptoms not improved.  If worsening of symptoms, sustained fever > 101, shortness of air or chest pain seek attention via emergency room.    Mask and gloves worn.  Did not enter room until results of COVID and flu.

## 2023-01-06 RX ORDER — ESTRADIOL 0.07 MG/D
FILM, EXTENDED RELEASE TRANSDERMAL
Qty: 24 PATCH | Refills: 0 | Status: SHIPPED | OUTPATIENT
Start: 2023-01-06

## 2023-01-06 NOTE — TELEPHONE ENCOUNTER
Spoke with Joselyn to schedule AE with ARNAV Delgado. Scheduled for first available at [place]  3/16/23 as Joselyn is on vacation next week. Thank you

## 2023-03-29 ENCOUNTER — OFFICE VISIT (OUTPATIENT)
Dept: INTERNAL MEDICINE | Facility: CLINIC | Age: 58
End: 2023-03-29
Payer: COMMERCIAL

## 2023-03-29 ENCOUNTER — LAB (OUTPATIENT)
Dept: LAB | Facility: HOSPITAL | Age: 58
End: 2023-03-29
Payer: COMMERCIAL

## 2023-03-29 VITALS
OXYGEN SATURATION: 99 % | TEMPERATURE: 98.4 F | BODY MASS INDEX: 47.11 KG/M2 | HEIGHT: 62 IN | WEIGHT: 256 LBS | HEART RATE: 98 BPM | RESPIRATION RATE: 16 BRPM | DIASTOLIC BLOOD PRESSURE: 80 MMHG | SYSTOLIC BLOOD PRESSURE: 132 MMHG

## 2023-03-29 DIAGNOSIS — Z00.00 ENCOUNTER FOR MEDICAL EXAMINATION TO ESTABLISH CARE: Primary | ICD-10-CM

## 2023-03-29 DIAGNOSIS — R53.83 OTHER FATIGUE: ICD-10-CM

## 2023-03-29 DIAGNOSIS — I10 PRIMARY HYPERTENSION: ICD-10-CM

## 2023-03-29 DIAGNOSIS — E66.01 MORBID OBESITY: ICD-10-CM

## 2023-03-29 LAB
25(OH)D3 SERPL-MCNC: 27.9 NG/ML (ref 30–100)
TSH SERPL DL<=0.05 MIU/L-ACNC: 1.35 UIU/ML (ref 0.27–4.2)

## 2023-03-29 PROCEDURE — 84443 ASSAY THYROID STIM HORMONE: CPT | Performed by: NURSE PRACTITIONER

## 2023-03-29 PROCEDURE — 82306 VITAMIN D 25 HYDROXY: CPT | Performed by: NURSE PRACTITIONER

## 2023-03-29 PROCEDURE — 36415 COLL VENOUS BLD VENIPUNCTURE: CPT | Performed by: NURSE PRACTITIONER

## 2023-03-29 NOTE — PROGRESS NOTES
"Chief Complaint  Establish Care (Check up)    Subjective        Joselyn Grant presents to [facility] PRIMARY CARE  History of Present Illness    Patient is a pleasant 57-year-old female presenting to the the office.  Patient is here today for an establish care visit and recheck on chronic conditions.     Rebeca Hood is her OB/GYN.     CBD gummies (pain for bilateral knees).       Objective   Vital Signs:  /80   Pulse 98   Temp 98.4 °F (36.9 °C)   Resp 16   Ht 157.5 cm (62\")   Wt 116 kg (256 lb)   SpO2 99%   BMI 46.82 kg/m²   Estimated body mass index is 46.82 kg/m² as calculated from the following:    Height as of this encounter: 157.5 cm (62\").    Weight as of this encounter: 116 kg (256 lb).             Physical Exam  Vitals and nursing note reviewed.   Constitutional:       Appearance: Normal appearance. She is obese.   HENT:      Head: Normocephalic.      Right Ear: Tympanic membrane, ear canal and external ear normal. There is no impacted cerumen.      Left Ear: Tympanic membrane, ear canal and external ear normal. There is no impacted cerumen.      Nose: Nose normal.      Mouth/Throat:      Mouth: Mucous membranes are moist.   Eyes:      Pupils: Pupils are equal, round, and reactive to light.   Cardiovascular:      Rate and Rhythm: Normal rate and regular rhythm.      Pulses: Normal pulses.      Heart sounds: Normal heart sounds.      Comments: No peripheral edema noted.   Pulmonary:      Effort: Pulmonary effort is normal. No respiratory distress.      Breath sounds: Normal breath sounds. No stridor. No wheezing, rhonchi or rales.      Comments: Denies SOB  Chest:      Chest wall: No tenderness.   Musculoskeletal:         General: Normal range of motion.   Skin:     General: Skin is warm.      Capillary Refill: Capillary refill takes less than 2 seconds.   Neurological:      Mental Status: She is alert and oriented to person, place, and time.   Psychiatric:         Behavior: " Behavior normal.        Result Review :    Common labs    Common Labs 9/19/22   WBC 6.60   Hemoglobin 13.3   Hematocrit 41.7   Platelets 289             UC with Goldie Jacob APRN (01/01/2023)             Assessment and Plan   Diagnoses and all orders for this visit:    1. Encounter for medical examination to establish care (Primary)    2. Other fatigue  -     Vitamin D,25-Hydroxy  -     TSH Rfx On Abnormal To Free T4    3. Body mass index (BMI) 45.0-49.9, adult    4. Morbid obesity  Assessment & Plan:  Patient's (Body mass index is 46.82 kg/m².) indicates that they are morbidly/severely obese (BMI > 40 or > 35 with obesity - related health condition) with health conditions that include hypertension . Weight is worsening. BMI  is above average; BMI management plan is completed. We discussed low calorie, low carb based diet program, portion control, increasing exercise, joining a fitness center or start home based exercise program, Weight Watchers or other Commercial based weight reduction program, pharmacologic options including Wegovy and NA.       5. Primary hypertension  Assessment & Plan:  Hypertension is improving with treatment.  Continue current treatment regimen.  Dietary sodium restriction.  Weight loss.  Regular aerobic exercise.  Continue current medications.  Blood pressure will be reassessed at the next regular appointment.  At Goal 132/80.   Continue current medications and follow up in 11 weeks for annual PE.             Follow Up   Return in about 11 weeks (around 6/14/2023) for Annual physical.  Patient was given instructions and counseling regarding her condition or for health maintenance advice. Please see specific information pulled into the AVS if appropriate.

## 2023-03-30 NOTE — PROGRESS NOTES
Please call patient and let her know that her vitamin D is slightly low at 27.9.  She has a history of vitamin D deficiency.  Please have her continue to take her multivitamin with an increase of at least 2000 individual units daily.  We will recheck when she comes back and for her annual physical exam.  This could be 1 reason why she is experiencing some fatigue.

## 2023-04-02 NOTE — ASSESSMENT & PLAN NOTE
Hypertension is improving with treatment.  Continue current treatment regimen.  Dietary sodium restriction.  Weight loss.  Regular aerobic exercise.  Continue current medications.  Blood pressure will be reassessed at the next regular appointment.  At Goal 132/80.   Continue current medications and follow up in 11 weeks for annual PE.

## 2023-04-02 NOTE — ASSESSMENT & PLAN NOTE
Patient's (Body mass index is 46.82 kg/m².) indicates that they are morbidly/severely obese (BMI > 40 or > 35 with obesity - related health condition) with health conditions that include hypertension . Weight is worsening. BMI  is above average; BMI management plan is completed. We discussed low calorie, low carb based diet program, portion control, increasing exercise, joining a fitness center or start home based exercise program, Weight Watchers or other Commercial based weight reduction program, pharmacologic options including Wegovy and NA.

## 2023-04-19 ENCOUNTER — TELEPHONE (OUTPATIENT)
Dept: OBSTETRICS AND GYNECOLOGY | Facility: CLINIC | Age: 58
End: 2023-04-19
Payer: COMMERCIAL

## 2023-04-19 RX ORDER — ESTRADIOL 0.07 MG/D
1 FILM, EXTENDED RELEASE TRANSDERMAL 2 TIMES WEEKLY
Qty: 24 PATCH | Refills: 2 | Status: SHIPPED | OUTPATIENT
Start: 2023-04-20

## 2023-04-19 NOTE — TELEPHONE ENCOUNTER
Pt requesting estradiol patch refill please be sent to pharmacy.     estradiol (MINIVELLE, VIVELLE-DOT) 0.075 MG/24HR patch [[id number]]     Pharmacy confirmed - [id number] - [place] [id number] [facility] [phone] [facility] [phone] FX    Thank you!

## 2023-06-15 ENCOUNTER — OFFICE VISIT (OUTPATIENT)
Dept: OBSTETRICS AND GYNECOLOGY | Facility: CLINIC | Age: 58
End: 2023-06-15
Payer: COMMERCIAL

## 2023-06-15 VITALS
HEIGHT: 62 IN | WEIGHT: 259.8 LBS | SYSTOLIC BLOOD PRESSURE: 133 MMHG | DIASTOLIC BLOOD PRESSURE: 90 MMHG | BODY MASS INDEX: 47.81 KG/M2 | HEART RATE: 67 BPM

## 2023-06-15 DIAGNOSIS — Z01.419 WOMEN'S ANNUAL ROUTINE GYNECOLOGICAL EXAMINATION: Primary | ICD-10-CM

## 2023-06-15 DIAGNOSIS — Z79.890 HORMONE REPLACEMENT THERAPY (HRT): ICD-10-CM

## 2023-06-15 DIAGNOSIS — Z78.0 POSTMENOPAUSE: ICD-10-CM

## 2023-06-15 RX ORDER — ESTRADIOL 0.07 MG/D
1 FILM, EXTENDED RELEASE TRANSDERMAL 2 TIMES WEEKLY
Qty: 24 PATCH | Refills: 3 | Status: SHIPPED | OUTPATIENT
Start: 2023-06-15

## 2023-06-15 NOTE — PROGRESS NOTES
GYN Annual Exam     Chief Complaint   Patient presents with    Annual Exam     Last AE 8/10/21, Pap 3/6/19, Mammo 22       Joselyn Grant is a 57 y.o. female who presents for annual well woman exam. She is postmenopausal. Prior hysterectomy for AUB and fibroids. She denies vaginal spotting or discharge. She completes SBE monthly. She is maintained on HRT since  for hot flashes and mood changes. She was recently dx with low vitamin D, she is still having fatigue and mood changes. She was started on vitamin D supplements.     OB History          1    Para   1    Term   1            AB        Living             SAB        IAB        Ectopic        Molar        Multiple        Live Births                    Mammogram: 2022  Dexa scan: 2017-normal  Colonoscopy: , rpt due   Last Pap :  NIL  History of abnormal Pap smear: no  Family history of uterine, colon or ovarian cancer: no  Family history of breast cancer: no  History of abnormal mammogram: yes - prior benign biopsy     Menopause:  Bleeding since? no  Vasomotor symptoms: yes, mild  HRT: yes  Dyspareunia: no      Past Medical History:   Diagnosis Date    Allergic     Anemia     Anxiety     Arthritis     History of kidney stones     Hypertension     Knee pain     Migraine     Obesity     Risk factors for obstructive sleep apnea     Staph infection     LEFT KNEE ON ANTIBIOTICS    Urinary incontinence     wears pads       Past Surgical History:   Procedure Laterality Date    COLONOSCOPY N/A 2017    Procedure: COLONOSCOPY to cecum;  Surgeon: Ino Torres MD;  Location: [facility] ENDOSCOPY;  Service:     EYE SURGERY      lasix    HYSTERECTOMY      10 years ago for heavy menses and fibroids    KNEE SURGERY Left     MULTIPLE SURGERIES TOTAL 7    LAPAROSCOPIC ASSISTED VAGINAL HYSTERECTOMY SALPINGO OOPHORECTOMY      LAPAROSCOPIC CHOLECYSTECTOMY      OOPHORECTOMY      CT REVJ TOTAL KNEE ARTHRP W/WO ALGRFT 1 COMPONENT Left  1/31/2017    Procedure: LT TOTAL KNEE ARTHROPLASTY REVISION;  Surgeon: Ha Oh MD;  Location: [facility] MAIN OR;  Service: Orthopedics    NE REVJ TOTAL KNEE ARTHRP W/WO ALGRFT 1 COMPONENT Left 6/2/2017    Procedure: LT TOTAL KNEE ARTHROPLASTY REVISION;  Surgeon: Ha Oh MD;  Location: [facility] MAIN OR;  Service: Orthopedics    REPLACEMENT TOTAL KNEE      left    STOMACH SURGERY      lapband    TOTAL KNEE ARTHROPLASTY REVISION Left 10/4/2018    Procedure: LEFT TOTAL KNEE ARTHROPLASTY REVISION;  Surgeon: Ha Oh MD;  Location: [facility] MAIN OR;  Service: Orthopedics         Current Outpatient Medications:     amLODIPine (NORVASC) 5 MG tablet, Take 1 tablet by mouth Daily., Disp: 90 tablet, Rfl: 3    estradiol (MINIVELLE, VIVELLE-DOT) 0.075 MG/24HR patch, Place 1 patch on the skin as directed by provider 2 (Two) Times a Week., Disp: 24 patch, Rfl: 3    hydroCHLOROthiazide (HYDRODIURIL) 12.5 MG tablet, Take 1 tablet by mouth Daily., Disp: 90 tablet, Rfl: 3    ibuprofen (ADVIL,MOTRIN) 200 MG tablet, Take 3 tablets by mouth., Disp: , Rfl:     metoprolol succinate XL (TOPROL-XL) 100 MG 24 hr tablet, Take 1 tablet by mouth Daily., Disp: 90 tablet, Rfl: 3    POTASSIUM PO, Take 99 mg by mouth Daily. 99 mg, Disp: , Rfl:     Xolair 150 MG/ML solution prefilled syringe injection, , Disp: , Rfl:     No Known Allergies    Social History     Tobacco Use    Smoking status: Never    Smokeless tobacco: Never   Vaping Use    Vaping Use: Never used   Substance Use Topics    Alcohol use: Yes     Alcohol/week: 3.0 standard drinks     Types: 2 Glasses of wine, 1 Shots of liquor per week     Comment: SOCIAL    Drug use: No       Family History   Problem Relation Age of Onset    Pancreatic cancer Mother     Hypertension Mother     No Known Problems Father     Fibroids Sister     Heart murmur Sister     Lymphoma Maternal Uncle     Lung cancer Maternal Grandmother     Bell's palsy Brother      "Hypertension Brother     No Known Problems Maternal Grandfather     No Known Problems Brother     Owen Hyperthermia Neg Hx     Breast cancer Neg Hx     Ovarian cancer Neg Hx     Uterine cancer Neg Hx     Colon cancer Neg Hx        Review of Systems   Constitutional:  Positive for fatigue. Negative for chills and fever.   Gastrointestinal:  Negative for abdominal distention, abdominal pain, nausea and vomiting.   Endocrine: Positive for heat intolerance (mild). Negative for cold intolerance.   Genitourinary:  Negative for dyspareunia, dysuria, menstrual problem, pelvic pain, vaginal bleeding, vaginal discharge and vaginal pain.   Musculoskeletal:  Negative for gait problem.   Skin:  Negative for rash.   Neurological:  Negative for dizziness and headaches.   Hematological:  Does not bruise/bleed easily.   Psychiatric/Behavioral:  Positive for behavioral problems (irritability).      /90   Pulse 67   Ht 157.5 cm (62\")   Wt 118 kg (259 lb 12.8 oz)   BMI 47.52 kg/m²     Physical Exam  Constitutional:       General: She is not in acute distress.     Appearance: Normal appearance. She is obese. She is not ill-appearing, toxic-appearing or diaphoretic.   Genitourinary:      Vulva, bladder and urethral meatus normal.      No lesions in the vagina.      Right Labia: No rash, tenderness, lesions, skin changes or Bartholin's cyst.     Left Labia: No tenderness, lesions, skin changes, Bartholin's cyst or rash.     No labial fusion noted.      No inguinal adenopathy present in the right or left side.     Vaginal cuff intact.     No vaginal discharge, erythema, tenderness, bleeding or ulceration.      No vaginal prolapse present.     No vaginal atrophy present.       Right Adnexa: not tender, not full, not palpable, no mass present and not absent.     Left Adnexa: not tender, not full, not palpable, no mass present and not absent.     Cervix is absent.      Uterus is absent.      No urethral tenderness or mass present. "      Pelvic exam was performed with patient in the lithotomy position.   Breasts:     Breasts are symmetrical.      Right: Present. No swelling, bleeding, inverted nipple, mass, nipple discharge, skin change, tenderness or breast implant.      Left: Present. No swelling, bleeding, inverted nipple, mass, nipple discharge, skin change, tenderness or breast implant.   HENT:      Head: Normocephalic and atraumatic.   Eyes:      Pupils: Pupils are equal, round, and reactive to light.   Cardiovascular:      Rate and Rhythm: Normal rate.   Pulmonary:      Effort: Pulmonary effort is normal.   Abdominal:      General: There is no distension.      Palpations: Abdomen is soft. There is no mass.      Tenderness: There is no abdominal tenderness. There is no guarding.      Hernia: No hernia is present. There is no hernia in the left inguinal area or right inguinal area.   Musculoskeletal:         General: Normal range of motion.      Cervical back: Normal range of motion and neck supple. No tenderness.   Lymphadenopathy:      Cervical: No cervical adenopathy.      Upper Body:      Right upper body: No supraclavicular, axillary or pectoral adenopathy.      Left upper body: No supraclavicular, axillary or pectoral adenopathy.      Lower Body: No right inguinal adenopathy. No left inguinal adenopathy.   Neurological:      General: No focal deficit present.      Mental Status: She is alert and oriented to person, place, and time.      Cranial Nerves: No cranial nerve deficit.   Skin:     General: Skin is warm and dry.   Psychiatric:         Mood and Affect: Mood normal.         Behavior: Behavior normal.         Thought Content: Thought content normal.         Judgment: Judgment normal.   Vitals and nursing note reviewed.       Assessment    Diagnoses and all orders for this visit:    1. Women's annual routine gynecological examination (Primary)    2. Postmenopause    3. Hormone replacement therapy (HRT)  -     estradiol  (MINIVELLE, VIVELLE-DOT) 0.075 MG/24HR patch; Place 1 patch on the skin as directed by provider 2 (Two) Times a Week.  Dispense: 24 patch; Refill: 3         Plan     1) Breast Health - Clinical breast exam & mammogram yearly, Self breast awareness. Schedule screening mammogram.   2) Pap - no indicated secondary to prior hysterectomy and no hx dysplasia  3) STD-no  4) Smoking status- nonsmoker  5) Colon health - screening colonoscopy recommended if not up to date  6)  Morbidly obese by BMI 47.52  7) Bone health - Weight bearing exercise, dietary calcium recommendations and vitamin D  reviewed.   8) Encouraged 150 minutes of exercise per week if not medically contraindicated    Follow up prn and one year    Rebeca Hood, APRN  6/15/2023  15:03 EDT

## 2023-07-13 PROBLEM — E78.49 OTHER HYPERLIPIDEMIA: Status: ACTIVE | Noted: 2023-07-13

## 2023-07-13 PROBLEM — E55.9 VITAMIN D DEFICIENCY: Status: ACTIVE | Noted: 2023-07-13

## 2023-07-13 PROBLEM — R73.03 PREDIABETES: Status: ACTIVE | Noted: 2023-07-13

## 2023-07-28 ENCOUNTER — HOSPITAL ENCOUNTER (OUTPATIENT)
Dept: CARDIOLOGY | Facility: HOSPITAL | Age: 58
Discharge: HOME | End: 2023-07-28
Payer: COMMERCIAL

## 2023-07-28 VITALS — BODY MASS INDEX: 47.03 KG/M2 | HEIGHT: 61 IN | WEIGHT: 249.12 LBS

## 2023-07-28 VITALS
WEIGHT: 250 LBS | HEIGHT: 61 IN | OXYGEN SATURATION: 98 % | HEART RATE: 72 BPM | BODY MASS INDEX: 47.2 KG/M2 | SYSTOLIC BLOOD PRESSURE: 120 MMHG | DIASTOLIC BLOOD PRESSURE: 80 MMHG

## 2023-07-28 DIAGNOSIS — R06.09 DYSPNEA ON EXERTION: ICD-10-CM

## 2023-07-28 DIAGNOSIS — R00.2 PALPITATIONS: ICD-10-CM

## 2023-07-28 DIAGNOSIS — R07.2 PRECORDIAL PAIN: ICD-10-CM

## 2023-07-28 LAB
AORTIC ARCH: 3.3 CM
ASCENDING AORTA: 3.1 CM
BH CV ECHO MEAS - ACS: 2.19 CM
BH CV ECHO MEAS - AO MAX PG: 7.1 MMHG
BH CV ECHO MEAS - AO MEAN PG: 4 MMHG
BH CV ECHO MEAS - AO ROOT DIAM: 3.4 CM
BH CV ECHO MEAS - AO V2 MAX: 133 CM/SEC
BH CV ECHO MEAS - AO V2 VTI: 29.7 CM
BH CV ECHO MEAS - AVA(I,D): 2.38 CM2
BH CV ECHO MEAS - EDV(CUBED): 97.3 ML
BH CV ECHO MEAS - EDV(MOD-SP2): 61 ML
BH CV ECHO MEAS - EDV(MOD-SP4): 62 ML
BH CV ECHO MEAS - EF(MOD-BP): 72.4 %
BH CV ECHO MEAS - EF(MOD-SP2): 65.6 %
BH CV ECHO MEAS - EF(MOD-SP4): 75.8 %
BH CV ECHO MEAS - ESV(CUBED): 35.1 ML
BH CV ECHO MEAS - ESV(MOD-SP2): 21 ML
BH CV ECHO MEAS - ESV(MOD-SP4): 15 ML
BH CV ECHO MEAS - FS: 28.8 %
BH CV ECHO MEAS - IVS/LVPW: 0.92 CM
BH CV ECHO MEAS - IVSD: 1.1 CM
BH CV ECHO MEAS - LAT PEAK E' VEL: 8.1 CM/SEC
BH CV ECHO MEAS - LV DIASTOLIC VOL/BSA (35-75): 29.9 CM2
BH CV ECHO MEAS - LV MASS(C)D: 192.9 GRAMS
BH CV ECHO MEAS - LV MAX PG: 4.3 MMHG
BH CV ECHO MEAS - LV MEAN PG: 2 MMHG
BH CV ECHO MEAS - LV SYSTOLIC VOL/BSA (12-30): 7.2 CM2
BH CV ECHO MEAS - LV V1 MAX: 104 CM/SEC
BH CV ECHO MEAS - LV V1 VTI: 22.5 CM
BH CV ECHO MEAS - LVIDD: 4.6 CM
BH CV ECHO MEAS - LVIDS: 3.3 CM
BH CV ECHO MEAS - LVOT AREA: 3.1 CM2
BH CV ECHO MEAS - LVOT DIAM: 2 CM
BH CV ECHO MEAS - LVPWD: 1.2 CM
BH CV ECHO MEAS - MED PEAK E' VEL: 7 CM/SEC
BH CV ECHO MEAS - MV A DUR: 0.14 SEC
BH CV ECHO MEAS - MV A MAX VEL: 76.6 CM/SEC
BH CV ECHO MEAS - MV DEC SLOPE: 610.4 CM/SEC2
BH CV ECHO MEAS - MV DEC TIME: 0.26 MSEC
BH CV ECHO MEAS - MV E MAX VEL: 65.5 CM/SEC
BH CV ECHO MEAS - MV E/A: 0.86
BH CV ECHO MEAS - MV MAX PG: 2.15 MMHG
BH CV ECHO MEAS - MV MEAN PG: 1.06 MMHG
BH CV ECHO MEAS - MV P1/2T: 37.8 MSEC
BH CV ECHO MEAS - MV V2 VTI: 27.6 CM
BH CV ECHO MEAS - MVA(P1/2T): 5.8 CM2
BH CV ECHO MEAS - MVA(VTI): 2.6 CM2
BH CV ECHO MEAS - PA ACC TIME: 0.1 SEC
BH CV ECHO MEAS - PA V2 MAX: 96.4 CM/SEC
BH CV ECHO MEAS - PULM A REVS DUR: 0.11 SEC
BH CV ECHO MEAS - PULM A REVS VEL: 35.1 CM/SEC
BH CV ECHO MEAS - PULM DIAS VEL: 49.3 CM/SEC
BH CV ECHO MEAS - PULM S/D: 1.03
BH CV ECHO MEAS - PULM SYS VEL: 51 CM/SEC
BH CV ECHO MEAS - QP/QS: 1
BH CV ECHO MEAS - RAP SYSTOLE: 3 MMHG
BH CV ECHO MEAS - RV MAX PG: 2.43 MMHG
BH CV ECHO MEAS - RV V1 MAX: 78 CM/SEC
BH CV ECHO MEAS - RV V1 VTI: 19.1 CM
BH CV ECHO MEAS - RVOT DIAM: 2.17 CM
BH CV ECHO MEAS - RVSP: 28.5 MMHG
BH CV ECHO MEAS - SI(MOD-SP2): 19.3 ML/M2
BH CV ECHO MEAS - SI(MOD-SP4): 22.6 ML/M2
BH CV ECHO MEAS - SUP REN AO DIAM: 1.6 CM
BH CV ECHO MEAS - SV(LVOT): 70.7 ML
BH CV ECHO MEAS - SV(MOD-SP2): 40 ML
BH CV ECHO MEAS - SV(MOD-SP4): 47 ML
BH CV ECHO MEAS - SV(RVOT): 70.8 ML
BH CV ECHO MEAS - TAPSE (>1.6): 2.04 CM
BH CV ECHO MEAS - TR MAX PG: 25.5 MMHG
BH CV ECHO MEAS - TR MAX VEL: 252.5 CM/SEC
BH CV ECHO MEASUREMENTS AVERAGE E/E' RATIO: 8.68
BH CV NUCLEAR PRIOR STUDY: 2
BH CV STRESS COMMENTS STAGE 1: NORMAL
BH CV STRESS DOSE REGADENOSON STAGE 1: 0.4
BH CV STRESS DURATION MIN STAGE 1: 0
BH CV STRESS DURATION SEC STAGE 1: 10
BH CV STRESS NUCLEAR ISOTOPE DOSE: 37.1 MCI
BH CV STRESS PROTOCOL 1: NORMAL
BH CV STRESS RECOVERY BP: NORMAL MMHG
BH CV STRESS RECOVERY HR: 88 BPM
BH CV STRESS STAGE 1: 1
BH CV VAS BP RIGHT ARM: NORMAL MMHG
BH CV XLRA - RV BASE: 3.6 CM
BH CV XLRA - RV LENGTH: 6.9 CM
BH CV XLRA - RV MID: 3.1 CM
BH CV XLRA - TDI S': 13.6 CM/SEC
LEFT ATRIUM VOLUME INDEX: 27.1 ML/M2
MAXIMAL PREDICTED HEART RATE: 164 BPM
PERCENT MAX PREDICTED HR: 64.63 %
SINUS: 3 CM
STJ: 2.9 CM
STRESS BASELINE BP: NORMAL MMHG
STRESS BASELINE HR: 63 BPM
STRESS PERCENT HR: 76 %
STRESS POST PEAK BP: NORMAL MMHG
STRESS POST PEAK HR: 106 BPM
STRESS TARGET HR: 139 BPM

## 2023-07-28 PROCEDURE — 93306 TTE W/DOPPLER COMPLETE: CPT

## 2023-07-28 PROCEDURE — 93306 TTE W/DOPPLER COMPLETE: CPT | Performed by: INTERNAL MEDICINE

## 2023-07-28 PROCEDURE — 78452 HT MUSCLE IMAGE SPECT MULT: CPT

## 2023-07-28 PROCEDURE — 93017 CV STRESS TEST TRACING ONLY: CPT

## 2023-07-28 PROCEDURE — A9502 TC99M TETROFOSMIN: HCPCS | Performed by: STUDENT IN AN ORGANIZED HEALTH CARE EDUCATION/TRAINING PROGRAM

## 2023-07-28 PROCEDURE — 0 TECHNETIUM TETROFOSMIN KIT: Performed by: STUDENT IN AN ORGANIZED HEALTH CARE EDUCATION/TRAINING PROGRAM

## 2023-07-28 PROCEDURE — 25010000002 REGADENOSON 0.4 MG/5ML SOLUTION: Performed by: STUDENT IN AN ORGANIZED HEALTH CARE EDUCATION/TRAINING PROGRAM

## 2023-07-28 RX ORDER — REGADENOSON 0.08 MG/ML
0.4 INJECTION, SOLUTION INTRAVENOUS
Status: COMPLETED | OUTPATIENT
Start: 2023-07-28 | End: 2023-07-28

## 2023-07-28 RX ADMIN — TETROFOSMIN 1 DOSE: 1.38 INJECTION, POWDER, LYOPHILIZED, FOR SOLUTION INTRAVENOUS at 11:20

## 2023-07-28 RX ADMIN — REGADENOSON 0.4 MG: 0.08 INJECTION, SOLUTION INTRAVENOUS at 11:20

## 2023-07-28 NOTE — PROGRESS NOTES
Ms. Grant, your heart ultrasound showed normal heart muscle function without any evidence of heart damage.  There is nothing glaring on this ultrasound that would suggest a cause of chest pain, we will await the stress test results.    The heart muscle does have a mild amount of extra thickness to it, something called mild concentric hypertrophy.  This is likely due to elevated blood pressures in the past.  Keep an eye in your blood pressure at home and the blood pressure goal with this type of finding is less in 130/80, ideally less than 120/80.  Otherwise the ultrasound looked good without any major abnormalities.

## 2023-08-01 ENCOUNTER — HOSPITAL ENCOUNTER (OUTPATIENT)
Dept: CARDIOLOGY | Facility: HOSPITAL | Age: 58
Discharge: HOME | End: 2023-08-01
Payer: COMMERCIAL

## 2023-08-01 LAB
BH CV NUCLEAR PRIOR STUDY: 2
BH CV REST NUCLEAR ISOTOPE DOSE: 35.9 MCI
BH CV STRESS COMMENTS STAGE 1: NORMAL
BH CV STRESS DOSE REGADENOSON STAGE 1: 0.4
BH CV STRESS DURATION MIN STAGE 1: 0
BH CV STRESS DURATION SEC STAGE 1: 10
BH CV STRESS NUCLEAR ISOTOPE DOSE: 37.1 MCI
BH CV STRESS PROTOCOL 1: NORMAL
BH CV STRESS RECOVERY BP: NORMAL MMHG
BH CV STRESS RECOVERY HR: 88 BPM
BH CV STRESS STAGE 1: 1
LV EF NUC BP: 65 %
MAXIMAL PREDICTED HEART RATE: 164 BPM
PERCENT MAX PREDICTED HR: 64.63 %
STRESS BASELINE BP: NORMAL MMHG
STRESS BASELINE HR: 63 BPM
STRESS PERCENT HR: 76 %
STRESS POST PEAK BP: NORMAL MMHG
STRESS POST PEAK HR: 106 BPM
STRESS TARGET HR: 139 BPM

## 2023-08-01 PROCEDURE — 0 TECHNETIUM TETROFOSMIN KIT: Performed by: STUDENT IN AN ORGANIZED HEALTH CARE EDUCATION/TRAINING PROGRAM

## 2023-08-01 PROCEDURE — A9502 TC99M TETROFOSMIN: HCPCS | Performed by: STUDENT IN AN ORGANIZED HEALTH CARE EDUCATION/TRAINING PROGRAM

## 2023-08-01 RX ADMIN — TETROFOSMIN 1 DOSE: 1.38 INJECTION, POWDER, LYOPHILIZED, FOR SOLUTION INTRAVENOUS at 14:08

## 2023-08-02 ENCOUNTER — TELEPHONE (OUTPATIENT)
Dept: CARDIOLOGY | Facility: CLINIC | Age: 58
End: 2023-08-02
Payer: COMMERCIAL

## 2023-09-19 NOTE — PROGRESS NOTES
" Physical Therapy Daily Progress Note    Subjective     Joselyn Grant reports: she rode a bike on Monday after PT, \"I've been tired and sore ever since\"      Objective   See Exercise, Manual, and Modality Logs for complete treatment.       Assessment/Plan  Cautioned patient against riding bike, would be better to stick to stationary bike at this time to decrease risk of fall.  She verbalized understanding.  Tolerated exercises well in clinic without increased pain.   Progress per Plan of Care           Manual Therapy:    0     mins  12388;  Therapeutic Exercise:    45     mins  12080;     Neuromuscular Driss:    0    mins  90725;    Therapeutic Activity:     0     mins  50204;     Gait Trainin     mins  68762;     Ultrasound:     0     mins  72684;    Electrical Stimulation:    15     mins  35246 ( );    Timed Treatment:   45   mins   Total Treatment:     60   mins    Zena Longo PT, DPT  Physical Therapist  [place] License #[id number]          " PROCEDURES:  Robot-assisted repair of left inguinal hernia using da Scott Xi 19-Sep-2023 13:48:49  Tatyana Vega

## 2023-10-16 NOTE — PROGRESS NOTES
Physical Therapy Daily Progress Note    Subjective     Joselyn Grant reports: she felt really good for a couple days after last session, then had onset of sharp anterior shin pain that was so severe it limited her activity, made it difficult to bear weight.  She bought a compression garment that somewhat improves pain, making it easier to walk.  Pt has called MD this morning to see if he wants to see her, has not heard back.      Objective   See Exercise, Manual, and Modality Logs for complete treatment.   (-) SLR  (-) neural tension screen  (-) quadrant  (-) Lisa's  No erythema or localized edema  Left ankle ROM WFL, anterior shin pain increases with DF    Assessment/Plan  Pain does not appear to be related to sciatica.  Likely muscular or cutaneous hypersensitivity.  Will assess effect of STM, resume full exercises as able.  Progress per Plan of Care           Manual Therapy:    8     mins  26056;  Therapeutic Exercise:    30     mins  36328;     Neuromuscular Driss:    0    mins  77141;    Therapeutic Activity:     0     mins  30496;     Gait Trainin     mins  79240;     Ultrasound:     0     mins  65030;    Electrical Stimulation:    15     mins  53693 ( );    Timed Treatment:   38   mins   Total Treatment:     53   mins    Zena Longo, PT, DPT  Physical Therapist  [place] License #[id number]                     Normal for race

## 2023-11-20 ENCOUNTER — TELEPHONE (OUTPATIENT)
Dept: PEDIATRICS | Facility: OTHER | Age: 58
End: 2023-11-20
Payer: COMMERCIAL

## 2023-11-20 DIAGNOSIS — R60.0 MILD PERIPHERAL EDEMA: ICD-10-CM

## 2023-11-20 DIAGNOSIS — I10 ESSENTIAL HYPERTENSION: ICD-10-CM

## 2023-11-20 DIAGNOSIS — I10 PRIMARY HYPERTENSION: ICD-10-CM

## 2023-11-20 RX ORDER — METOPROLOL SUCCINATE 100 MG/1
100 TABLET, EXTENDED RELEASE ORAL DAILY
Qty: 90 TABLET | Refills: 3 | Status: CANCELLED | OUTPATIENT
Start: 2023-11-20

## 2023-11-20 RX ORDER — HYDROCHLOROTHIAZIDE 12.5 MG/1
12.5 TABLET ORAL DAILY
Qty: 90 TABLET | Refills: 3 | Status: CANCELLED | OUTPATIENT
Start: 2023-11-20

## 2023-11-20 NOTE — TELEPHONE ENCOUNTER
A new encounter will be opened and refill will be sent, as this was opened under wrong department.

## 2023-11-20 NOTE — TELEPHONE ENCOUNTER
Caller: Joselyn Grant    Relationship: Self    Best call back number: [phone]  Requested Prescriptions:   Requested Prescriptions      No prescriptions requested or ordered in this encounter        Pharmacy where request should be sent: [facility] - [address] [phone] [facility] [phone] FX     Last office visit with prescribing clinician: 7/10/2023   Last telemedicine visit with prescribing clinician: Visit date not found   Next office visit with prescribing clinician: Visit date not found     Additional details provided by patient: WILL NEED A NEW PRESCRIPTION SENT  TO PHARMACY TO THE PHARMACY     Does the patient have less than a 3 day supply:  [x] Yes  [] No    Would you like a call back once the refill request has been completed: [x] Yes [] No    If the office needs to give you a call back, can they leave a voicemail: [x] Yes [] No    Aleksey Singh Rep   11/20/23 11:04 EST

## 2023-11-20 NOTE — TELEPHONE ENCOUNTER
Caller: Grant Joselyn A    Relationship: Self    Best call back number: [phone]    Requested Prescriptions:   Requested Prescriptions     Pending Prescriptions Disp Refills    metoprolol succinate XL (TOPROL-XL) 100 MG 24 hr tablet 90 tablet 3     Sig: Take 1 tablet by mouth Daily.    hydroCHLOROthiazide (HYDRODIURIL) 12.5 MG tablet 90 tablet 3     Sig: Take 1 tablet by mouth Daily.        Pharmacy where request should be sent: [id number] [phone] [facility] [phone]      Last office visit with prescribing clinician: 7/10/2023   Last telemedicine visit with prescribing clinician: Visit date not found   Next office visit with prescribing clinician: Visit date not found     Additional details provided by patient: REQUESTING A 10 DAY SUPPLY WHILE WAITING ON ORDER FROM [facility] [employer] .  WILL NEED A NEW PRESCRIPTION SENT TO THE  PHARMACY THEY ARE SENDING HER A 90 DAY SUPPLY     Does the patient have less than a 3 day supply:  [x] Yes  [] No    Would you like a call back once the refill request has been completed: [] Yes [x] No    If the office needs to give you a call back, can they leave a voicemail: [] Yes [x] No    Aleksey Singh Rep   11/20/23 11:00 EST

## 2023-11-20 NOTE — TELEPHONE ENCOUNTER
Rx Refill Note  Requested Prescriptions     Pending Prescriptions Disp Refills    metoprolol succinate XL (TOPROL-XL) 100 MG 24 hr tablet 90 tablet 3     Sig: Take 1 tablet by mouth Daily.    hydroCHLOROthiazide (HYDRODIURIL) 12.5 MG tablet 90 tablet 3     Sig: Take 1 tablet by mouth Daily.      Last office visit with prescribing clinician: 7/10/2023   Last telemedicine visit with prescribing clinician: Visit date not found   Next office visit with prescribing clinician: 11/20/2023       Hardik Gutierrez MA  11/20/23, 12:10 EST

## 2023-11-28 DIAGNOSIS — R60.0 MILD PERIPHERAL EDEMA: ICD-10-CM

## 2023-11-28 DIAGNOSIS — I10 PRIMARY HYPERTENSION: ICD-10-CM

## 2023-11-28 DIAGNOSIS — I10 ESSENTIAL HYPERTENSION: ICD-10-CM

## 2023-11-28 RX ORDER — AMLODIPINE BESYLATE 5 MG/1
5 TABLET ORAL DAILY
Qty: 90 TABLET | Refills: 0 | Status: SHIPPED | OUTPATIENT
Start: 2023-11-28 | End: 2023-12-01 | Stop reason: SDUPTHER

## 2023-11-28 RX ORDER — METOPROLOL SUCCINATE 100 MG/1
100 TABLET, EXTENDED RELEASE ORAL DAILY
Qty: 90 TABLET | Refills: 0 | Status: SHIPPED | OUTPATIENT
Start: 2023-11-28 | End: 2023-12-01 | Stop reason: SDUPTHER

## 2023-11-28 RX ORDER — ATORVASTATIN CALCIUM 10 MG/1
10 TABLET, FILM COATED ORAL DAILY
Qty: 90 TABLET | Refills: 0 | Status: SHIPPED | OUTPATIENT
Start: 2023-11-28

## 2023-11-28 RX ORDER — HYDROCHLOROTHIAZIDE 12.5 MG/1
12.5 TABLET ORAL DAILY
Qty: 90 TABLET | Refills: 0 | Status: SHIPPED | OUTPATIENT
Start: 2023-11-28 | End: 2023-12-01 | Stop reason: SDUPTHER

## 2023-12-01 ENCOUNTER — TELEPHONE (OUTPATIENT)
Dept: INTERNAL MEDICINE | Facility: CLINIC | Age: 58
End: 2023-12-01
Payer: COMMERCIAL

## 2023-12-01 DIAGNOSIS — I10 PRIMARY HYPERTENSION: ICD-10-CM

## 2023-12-01 DIAGNOSIS — R60.0 MILD PERIPHERAL EDEMA: ICD-10-CM

## 2023-12-01 DIAGNOSIS — I10 ESSENTIAL HYPERTENSION: ICD-10-CM

## 2023-12-01 RX ORDER — METOPROLOL SUCCINATE 100 MG/1
100 TABLET, EXTENDED RELEASE ORAL DAILY
Qty: 90 TABLET | Refills: 0 | Status: SHIPPED | OUTPATIENT
Start: 2023-12-01

## 2023-12-01 RX ORDER — AMLODIPINE BESYLATE 5 MG/1
5 TABLET ORAL DAILY
Qty: 90 TABLET | Refills: 0 | Status: SHIPPED | OUTPATIENT
Start: 2023-12-01

## 2023-12-01 RX ORDER — HYDROCHLOROTHIAZIDE 12.5 MG/1
12.5 TABLET ORAL DAILY
Qty: 90 TABLET | Refills: 0 | Status: SHIPPED | OUTPATIENT
Start: 2023-12-01

## 2023-12-01 NOTE — TELEPHONE ENCOUNTER
Caller: ARIEL WITH [facility]    Relationship:     Best call back number: [phone]     Requested Prescriptions:   amLODIPine (NORVASC) 5 MG tablet     hydroCHLOROthiazide (HYDRODIURIL) 12.5 MG tablet     metoprolol succinate XL (TOPROL-XL) 100 MG 24 hr tablet      Pharmacy where request should be sent:  [facility] - [address] [phone] [facility] [phone]      Last office visit with prescribing clinician: 7/10/2023   Last telemedicine visit with prescribing clinician: 7/13/2023   Next office visit with prescribing clinician: Visit date not found     Additional details provided by patient: PATIENT'S PHARMACY CALLED FOR A REFILL ON THESE MEDICATIONS.     Does the patient have less than a 3 day supply:  [] Yes  [] No    Would you like a call back once the refill request has been completed: [] Yes [] No    If the office needs to give you a call back, can they leave a voicemail: [] Yes [] No    Aleksey Hogan Rep   12/01/23 09:47 EST         THANKS

## 2023-12-06 RX ORDER — ATORVASTATIN CALCIUM 10 MG/1
10 TABLET, FILM COATED ORAL DAILY
Qty: 90 TABLET | Refills: 0 | OUTPATIENT
Start: 2023-12-06

## 2024-02-09 DIAGNOSIS — I10 ESSENTIAL HYPERTENSION: ICD-10-CM

## 2024-02-09 DIAGNOSIS — I10 PRIMARY HYPERTENSION: ICD-10-CM

## 2024-02-09 DIAGNOSIS — R60.0 MILD PERIPHERAL EDEMA: ICD-10-CM

## 2024-02-09 RX ORDER — AMLODIPINE BESYLATE 5 MG/1
5 TABLET ORAL DAILY
Qty: 60 TABLET | Refills: 0 | Status: SHIPPED | OUTPATIENT
Start: 2024-02-09

## 2024-02-09 RX ORDER — ATORVASTATIN CALCIUM 10 MG/1
10 TABLET, FILM COATED ORAL DAILY
Qty: 60 TABLET | Refills: 0 | Status: SHIPPED | OUTPATIENT
Start: 2024-02-09

## 2024-02-09 RX ORDER — HYDROCHLOROTHIAZIDE 12.5 MG/1
12.5 TABLET ORAL DAILY
Qty: 60 TABLET | Refills: 0 | Status: SHIPPED | OUTPATIENT
Start: 2024-02-09

## 2024-02-09 RX ORDER — METOPROLOL SUCCINATE 100 MG/1
100 TABLET, EXTENDED RELEASE ORAL DAILY
Qty: 60 TABLET | Refills: 0 | Status: SHIPPED | OUTPATIENT
Start: 2024-02-09

## 2024-02-09 NOTE — TELEPHONE ENCOUNTER
Rx Refill Note  Requested Prescriptions     Pending Prescriptions Disp Refills    hydroCHLOROthiazide (HYDRODIURIL) 12.5 MG tablet [Pharmacy Med Name: HYDROCHLOROTHIAZIDE TABS 12.5MG] 90 tablet 3     Sig: TAKE 1 TABLET DAILY    metoprolol succinate XL (TOPROL-XL) 100 MG 24 hr tablet [Pharmacy Med Name: METOPROLOL SUCCINATE ER TABS 100MG] 90 tablet 3     Sig: TAKE 1 TABLET DAILY    amLODIPine (NORVASC) 5 MG tablet [Pharmacy Med Name: AMLODIPINE BESYLATE TABS 5MG] 90 tablet 3     Sig: TAKE 1 TABLET DAILY    atorvastatin (LIPITOR) 10 MG tablet [Pharmacy Med Name: ATORVASTATIN TABS 10MG] 90 tablet 3     Sig: TAKE 1 TABLET DAILY      Last office visit with prescribing clinician: 7/10/2023   I left her a  to schedule f/u    Hardik Gutierrez MA  02/09/24, 11:22 EST

## 2024-03-01 ENCOUNTER — PATIENT MESSAGE (OUTPATIENT)
Dept: INTERNAL MEDICINE | Facility: CLINIC | Age: 59
End: 2024-03-01
Payer: COMMERCIAL

## 2024-03-04 NOTE — TELEPHONE ENCOUNTER
From: Joselyn Grant  To: Mariann Alves  Sent: 3/1/2024 3:20 PM EST  Subject: weight loss    Hello I was wondering if I could get prescription for the weight loss medication?     I am not losing any weight and now my only good knee is having issues.   I have also relocated for work to [place] and looking for PCP there but I've been flying back and forth.

## 2024-03-12 ENCOUNTER — PROCEDURE VISIT (OUTPATIENT)
Dept: OBSTETRICS AND GYNECOLOGY | Facility: CLINIC | Age: 59
End: 2024-03-12
Payer: COMMERCIAL

## 2024-03-12 DIAGNOSIS — Z12.31 VISIT FOR SCREENING MAMMOGRAM: Primary | ICD-10-CM

## 2024-03-13 ENCOUNTER — OFFICE VISIT (OUTPATIENT)
Dept: INTERNAL MEDICINE | Facility: CLINIC | Age: 59
End: 2024-03-13
Payer: COMMERCIAL

## 2024-03-13 ENCOUNTER — TELEPHONE (OUTPATIENT)
Dept: INTERNAL MEDICINE | Facility: CLINIC | Age: 59
End: 2024-03-13

## 2024-03-13 VITALS
RESPIRATION RATE: 18 BRPM | BODY MASS INDEX: 47.77 KG/M2 | OXYGEN SATURATION: 97 % | TEMPERATURE: 97 F | SYSTOLIC BLOOD PRESSURE: 128 MMHG | HEART RATE: 67 BPM | DIASTOLIC BLOOD PRESSURE: 80 MMHG | HEIGHT: 61 IN | WEIGHT: 253 LBS

## 2024-03-13 DIAGNOSIS — R73.03 PREDIABETES: ICD-10-CM

## 2024-03-13 DIAGNOSIS — E66.01 MORBID OBESITY: ICD-10-CM

## 2024-03-13 DIAGNOSIS — E55.9 VITAMIN D DEFICIENCY: ICD-10-CM

## 2024-03-13 DIAGNOSIS — I10 PRIMARY HYPERTENSION: Primary | ICD-10-CM

## 2024-03-13 DIAGNOSIS — Z00.00 HEALTHCARE MAINTENANCE: ICD-10-CM

## 2024-03-13 DIAGNOSIS — E78.49 OTHER HYPERLIPIDEMIA: ICD-10-CM

## 2024-03-13 NOTE — PROGRESS NOTES
"Chief Complaint  Weight Loss (Requesting medication to help pt is fasting)  Healthcare maintenance    Subjective        Joselyn Grant presents to [facility]  History of Present Illness    Patient is a pleasant 58-year-old female who have seen in the office previously.  Patient is here today for a healthcare maintenance visit/follow-up on chronic health conditions.  Patient is also requesting a new medication due to her history of obesity.  She is requesting Wegovy at this time.    Patient does see orthopedic specialist for her R knee problem (Althea and nurse practitioner).  R wrist bone spurs.  She does follow Ortho at this time.  No other problems on today's office visit.    Objective   Vital Signs:  /80   Pulse 67   Temp 97 °F (36.1 °C)   Resp 18   Ht 154 cm (60.63\")   Wt 115 kg (253 lb)   SpO2 97%   BMI 48.39 kg/m²   Estimated body mass index is 48.39 kg/m² as calculated from the following:    Height as of this encounter: 154 cm (60.63\").    Weight as of this encounter: 115 kg (253 lb).       Class 3 Severe Obesity (BMI >=40). Obesity-related health conditions include the following: hypertension and dyslipidemias. Obesity is unchanged. BMI is is above average; BMI management plan is completed. We discussed portion control, increasing exercise, joining a fitness center or start home based exercise program, Weight Watchers or other Commercial based weight reduction program, and an roopa-based approach such as [employer] Pal or Lose It.       Physical Exam  Vitals and nursing note reviewed.   Constitutional:       Appearance: Normal appearance. She is obese.   HENT:      Head: Normocephalic.      Nose: Nose normal.      Mouth/Throat:      Mouth: Mucous membranes are moist.   Eyes:      Pupils: Pupils are equal, round, and reactive to light.   Cardiovascular:      Rate and Rhythm: Normal rate and regular rhythm.      Pulses: Normal pulses.      Heart sounds: Normal " heart sounds.   Pulmonary:      Effort: Pulmonary effort is normal. No respiratory distress.      Breath sounds: Normal breath sounds. No stridor. No wheezing, rhonchi or rales.   Chest:      Chest wall: No tenderness.   Musculoskeletal:         General: Normal range of motion.   Skin:     General: Skin is warm.      Capillary Refill: Capillary refill takes less than 2 seconds.   Neurological:      Mental Status: She is alert and oriented to person, place, and time.   Psychiatric:         Behavior: Behavior normal.        Result Review :      Common labs          7/10/2023    09:07   Common Labs   Glucose 98    BUN 19    Creatinine 0.92    Sodium 141    Potassium 4.4    Chloride 102    Calcium 10.6    Total Protein 7.4    Albumin 4.5    Total Bilirubin 0.2    Alkaline Phosphatase 89    AST (SGOT) 12    ALT (SGPT) 12    WBC 4.72    Hemoglobin 12.9    Hematocrit 38.8    Platelets 246    Total Cholesterol 256    Triglycerides 139    HDL Cholesterol 71    LDL Cholesterol  160    Hemoglobin A1C 5.80                   Assessment and Plan     Diagnoses and all orders for this visit:    1. Primary hypertension (Primary)  Assessment & Plan:  Chronic/stable  Hypertension is improving with treatment.  Continue current treatment regimen.  Dietary sodium restriction.  Weight loss.  Regular aerobic exercise.  Ambulatory blood pressure monitoring.  Blood pressure will be reassessed in 3 months.  Patient currently at goal 128/80.  Currently taking amlodipine 5 mg tablet daily, hydrochlorothiazide at 12.5 mg daily, also taking metoprolol succinate  mg q. 24-hour tablet daily.  Denies any side effects of the medication at this time.   Will continue follow up with cardiology at this time.     Orders:  -     CBC & Differential  -     Comprehensive Metabolic Panel  -     Hemoglobin A1c  -     Vitamin D 25 hydroxy    2. Other hyperlipidemia  Assessment & Plan:  Chronic/unstable  Lipid abnormalities are worsening.  Nutritional  counseling was provided. and Pharmacotherapy as ordered.  Lipids will be reassessed in today.     Patient will start taking atorvastatin 10 mg tablet daily.  Nutritional counseling was given on today's office visit.  She is aware to increase her lean proteins decrease her saturated fat content and to increase veggies.  She will increase exercise to at least 3 times per week and increase her water intake.  We will see patient back in 3 months for recheck    Orders:  -     CBC & Differential  -     Comprehensive Metabolic Panel  -     Hemoglobin A1c  -     Vitamin D 25 hydroxy    3. Morbid obesity  Assessment & Plan:  Chronic/Unstable   Patient's (Body mass index is 48.39 kg/m².) indicates that they are morbidly/severely obese (BMI > 40 or > 35 with obesity - related health condition) with health conditions that include hypertension, dyslipidemias, and osteoarthritis . Weight is worsening. BMI  is above average; BMI management plan is completed. We discussed portion control, increasing exercise, joining a fitness center or start home based exercise program, Weight Watchers or other Commercial based weight reduction program, pharmacologic options including Wegovy, and an roopa-based approach such as [employer] Pal or Lose It.     Patient will contact her insurance company and ask about Wegovy coverage.   She does qualify for this medication at this time.   We will await her lab results at this time.     Orders:  -     CBC & Differential  -     Comprehensive Metabolic Panel  -     Hemoglobin A1c  -     Vitamin D 25 hydroxy    4. Prediabetes  -     CBC & Differential  -     Comprehensive Metabolic Panel  -     Hemoglobin A1c  -     Vitamin D 25 hydroxy    5. Vitamin D deficiency  -     CBC & Differential  -     Comprehensive Metabolic Panel  -     Hemoglobin A1c  -     Vitamin D 25 hydroxy    6. Healthcare maintenance    We will contact patient with her laboratory results.  We will await her approval from insurance company  to approve Wegovy.  Patient agrees with treatment plan at this time.  Patient will also find a nonimpact exercise that she can do at least 2-3 times per week and also include weight training several times a week.       I spent 35 minutes caring for Joselyn on this date of service. This time includes time spent by me in the following activities:preparing for the visit, reviewing tests, obtaining and/or reviewing a separately obtained history, performing a medically appropriate examination and/or evaluation , counseling and educating the patient/family/caregiver, ordering medications, tests, or procedures, referring and communicating with other health care professionals , documenting information in the medical record, independently interpreting results and communicating that information with the patient/family/caregiver, and care coordination  Follow Up     Return in about 3 months (around 6/13/2024) for Recheck.  Patient was given instructions and counseling regarding her condition or for health maintenance advice. Please see specific information pulled into the AVS if appropriate.

## 2024-03-13 NOTE — ASSESSMENT & PLAN NOTE
Chronic/unstable  Lipid abnormalities are worsening.  Nutritional counseling was provided. and Pharmacotherapy as ordered.  Lipids will be reassessed in today.     Patient will start taking atorvastatin 10 mg tablet daily.  Nutritional counseling was given on today's office visit.  She is aware to increase her lean proteins decrease her saturated fat content and to increase veggies.  She will increase exercise to at least 3 times per week and increase her water intake.  We will see patient back in 3 months for recheck

## 2024-03-13 NOTE — TELEPHONE ENCOUNTER
PATIENT CALLED AND STATES SHE SPOKE WITH HER INSURANCE COMPANY AND THEY DON'T COVER  WEGOVY. THEY WILL COVER JESS AND AND ZEPBOUND QUICKER AND NEEDS TO BE FOR MEDICAL REASON. NOT JUST TO LOSE WEIGHT.    CALL BACK NUMBER [phone]

## 2024-03-13 NOTE — ASSESSMENT & PLAN NOTE
Chronic/stable  Hypertension is improving with treatment.  Continue current treatment regimen.  Dietary sodium restriction.  Weight loss.  Regular aerobic exercise.  Ambulatory blood pressure monitoring.  Blood pressure will be reassessed in 3 months.  Patient currently at goal 128/80.  Currently taking amlodipine 5 mg tablet daily, hydrochlorothiazide at 12.5 mg daily, also taking metoprolol succinate  mg q. 24-hour tablet daily.  Denies any side effects of the medication at this time.   Will continue follow up with cardiology at this time.

## 2024-03-13 NOTE — ASSESSMENT & PLAN NOTE
Chronic/Unstable   Patient's (Body mass index is 48.39 kg/m².) indicates that they are morbidly/severely obese (BMI > 40 or > 35 with obesity - related health condition) with health conditions that include hypertension, dyslipidemias, and osteoarthritis . Weight is worsening. BMI  is above average; BMI management plan is completed. We discussed portion control, increasing exercise, joining a fitness center or start home based exercise program, Weight Watchers or other Commercial based weight reduction program, pharmacologic options including Wegovy, and an roopa-based approach such as [employer] Pal or Lose It.     Patient will contact her insurance company and ask about Wegovy coverage.   She does qualify for this medication at this time.   We will await her lab results at this time.

## 2024-03-14 LAB
25(OH)D3+25(OH)D2 SERPL-MCNC: 41.6 NG/ML (ref 30–100)
ALBUMIN SERPL-MCNC: 4.3 G/DL (ref 3.5–5.2)
ALBUMIN/GLOB SERPL: 1.4 G/DL
ALP SERPL-CCNC: 110 U/L (ref 39–117)
ALT SERPL-CCNC: 8 U/L (ref 1–33)
AST SERPL-CCNC: 14 U/L (ref 1–32)
BASOPHILS # BLD AUTO: 0.04 10*3/MM3 (ref 0–0.2)
BASOPHILS NFR BLD AUTO: 0.9 % (ref 0–1.5)
BILIRUB SERPL-MCNC: 0.3 MG/DL (ref 0–1.2)
BUN SERPL-MCNC: 17 MG/DL (ref 6–20)
BUN/CREAT SERPL: 18.3 (ref 7–25)
CALCIUM SERPL-MCNC: 9.9 MG/DL (ref 8.6–10.5)
CHLORIDE SERPL-SCNC: 100 MMOL/L (ref 98–107)
CO2 SERPL-SCNC: 28.8 MMOL/L (ref 22–29)
CREAT SERPL-MCNC: 0.93 MG/DL (ref 0.57–1)
EGFRCR SERPLBLD CKD-EPI 2021: 71.8 ML/MIN/1.73
EOSINOPHIL # BLD AUTO: 0.07 10*3/MM3 (ref 0–0.4)
EOSINOPHIL NFR BLD AUTO: 1.5 % (ref 0.3–6.2)
ERYTHROCYTE [DISTWIDTH] IN BLOOD BY AUTOMATED COUNT: 14.4 % (ref 12.3–15.4)
GLOBULIN SER CALC-MCNC: 3.1 GM/DL
GLUCOSE SERPL-MCNC: 90 MG/DL (ref 65–99)
HBA1C MFR BLD: 6.1 % (ref 4.8–5.6)
HCT VFR BLD AUTO: 41.3 % (ref 34–46.6)
HGB BLD-MCNC: 13.2 G/DL (ref 12–15.9)
IMM GRANULOCYTES # BLD AUTO: 0.01 10*3/MM3 (ref 0–0.05)
IMM GRANULOCYTES NFR BLD AUTO: 0.2 % (ref 0–0.5)
LYMPHOCYTES # BLD AUTO: 1.85 10*3/MM3 (ref 0.7–3.1)
LYMPHOCYTES NFR BLD AUTO: 39.6 % (ref 19.6–45.3)
MCH RBC QN AUTO: 27.7 PG (ref 26.6–33)
MCHC RBC AUTO-ENTMCNC: 32 G/DL (ref 31.5–35.7)
MCV RBC AUTO: 86.8 FL (ref 79–97)
MONOCYTES # BLD AUTO: 0.45 10*3/MM3 (ref 0.1–0.9)
MONOCYTES NFR BLD AUTO: 9.6 % (ref 5–12)
NEUTROPHILS # BLD AUTO: 2.25 10*3/MM3 (ref 1.7–7)
NEUTROPHILS NFR BLD AUTO: 48.2 % (ref 42.7–76)
NRBC BLD AUTO-RTO: 0 /100 WBC (ref 0–0.2)
PLATELET # BLD AUTO: 279 10*3/MM3 (ref 140–450)
POTASSIUM SERPL-SCNC: 4.9 MMOL/L (ref 3.5–5.2)
PROT SERPL-MCNC: 7.4 G/DL (ref 6–8.5)
RBC # BLD AUTO: 4.76 10*6/MM3 (ref 3.77–5.28)
SODIUM SERPL-SCNC: 138 MMOL/L (ref 136–145)
WBC # BLD AUTO: 4.67 10*3/MM3 (ref 3.4–10.8)

## 2024-03-15 ENCOUNTER — TELEMEDICINE (OUTPATIENT)
Dept: INTERNAL MEDICINE | Facility: CLINIC | Age: 59
End: 2024-03-15
Payer: COMMERCIAL

## 2024-03-15 DIAGNOSIS — Z71.3 NUTRITIONAL COUNSELING: ICD-10-CM

## 2024-03-15 DIAGNOSIS — E66.01 MORBID OBESITY: ICD-10-CM

## 2024-03-15 DIAGNOSIS — I10 PRIMARY HYPERTENSION: Primary | ICD-10-CM

## 2024-03-15 DIAGNOSIS — R73.03 PREDIABETES: ICD-10-CM

## 2024-03-15 DIAGNOSIS — Z09 FOLLOW-UP EXAM: ICD-10-CM

## 2024-03-15 NOTE — PROGRESS NOTES
"Chief Complaint  Recheck Abnormal Labs     Subjective        Joselyn Grant presents to [facility]  History of Present Illness    You have chosen to receive care through a telehealth visit.  Do you consent to use a video/audio connection for your medical care today? Yes    Patient is a pleasant 58 year old female.   We are doing a telehealth visit today to discuss abnormal labs.   She is in [place], [place] and I am on campus in [place], [place] doing a telehealth conference today.     She wants a weight loss drug/Wegovy/semaglutide at this time.  No other problems on today's office visit.        Objective   Vital Signs:  There were no vitals taken for this visit.  Estimated body mass index is 49.35 kg/m² as calculated from the following:    Height as of 3/26/24: 154 cm (60.63\").    Weight as of 3/26/24: 117 kg (258 lb).       Class 3 Severe Obesity (BMI >=40). Obesity-related health conditions include the following: hypertension, dyslipidemias, and osteoarthritis. Obesity is worsening. BMI is is above average; BMI management plan is completed. We discussed portion control, increasing exercise, joining a fitness center or start home based exercise program, Weight Watchers or other Commercial based weight reduction program, pharmacologic options including semaglutide, and an roopa-based approach such as [employer] Pal or Lose It.       Physical Exam  Constitutional:       Appearance: Normal appearance. She is obese.   Pulmonary:      Comments: Denies shortness of breath  Neurological:      Mental Status: She is alert and oriented to person, place, and time.   Psychiatric:         Behavior: Behavior normal.        Result Review :      Common labs          7/10/2023    09:07 3/13/2024    10:02   Common Labs   Glucose 98  90    BUN 19  17    Creatinine 0.92  0.93    Sodium 141  138    Potassium 4.4  4.9    Chloride 102  100    Calcium 10.6  9.9    Total Protein 7.4  7.4    Albumin 4.5  4.3  "   Total Bilirubin 0.2  0.3    Alkaline Phosphatase 89  110    AST (SGOT) 12  14    ALT (SGPT) 12  8    WBC 4.72  4.67    Hemoglobin 12.9  13.2    Hematocrit 38.8  41.3    Platelets 246  279    Total Cholesterol 256     Triglycerides 139     HDL Cholesterol 71     LDL Cholesterol  160     Hemoglobin A1C 5.80  6.10                   Assessment and Plan     Diagnoses and all orders for this visit:    1. Primary hypertension (Primary)    2. Prediabetes  Assessment & Plan:  Prediabetic at 6.1.  Nutritional counseling has been provided.  Will recheck in 3 months.  Semaglutide start.      3. Morbid obesity  Assessment & Plan:  Chronic/unstable  Patient's (Body mass index is 49.35 kg/m².) indicates that they are morbidly/severely obese (BMI > 40 or > 35 with obesity - related health condition) with health conditions that include hypertension and dyslipidemias . Weight is worsening. BMI  is above average; BMI management plan is completed. We discussed portion control, increasing exercise, joining a fitness center or start home based exercise program, Weight Watchers or other Commercial based weight reduction program, and pharmacologic options including semaglutide .     Prescription has been sent in for semaglutide to start.  She will return to the office for administration/education.      4. Follow-up exam    5. Nutritional counseling           I spent 30 minutes caring for Joselyn on this date of service. This time includes time spent by me in the following activities:preparing for the visit, reviewing tests, obtaining and/or reviewing a separately obtained history, performing a medically appropriate examination and/or evaluation , counseling and educating the patient/family/caregiver, ordering medications, tests, or procedures, referring and communicating with other health care professionals , documenting information in the medical record, independently interpreting results and communicating that information with the  patient/family/caregiver, and care coordination  Follow Up     Return in about 4 weeks (around 4/12/2024) for Recheck.  Patient was given instructions and counseling regarding her condition or for health maintenance advice. Please see specific information pulled into the AVS if appropriate.

## 2024-03-26 ENCOUNTER — OFFICE VISIT (OUTPATIENT)
Dept: INTERNAL MEDICINE | Facility: CLINIC | Age: 59
End: 2024-03-26
Payer: COMMERCIAL

## 2024-03-26 VITALS
HEART RATE: 80 BPM | TEMPERATURE: 96.3 F | DIASTOLIC BLOOD PRESSURE: 72 MMHG | BODY MASS INDEX: 48.71 KG/M2 | OXYGEN SATURATION: 98 % | HEIGHT: 61 IN | SYSTOLIC BLOOD PRESSURE: 118 MMHG | WEIGHT: 258 LBS | RESPIRATION RATE: 16 BRPM

## 2024-03-26 DIAGNOSIS — R73.03 PREDIABETES: ICD-10-CM

## 2024-03-26 DIAGNOSIS — Z71.3 NUTRITIONAL COUNSELING: ICD-10-CM

## 2024-03-26 DIAGNOSIS — E66.01 MORBID OBESITY: Primary | ICD-10-CM

## 2024-03-26 NOTE — PROGRESS NOTES
"Chief Complaint  Obesity    Subjective        Joselyn Grant presents to [facility] PRIMARY CARE  History of Present Illness    Patient is a pleasant 58-year-old female who have seen in the office previously.  We are doing a follow-up today on her chronic condition of obesity and starting semaglutide at this time.  Patient is starting semaglutide with B12 from [facility].  No other problems on today's office visit.    Objective   Vital Signs:  /72   Pulse 80   Temp 96.3 °F (35.7 °C)   Resp 16   Ht 154 cm (60.63\")   Wt 117 kg (258 lb)   SpO2 98%   BMI 49.35 kg/m²   Estimated body mass index is 49.35 kg/m² as calculated from the following:    Height as of this encounter: 154 cm (60.63\").    Weight as of this encounter: 117 kg (258 lb).       Class 3 Severe Obesity (BMI >=40). Obesity-related health conditions include the following: hypertension and dyslipidemias. Obesity is worsening. BMI is is above average; BMI management plan is completed. We discussed portion control, increasing exercise, joining a fitness center or start home based exercise program, Weight Watchers or other Commercial based weight reduction program, consulting a Bariatric surgeon, pharmacologic options including semaglutide, and an roopa-based approach such as Leetchi Pal or Lose It.       Physical Exam  Vitals and nursing note reviewed.   Constitutional:       Appearance: Normal appearance. She is obese.   HENT:      Head: Normocephalic.      Nose: Nose normal.      Mouth/Throat:      Mouth: Mucous membranes are moist.   Eyes:      Pupils: Pupils are equal, round, and reactive to light.   Cardiovascular:      Rate and Rhythm: Normal rate and regular rhythm.      Pulses: Normal pulses.      Heart sounds: Normal heart sounds.      Comments: No peripheral edema  Pulmonary:      Effort: Pulmonary effort is normal. No respiratory distress.      Breath sounds: Normal breath sounds. No stridor. No wheezing, " rhonchi or rales.      Comments: Denies shortness of breath  Chest:      Chest wall: No tenderness.   Musculoskeletal:         General: Normal range of motion.   Skin:     General: Skin is warm.      Capillary Refill: Capillary refill takes less than 2 seconds.   Neurological:      Mental Status: She is alert and oriented to person, place, and time.   Psychiatric:         Behavior: Behavior normal.        Result Review :      Common labs          7/10/2023    09:07 3/13/2024    10:02   Common Labs   Glucose 98  90    BUN 19  17    Creatinine 0.92  0.93    Sodium 141  138    Potassium 4.4  4.9    Chloride 102  100    Calcium 10.6  9.9    Total Protein 7.4  7.4    Albumin 4.5  4.3    Total Bilirubin 0.2  0.3    Alkaline Phosphatase 89  110    AST (SGOT) 12  14    ALT (SGPT) 12  8    WBC 4.72  4.67    Hemoglobin 12.9  13.2    Hematocrit 38.8  41.3    Platelets 246  279    Total Cholesterol 256     Triglycerides 139     HDL Cholesterol 71     LDL Cholesterol  160     Hemoglobin A1C 5.80  6.10                   Assessment and Plan     Diagnoses and all orders for this visit:    1. Morbid obesity (Primary)  Assessment & Plan:  Chronic/unstable  Patient's (Body mass index is 49.35 kg/m².) indicates that they are morbidly/severely obese (BMI > 40 or > 35 with obesity - related health condition) with health conditions that include hypertension and dyslipidemias . Weight is worsening. BMI  is above average; BMI management plan is completed. We discussed portion control, increasing exercise, joining a fitness center or start home based exercise program, Weight Watchers or other Commercial based weight reduction program, and pharmacologic options including semaglutide .    Patient brought in her semaglutide injection to be taught on subcutaneous injections.  Administered 0.25 mg of semaglutide subcu abdomen on the left side.  Patient will administer weekly for the next 5 weeks and come back in for follow-up examination.      2.  Prediabetes    3. Nutritional counseling           I spent 30 minutes caring for Joselyn on this date of service. This time includes time spent by me in the following activities:preparing for the visit, reviewing tests, obtaining and/or reviewing a separately obtained history, performing a medically appropriate examination and/or evaluation , counseling and educating the patient/family/caregiver, ordering medications, tests, or procedures, referring and communicating with other health care professionals , documenting information in the medical record, independently interpreting results and communicating that information with the patient/family/caregiver, and care coordination  Follow Up     Return for Next scheduled follow up.  Patient was given instructions and counseling regarding her condition or for health maintenance advice. Please see specific information pulled into the AVS if appropriate.

## 2024-03-26 NOTE — ASSESSMENT & PLAN NOTE
Chronic/unstable  Patient's (Body mass index is 49.35 kg/m².) indicates that they are morbidly/severely obese (BMI > 40 or > 35 with obesity - related health condition) with health conditions that include hypertension and dyslipidemias . Weight is worsening. BMI  is above average; BMI management plan is completed. We discussed portion control, increasing exercise, joining a fitness center or start home based exercise program, Weight Watchers or other Commercial based weight reduction program, and pharmacologic options including semaglutide .    Patient brought in her semaglutide injection to be taught on subcutaneous injections.  Administered 0.25 mg of semaglutide subcu abdomen on the left side.  Patient will administer weekly for the next 5 weeks and come back in for follow-up examination.

## 2024-03-29 NOTE — ASSESSMENT & PLAN NOTE
Prediabetic at 6.1.  Nutritional counseling has been provided.  Will recheck in 3 months.  Semaglutide start.

## 2024-03-29 NOTE — ASSESSMENT & PLAN NOTE
Chronic/unstable  Patient's (Body mass index is 49.35 kg/m².) indicates that they are morbidly/severely obese (BMI > 40 or > 35 with obesity - related health condition) with health conditions that include hypertension and dyslipidemias . Weight is worsening. BMI  is above average; BMI management plan is completed. We discussed portion control, increasing exercise, joining a fitness center or start home based exercise program, Weight Watchers or other Commercial based weight reduction program, and pharmacologic options including semaglutide .     Prescription has been sent in for semaglutide to start.  She will return to the office for administration/education.

## 2024-04-08 RX ORDER — ATORVASTATIN CALCIUM 10 MG/1
10 TABLET, FILM COATED ORAL DAILY
Qty: 60 TABLET | Refills: 5 | Status: SHIPPED | OUTPATIENT
Start: 2024-04-08

## 2024-05-06 DIAGNOSIS — Z79.890 HORMONE REPLACEMENT THERAPY (HRT): ICD-10-CM

## 2024-05-06 RX ORDER — ESTRADIOL 0.07 MG/D
FILM, EXTENDED RELEASE TRANSDERMAL
Qty: 24 PATCH | Refills: 3 | Status: SHIPPED | OUTPATIENT
Start: 2024-05-06

## 2024-05-07 ENCOUNTER — TELEMEDICINE (OUTPATIENT)
Dept: INTERNAL MEDICINE | Facility: CLINIC | Age: 59
End: 2024-05-07
Payer: COMMERCIAL

## 2024-05-07 VITALS — WEIGHT: 247 LBS | BODY MASS INDEX: 47.24 KG/M2

## 2024-05-07 DIAGNOSIS — Z71.3 NUTRITIONAL COUNSELING: ICD-10-CM

## 2024-05-07 DIAGNOSIS — Z09 FOLLOW-UP EXAM: ICD-10-CM

## 2024-05-07 DIAGNOSIS — I10 PRIMARY HYPERTENSION: Primary | ICD-10-CM

## 2024-05-07 DIAGNOSIS — R73.03 PREDIABETES: ICD-10-CM

## 2024-05-07 PROCEDURE — 99214 OFFICE O/P EST MOD 30 MIN: CPT | Performed by: NURSE PRACTITIONER

## 2024-05-07 NOTE — PROGRESS NOTES
"Chief Complaint  Follow Up Obesity     Subjective        Joselyn Grant presents to [facility] PRIMARY CARE  History of Present Illness    You have chosen to receive care through a telehealth visit.  Do you consent to use a video/audio connection for your medical care today? Yes    She is in town and we are doing a telehealth conference today.     She is currently in [place], [place] and I am currently in [place] doing a telehealth conference today.     Obesity follow-up.  She is down 10 pounds from her last visit in March.  Denies symptoms of Wegovy and is doing well at this time.    Has been taking 0.25 mg's weekly of semaglutide.     She is doing treadmill at this time and lifting weights.       Objective   Vital Signs:  Wt 112 kg (247 lb)   BMI 47.24 kg/m²   Estimated body mass index is 47.24 kg/m² as calculated from the following:    Height as of 3/26/24: 154 cm (60.63\").    Weight as of this encounter: 112 kg (247 lb).       Class 3 Severe Obesity (BMI >=40). Obesity-related health conditions include the following: hypertension and dyslipidemias. Obesity is improving with treatment. BMI is is above average; BMI management plan is completed. We discussed portion control, increasing exercise, and pharmacologic options including semaglutide .       Physical Exam  Constitutional:       Appearance: Normal appearance. She is obese.   Pulmonary:      Comments: Denies shortness of breath  Neurological:      Mental Status: She is alert and oriented to person, place, and time.   Psychiatric:         Behavior: Behavior normal.        Result Review :      Common labs          7/10/2023    09:07 3/13/2024    10:02   Common Labs   Glucose 98  90    BUN 19  17    Creatinine 0.92  0.93    Sodium 141  138    Potassium 4.4  4.9    Chloride 102  100    Calcium 10.6  9.9    Total Protein 7.4  7.4    Albumin 4.5  4.3    Total Bilirubin 0.2  0.3    Alkaline Phosphatase 89  110    AST (SGOT) 12  14    ALT " (SGPT) 12  8    WBC 4.72  4.67    Hemoglobin 12.9  13.2    Hematocrit 38.8  41.3    Platelets 246  279    Total Cholesterol 256     Triglycerides 139     HDL Cholesterol 71     LDL Cholesterol  160     Hemoglobin A1C 5.80  6.10                   Assessment and Plan     Diagnoses and all orders for this visit:    1. Primary hypertension (Primary)  Assessment & Plan:  Chronic/stable  Patient is continuing to monitor blood pressure at home.  Reports normal ranging anywhere from 120s systolic over 70s/80s diastolic.      2. Prediabetes  Assessment & Plan:  Prediabetic at 6.1.  Nutritional counseling has been provided.  Will recheck in 3 months.  She has been taking semaglutide 0.25 mg weekly at this time.  Will recheck A1c in 3 months.      3. Follow-up exam    4. Nutritional counseling           I spent 30 minutes caring for Joselyn on this date of service. This time includes time spent by me in the following activities:preparing for the visit, reviewing tests, obtaining and/or reviewing a separately obtained history, performing a medically appropriate examination and/or evaluation , counseling and educating the patient/family/caregiver, ordering medications, tests, or procedures, referring and communicating with other health care professionals , documenting information in the medical record, independently interpreting results and communicating that information with the patient/family/caregiver, and care coordination  Follow Up     Return in about 8 weeks (around 7/2/2024) for Recheck.  Patient was given instructions and counseling regarding her condition or for health maintenance advice. Please see specific information pulled into the AVS if appropriate.

## 2024-05-21 NOTE — ASSESSMENT & PLAN NOTE
Chronic/stable  Patient is continuing to monitor blood pressure at home.  Reports normal ranging anywhere from 120s systolic over 70s/80s diastolic.

## 2024-05-21 NOTE — ASSESSMENT & PLAN NOTE
Prediabetic at 6.1.  Nutritional counseling has been provided.  Will recheck in 3 months.  She has been taking semaglutide 0.25 mg weekly at this time.  Will recheck A1c in 3 months.

## 2024-07-25 ENCOUNTER — PATIENT MESSAGE (OUTPATIENT)
Dept: INTERNAL MEDICINE | Facility: CLINIC | Age: 59
End: 2024-07-25
Payer: COMMERCIAL

## 2024-07-29 NOTE — PROGRESS NOTES
Cardiology Outpatient Visit      Identification: Walt Gao is a 60 y.o. male who resides in [place], [place].     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  He had left hemiparesis and was found to have right MCA territory watershed CVA.  He is still recovering neurologically from this stroke.  He is no longer able to drive and is no longer working as he did as a .  During his hospitalization, echocardiogram showed normal LV function and negative bubble study.  He wore a Holter monitor after discharge which showed no atrial fibrillation.  He saw neurology who are concerned of atheroembolic or cardioembolic etiology.    He denies any angina or heart failure symptoms presently.  After leaving [facility], he was started on a whole new set of medications.  He did not bring his medication list with him to the office but my nurse called [facility] to reconcile meds.    Review of Systems   Cardiovascular:  Negative for chest pain, claudication, cyanosis, dyspnea on exertion, irregular heartbeat, leg swelling, near-syncope, orthopnea, palpitations, paroxysmal nocturnal dyspnea and syncope.   Respiratory:  Positive for sleep disturbances due to breathing, snoring and sputum production. Negative for cough, hemoptysis, shortness of breath and wheezing.        No Known Allergies      Current Outpatient Medications   Medication Instructions    acetaminophen (TYLENOL) 500 mg, Oral, Every 6 Hours PRN    aspirin 81 mg, Oral, Daily    atorvastatin (LIPITOR) 40 mg, Oral, Nightly    carvedilol (COREG) 6.25 mg, Oral, 2 Times Daily    dapagliflozin Propanediol (FARXIGA) 10 mg, Oral, Daily    escitalopram (LEXAPRO) 10 mg, Oral, Daily    loratadine (CLARITIN) 10 mg, Daily    Psyllium (METAMUCIL MULTIHEALTH FIBER) 51.7  " Physical Therapy Daily Progress Note    Visit #16    Subjective     Joselyn Grant reports: \"I'm feeling good today\"      Objective   See Exercise, Manual, and Modality Logs for complete treatment.       Assessment/Plan  Pt did well with addition of leg press, fluid knee motion with improved quad activation.  Progress per Plan of Care           Manual Therapy:    0     mins  40061;  Therapeutic Exercise:    40     mins  67515;     Neuromuscular Driss:    0    mins  08805;    Therapeutic Activity:     0     mins  12936;     Gait Trainin     mins  25001;     Ultrasound:     0     mins  40204;    Electrical Stimulation:    15     mins  26967 ( );    Timed Treatment:   40   mins   Total Treatment:     55   mins    Zena Longo, PT, DPT  Physical Therapist  [place] License #[id number]                    " "% packet 1 packet, Oral, Daily    spironolactone (ALDACTONE) 25 mg, Oral, Daily    tamsulosin (FLOMAX) 0.4 mg, Oral, Daily    torsemide (DEMADEX) 10 mg, Oral, Daily    valsartan (DIOVAN) 80 mg, Oral, Daily    vitamin B-12 (CYANOCOBALAMIN) 1,000 mcg, Daily         Objective     /98 (BP Location: Right arm, Patient Position: Sitting, Cuff Size: Adult)   Pulse 79   Ht 175.3 cm (69.02\")   Wt 91.2 kg (201 lb)   SpO2 99%   BMI 29.67 kg/m²       Constitutional:       Appearance: Healthy appearance.   Eyes:      General: No scleral icterus.  Neck:      Thyroid: No thyroid mass.      Vascular: No carotid bruit or JVD. JVD normal.   Pulmonary:      Effort: Pulmonary effort is normal.      Breath sounds: Normal breath sounds.   Cardiovascular:      Normal rate. Regular rhythm.      Murmurs: There is no murmur.      No gallop.    Edema:     Peripheral edema absent.   Skin:     General: Skin is warm. There is no cyanosis.   Neurological:      General: No focal deficit present.      Mental Status: Alert.   Psychiatric:         Attention and Perception: Attention normal.         Result Review  (reviewed with patient):            Lab Results   Component Value Date    GLUCOSE 106 (H) 06/24/2024    BUN 17 06/24/2024    CREATININE 1.67 (H) 06/24/2024    EGFR 46.9 (L) 06/24/2024    BCR 10.2 06/24/2024    K 4.4 06/24/2024    CO2 22.6 06/24/2024    CALCIUM 9.2 06/24/2024    ALBUMIN 4.5 06/24/2024    BILITOT 0.3 06/24/2024    AST 19 06/24/2024    ALT 28 06/24/2024     Lab Results   Component Value Date    WBC 6.94 06/24/2024    HGB 12.4 (L) 06/24/2024    HCT 34.9 (L) 06/24/2024    .4 (H) 06/24/2024     06/24/2024     Lab Results   Component Value Date    CHOL 197 06/03/2024    TRIG 90 06/03/2024    HDL 47 06/03/2024     (H) 06/03/2024     Lab Results   Component Value Date    HGBA1C 6.20 (H) 06/03/2024           Assessment     Diagnoses and all orders for this visit:    1. Cerebrovascular accident (CVA) due " to embolism of right middle cerebral artery (Primary)  Overview:  [facility] admission 6/2/2024 with left hemiparesis left facial droop with acute right MCA watershed infarcts  Echo (6/3/2024): LVEF 50%.  No significant valve abnormality.  Negative bubble study.  14 day monitor (6/3/2024): Average HR 68. No afib.  Suspected atheroembolic versus cardioembolic    Assessment & Plan:  Neurology suspects atheroembolic versus cardioembolic etiology  Holter monitor shows no atrial fibrillation  Recommend loop recorder placement to evaluate for occult atrial fibrillation    Orders:  -     Cancel: Case Request Cath Lab: Loop insertion  -     Loop Recorder Implant; Future  -     aspirin 81 MG EC tablet; Take 1 tablet by mouth Daily.  Dispense: 90 tablet; Refill: 3    2. Heart failure with improved ejection fraction (HFimpEF)  Overview:  Cardiac catheterization revealing nonobstructive CAD, 2013  LVEF 30% on heart catheterization, 2013  Echo (8/29/16): Moderate LVH. LVEF 23%. Left ventricular diastolic dysfunction (grade II) consistent with pseudonormalization.  Moderate mitral valve regurgitation  Echo (2/21/2017):  LVEF 50%  Echo (6/3/2024): LVEF 50%.  No significant valve abnormality.  Negative bubble study.    Assessment & Plan:  Stage C HFrEF with normalized LV systolic function  No heart failure symptoms presently  Continue valsartan, dapagliflozin, spironolactone  Resume HF approved beta-blocker with carvedilol 6.25 mg twice daily    Orders:  -     CBC (No Diff); Future  -     proBNP; Future  -     dapagliflozin Propanediol (Farxiga) 10 MG tablet; Take 10 mg by mouth Daily.  Dispense: 90 tablet; Refill: 3  -     spironolactone (ALDACTONE) 25 MG tablet; Take 1 tablet by mouth Daily.  Dispense: 90 tablet; Refill: 3  -     valsartan (DIOVAN) 80 MG tablet; Take 1 tablet by mouth Daily.  Dispense: 90 tablet; Refill: 3  -     torsemide (DEMADEX) 10 MG tablet; Take 1 tablet by mouth Daily.  Dispense: 90 tablet; Refill: 3  -      carvedilol (COREG) 6.25 MG tablet; Take 1 tablet by mouth 2 (Two) Times a Day.  Dispense: 180 tablet; Refill: 3    3. Chronic renal impairment, stage 3 (moderate), unspecified whether stage 3a or 3b CKD  -     Comprehensive Metabolic Panel; Future    4. Essential hypertension  Overview:  Target blood pressure <130/80 mmHg    Assessment & Plan:  Uncontrolled today but unclear what medications he has been taking  Start carvedilol 6.25 mg twice daily  Continue valsartan, torsemide, spironolactone      5. Hyperlipidemia LDL goal <70  Assessment & Plan:  Statin therapy indicated due to stroke  Resume atorvastatin at 40 mg daily    Orders:  -     Comprehensive Metabolic Panel; Future  -     LDL Cholesterol, Direct; Future  -     atorvastatin (LIPITOR) 40 MG tablet; Take 1 tablet by mouth Every Night.  Dispense: 90 tablet; Refill: 3    6. Type 2 diabetes mellitus without complication, without long-term current use of insulin  -     Hemoglobin A1c; Future          Plan   Obtain CMP, CBC, direct LDL, A1c  Start carvedilol 6.25 mg twice daily  Start aspirin 81 mg daily  Resume atorvastatin 40 mg daily  Schedule loop recorder placement to detect occult atrial fibrillation      Follow-up   Return in about 6 months (around 1/30/2025) for Follow-up with cardiology APRN/PA.        Larry Delgado MD, FACC, [facility]  7/30/2024

## 2024-08-09 ENCOUNTER — OFFICE VISIT (OUTPATIENT)
Dept: OBSTETRICS AND GYNECOLOGY | Facility: CLINIC | Age: 59
End: 2024-08-09
Payer: COMMERCIAL

## 2024-08-09 VITALS
HEIGHT: 61 IN | WEIGHT: 248 LBS | SYSTOLIC BLOOD PRESSURE: 161 MMHG | BODY MASS INDEX: 46.82 KG/M2 | DIASTOLIC BLOOD PRESSURE: 100 MMHG

## 2024-08-09 DIAGNOSIS — Z79.890 HORMONE REPLACEMENT THERAPY (HRT): ICD-10-CM

## 2024-08-09 DIAGNOSIS — Z01.419 WOMEN'S ANNUAL ROUTINE GYNECOLOGICAL EXAMINATION: Primary | ICD-10-CM

## 2024-08-09 DIAGNOSIS — Z78.0 POSTMENOPAUSE: ICD-10-CM

## 2024-08-09 DIAGNOSIS — I10 ESSENTIAL HYPERTENSION: ICD-10-CM

## 2024-08-09 DIAGNOSIS — R53.83 OTHER FATIGUE: ICD-10-CM

## 2024-08-09 NOTE — PROGRESS NOTES
GYN Annual Exam     Chief Complaint   Patient presents with    Gynecologic Exam     Pt here today for AE, Mammo 3/2024, Prev hyst,  pt states having issue with fatigue        Joselyn Grant is a 58 y.o. female who presents for annual well woman exam. She is postmenopausal. She denies vaginal spotting or discharge. She completes SBE monthly. Prior hysterectomy for AUB and fibroids. She is maintained on HRT since  for hot flashes and mood changes. C/o fatigue, hx low vitamin D and anemia. Most recent labs showed normal Hgb and vitamin D level. She continues vitamin D supplement. BP elevated today, she has not taken her medication today. She monitors at home and will continue to follow.      OB History          1    Para   1    Term   1            AB        Living             SAB        IAB        Ectopic        Molar        Multiple        Live Births                  Mammogram: 3/2024 normal  Dexa scan:  normal  Colonoscopy: , rpt due   Last Pap :  NIL  History of abnormal Pap smear: no  Family history of uterine, colon or ovarian cancer: no  Family history of breast cancer: no  History of abnormal mammogram: yes - prior benign breast biopsy     Menopause:  Bleeding since? yes  Vasomotor symptoms: yes  HRT: yes  Dyspareunia: no    Past Medical History:   Diagnosis Date    Allergic     Anemia     Anxiety     Arthritis     History of kidney stones     Hypertension     Knee pain     Migraine     Obesity     Other hyperlipidemia 2023    Risk factors for obstructive sleep apnea     Staph infection     LEFT KNEE ON ANTIBIOTICS    Urinary incontinence     wears pads       Past Surgical History:   Procedure Laterality Date    COLONOSCOPY N/A 2017    Procedure: COLONOSCOPY to cecum;  Surgeon: Ino Torres MD;  Location: [facility] ENDOSCOPY;  Service:     EYE SURGERY      lasix    HYSTERECTOMY      10 years ago for heavy menses and fibroids    KNEE SURGERY Left     MULTIPLE  SURGERIES TOTAL 7    LAPAROSCOPIC ASSISTED VAGINAL HYSTERECTOMY SALPINGO OOPHORECTOMY      LAPAROSCOPIC CHOLECYSTECTOMY      OOPHORECTOMY      NH REVJ TOTAL KNEE ARTHRP W/WO ALGRFT 1 COMPONENT Left 01/31/2017    Procedure: LT TOTAL KNEE ARTHROPLASTY REVISION;  Surgeon: Ha Oh MD;  Location: [facility] OR;  Service: Orthopedics    NH REVJ TOTAL KNEE ARTHRP W/WO ALGRFT 1 COMPONENT Left 06/02/2017    Procedure: LT TOTAL KNEE ARTHROPLASTY REVISION;  Surgeon: Ha Oh MD;  Location: [facility] OR;  Service: Orthopedics    REPLACEMENT TOTAL KNEE      left    STOMACH SURGERY      lapband    TOTAL KNEE ARTHROPLASTY REVISION Left 10/04/2018    Procedure: LEFT TOTAL KNEE ARTHROPLASTY REVISION;  Surgeon: Ha Oh MD;  Location: [facility] OR;  Service: Orthopedics         Current Outpatient Medications:     amLODIPine (NORVASC) 5 MG tablet, TAKE 1 TABLET DAILY, Disp: 60 tablet, Rfl: 0    atorvastatin (LIPITOR) 10 MG tablet, TAKE 1 TABLET DAILY, Disp: 60 tablet, Rfl: 5    estradiol (MINIVELLE, VIVELLE-DOT) 0.075 MG/24HR patch, PLACE 1 PATCH ON THE SKIN AS DIRECTED BY PROVIDER TWO TIMES A WEEK, Disp: 24 patch, Rfl: 3    hydroCHLOROthiazide 12.5 MG tablet, TAKE 1 TABLET DAILY, Disp: 60 tablet, Rfl: 0    ibuprofen (ADVIL,MOTRIN) 200 MG tablet, Take 3 tablets by mouth., Disp: , Rfl:     metoprolol succinate XL (TOPROL-XL) 100 MG 24 hr tablet, TAKE 1 TABLET DAILY, Disp: 60 tablet, Rfl: 0    NON FORMULARY, Semaglutide with b-12, Disp: , Rfl:     POTASSIUM PO, Take 99 mg by mouth Daily. 99 mg, Disp: , Rfl:     Semaglutide-Weight Management 0.25 MG/0.5ML solution auto-injector, Inject 0.5 mL under the skin into the appropriate area as directed 1 (One) Time Per Week. With b-12, Disp: , Rfl:     Xolair 150 MG/ML solution prefilled syringe injection, 1 mL Every 28 (Twenty-Eight) Days., Disp: , Rfl:     No Known Allergies    Social History     Tobacco Use    Smoking status: Never     "Smokeless tobacco: Never   Vaping Use    Vaping status: Never Used   Substance Use Topics    Alcohol use: Yes     Alcohol/week: 3.0 standard drinks of alcohol     Types: 2 Glasses of wine, 1 Shots of liquor per week     Comment: SOCIAL    Drug use: No       Family History   Problem Relation Age of Onset    Pancreatic cancer Mother     Hypertension Mother     No Known Problems Father     Fibroids Sister     Heart murmur Sister     Hypertension Sister     Lymphoma Maternal Uncle     Lung cancer Maternal Grandmother     Bell's palsy Brother     Hypertension Brother     Asthma Brother     No Known Problems Maternal Grandfather     No Known Problems Brother     Malig Hyperthermia Neg Hx     Breast cancer Neg Hx     Ovarian cancer Neg Hx     Uterine cancer Neg Hx     Colon cancer Neg Hx        Review of Systems   Constitutional:  Positive for fatigue. Negative for chills and fever.   Gastrointestinal:  Negative for abdominal distention, abdominal pain, nausea and vomiting.   Endocrine: Positive for heat intolerance. Negative for cold intolerance.   Genitourinary:  Negative for dyspareunia, dysuria, menstrual problem, pelvic pain, vaginal bleeding, vaginal discharge and vaginal pain.   Musculoskeletal:  Negative for gait problem.   Skin:  Negative for rash.   Neurological:  Negative for dizziness and headaches.   Hematological:  Does not bruise/bleed easily.   Psychiatric/Behavioral:  Negative for behavioral problems.        /100   Ht 154 cm (60.63\")   Wt 112 kg (248 lb)   BMI 47.43 kg/m²     Physical Exam  Constitutional:       General: She is not in acute distress.     Appearance: Normal appearance. She is obese. She is not ill-appearing, toxic-appearing or diaphoretic.   Genitourinary:      Vulva, bladder and urethral meatus normal.      No lesions in the vagina.      Right Labia: No rash, tenderness, lesions, skin changes or Bartholin's cyst.     Left Labia: No tenderness, lesions, skin changes, Bartholin's " cyst or rash.     No labial fusion noted.      No inguinal adenopathy present in the right or left side.     Vaginal cuff intact.     No vaginal discharge, erythema, tenderness, bleeding or ulceration.      No vaginal prolapse present.     No vaginal atrophy present.       Right Adnexa: not tender, not full, not palpable, no mass present and not absent.     Left Adnexa: not tender, not full, not palpable, no mass present and not absent.     No cervical motion tenderness, discharge, lesion, polyp, nabothian cyst or eversion.      Uterus is absent.      No urethral tenderness or mass present.      Pelvic exam was performed with patient in the lithotomy position.   Breasts:     Breasts are symmetrical.      Right: Present. No swelling, bleeding, inverted nipple, mass, nipple discharge, skin change, tenderness or breast implant.      Left: Present. No swelling, bleeding, inverted nipple, mass, nipple discharge, skin change, tenderness or breast implant.   HENT:      Head: Normocephalic and atraumatic.   Eyes:      Pupils: Pupils are equal, round, and reactive to light.   Cardiovascular:      Rate and Rhythm: Normal rate.   Pulmonary:      Effort: Pulmonary effort is normal.   Abdominal:      General: There is no distension.      Palpations: Abdomen is soft. There is no mass.      Tenderness: There is no abdominal tenderness. There is no guarding.      Hernia: No hernia is present. There is no hernia in the left inguinal area or right inguinal area.   Musculoskeletal:         General: Normal range of motion.      Cervical back: Normal range of motion and neck supple. No tenderness.   Lymphadenopathy:      Cervical: No cervical adenopathy.      Upper Body:      Right upper body: No supraclavicular, axillary or pectoral adenopathy.      Left upper body: No supraclavicular, axillary or pectoral adenopathy.      Lower Body: No right inguinal adenopathy. No left inguinal adenopathy.   Neurological:      General: No focal  deficit present.      Mental Status: She is alert and oriented to person, place, and time.      Cranial Nerves: No cranial nerve deficit.   Skin:     General: Skin is warm and dry.   Psychiatric:         Mood and Affect: Mood normal.         Behavior: Behavior normal.         Thought Content: Thought content normal.         Judgment: Judgment normal.   Vitals and nursing note reviewed.       Assessment    Diagnoses and all orders for this visit:    1. Women's annual routine gynecological examination (Primary)  -     IGP, Apt HPV,rfx 16 / 18,45    2. Postmenopause    3. Hormone replacement therapy (HRT)    4. Other fatigue  -     CBC & Differential  -     Comprehensive Metabolic Panel  -     Hemoglobin A1c  -     TSH  -     Vitamin D,25-Hydroxy  -     Testosterone    5. Essential hypertension       Plan     1) Breast Health - Clinical breast exam & mammogram yearly, Self breast awareness. Schedule screening mammogram.   2) Pap - collected  3) STD-no  4) Smoking status- nonsmoker  5) Colon health - screening colonoscopy recommended if not up to date  6)  Morbidly obese by BMI 47.43  7) Bone health - Weight bearing exercise, dietary calcium recommendations and vitamin D  reviewed.   8) Encouraged 150 minutes of exercise per week if not medically contraindicated  9) HRT refilled in May. She is happy with use and desires to continue.   10) Fatigue: repeating labs today. Encouraged to schedule with sleep medicine for sleep study.  11) HTN: She monitors BP at home, she thinks BP is elevated today because she drove from [place] to [place] last night, has not had AM dose of medication. She will monitor BP at home and f/u with PCP if she continues to see elevated results.    Return in about 1 year (around 8/9/2025) for Annual physical.    Rebeca Hood, APRN  8/9/2024  11:02 EDT

## 2024-08-10 LAB
25(OH)D3+25(OH)D2 SERPL-MCNC: 40.1 NG/ML (ref 30–100)
ALBUMIN SERPL-MCNC: 4.2 G/DL (ref 3.5–5.2)
ALBUMIN/GLOB SERPL: 1.4 G/DL
ALP SERPL-CCNC: 111 U/L (ref 39–117)
ALT SERPL-CCNC: 10 U/L (ref 1–33)
AST SERPL-CCNC: 14 U/L (ref 1–32)
BASOPHILS # BLD AUTO: 0.04 10*3/MM3 (ref 0–0.2)
BASOPHILS NFR BLD AUTO: 0.9 % (ref 0–1.5)
BILIRUB SERPL-MCNC: 0.3 MG/DL (ref 0–1.2)
BUN SERPL-MCNC: 14 MG/DL (ref 6–20)
BUN/CREAT SERPL: 17.5 (ref 7–25)
CALCIUM SERPL-MCNC: 9.5 MG/DL (ref 8.6–10.5)
CHLORIDE SERPL-SCNC: 104 MMOL/L (ref 98–107)
CO2 SERPL-SCNC: 27.6 MMOL/L (ref 22–29)
CREAT SERPL-MCNC: 0.8 MG/DL (ref 0.57–1)
EGFRCR SERPLBLD CKD-EPI 2021: 86.1 ML/MIN/1.73
EOSINOPHIL # BLD AUTO: 0.08 10*3/MM3 (ref 0–0.4)
EOSINOPHIL NFR BLD AUTO: 1.8 % (ref 0.3–6.2)
ERYTHROCYTE [DISTWIDTH] IN BLOOD BY AUTOMATED COUNT: 14 % (ref 12.3–15.4)
GLOBULIN SER CALC-MCNC: 3 GM/DL
GLUCOSE SERPL-MCNC: 84 MG/DL (ref 65–99)
HBA1C MFR BLD: 5.5 % (ref 4.8–5.6)
HCT VFR BLD AUTO: 38.3 % (ref 34–46.6)
HGB BLD-MCNC: 12.8 G/DL (ref 12–15.9)
IMM GRANULOCYTES # BLD AUTO: 0 10*3/MM3 (ref 0–0.05)
IMM GRANULOCYTES NFR BLD AUTO: 0 % (ref 0–0.5)
LYMPHOCYTES # BLD AUTO: 1.58 10*3/MM3 (ref 0.7–3.1)
LYMPHOCYTES NFR BLD AUTO: 35.3 % (ref 19.6–45.3)
MCH RBC QN AUTO: 29.8 PG (ref 26.6–33)
MCHC RBC AUTO-ENTMCNC: 33.4 G/DL (ref 31.5–35.7)
MCV RBC AUTO: 89.1 FL (ref 79–97)
MONOCYTES # BLD AUTO: 0.4 10*3/MM3 (ref 0.1–0.9)
MONOCYTES NFR BLD AUTO: 8.9 % (ref 5–12)
NEUTROPHILS # BLD AUTO: 2.38 10*3/MM3 (ref 1.7–7)
NEUTROPHILS NFR BLD AUTO: 53.1 % (ref 42.7–76)
NRBC BLD AUTO-RTO: 0 /100 WBC (ref 0–0.2)
PLATELET # BLD AUTO: 237 10*3/MM3 (ref 140–450)
POTASSIUM SERPL-SCNC: 4.5 MMOL/L (ref 3.5–5.2)
PROT SERPL-MCNC: 7.2 G/DL (ref 6–8.5)
RBC # BLD AUTO: 4.3 10*6/MM3 (ref 3.77–5.28)
SODIUM SERPL-SCNC: 142 MMOL/L (ref 136–145)
TESTOST SERPL-MCNC: 17 NG/DL (ref 4–50)
TSH SERPL DL<=0.005 MIU/L-ACNC: 1.29 UIU/ML (ref 0.27–4.2)
WBC # BLD AUTO: 4.48 10*3/MM3 (ref 3.4–10.8)

## 2024-08-14 LAB
CYTOLOGIST CVX/VAG CYTO: NORMAL
CYTOLOGY CVX/VAG DOC CYTO: NORMAL
CYTOLOGY CVX/VAG DOC THIN PREP: NORMAL
DX ICD CODE: NORMAL
HPV I/H RISK 4 DNA CVX QL PROBE+SIG AMP: NEGATIVE
Lab: NORMAL
OTHER STN SPEC: NORMAL
STAT OF ADQ CVX/VAG CYTO-IMP: NORMAL

## 2024-08-22 ENCOUNTER — OFFICE VISIT (OUTPATIENT)
Dept: INTERNAL MEDICINE | Facility: CLINIC | Age: 59
End: 2024-08-22
Payer: COMMERCIAL

## 2024-08-22 VITALS
OXYGEN SATURATION: 98 % | RESPIRATION RATE: 16 BRPM | HEIGHT: 61 IN | BODY MASS INDEX: 46.82 KG/M2 | HEART RATE: 80 BPM | WEIGHT: 248 LBS | TEMPERATURE: 96.3 F | DIASTOLIC BLOOD PRESSURE: 80 MMHG | SYSTOLIC BLOOD PRESSURE: 118 MMHG

## 2024-08-22 DIAGNOSIS — Z76.0 ENCOUNTER FOR MEDICATION REFILL: ICD-10-CM

## 2024-08-22 DIAGNOSIS — I10 PRIMARY HYPERTENSION: Primary | ICD-10-CM

## 2024-08-22 DIAGNOSIS — E78.49 OTHER HYPERLIPIDEMIA: ICD-10-CM

## 2024-08-22 DIAGNOSIS — E55.9 VITAMIN D DEFICIENCY: ICD-10-CM

## 2024-08-22 DIAGNOSIS — E66.01 MORBID OBESITY: ICD-10-CM

## 2024-08-22 DIAGNOSIS — Z00.00 ANNUAL PHYSICAL EXAM: ICD-10-CM

## 2024-08-22 PROCEDURE — 99396 PREV VISIT EST AGE 40-64: CPT | Performed by: NURSE PRACTITIONER

## 2024-08-22 RX ORDER — ONDANSETRON 4 MG/1
4 TABLET, ORALLY DISINTEGRATING ORAL EVERY 8 HOURS PRN
Qty: 14 TABLET | Refills: 1 | Status: SHIPPED | OUTPATIENT
Start: 2024-08-22 | End: 2024-08-29

## 2024-08-22 NOTE — PROGRESS NOTES
"Chief Complaint  Annual Exam  Obesity Follow Up    Subjective        Joselyn Grant presents to [facility]  History of Present Illness  History of Present Illness  The patient presents for evaluation of weight loss.    She reports a slow but steady weight loss, having lost approximately 10 pounds since her last visit. She has been on semaglutide 0.5 mg for the past month, which she takes every Tuesday.     Her exercise routine is limited due to knee pain, which she describes as a feeling of rejection from her knees. She has a treadmill at home and was using it daily until her knee pain became too severe. She struggles with planks and other exercises that put pressure on her knees.    Her bowel movements are regular, although they can be influenced by her diet. She typically eats two meals a day, with some snacking in between. Her diet includes coffee, grain biscuits, pizza, salad, chicken, vegetables, and chicken salad. She finds it difficult to eat more than one boiled egg due to its dryness. She has experienced periods of up to two days without a bowel movement, but finds that eating oatmeal can help stimulate bowel activity. She tries to incorporate chicken, beans, lentils, cheese, and vegetables into her diet.    She does not experience nausea and does not take Zofran. She avoids eating after 6:00 PM as it can cause morning nausea.    She is up to date with her dental and vision check-ups. Her OB/GYN recently conducted a blood test.       Objective   Vital Signs:  /80   Pulse 80   Temp 96.3 °F (35.7 °C)   Resp 16   Ht 154 cm (60.63\")   Wt 112 kg (248 lb)   SpO2 98%   BMI 47.43 kg/m²   Estimated body mass index is 47.43 kg/m² as calculated from the following:    Height as of this encounter: 154 cm (60.63\").    Weight as of this encounter: 112 kg (248 lb).          Physical Exam  Vitals and nursing note reviewed.   Constitutional:       Appearance: Normal appearance. " She is obese.   HENT:      Head: Normocephalic.      Nose: Nose normal.      Mouth/Throat:      Mouth: Mucous membranes are moist.   Eyes:      Pupils: Pupils are equal, round, and reactive to light.   Cardiovascular:      Rate and Rhythm: Normal rate and regular rhythm.      Pulses: Normal pulses.      Heart sounds: Normal heart sounds.      Comments: No peripheral edema  Pulmonary:      Effort: Pulmonary effort is normal. No respiratory distress.      Breath sounds: Normal breath sounds. No stridor. No wheezing, rhonchi or rales.      Comments: Denies shortness of breath  Chest:      Chest wall: No tenderness.   Musculoskeletal:         General: Normal range of motion.   Skin:     General: Skin is warm.      Capillary Refill: Capillary refill takes less than 2 seconds.   Neurological:      Mental Status: She is alert and oriented to person, place, and time.   Psychiatric:         Behavior: Behavior normal.        Physical Exam  Vital Signs  Patient's weight is 247 pounds.     Result Review :      Common labs          3/13/2024    10:02 8/9/2024    11:10   Common Labs   Glucose 90  84    BUN 17  14    Creatinine 0.93  0.80    Sodium 138  142    Potassium 4.9  4.5    Chloride 100  104    Calcium 9.9  9.5    Total Protein 7.4  7.2    Albumin 4.3  4.2    Total Bilirubin 0.3  0.3    Alkaline Phosphatase 110  111    AST (SGOT) 14  14    ALT (SGPT) 8  10    WBC 4.67  4.48    Hemoglobin 13.2  12.8    Hematocrit 41.3  38.3    Platelets 279  237    Hemoglobin A1C 6.10  5.50        Results  Laboratory Studies  Hemoglobin A1c was 5.5. Testosterone was normal. Thyroid function was normal. Vitamin D levels were normal.              Assessment and Plan     Diagnoses and all orders for this visit:    1. Primary hypertension (Primary)    2. Other hyperlipidemia  -     Lipid Panel    3. Morbid obesity    4. Vitamin D deficiency    5. Annual physical exam    6. Encounter for medication refill    Other orders  -     ondansetron ODT  (ZOFRAN-ODT) 4 MG disintegrating tablet; Place 1 tablet on the tongue Every 8 (Eight) Hours As Needed for Nausea or Vomiting for up to 7 days.  Dispense: 14 tablet; Refill: 1      Assessment & Plan  1. Weight loss.  Her blood pressure and pulse are within normal ranges. The hemoglobin A1c has improved from 6.1 to 5.5, indicating a decrease in the risk factor for diabetes. The CBC, CMP, Pap smear, testosterone, thyroid, and vitamin D results are all normal. However, her lipid panel was abnormal during the last evaluation. She was advised to incorporate exercises such as wall push-ups and seated squats into her routine. Dietary recommendations included increasing protein intake through foods like boiled eggs and protein shakes between meals. A prescription for semaglutide 1 mg was provided, with a plan to gradually increase the dose to 1.7 mg and then to 2.4 mg. Zofran was also prescribed to manage potential nausea associated with the increased dosage of semaglutide. A lipid panel will be ordered, which she can complete at her convenience. She was advised to take Colace or Metamucil as needed and to start a probiotic regimen.           I spent 30 minutes caring for Joselyn on this date of service. This time includes time spent by me in the following activities:preparing for the visit, reviewing tests, obtaining and/or reviewing a separately obtained history, performing a medically appropriate examination and/or evaluation , counseling and educating the patient/family/caregiver, ordering medications, tests, or procedures, referring and communicating with other health care professionals , documenting information in the medical record, independently interpreting results and communicating that information with the patient/family/caregiver, and care coordination  Follow Up     Return in about 8 weeks (around 10/17/2024) for Recheck.  Patient was given instructions and counseling regarding her condition or for health  maintenance advice. Please see specific information pulled into the AVS if appropriate.     Patient or patient representative verbalized consent for the use of Ambient Listening during the visit with  ARNAV Christensen for chart documentation. 8/22/2024  10:50 EDT

## 2024-10-04 DIAGNOSIS — R60.0 MILD PERIPHERAL EDEMA: ICD-10-CM

## 2024-10-04 DIAGNOSIS — I10 ESSENTIAL HYPERTENSION: ICD-10-CM

## 2024-10-04 DIAGNOSIS — I10 PRIMARY HYPERTENSION: ICD-10-CM

## 2024-10-04 RX ORDER — METOPROLOL SUCCINATE 100 MG/1
TABLET, EXTENDED RELEASE ORAL
Qty: 60 TABLET | Refills: 3 | Status: SHIPPED | OUTPATIENT
Start: 2024-10-04

## 2024-10-04 RX ORDER — HYDROCHLOROTHIAZIDE 12.5 MG/1
12.5 TABLET ORAL DAILY
Qty: 60 TABLET | Refills: 3 | Status: SHIPPED | OUTPATIENT
Start: 2024-10-04

## 2024-10-04 RX ORDER — AMLODIPINE BESYLATE 5 MG/1
5 TABLET ORAL DAILY
Qty: 60 TABLET | Refills: 3 | Status: SHIPPED | OUTPATIENT
Start: 2024-10-04

## 2024-10-24 DIAGNOSIS — R60.0 MILD PERIPHERAL EDEMA: ICD-10-CM

## 2024-10-24 DIAGNOSIS — I10 PRIMARY HYPERTENSION: ICD-10-CM

## 2024-10-24 DIAGNOSIS — I10 ESSENTIAL HYPERTENSION: ICD-10-CM

## 2024-10-24 RX ORDER — METOPROLOL SUCCINATE 100 MG/1
100 TABLET, EXTENDED RELEASE ORAL DAILY
Qty: 60 TABLET | Refills: 3 | Status: SHIPPED | OUTPATIENT
Start: 2024-10-24

## 2024-10-24 RX ORDER — AMLODIPINE BESYLATE 5 MG/1
5 TABLET ORAL DAILY
Qty: 60 TABLET | Refills: 3 | Status: SHIPPED | OUTPATIENT
Start: 2024-10-24

## 2024-10-24 RX ORDER — HYDROCHLOROTHIAZIDE 12.5 MG/1
12.5 TABLET ORAL DAILY
Qty: 60 TABLET | Refills: 3 | Status: SHIPPED | OUTPATIENT
Start: 2024-10-24

## 2024-10-24 NOTE — TELEPHONE ENCOUNTER
Caller: Joselyn Grant JUDY    Relationship: Self    Best call back number: [phone]     Requested Prescriptions:   Requested Prescriptions     Pending Prescriptions Disp Refills    amLODIPine (NORVASC) 5 MG tablet 60 tablet 3     Sig: Take 1 tablet by mouth Daily.    metoprolol succinate XL (TOPROL-XL) 100 MG 24 hr tablet 60 tablet 3    hydroCHLOROthiazide 12.5 MG tablet 60 tablet 3     Sig: Take 1 tablet by mouth Daily.        Pharmacy where request should be sent: [id number] [phone] [facility] [phone]      Last office visit with prescribing clinician: 8/22/2024   Last telemedicine visit with prescribing clinician: 5/7/2024   Next office visit with prescribing clinician: Visit date not found     Additional details provided by patient: PATIENT CALLING WANTING TO GET ABOUT 2 WEEK SUPPLY SHE STATED THAT THE MAIL ORDER OF THE MEDICATION IS DELAYED.    Does the patient have less than a 3 day supply:  [x] Yes  [] No    Aleksey Chris Rep   10/24/24 12:09 EDT

## 2024-11-26 NOTE — TELEPHONE ENCOUNTER
"Caller: Joselyn Grant    Relationship: Self    Best call back number: [phone]     Requested Prescriptions:   Requested Prescriptions     Pending Prescriptions Disp Refills    Semaglutide-Weight Management 0.25 MG/0.5ML solution auto-injector       Sig: Inject 2 mL under the skin into the appropriate area as directed 1 (One) Time Per Week. With [facility]        Pharmacy where request should be sent: [facility] \"COMPOUNDS ONLY\" - [place], [place] - [address] [facility] [phone] [facility] [phone]      Last office visit with prescribing clinician: 8/22/2024   Last telemedicine visit with prescribing clinician: Visit date not found   Next office visit with prescribing clinician: Visit date not found     Additional details provided by patient: PATIENT STATES SHE IS OUT     Does the patient have less than a 3 day supply:  [x] Yes  [] No    "

## 2025-03-24 DIAGNOSIS — Z79.890 HORMONE REPLACEMENT THERAPY (HRT): ICD-10-CM

## 2025-03-25 RX ORDER — ESTRADIOL 0.07 MG/D
FILM, EXTENDED RELEASE TRANSDERMAL
Qty: 24 PATCH | Refills: 3 | Status: SHIPPED | OUTPATIENT
Start: 2025-03-25

## 2025-05-01 ENCOUNTER — TELEPHONE (OUTPATIENT)
Dept: INTERNAL MEDICINE | Facility: CLINIC | Age: 60
End: 2025-05-01
Payer: COMMERCIAL

## 2025-05-01 RX ORDER — ATORVASTATIN CALCIUM 10 MG/1
10 TABLET, FILM COATED ORAL DAILY
Qty: 60 TABLET | Refills: 0 | Status: SHIPPED | OUTPATIENT
Start: 2025-05-01

## 2025-05-01 NOTE — TELEPHONE ENCOUNTER
Called pt to get her scheduled to see Mariann. Pt  said she was available June 11. I told her I would have to get an override from my practice manager and will give her a call if there is an issue. Pt requested a refill I stated to pt I can get her a refill but she has to come in and keep her appt.

## 2025-05-09 ENCOUNTER — TELEMEDICINE (OUTPATIENT)
Dept: INTERNAL MEDICINE | Facility: CLINIC | Age: 60
End: 2025-05-09
Payer: COMMERCIAL

## 2025-05-09 VITALS — WEIGHT: 241.6 LBS | BODY MASS INDEX: 46.21 KG/M2

## 2025-05-09 DIAGNOSIS — E66.01 MORBID OBESITY: Primary | ICD-10-CM

## 2025-05-09 RX ORDER — ONDANSETRON 4 MG/1
4 TABLET, ORALLY DISINTEGRATING ORAL EVERY 8 HOURS PRN
Qty: 28 TABLET | Refills: 0 | Status: SHIPPED | OUTPATIENT
Start: 2025-05-09 | End: 2025-05-16

## 2025-05-09 NOTE — PROGRESS NOTES
"Chief Complaint  Obesity    Subjective        Joselyn Grant presents to [facility]  History of Present Illness  History of Present Illness    You have chosen to receive care through a telehealth visit.  Do you consent to use a video/audio connection for your medical care today? Yes    Patient is in [place], [place] and I am on campus in [place], [place] doing a telehealth conference today.    The patient presents via virtual visit for weight management.    She has been on a regimen of semaglutide, administered at a dosage of 1 mg weekly. She reports a gradual weight loss, with her current weight recorded at 241 pounds. She has been engaging in water aerobics as part of her exercise routine, although she has had to reduce the frequency from three times to once a week due to knee pain, which necessitated a visit to her orthopedist for an injection. She is currently out of semaglutide and is approximately 2 to 3 weeks behind on her medication schedule. Despite this, she is reluctant to discontinue the medication as she perceives it to be beneficial, even though the progress is slow. She monitors her weight at home using a personal scale. She also reports an increase in energy levels and a noticeable difference in the fit of her clothes. She has been unable to achieve a weight below 240 pounds.    She is requesting a refill of Zofran in case she gets an upset stomach.         Objective   Vital Signs:  Wt 110 kg (241 lb 9.6 oz)   BMI 46.21 kg/m²   Estimated body mass index is 46.21 kg/m² as calculated from the following:    Height as of 8/22/24: 154 cm (60.63\").    Weight as of this encounter: 110 kg (241 lb 9.6 oz).               Physical Exam  Constitutional:       Appearance: Normal appearance.   Neurological:      Mental Status: She is alert and oriented to person, place, and time.   Psychiatric:         Behavior: Behavior normal.        Physical Exam       Result Review :      Common " labs          8/9/2024    11:10   Common Labs   Glucose 84    BUN 14    Creatinine 0.80    Sodium 142    Potassium 4.5    Chloride 104    Calcium 9.5    Albumin 4.2    Total Bilirubin 0.3    Alkaline Phosphatase 111    AST (SGOT) 14    ALT (SGPT) 10    WBC 4.48    Hemoglobin 12.8    Hematocrit 38.3    Platelets 237    Hemoglobin A1C 5.50        Results                Assessment and Plan        Morbid obesity  Patient's (Body mass index is 46.21 kg/m².) indicates that they are morbidly/severely obese (BMI > 40 or > 35 with obesity - related health condition) with health conditions that include hypertension and dyslipidemias . Weight is improving with treatment. BMI  is above average; BMI management plan is completed. We discussed portion control, increasing exercise, joining a fitness center or start home based exercise program, Weight Watchers or other Commercial based weight reduction program, pharmacologic options including Semaglutide, an roopa-based approach such as Jellycoaster Pal or Lose It, and Information on healthy weight added to patient's after visit summary.             Assessment & Plan  1. Weight management.  - She has been on semaglutide 1 mg weekly, which is not even maintenance. She has been experiencing a gradual weight loss, with her current weight recorded at 241 pounds. Her weight was 260 pounds in 2023.  - She has been engaging in water aerobics as part of her exercise routine, although she has had to reduce the frequency from three times to once a week due to knee pain. She is currently out of semaglutide and is approximately 2 to 3 weeks behind on her medication schedule.  - She is reluctant to discontinue the medication as she perceives it to be beneficial, even though the progress is slow. She monitors her weight at home using a personal scale. She also reports an increase in energy levels and a noticeable difference in the fit of her clothes. She has been unable to achieve a weight below 240  pounds. Discussed that [facility] is no longer compounding semaglutide due to legal issues. Two options were presented: transitioning to a weight loss clinic or switching to a branded medication. The patient opted for the latter.  - A prescription for Zepbound 2.5 mg weekly injections will be sent to [facility], and she will be contacted for payment. She is advised to inform us upon receipt and initiation of the medication. The semaglutide prescription will be discontinued.    2. Medication management.  - A prescription for Zofran 28 tablets will be sent to [facility] for mail delivery.         I spent 30 minutes caring for Joselyn on this date of service. This time includes time spent by me in the following activities:preparing for the visit, reviewing tests, obtaining and/or reviewing a separately obtained history, performing a medically appropriate examination and/or evaluation , counseling and educating the patient/family/caregiver, ordering medications, tests, or procedures, referring and communicating with other health care professionals , documenting information in the medical record, independently interpreting results and communicating that information with the patient/family/caregiver, and care coordination  Follow Up     Return in about 6 weeks (around 6/17/2025) for Recheck.  Patient was given instructions and counseling regarding her condition or for health maintenance advice. Please see specific information pulled into the AVS if appropriate.     Patient or patient representative verbalized consent for the use of Ambient Listening during the visit with  ARNAV Christensen for chart documentation. 5/23/2025  09:56 EDT

## 2025-05-09 NOTE — ASSESSMENT & PLAN NOTE
Patient's (Body mass index is 46.21 kg/m².) indicates that they are morbidly/severely obese (BMI > 40 or > 35 with obesity - related health condition) with health conditions that include hypertension and dyslipidemias . Weight is improving with treatment. BMI  is above average; BMI management plan is completed. We discussed portion control, increasing exercise, joining a fitness center or start home based exercise program, Weight Watchers or other Commercial based weight reduction program, pharmacologic options including Semaglutide, an roopa-based approach such as HCDC Pal or Lose It, and Information on healthy weight added to patient's after visit summary.

## 2025-05-20 ENCOUNTER — TELEPHONE (OUTPATIENT)
Dept: INTERNAL MEDICINE | Facility: CLINIC | Age: 60
End: 2025-05-20
Payer: COMMERCIAL

## 2025-05-20 NOTE — TELEPHONE ENCOUNTER
Caller: Rudy Joselyn A    Relationship: Self    Best call back number: [phone]     What is the best time to reach you: ANY TIME    Who are you requesting to speak with (clinical staff, provider,  specific staff member): JACQUELINE SU[facility]    Do you know the name of the person who called: JOSELYN DAVIS    What was the call regarding: STATES PRICE OF THE MEDICATION BELOW  HAS GONE UP TO OVER A $1,200.  SHE STATES SHE IS NOT GOING TO BE ABLE TO AFFORD THAT MEDICATION.  SHE WANTS TO KNOW WHAT [facility] RECOMMENDS  Tirzepatide-Weight Management (ZEPBOUND) 2.5 MG/0.5ML solution auto-injector     [id number] [address] AT [facility] - [phone]  - [phone]  [phone]     PLEASE ADVISE.

## 2025-05-21 RX ORDER — TIRZEPATIDE 2.5 MG/.5ML
2.5 INJECTION, SOLUTION SUBCUTANEOUS WEEKLY
Qty: 2 ML | Refills: 2 | Status: SHIPPED | OUTPATIENT
Start: 2025-05-21 | End: 2025-05-23

## 2025-05-21 NOTE — TELEPHONE ENCOUNTER
Please let her know that is the cost of the injector. I need to send in the vial from [facility] direct. Mariann

## 2025-06-11 ENCOUNTER — OFFICE VISIT (OUTPATIENT)
Dept: INTERNAL MEDICINE | Facility: CLINIC | Age: 60
End: 2025-06-11
Payer: COMMERCIAL

## 2025-06-11 VITALS
TEMPERATURE: 98 F | OXYGEN SATURATION: 100 % | HEIGHT: 61 IN | BODY MASS INDEX: 46.26 KG/M2 | DIASTOLIC BLOOD PRESSURE: 90 MMHG | HEART RATE: 80 BPM | WEIGHT: 245 LBS | SYSTOLIC BLOOD PRESSURE: 126 MMHG

## 2025-06-11 DIAGNOSIS — Z09 FOLLOW-UP EXAM: ICD-10-CM

## 2025-06-11 DIAGNOSIS — R73.03 PREDIABETES: Primary | ICD-10-CM

## 2025-06-11 DIAGNOSIS — E66.01 MORBID OBESITY: ICD-10-CM

## 2025-06-11 NOTE — PROGRESS NOTES
Chief Complaint  Obesity Follow Up     Subjective        Joselyn Grant presents to [facility]  History of Present Illness  History of Present Illness    The patient presents for weight management, night sweats, and hives.    She has recently initiated treatment with tirzepatide, having received 4 individual doses of 2.5 mg each. She is scheduled to take her second dose on Saturday. She reports experiencing headaches since starting the medication, even though she maintains a regular eating schedule. Her blood pressure remains within normal limits. She has been adhering to a diet and exercise regimen, which has resulted in a weight loss from 260 pounds to her current weight of 245 pounds. She is not interested in attending a weight loss clinic due to the frequent visits required. She is considering relocating to [place] and plans to explore weight loss clinics in that area. She has been on this medication since 03/2024, initially at a dose of 0.25 for 6 weeks, and has lost approximately 20 pounds. She has not yet checked with her insurance regarding coverage for semaglutide.    She is currently on Xolair, which she takes every 3 weeks. If she misses a dose, she experiences skin redness and hives. This condition first appeared during a trip to [place] and has persisted since then. The Xolair is effective as long as she adheres to the 3-week dosing schedule. If she extends the interval to 4 weeks, she experiences severe symptoms akin to a triple dose of chickenpox. She has undergone allergy testing, which did not reveal any specific allergens.    She has been experiencing night sweats since her medication was switched from Vivelle-Dot to a different patch by her pharmacy. She has an upcoming appointment with Rebeca Hood in about a month.       Objective   Vital Signs:  /90 (BP Location: Right arm, Patient Position: Sitting)   Pulse 80   Temp 98 °F (36.7 °C) (Oral)   Ht 154 cm  "(60.63\")   Wt 111 kg (245 lb)   SpO2 100%   BMI 46.86 kg/m²   Estimated body mass index is 46.86 kg/m² as calculated from the following:    Height as of this encounter: 154 cm (60.63\").    Weight as of this encounter: 111 kg (245 lb).     Physical Exam  Vitals and nursing note reviewed.   Constitutional:       Appearance: Normal appearance.   HENT:      Head: Normocephalic.      Right Ear: Tympanic membrane, ear canal and external ear normal.      Left Ear: Tympanic membrane, ear canal and external ear normal.      Nose: Nose normal.      Mouth/Throat:      Mouth: Mucous membranes are moist.   Eyes:      Pupils: Pupils are equal, round, and reactive to light.   Cardiovascular:      Rate and Rhythm: Normal rate and regular rhythm.      Pulses: Normal pulses.      Heart sounds: Normal heart sounds.      Comments: No peripheral edema noted  Pulmonary:      Effort: Pulmonary effort is normal. No respiratory distress.      Breath sounds: Normal breath sounds. No stridor. No wheezing, rhonchi or rales.      Comments: Denies SOB  Chest:      Chest wall: No tenderness.   Musculoskeletal:         General: Normal range of motion.   Skin:     General: Skin is warm.      Capillary Refill: Capillary refill takes less than 2 seconds.   Neurological:      Mental Status: She is alert and oriented to person, place, and time.   Psychiatric:         Behavior: Behavior normal.        Physical Exam       Result Review :      Common labs          8/9/2024    11:10   Common Labs   Glucose 84    BUN 14    Creatinine 0.80    Sodium 142    Potassium 4.5    Chloride 104    Calcium 9.5    Albumin 4.2    Total Bilirubin 0.3    Alkaline Phosphatase 111    AST (SGOT) 14    ALT (SGPT) 10    WBC 4.48    Hemoglobin 12.8    Hematocrit 38.3    Platelets 237    Hemoglobin A1C 5.50        Results                Assessment and Plan        Prediabetes         Morbid obesity  Patient's (Body mass index is 46.86 kg/m².) indicates that they are  with " "health conditions that include  . Weight is . BMI  . We discussed .          Follow-up exam            Assessment & Plan  1. Weight management.  - Headaches noted after starting tirzepatide.  - Weight decreased from 260 lbs to 245 lbs.  - Discussion about the cost of tirzepatide and potential alternatives, including weight loss clinics and obtaining medication from [facility].  - Instructed to continue diet and exercise, and to inquire about the cost of increasing tirzepatide dosage to 5 mg.    2. Night sweats.  - Night sweats reported after switching from Vivelle-Dot to a new patch.  - Advised to contact the pharmacy to request a return to Vivelle-Dot.  - Suggested to discuss with Rebeca Hood the possibility of adding \"no substitutions\" to her prescription.    3. Hives.  - Reports hives and itching if Xolair is not taken every 3 weeks.  - Symptoms described as severe, similar to a triple dose of chickenpox.  - Will continue with Xolair every 3 weeks to manage symptoms.         I spent 25 minutes caring for Joselyn on this date of service. This time includes time spent by me in the following activities:preparing for the visit, reviewing tests, obtaining and/or reviewing a separately obtained history, performing a medically appropriate examination and/or evaluation , counseling and educating the patient/family/caregiver, ordering medications, tests, or procedures, referring and communicating with other health care professionals , documenting information in the medical record, independently interpreting results and communicating that information with the patient/family/caregiver, and care coordination  Follow Up     Return for Next scheduled follow up.  Patient was given instructions and counseling regarding her condition or for health maintenance advice. Please see specific information pulled into the AVS if appropriate.     Patient or patient representative verbalized consent for the use of Ambient Listening during " the visit with  ARNAV Christensen for chart documentation. 6/11/2025  11:32 EDT

## 2025-06-11 NOTE — ASSESSMENT & PLAN NOTE
Patient's (Body mass index is 46.86 kg/m².) indicates that they are  with health conditions that include  . Weight is . BMI  . We discussed .

## 2025-06-16 ENCOUNTER — TELEPHONE (OUTPATIENT)
Dept: INTERNAL MEDICINE | Facility: CLINIC | Age: 60
End: 2025-06-16
Payer: COMMERCIAL

## 2025-06-16 NOTE — TELEPHONE ENCOUNTER
Patient returned call. MA confirmed with patient that she is taking weight loss medication. Patient stated she discussed weight loss medications and options with myrna during her visit on 06/11.

## 2025-07-02 DIAGNOSIS — Z79.890 HORMONE REPLACEMENT THERAPY (HRT): ICD-10-CM

## 2025-07-02 RX ORDER — ESTRADIOL 0.07 MG/D
1 PATCH, EXTENDED RELEASE TRANSDERMAL 2 TIMES WEEKLY
Qty: 24 EACH | Refills: 3 | Status: SHIPPED | OUTPATIENT
Start: 2025-07-03

## 2025-07-09 RX ORDER — ATORVASTATIN CALCIUM 10 MG/1
10 TABLET, FILM COATED ORAL DAILY
Qty: 60 TABLET | Refills: 5 | Status: SHIPPED | OUTPATIENT
Start: 2025-07-09

## 2025-08-13 DIAGNOSIS — Z79.890 HORMONE REPLACEMENT THERAPY (HRT): ICD-10-CM

## 2025-08-13 RX ORDER — ESTRADIOL 0.07 MG/D
1 PATCH, EXTENDED RELEASE TRANSDERMAL 2 TIMES WEEKLY
Qty: 24 EACH | Refills: 3 | Status: SHIPPED | OUTPATIENT
Start: 2025-08-14

## 2025-08-26 ENCOUNTER — PROCEDURE VISIT (OUTPATIENT)
Dept: OBSTETRICS AND GYNECOLOGY | Facility: CLINIC | Age: 60
End: 2025-08-26
Payer: COMMERCIAL

## 2025-08-26 ENCOUNTER — OFFICE VISIT (OUTPATIENT)
Dept: OBSTETRICS AND GYNECOLOGY | Facility: CLINIC | Age: 60
End: 2025-08-26
Payer: COMMERCIAL

## 2025-08-26 VITALS
WEIGHT: 248 LBS | BODY MASS INDEX: 46.82 KG/M2 | SYSTOLIC BLOOD PRESSURE: 153 MMHG | DIASTOLIC BLOOD PRESSURE: 88 MMHG | HEIGHT: 61 IN

## 2025-08-26 DIAGNOSIS — Z12.31 VISIT FOR SCREENING MAMMOGRAM: Primary | ICD-10-CM

## 2025-08-26 DIAGNOSIS — Z79.890 HORMONE REPLACEMENT THERAPY (HRT): ICD-10-CM

## 2025-08-26 DIAGNOSIS — Z01.419 WOMEN'S ANNUAL ROUTINE GYNECOLOGICAL EXAMINATION: Primary | ICD-10-CM

## 2025-08-26 DIAGNOSIS — Z78.0 POSTMENOPAUSE: ICD-10-CM

## 2025-08-26 PROCEDURE — 99396 PREV VISIT EST AGE 40-64: CPT | Performed by: NURSE PRACTITIONER

## 2025-08-26 RX ORDER — HYALURONATE SODIUM 20 MG/2 ML
SYRINGE (ML) INTRAARTICULAR
COMMUNITY
Start: 2025-06-24

## 2025-08-26 RX ORDER — ESTRADIOL 0.1 MG/D
1 FILM, EXTENDED RELEASE TRANSDERMAL 2 TIMES WEEKLY
Qty: 24 EACH | Refills: 3 | Status: SHIPPED | OUTPATIENT
Start: 2025-08-28

## 2025-08-26 RX ORDER — OMALIZUMAB 300 MG/2ML
INJECTION, SOLUTION SUBCUTANEOUS
COMMUNITY
Start: 2025-08-25

## (undated) DEVICE — PK KN TOTL 40

## (undated) DEVICE — DRSNG GZ PETROLTM XEROFORM CURAD 1X8IN STRL

## (undated) DEVICE — KT DRN EVAC WND PVC PCH WTROC RND 10F400

## (undated) DEVICE — SUT ETHIB 0 CT1 CR8 18IN CX21D

## (undated) DEVICE — CEMENT MIXING SYSTEM WITH FEMORAL BREAKWAY NOZZLE: Brand: REVOLUTION

## (undated) DEVICE — DRAPE,REIN 53X77,STERILE: Brand: MEDLINE

## (undated) DEVICE — SOL ISO/ALC RUB 70PCT 4OZ

## (undated) DEVICE — DRSNG SURESITE WNDW 4X4.5

## (undated) DEVICE — DRAPE,U/ SHT,SPLIT,PLAS,STERIL: Brand: MEDLINE

## (undated) DEVICE — SUT VIC 0 CT1 36IN J946H

## (undated) DEVICE — IMMOB KN 3PNL DLX CANVS 22IN BLU

## (undated) DEVICE — OCCLUSIVE GAUZE STRIP,3% BISMUTH TRIBROMOPHENATE IN PETROLATUM BLEND: Brand: XEROFORM

## (undated) DEVICE — APPL CHLORAPREP W/TINT 26ML ORNG

## (undated) DEVICE — UNDERCAST PADDING: Brand: DEROYAL

## (undated) DEVICE — OPTIFOAM GENTLE SA, POSTOP, 4X12: Brand: MEDLINE

## (undated) DEVICE — PREP IM ENCHANCED TOTAL HIP BONE                                    PREPARATION KIT: Brand: PREP-IM

## (undated) DEVICE — BNDG ELAS ELITE V/CLOSE 6IN 5YD LF STRL

## (undated) DEVICE — ANTIBACTERIAL UNDYED BRAIDED (POLYGLACTIN 910), SYNTHETIC ABSORBABLE SUTURE: Brand: COATED VICRYL

## (undated) DEVICE — PAD,ABDOMINAL,8"X10",ST,LF: Brand: MEDLINE

## (undated) DEVICE — PROXIMATE RH ROTATING HEAD SKIN STAPLERS (35 WIDE) CONTAINS 35 STAINLESS STEEL STAPLES: Brand: PROXIMATE

## (undated) DEVICE — PIN HOLD TEMP NOHEAD FLUT 1/8X3.5IN

## (undated) DEVICE — GLV SURG TRIUMPH CLASSIC PF LTX 6.5 STRL

## (undated) DEVICE — GLV SURG TRIUMPH CLASSIC PF LTX 8 STRL

## (undated) DEVICE — DUAL CUT SAGITTAL BLADE

## (undated) DEVICE — NDL SPINE 22G 31/2IN BLK

## (undated) DEVICE — ENCORE® LATEX ORTHO SIZE 8, STERILE LATEX POWDER-FREE SURGICAL GLOVE: Brand: ENCORE

## (undated) DEVICE — THIN OFFSET (13.0 X 0.38 X 39.0MM)

## (undated) DEVICE — SKIN PREP TRAY W/CHG: Brand: MEDLINE INDUSTRIES, INC.

## (undated) DEVICE — GLV SURG BIOGEL LTX PF 8

## (undated) DEVICE — COAXIAL FEMORAL CANAL TIP

## (undated) DEVICE — CANN NASL CO2 TRULINK W/O2 A/

## (undated) DEVICE — TBG 02 CRUSH RESIST LF CLR 7FT

## (undated) DEVICE — DRSNG WND GEL FIBR OPTICELL AG PLS W/SLV LF 4X5IN  STRL

## (undated) DEVICE — SPNG GZ WOVN 4X4IN 12PLY 10/BX STRL

## (undated) DEVICE — ENCORE® LATEX ORTHO SIZE 6.5, STERILE LATEX POWDER-FREE SURGICAL GLOVE: Brand: ENCORE

## (undated) DEVICE — TOWEL,OR,DSP,ST,NATURAL,DLX,4/PK,20PK/CS: Brand: MEDLINE

## (undated) DEVICE — GLV SURG BIOGEL LTX PF 6 1/2

## (undated) DEVICE — THE TORRENT IRRIGATION SCOPE CONNECTOR IS USED WITH THE TORRENT IRRIGATION TUBING TO PROVIDE IRRIGATION FLUIDS SUCH AS STERILE WATER DURING GASTROINTESTINAL ENDOSCOPIC PROCEDURES WHEN USED IN CONJUNCTION WITH AN IRRIGATION PUMP (OR ELECTROSURGICAL UNIT).: Brand: TORRENT

## (undated) DEVICE — Device: Brand: DEFENDO AIR/WATER/SUCTION AND BIOPSY VALVE

## (undated) DEVICE — SIGMA LCS HIGH PERFORMANCE INSTRUMENTS STERILE THREADED PINS: Brand: SIGMA LCS HIGH PERFORMANCE

## (undated) DEVICE — SYR CONTRL LUERLOK 10CC

## (undated) DEVICE — TUBING, SUCTION, 1/4" X 10', STRAIGHT: Brand: MEDLINE